# Patient Record
Sex: FEMALE | Race: WHITE | NOT HISPANIC OR LATINO | Employment: UNEMPLOYED | URBAN - METROPOLITAN AREA
[De-identification: names, ages, dates, MRNs, and addresses within clinical notes are randomized per-mention and may not be internally consistent; named-entity substitution may affect disease eponyms.]

---

## 2017-01-16 ENCOUNTER — CARE COORDINATION (OUTPATIENT)
Dept: CARE COORDINATION | Facility: CLINIC | Age: 57
End: 2017-01-16

## 2017-01-16 NOTE — PROGRESS NOTES
Clinic Care Coordination Contact - Social Work - Follow-Up - 1-23-17  OUTREACH    Referral Information:  Referral Source: Specialist (Dr. Deal)  Reason for Contact:  Follow up regarding   Care Conference: No     Universal Utilization:   ED Visits in last year: 0  Hospital visits in last year: 0  Last PCP appointment: 08/08/16  Missed Appointments: 0  Concerns:  (no)  Multiple Providers or Specialists: FP, Ortho, PT    Clinical Concerns:  Current Medical Concerns:      Bursitis, hip   Primary osteoarthritis of both hips   Health Care Home   GERD (gastroesophageal reflux disease)   Hypothyroidism   Obesity   Bipolar 2 disorder (H)   Peripheral edema   KALPESH (obstructive sleep apnea)   Hypertension goal BP (blood pressure) < 140/90   Hyperlipidemia LDL goal <130   Mild depressed bipolar I disorder (H)   Degenerative joint disease (DJD) of hip             Current Behavioral Concerns:  bipolar    Education Provided to patient: Yes, regarding mental health resources   Clinical Pathway Name: None      Medication Management:  Pt does own     Functional Status:  Mobility Status: Independent w/Device  Equipment Currently Used at Home: walker, rolling  Transportation:  Pt has car, son and boyfriend drive           Psychosocial:  Current living arrangement:  I live in a private home with family (son), Other (boyfriend resides with pt also)  Financial/Insurance:  Finances are tight, pt and boyfriend both receive disability and son receives food support/[employer], pt has used food shelves in the past though declines now due to the type of food they provide.     Resources and Interventions:    SW contacted pt via phone today and talked at length.  Pt's situation remains much the same as in previous contacts.  Pt reports that she has been unable to exercise or lose weight in preparation for hip surgery.  Pt reports that she has pain which makes it difficult for her to exercise or get around.  Pt's son, who has asperger's,  assists pt with some things around the home.  Pt's son is involved with services at [place].  Pt's father remains ill with cancer and pt's cat  recently.  Pt acknowledges that she has bipolar disorder and struggles more with it in the winter months.  Pt continues to see a psychiatrist regulary and takes mental health medications as listed on her medication list.  SW discussed with pt the benefits of counseling though pt declines.  Pt reports she saw a counselor for one year in [phone] though does not want to pursue it now.  SW discussed at length with pt her desire to get a second opinion regarding potential hip surgery.  Pt has not pursued a second opinion thugh communicates that she wants to.  SW discussed pt focusing in the present as pt gets overwhelmed with thinking ahead regarding all the decisions she would need to make regarding surgery.  SW discussed pt focusing only on setting up an orthopedic appt.  Pt receptive to doing this.  SW provided active listening, encouragement, and support to pt during contact.      Current Resources:  Pt to talk to her friend regarding orthopedic options in [facility].  PAS Number:  (na)  Senior Linkage Line Referral Placed:  (na)  Advanced Care Plans/Directives on file:: No  Referrals Placed:  (none)     Goals:   Goal 1 Statement:  (I would like a second opinion regarding surgery)  Goal 1 Progression Percent: 10%  Goal 1 Progression Date: 17              Barriers:  Pt having difficulty moving forward with setting up ortho appt as pt becomes overwhelmed with process   Strengths:  Pt communicates desire to get 2nd opinion      Patient/Caregiver understanding:  Pt has lack of insight into her situation  Frequency of Care Coordination:  (prn)  Upcoming appointment:  (none scheduled)     Plan:  SW will follow up with pt in one month regarding getting ortho appt set up.       DOUGLAS Ferguson  Care Coordinator - Social Work  [facility]  Office:  [phone]  1/23/2017 11:00 AM    Clinic Care Coordination Contact - Social Work - Follow-Up - 1-16-17  [facility]/Voicemail    Referral Source: Specialist (Dr. Deal)  Clinical Data: SW/Care Coordinator Outreach to pt regarding hip surgery.    Outreach attempted x1.  Left message on voicemail with call back information and requested return call.  Plan: SW/Care Coordinator will follow up with pt in 3-5 days if pt does not call SW back by then.    DOUGLAS Ferguson  Care Coordinator - Social Work  [facility]  Office:  [phone]  1/16/2017 9:46 AM

## 2017-02-08 ENCOUNTER — TELEPHONE (OUTPATIENT)
Dept: FAMILY MEDICINE | Facility: CLINIC | Age: 57
End: 2017-02-08

## 2017-02-08 DIAGNOSIS — K21.9 GASTROESOPHAGEAL REFLUX DISEASE WITHOUT ESOPHAGITIS: Primary | ICD-10-CM

## 2017-02-08 RX ORDER — ESOMEPRAZOLE MAGNESIUM 40 MG/1
40 CAPSULE, DELAYED RELEASE ORAL 2 TIMES DAILY
Qty: 180 CAPSULE | Refills: 1 | Status: SHIPPED | OUTPATIENT
Start: 2017-02-08 | End: 2017-06-21

## 2017-02-08 NOTE — TELEPHONE ENCOUNTER
Patient notified, she will call back with any questions or concerns.    SANJAY Youssef, Clinical RN [facility].

## 2017-02-08 NOTE — TELEPHONE ENCOUNTER
Please notify patient that I have sent the Nexium to her pharmacy for the BID dosing and she should f/u with Dr. Mccloud for further refills.  She should try and see if her symptoms are relieved with just once per day dosing of this medication.     Will forward to SBF as an FYI to f/u on  I see that it has been BID dosing for quite some time, however at last visit PPI risk was discussed (no mention of a dose change but the sig was changed at that appointment, the quantity was not changed).   7. Gastroesophageal reflux disease without esophagitis  Controlled but PPI risk discussed. Discussed hiatal hernia repair as better long term treatment she will think about his.  - esomeprazole (NEXIUM) 40 MG capsule; Take 1 capsule (40 mg) by mouth every morning (before breakfast) Take 30-60 minutes before eating.  Dispense: 180 capsule; Refill: 1    Raissa Newell PA-C

## 2017-02-08 NOTE — TELEPHONE ENCOUNTER
..Reason for Call:  Prescription for the esomeprazole and change dosing/needs new script    Detailed comments: script should say: take 2 per day, qty of 180; it is written :  take one for breakfast so filled only for # 90 for 90 day supply; has been getting it for a long time for 2 per day, now is close to being out  [facility] [phone]  Phone Number Patient can be reached at: Home number on file [phone] (home)    Best Time: anytime    Can we leave a detailed message on this number? YES    Call taken on 2/8/2017 at 12:19 PM by Marie Johnson

## 2017-02-23 ENCOUNTER — CARE COORDINATION (OUTPATIENT)
Dept: CARE COORDINATION | Facility: CLINIC | Age: 57
End: 2017-02-23

## 2017-02-23 NOTE — PROGRESS NOTES
Clinic Care Coordination Contact - Social Work - Follow-Up 3-2-17  Care Team Conversations    SW contacted pt via phone today to follow-up regarding getting a second opinion regarding hip surgery.  Pt reports that she plans to make appt with MD in [place] within this month, near where her friend lives, to get 2nd opinion.  SW provided encouragement and support during contact.  SW will follow up with pt in 4 weeks.     DOUGLAS Ferguson  Care Coordinator - [facility] Work  [facility]  Office:  [phone]  3/2/2017 3:18 PM    Clinic Care Coordination Contact - Social Work - Follow-Up - 2-23-17  [facility]/Voicemail    Referral Source: Specialist (Dr. Deal)  Clinical Data: SW/Care Coordinator Outreach to follow-up with pt regarding second opinion for hip surgery  Outreach attempted x1.  Left message on voicemail with call back information and requested return call.  Plan: SW/Care Coordinator will follow-up with pt in 3-5 days if pt does not call SW back by then     DOUGLAS Ferguson  Care Coordinator - Social Work  [facility]  Office:  [phone]  2/23/2017 11:21 AM

## 2017-03-27 ENCOUNTER — OFFICE VISIT (OUTPATIENT)
Dept: FAMILY MEDICINE | Facility: CLINIC | Age: 57
End: 2017-03-27
Payer: COMMERCIAL

## 2017-03-27 VITALS
TEMPERATURE: 98.4 F | BODY MASS INDEX: 43.38 KG/M2 | HEIGHT: 65 IN | WEIGHT: 260.38 LBS | SYSTOLIC BLOOD PRESSURE: 132 MMHG | OXYGEN SATURATION: 96 % | HEART RATE: 66 BPM | DIASTOLIC BLOOD PRESSURE: 80 MMHG

## 2017-03-27 DIAGNOSIS — M16.4 POST-TRAUMATIC OSTEOARTHRITIS OF BOTH HIPS: Primary | ICD-10-CM

## 2017-03-27 DIAGNOSIS — E66.01 MORBID OBESITY DUE TO EXCESS CALORIES (H): ICD-10-CM

## 2017-03-27 DIAGNOSIS — N32.89 BLADDER SPASM: ICD-10-CM

## 2017-03-27 DIAGNOSIS — M16.0 PRIMARY OSTEOARTHRITIS OF BOTH HIPS: ICD-10-CM

## 2017-03-27 DIAGNOSIS — K21.9 GASTROESOPHAGEAL REFLUX DISEASE, ESOPHAGITIS PRESENCE NOT SPECIFIED: ICD-10-CM

## 2017-03-27 DIAGNOSIS — F31.81 BIPOLAR 2 DISORDER (H): ICD-10-CM

## 2017-03-27 LAB
ALBUMIN UR-MCNC: ABNORMAL MG/DL
APPEARANCE UR: CLEAR
BACTERIA #/AREA URNS HPF: ABNORMAL /HPF
BILIRUB UR QL STRIP: NEGATIVE
COLOR UR AUTO: YELLOW
GLUCOSE UR STRIP-MCNC: NEGATIVE MG/DL
HGB UR QL STRIP: NEGATIVE
HYALINE CASTS #/AREA URNS LPF: ABNORMAL /LPF (ref 0–2)
KETONES UR STRIP-MCNC: ABNORMAL MG/DL
LEUKOCYTE ESTERASE UR QL STRIP: NEGATIVE
MUCOUS THREADS #/AREA URNS LPF: PRESENT /LPF
NITRATE UR QL: NEGATIVE
NON-SQ EPI CELLS #/AREA URNS LPF: ABNORMAL /LPF
PH UR STRIP: 6 PH (ref 5–7)
RBC #/AREA URNS AUTO: ABNORMAL /HPF (ref 0–2)
SP GR UR STRIP: 1.02 (ref 1–1.03)
URN SPEC COLLECT METH UR: ABNORMAL
UROBILINOGEN UR STRIP-ACNC: 1 EU/DL (ref 0.2–1)
WBC #/AREA URNS AUTO: ABNORMAL /HPF (ref 0–2)

## 2017-03-27 PROCEDURE — 81001 URINALYSIS AUTO W/SCOPE: CPT | Performed by: FAMILY MEDICINE

## 2017-03-27 PROCEDURE — 99214 OFFICE O/P EST MOD 30 MIN: CPT | Performed by: FAMILY MEDICINE

## 2017-03-27 NOTE — NURSING NOTE
"Chief Complaint   Patient presents with     Medication Problem       Initial /80  Pulse 66  Temp 98.4  F (36.9  C) (Oral)  Ht 5' 5\" (1.651 m)  Wt 260 lb 6 oz (118.1 kg)  SpO2 96%  Breastfeeding? No  BMI 43.33 kg/m2 Estimated body mass index is 43.33 kg/(m^2) as calculated from the following:    Height as of this encounter: 5' 5\" (1.651 m).    Weight as of this encounter: 260 lb 6 oz (118.1 kg).  Medication Reconciliation: complete     Radha Lane CMA      "

## 2017-03-27 NOTE — MR AVS SNAPSHOT
After Visit Summary   3/27/2017    Geovanna Aguilar    MRN: [id number]           Patient Information     Date Of Birth          1960        Visit Information        Provider Department      3/27/2017 1:20 PM Leah Mae MD [facility]        Today's Diagnoses     Post-traumatic osteoarthritis of both hips    -  1    Primary osteoarthritis of both hips        Gastroesophageal reflux disease, esophagitis presence not specified        Bladder spasm        Bipolar 2 disorder (H)        Morbid obesity due to excess calories (H)           Follow-ups after your visit        Additional Services     GASTROENTEROLOGY ADULT REF PROCEDURE ONLY       Last Lab Result: Creatinine (mg/dL)       Date                     Value                 08/08/2016               0.83             ----------  Body mass index is 43.33 kg/(m^2).     Needed:  No  Language:  English    Patient will be contacted to schedule procedure.     Please be aware that coverage of these services is subject to the terms and limitations of your health insurance plan.  Call member services at your health plan with any benefit or coverage questions.  Any procedures must be performed at a [place] facility OR coordinated by your clinic's referral office.    Please bring the following with you to your appointment:    (1) Any X-Rays, CTs or MRIs which have been performed.  Contact the facility where they were done to arrange for  prior to your scheduled appointment.    (2) List of current medications   (3) This referral request   (4) Any documents/labs given to you for this referral            ORTHO  REFERRAL       [facility] is referring you to the Orthopedic  Services at [facility].       The  Representative will assist you in the coordination of your Orthopedic and Musculoskeletal Care as prescribed by your physician.    The  [facility] Representative will call you within 1 business day to help schedule your appointment, or you may contact the  Representative at:    All areas ~ [phone]     Type of Referral : Surgical / Specialist Requesting [place]      Timeframe requested: Routine    Coverage of these services is subject to the terms and limitations of your health insurance plan.  Please call member services at your health plan with any benefit or coverage questions.      If X-rays, CT or MRI's have been performed, please contact the facility where they were done to arrange for , prior to your scheduled appointment.  Please bring this referral request to your appointment and present it to your specialist.                  Your next 10 appointments already scheduled     Mar 27, 2017  1:20 PM CDT   Office Visit with Leah Rhoades MD   [facility]    [address] [place] [id number] [phone]           Bring a current list of meds and any records pertaining to this visit.  For Physicals, please bring immunization records and any forms needing to be filled out.  Please arrive 10 minutes early to complete paperwork.              Who to contact     If you have questions or need follow up information about today's clinic visit or your schedule please contact [facility] directly at [phone].  Normal or non-critical lab and imaging results will be communicated to you by MyChart, letter or phone within 4 business days after the clinic has received the results. If you do not hear from us within 7 days, please contact the clinic through MyChart or phone. If you have a critical or abnormal lab result, we will notify you by phone as soon as possible.  Submit refill requests through [employer] or call your pharmacy and they will forward the refill request to us. Please allow 3 business days for your refill to be  "completed.          Additional Information About Your Visit        Tech[employer]er[employer]StyleChat by [employer] Mobile Information     Precogn[employer] lets you send messages to your doctor, view your test results, renew your prescriptions, schedule appointments and more. To sign up, go to [email or web address] . Click on \"Log in\" on the left side of the screen, which will take you to the Welcome page. Then click on \"Sign up Now\" on the right side of the page.     You will be asked to enter the access code listed below, as well as some personal information. Please follow the directions to create your username and password.     Your access code is: [email or web address]  Expires: 2017  1:18 PM     Your access code will  in 90 days. If you need help or a new code, please call your [place] clinic or [phone].        Care EveryWhere ID     This is your [facility] EveryWhere ID. This could be used by other organizations to access your [place] medical records  [id number]        Your Vitals Were     Pulse Temperature Height Pulse Oximetry Breastfeeding? BMI (Body Mass Index)    66 98.4  F (36.9  C) (Oral) 5' 5\" (1.651 m) 96% No 43.33 kg/m2       Blood Pressure from Last 3 Encounters:   17 132/80   16 130/69   16 122/64    Weight from Last 3 Encounters:   17 260 lb 6 oz (118.1 kg)   16 275 lb 8 oz (125 kg)   16 275 lb (124.7 kg)              We Performed the Following     *UA reflex to Microscopic and Culture ([place] and [facility] (except [place] and [place])     GASTROENTEROLOGY ADULT REF PROCEDURE ONLY     ORTHO  REFERRAL        Primary Care Provider Office Phone # Fax #    Leah Rhoades [phone]       [facility] [address]  [facility] [address]        Thank you!     Thank you for choosing [facility]  for your care. Our goal is always to provide you with excellent care. Hearing back from our patients is one way we can continue to " improve our services. Please take a few minutes to complete the written survey that you may receive in the mail after your visit with us. Thank you!             Your Updated Medication List - Protect others around you: Learn how to safely use, store and throw away your medicines at www.disposemymeds.org.          This list is accurate as of: 3/27/17  1:18 PM.  Always use your most recent med list.                   Brand Name Dispense Instructions for use    AMBIEN 10 MG tablet   Generic drug:  zolpidem      1 TABLET AT BEDTIME       buPROPion 300 MG 24 hr tablet    WELLBUTRIN XL     1 TABLET DAILY       cyclobenzaprine 10 MG tablet    FLEXERIL    30 tablet    Take 1 tablet (10 mg) by mouth 3 times daily as needed for muscle spasms       diclofenac 75 MG EC tablet    VOLTAREN    60 tablet    Take 1 tablet (75 mg) by mouth 2 times daily as needed for moderate pain       divalproex 500 MG 24 hr tablet    DEPAKOTE ER     Reported on 3/27/2017       esomeprazole 40 MG CR capsule    nexIUM    180 capsule    Take 1 capsule (40 mg) by mouth 2 times daily Take 30-60 minutes before eating. Needs to be seen by provider for further refills       levothyroxine 150 MCG tablet    SYNTHROID/LEVOTHROID    90 tablet    Take 1 tablet (150 mcg) by mouth daily       LORazepam 0.5 MG tablet    ATIVAN    2 tablet    Take 1 prior to procedure, can take 2 if needed       metoprolol 25 MG tablet    LOPRESSOR    180 tablet    Take 1 tablet (25 mg) by mouth 2 times daily       polyethylene glycol powder    MIRALAX    510 g    Take 17 g by mouth daily       simvastatin 40 MG tablet    ZOCOR    90 tablet    Take 1 tablet (40 mg) by mouth At Bedtime       traMADol 50 MG tablet    ULTRAM    90 tablet    Take 1-2 tablets ( mg) by mouth every 6 hours as needed for severe pain       zyPREXA 5 MG tablet   Generic drug:  OLANZapine      Reported on 3/27/2017

## 2017-03-27 NOTE — PROGRESS NOTES
MsRamona Rehmanavnipetar,    Your urine does not show any infection.    Please contact the clinic if you have additional questions.  Thank you.    Sincerely,    Leah Rhoades

## 2017-03-27 NOTE — PROGRESS NOTES
SUBJECTIVE:                                                    Geovanna Aguilar is a 56 year old female who presents to clinic today for the following health issues:      Medication Followup of  Nexium     Taking Medication as prescribed: yes    Side Effects:  Dizziness, muscle weakness/ aches, hand tremors     Medication Helping Symptoms:  Yes    8 months ago  Has been having problems when needing to go to the bathroom suddenly comes on    Pinch nerve in low back  Radiates down leg      Bilateral hip pain for months.  Right > left pain but left is better after cortisone.  Right hip have progressively worsened though.    GERD - on nexium bid for months and now a little more interested in doing something else.    Urine issues - present for last 8 months.  Sudden urge to go and has to go right away.  Has some leakage with cough and sneezing occasionally.    Obesity - has been working on weight loss as per patient ortho provider said she needs to lose 50 lbs for surgery.    Bipolar 2 - seeing psychiatry and doing well      Problem list and histories reviewed & adjusted, as indicated.  Additional history: as documented    Patient Active Problem List   Diagnosis     GERD (gastroesophageal reflux disease)     Hypothyroidism     Obesity     Bipolar 2 disorder (H)     Peripheral edema     KALPESH (obstructive sleep apnea)     Hypertension goal BP (blood pressure) < 140/90     Hyperlipidemia LDL goal <130     Mild depressed bipolar I disorder (H)     Degenerative joint disease (DJD) of hip     Bursitis, hip     Primary osteoarthritis of both hips     Health Care Home     Bladder spasm     Past Surgical History:   Procedure Laterality Date     C ABLATION SUPRAVENT ARRHYTHMOGENIC FOCUS  2001     CRYOTHERAPY, CERVICAL       HC TOOTH EXTRACTION W/FORCEP       HYSTERECTOMY SUPRACERVICAL  5/06    due to endometrial hyperplasia     SALPINGO OOPHORECTOMY,R/L/KEVIN  5/06    Salpingo Oophorectomy, RT     SURGICAL HISTORY OF -   1974     "Repair soft tissue of right arm     TUBAL LIGATION       [facility] End[facility]  12       Social History   Substance Use Topics     Smoking status: Never Smoker     Smokeless tobacco: Never Used      Comment: boyfriend smokes     Alcohol use Yes      Comment: rarely     Family History   Problem Relation Age of Onset     C.A.D. Father      Alzheimer Disease Paternal Grandmother      CANCER Mother 69     lung cancer now      DIABETES Son      Type 1           Reviewed and updated as needed this visit by clinical staff       Reviewed and updated as needed this visit by Provider         ROS:  Constitutional, HEENT, cardiovascular, pulmonary, gi and gu systems are negative, except as otherwise noted.    OBJECTIVE:                                                    /80  Pulse 66  Temp 98.4  F (36.9  C) (Oral)  Ht 5' 5\" (1.651 m)  Wt 260 lb 6 oz (118.1 kg)  SpO2 96%  Breastfeeding? No  BMI 43.33 kg/m2  Body mass index is 43.33 kg/(m^2).  GENERAL: healthy, alert and no distress, obese  NECK: no adenopathy, no asymmetry, masses, or scars and thyroid normal to palpation  RESP: lungs clear to auscultation - no rales, rhonchi or wheezes  CV: regular rate and rhythm, normal S1 S2, no S3 or S4, no murmur, click or rub, no peripheral edema and peripheral pulses strong  ABDOMEN: soft, nontender, no hepatosplenomegaly, no masses and bowel sounds normal  MS: ambulating with walker  PSYCH: mentation appears normal, affect normal/bright    Diagnostic Test Results:  none      ASSESSMENT/PLAN:                                                    1. Post-traumatic osteoarthritis of both hips  Referral for second opinion given severe OA  - ORTHO  REFERRAL    2. Primary osteoarthritis of both hips  As above  - ORTHO  REFERRAL    3. Gastroesophageal reflux disease, esophagitis presence not specified  Discussed apple cider vinegar drink, change nexium to zanatac and EGD for status assessment  - GASTROENTEROLOGY " ADULT REF PROCEDURE ONLY    4. Bladder spasm  Check for infection, Kegel exercises and weight loss.  - *UA reflex to Microscopic and Culture ([place] [facility] (except [place] and [place])    5. Bipolar 2 disorder (H)  Continue as per ortho    6. Morbid obesity due to excess calories (H)  Low carb eating recommended.    The uses and side effects, including black box warnings as appropriate, were discussed in detail.  All patient questions were answered.  The patient was instructed to call immediately if any side effects developed.       FUTURE APPOINTMENTS:       - Follow-up visit in August at latest or as needed.    Leah Rhoades MD  [facility]

## 2017-03-30 ENCOUNTER — CARE COORDINATION (OUTPATIENT)
Dept: CARE COORDINATION | Facility: CLINIC | Age: 57
End: 2017-03-30

## 2017-03-30 NOTE — PROGRESS NOTES
Clinic Care Coordination Contact - Social Work - Follow-Up - 4-6-17  [facility]/Voicemail    Referral Source: Specialist (Dr. Deal)  Clinical Data: SW/Care Coordinator Outreach to pt regarding getting second opinion regarding whether pt can have hip surgery.  Pt saw Dr. Blank on 4-4-17 and he did not feel comfortable doing hip surgery due to pt's obesity.  Dr. Blank referred pt back to Dr. Deal or Dr Saxena.  Pt has appt set up with Dr. Saxena on 4-24-17  Outreach attempted x2.  Left message on voicemail with call back information and requested return call.  Plan: SW/Care Coordinator will follow-up with pt in 3-5 days, if pt has not called SW back by then    DOUGLAS Ferguson  Care Coordinator - [facility] Work  [facility]  Office:  [phone]  4/6/2017 10:48 AM    Clinic Care Coordination Contact - Social Work - Follow-Up - 3-30-17  [facility]/Voicemail    Referral Source: Specialist (Dr. Deal)  Clinical Data: SW/Care Coordinator Outreach to pt regarding getting second opinion regarding orthpedic surgery  Outreach attempted X1.  Left message on voicemail with call back information and requested return call.  Plan: SW/Care Coordinator  Will follow up with pt in 3-5 days.    DOUGLAS Ferguson  Care Coordinator - [facility] Work  [facility]  Office:  [phone]  3/30/2017 9:47 AM

## 2017-04-04 ENCOUNTER — RADIANT APPOINTMENT (OUTPATIENT)
Dept: GENERAL RADIOLOGY | Facility: CLINIC | Age: 57
End: 2017-04-04
Attending: ORTHOPAEDIC SURGERY
Payer: COMMERCIAL

## 2017-04-04 ENCOUNTER — OFFICE VISIT (OUTPATIENT)
Dept: ORTHOPEDICS | Facility: CLINIC | Age: 57
End: 2017-04-04
Payer: COMMERCIAL

## 2017-04-04 VITALS — RESPIRATION RATE: 16 BRPM | BODY MASS INDEX: 42.65 KG/M2 | HEIGHT: 65 IN | WEIGHT: 256 LBS

## 2017-04-04 DIAGNOSIS — M16.0 OSTEOARTHRITIS OF BOTH HIPS, UNSPECIFIED OSTEOARTHRITIS TYPE: Primary | ICD-10-CM

## 2017-04-04 PROCEDURE — 73522 X-RAY EXAM HIPS BI 3-4 VIEWS: CPT

## 2017-04-04 PROCEDURE — 99243 OFF/OP CNSLTJ NEW/EST LOW 30: CPT | Performed by: ORTHOPAEDIC SURGERY

## 2017-04-04 NOTE — LETTER
2017       RE: Geovanna Aguilar  [address] [facility] [address]           Dear Colleague,    Thank you for referring your patient, Geovanna Aguilar, to the [facility]. Please see a copy of my visit note below.    Geovanna Aguilar is a 56 year old female who is seen in consultation at the request of Dr. Leah Mccloud  for right hip osteoarthritis.    Past Medical History:   Diagnosis Date     Bipolar 2 disorder (H)      GERD (gastroesophageal reflux disease)      Hiatal hernia      Hyperlipidemia LDL goal < 100      Hypertension      Hypothyroidism      Obesity      Obstructive sleep apnea      Peripheral edema      Schizophrenia (H)        Past Surgical History:   Procedure Laterality Date     C ABLATION SUPRAVENT ARRHYTHMOGENIC FOCUS       CRYOTHERAPY, CERVICAL       HC TOOTH EXTRACTION W/FORCEP       HYSTERECTOMY SUPRACERVICAL      due to endometrial hyperplasia     SALPINGO OOPHORECTOMY,R/L/KEVIN      Salpingo Oophorectomy, RT     SURGICAL HISTORY OF -       Repair soft tissue of right arm     TUBAL LIGATION       [facility]  12       Family History   Problem Relation Age of Onset     C.A.D. Father      Alzheimer Disease Paternal Grandmother      CANCER Mother 69     lung cancer now      DIABETES Son      Type 1       Social History     Social History     Marital status:      Spouse name: N/A     Number of children: N/A     Years of education: N/A     Occupational History     Not on file.     Social History Main Topics     Smoking status: Never Smoker     Smokeless tobacco: Never Used      Comment: boyfriend smokes     Alcohol use Yes      Comment: rarely     Drug use: No     Sexual activity: Yes     Partners: Male     Other Topics Concern     Parent/Sibling W/ Cabg, Mi Or Angioplasty Before 65f 55m? No     Social History Narrative       Current Outpatient Prescriptions   Medication Sig Dispense Refill      esomeprazole (NEXIUM) 40 MG CR capsule Take 1 capsule (40 mg) by mouth 2 times daily Take 30-60 minutes before eating. Needs to be seen by provider for further refills 180 capsule 1     traMADol (ULTRAM) 50 MG tablet Take 1-2 tablets ( mg) by mouth every 6 hours as needed for severe pain 90 tablet 1     divalproex (DEPAKOTE ER) 500 MG 24 hr tablet Reported on 3/27/2017       simvastatin (ZOCOR) 40 MG tablet Take 1 tablet (40 mg) by mouth At Bedtime 90 tablet 4     metoprolol (LOPRESSOR) 25 MG tablet Take 1 tablet (25 mg) by mouth 2 times daily 180 tablet 4     levothyroxine (SYNTHROID, LEVOTHROID) 150 MCG tablet Take 1 tablet (150 mcg) by mouth daily 90 tablet 3     LORazepam (ATIVAN) 0.5 MG tablet Take 1 prior to procedure, can take 2 if needed 2 tablet 0     polyethylene glycol (MIRALAX) powder Take 17 g by mouth daily (Patient not taking: Reported on 3/27/2017) 510 g 1     cyclobenzaprine (FLEXERIL) 10 MG tablet Take 1 tablet (10 mg) by mouth 3 times daily as needed for muscle spasms (Patient not taking: Reported on 3/27/2017) 30 tablet 1     diclofenac (VOLTAREN) 75 MG EC tablet Take 1 tablet (75 mg) by mouth 2 times daily as needed for moderate pain (Patient not taking: Reported on 3/27/2017) 60 tablet 2     BUPROPION HCL# 300 MG OR TB24 1 TABLET DAILY       AMBIEN 10 MG OR TABS 1 TABLET AT BEDTIME       ZYPREXA 5 MG OR TABS Reported on 3/27/2017         Allergies   Allergen Reactions     Abilify [Aripiprazole]      Carafate [Sucralfate]      Lisinopril Cough     cough       REVIEW OF SYSTEMS:  CONSTITUTIONAL:  NEGATIVE for fever, chills, change in weight, not feeling tired  SKIN:  NEGATIVE for worrisome rashes, no skin lumps, no skin ulcers and no non-healing wounds  EYES:  NEGATIVE for vision changes or irritation.  ENT/MOUTH:  NEGATIVE.  No hearing loss, no hoarseness, no difficulty swallowing.  RESP:  NEGATIVE. No cough or shortness of breath.  CV:  NEGATIVE for chest pain, palpitations or peripheral  "edema  GI:  NEGATIVE for nausea, abdominal pain, heartburn, or change in bowel habits  :  Negative. No dysuria, no hematuria  MUSCULOSKELETAL:  See HPI above  NEURO:  NEGATIVE . No headaches, no dizziness,  no numbness  ENDOCRINE:  NEGATIVE for temperature intolerance, skin/hair changes  HEME/ALLERGY/IMMUNE:  NEGATIVE for bleeding problems  PSYCHIATRIC:  NEGATIVE. no anxiety, no depression.      Exam:  Vitals: Resp 16  Ht 1.651 m (5' 5\")  Wt 116.1 kg (256 lb)  BMI 42.6 kg/m2  BMI= Body mass index is 42.6 kg/(m^2).  Constitutional:  healthy, alert and no distress  Neuro: Alert and Oriented x 3, Gait with right abduction lurch. Sensation grossly WNL.  HEENT:  Atraumatic, EOMI  Neck:  Neck supple with no tenderness.  Psych: Affect normal   Respiratory: Breathing not labored.  Cardiovascular: normal peripheral pulses  Lymph: no adenopathy  Skin: No rashes,worrisome lesions or skin problems        Again, thank you for allowing me to participate in the care of your patient.        Sincerely,              Power Blank MD    "

## 2017-04-04 NOTE — PROGRESS NOTES
DATE OF VISIT:  04/04/2017      Ms. Geovanna Aguilar is a 56-year-old female referred from Dr. Leah Mccloud for a second opinion regarding right hip osteoarthritis and pain.  She has had pain in the hip for several years.  It is getting much worse in the last 2 years.  She is a disabled  because of the hip.  She has a sharp aching pain rated between 8 and 10/10.  She has severe osteoarthritis of the hip and has been seen by Dr. Santiago Deal in 08/2016.  At that time she had a BMI of 45 and he advised weight loss prior to hip replacement, but realized that the hip was severe enough that we may not be able to get her down low enough before needing to do a hip replacement.  He referred to a nutritionist which she saw in September.  They placed her on a [id number] calorie diet.  She has been sticking fairly close to this and has dropped her weight from 275 pounds in September to 256 pounds today.  She is seen now for a second opinion, wondering if I would proceed with hip replacement without waiting for weight loss.  She was instructed to see Dr. Deal back in late fall, but she has not gone back to see him yet.  She was also instructed to see the nutritionist in followup and has not gone back to see her after the September visit.        X-ray of the pelvis and hip shows severe osteoarthritis of the right hip.  There is lateral subluxation of the hip, superior subluxation.  The only thing keeping the hip in position is the spurs that are still covering superiorly.  She has an enlarged femoral head on the right, very small on the left.  There is considerable shortening of the right hip with the subluxation.  She notes significant stresses to her life as well with family illnesses.      Examination shows a patient with significantly antalgic gait and an abductor lurch on the right.  She has pain with motion of both hips.  There is very little mobility and rotation on the right hip.  The left hip  has better mobility with 30-40 degrees external rotation and 20 degrees internal rotation.  Sensation and circulation are intact.      IMPRESSION:  Severe right hip osteoarthritis with continued obesity.  She has successfully lost 20 pounds and this is definitely moving in the right direction.  Because of the severity of her hip anatomy, I do not feel I would feel comfortable taking care of this yet and have advised either visiting back with Dr. Santiago Deal or seeing Dr. Neal Saxena for evaluation.  We will see back deb VILLEGAS MD             D: 2017 12:29   T: 2017 13:52   MT: SK      Name:     TYRESE MCBRIDE   MRN:      [id number]        Account:      [id number]   :      1960           Visit Date:   2017      Document: [id number]

## 2017-04-04 NOTE — PROGRESS NOTES
Geovanna Aguilar is a 56 year old female who is seen in consultation at the request of Dr. Leah Mccloud  for right hip osteoarthritis.    Past Medical History:   Diagnosis Date     Bipolar 2 disorder (H)      GERD (gastroesophageal reflux disease)      Hiatal hernia      Hyperlipidemia LDL goal < 100      Hypertension      Hypothyroidism      Obesity      Obstructive sleep apnea      Peripheral edema      Schizophrenia (H)        Past Surgical History:   Procedure Laterality Date     C ABLATION SUPRAVENT ARRHYTHMOGENIC FOCUS       CRYOTHERAPY, CERVICAL       HC TOOTH EXTRACTION W/FORCEP       HYSTERECTOMY SUPRACERVICAL      due to endometrial hyperplasia     SALPINGO OOPHORECTOMY,R/L/KEVIN      Salpingo Oophorectomy, RT     SURGICAL HISTORY OF -       Repair soft tissue of right arm     TUBAL LIGATION       [facility]  12       Family History   Problem Relation Age of Onset     C.A.D. Father      Alzheimer Disease Paternal Grandmother      CANCER Mother 69     lung cancer now      DIABETES Son      Type 1       Social History     Social History     Marital status:      Spouse name: N/A     Number of children: N/A     Years of education: N/A     Occupational History     Not on file.     Social History Main Topics     Smoking status: Never Smoker     Smokeless tobacco: Never Used      Comment: boyfriend smokes     Alcohol use Yes      Comment: rarely     Drug use: No     Sexual activity: Yes     Partners: Male     Other Topics Concern     Parent/Sibling W/ Cabg, Mi Or Angioplasty Before 65f 55m? No     Social History Narrative       Current Outpatient Prescriptions   Medication Sig Dispense Refill     esomeprazole (NEXIUM) 40 MG CR capsule Take 1 capsule (40 mg) by mouth 2 times daily Take 30-60 minutes before eating. Needs to be seen by provider for further refills 180 capsule 1     traMADol (ULTRAM) 50 MG tablet Take 1-2 tablets ( mg) by mouth every 6 hours as  needed for severe pain 90 tablet 1     divalproex (DEPAKOTE ER) 500 MG 24 hr tablet Reported on 3/27/2017       simvastatin (ZOCOR) 40 MG tablet Take 1 tablet (40 mg) by mouth At Bedtime 90 tablet 4     metoprolol (LOPRESSOR) 25 MG tablet Take 1 tablet (25 mg) by mouth 2 times daily 180 tablet 4     levothyroxine (SYNTHROID, LEVOTHROID) 150 MCG tablet Take 1 tablet (150 mcg) by mouth daily 90 tablet 3     LORazepam (ATIVAN) 0.5 MG tablet Take 1 prior to procedure, can take 2 if needed 2 tablet 0     polyethylene glycol (MIRALAX) powder Take 17 g by mouth daily (Patient not taking: Reported on 3/27/2017) 510 g 1     cyclobenzaprine (FLEXERIL) 10 MG tablet Take 1 tablet (10 mg) by mouth 3 times daily as needed for muscle spasms (Patient not taking: Reported on 3/27/2017) 30 tablet 1     diclofenac (VOLTAREN) 75 MG EC tablet Take 1 tablet (75 mg) by mouth 2 times daily as needed for moderate pain (Patient not taking: Reported on 3/27/2017) 60 tablet 2     BUPROPION HCL# 300 MG OR TB24 1 TABLET DAILY       AMBIEN 10 MG OR TABS 1 TABLET AT BEDTIME       ZYPREXA 5 MG OR TABS Reported on 3/27/2017         Allergies   Allergen Reactions     Abilify [Aripiprazole]      Carafate [Sucralfate]      Lisinopril Cough     cough       REVIEW OF SYSTEMS:  CONSTITUTIONAL:  NEGATIVE for fever, chills, change in weight, not feeling tired  SKIN:  NEGATIVE for worrisome rashes, no skin lumps, no skin ulcers and no non-healing wounds  EYES:  NEGATIVE for vision changes or irritation.  ENT/MOUTH:  NEGATIVE.  No hearing loss, no hoarseness, no difficulty swallowing.  RESP:  NEGATIVE. No cough or shortness of breath.  CV:  NEGATIVE for chest pain, palpitations or peripheral edema  GI:  NEGATIVE for nausea, abdominal pain, heartburn, or change in bowel habits  :  Negative. No dysuria, no hematuria  MUSCULOSKELETAL:  See HPI above  NEURO:  NEGATIVE . No headaches, no dizziness,  no numbness  ENDOCRINE:  NEGATIVE for temperature intolerance,  "skin/hair changes  HEME/ALLERGY/IMMUNE:  NEGATIVE for bleeding problems  PSYCHIATRIC:  NEGATIVE. no anxiety, no depression.      Exam:  Vitals: Resp 16  Ht 1.651 m (5' 5\")  Wt 116.1 kg (256 lb)  BMI 42.6 kg/m2  BMI= Body mass index is 42.6 kg/(m^2).  Constitutional:  healthy, alert and no distress  Neuro: Alert and Oriented x 3, Gait with right abduction lurch. Sensation grossly WNL.  HEENT:  Atraumatic, EOMI  Neck:  Neck supple with no tenderness.  Psych: Affect normal   Respiratory: Breathing not labored.  Cardiovascular: normal peripheral pulses  Lymph: no adenopathy  Skin: No rashes,worrisome lesions or skin problems      "

## 2017-04-04 NOTE — PATIENT INSTRUCTIONS
Visit Dr. Neal Saxena or Dr Santiago Deal for evaluation of right hip.  Continue weight loss.  Right hip is severe enough that the [facility] expertise will be necessary for a total hip arthroplasty.

## 2017-04-04 NOTE — MR AVS SNAPSHOT
After Visit Summary   4/4/2017    Geovanna Aguilar    MRN: [id number]           Patient Information     Date Of Birth          1960        Visit Information        Provider Department      4/4/2017 9:45 AM Power Blank MD [facility]        Today's Diagnoses     Osteoarthritis of both hips, unspecified osteoarthritis type    -  1       Follow-ups after your visit        Additional Services     ORTHO  REFERRAL       [facility] is referring you to the Orthopedic  Services at [facility].       The  Representative will assist you in the coordination of your Orthopedic and Musculoskeletal Care as prescribed by your physician.    The  Representative will call you within 1 business day to help schedule your appointment, or you may contact the  Representative at:    All areas ~ [phone]     Type of Referral : Surgical / Specialist Jreemy Saxena MD      Timeframe requested: 3 - 5 days    Coverage of these services is subject to the terms and limitations of your health insurance plan.  Please call member services at your health plan with any benefit or coverage questions.      If X-rays, CT or MRI's have been performed, please contact the facility where they were done to arrange for , prior to your scheduled appointment.  Please bring this referral request to your appointment and present it to your specialist.                  Who to contact     If you have questions or need follow up information about today's clinic visit or your schedule please contact [facility] directly at [phone].  Normal or non-critical lab and imaging results will be communicated to you by MyChart, letter or phone within 4 business days after the clinic has received the results. If you do not hear from us within 7 days, please contact the clinic through MyChart or phone. If you have a  "critical or abnormal lab result, we will notify you by phone as soon as possible.  Submit refill requests through [employer] or call your pharmacy and they will forward the refill request to us. Please allow 3 business days for your refill to be completed.          Additional Information About Your Visit        [employer] Information     [employer] lets you send messages to your doctor, view your test results, renew your prescriptions, schedule appointments and more. To sign up, go to [email or web address] . Click on \"Log in\" on the left side of the screen, which will take you to the Welcome page. Then click on \"Sign up Now\" on the right side of the page.     You will be asked to enter the access code listed below, as well as some personal information. Please follow the directions to create your username and password.     Your access code is: [email or web address]  Expires: 2017  1:18 PM     Your access code will  in 90 days. If you need help or a new code, please call your [facility] clinic or [phone].        Care EveryWhere ID     This is your Care EveryWhere ID. This could be used by other organizations to access your [facility] medical records  [id number]        Your Vitals Were     Respirations Height BMI (Body Mass Index)             16 1.651 m (5' 5\") 42.6 kg/m2          Blood Pressure from Last 3 Encounters:   17 132/80   16 130/69   16 122/64    Weight from Last 3 Encounters:   17 116.1 kg (256 lb)   17 118.1 kg (260 lb 6 oz)   16 125 kg (275 lb 8 oz)              We Performed the Following     ORTHO  REFERRAL     XR Pelvis and Hip Bilateral 1 View        Primary Care Provider Office Phone # Fax #    Leah Nevin Rhoades [phone]       [facility] [address]  [facility] [address]        Thank you!     Thank you for choosing [facility]  for your care. Our goal is always to provide you with " excellent care. Hearing back from our patients is one way we can continue to improve our services. Please take a few minutes to complete the written survey that you may receive in the mail after your visit with us. Thank you!             Your Updated Medication List - Protect others around you: Learn how to safely use, store and throw away your medicines at www.disposemymeds.org.          This list is accurate as of: 4/4/17 10:05 AM.  Always use your most recent med list.                   Brand Name Dispense Instructions for use    AMBIEN 10 MG tablet   Generic drug:  zolpidem      1 TABLET AT BEDTIME       buPROPion 300 MG 24 hr tablet    WELLBUTRIN XL     1 TABLET DAILY       cyclobenzaprine 10 MG tablet    FLEXERIL    30 tablet    Take 1 tablet (10 mg) by mouth 3 times daily as needed for muscle spasms       diclofenac 75 MG EC tablet    VOLTAREN    60 tablet    Take 1 tablet (75 mg) by mouth 2 times daily as needed for moderate pain       divalproex 500 MG 24 hr tablet    DEPAKOTE ER     Reported on 3/27/2017       esomeprazole 40 MG CR capsule    nexIUM    180 capsule    Take 1 capsule (40 mg) by mouth 2 times daily Take 30-60 minutes before eating. Needs to be seen by provider for further refills       levothyroxine 150 MCG tablet    SYNTHROID/LEVOTHROID    90 tablet    Take 1 tablet (150 mcg) by mouth daily       LORazepam 0.5 MG tablet    ATIVAN    2 tablet    Take 1 prior to procedure, can take 2 if needed       metoprolol 25 MG tablet    LOPRESSOR    180 tablet    Take 1 tablet (25 mg) by mouth 2 times daily       polyethylene glycol powder    MIRALAX    510 g    Take 17 g by mouth daily       simvastatin 40 MG tablet    ZOCOR    90 tablet    Take 1 tablet (40 mg) by mouth At Bedtime       traMADol 50 MG tablet    ULTRAM    90 tablet    Take 1-2 tablets ( mg) by mouth every 6 hours as needed for severe pain       zyPREXA 5 MG tablet   Generic drug:  OLANZapine      Reported on 3/27/2017

## 2017-04-04 NOTE — NURSING NOTE
"Chief Complaint   Patient presents with     Consult     Referred by Dr. Samantha Rhoades for bilateral hip pain.        Initial Resp 16  Ht 1.651 m (5' 5\")  Wt 116.1 kg (256 lb)  BMI 42.6 kg/m2 Estimated body mass index is 42.6 kg/(m^2) as calculated from the following:    Height as of this encounter: 1.651 m (5' 5\").    Weight as of this encounter: 116.1 kg (256 lb).  Medication Reconciliation: complete     FIORDALIZA VillegasC  Supervising physician: Power Blank MD  Dept. of Orthopedics  [facility]          "

## 2017-04-05 ENCOUNTER — PRE VISIT (OUTPATIENT)
Dept: ORTHOPEDICS | Facility: CLINIC | Age: 57
End: 2017-04-05

## 2017-04-05 NOTE — TELEPHONE ENCOUNTER
1.  Date/reason for appt: 4/24/17 -- osteoarthritis of both hips  2.  Referring provider: Power Blank  3.  Call to patient (Yes / No - short description): no, referred  4.  Previous care at / records requested from: [facility] -- records and imaging in epic/pacs  5.  Other: [facility]- pt saw Dr. Celeste Deal 8/16/16 -- records [facility].

## 2017-04-17 ENCOUNTER — CARE COORDINATION (OUTPATIENT)
Dept: CARE COORDINATION | Facility: CLINIC | Age: 57
End: 2017-04-17

## 2017-04-17 NOTE — LETTER
[facility] Care Coordination  [facility]  [address] [place]  [address]      April 17, 2017      Geovanna Aguilar  [address] [facility] [address]    Dear Geovanna,  I am the Clinic Care Coordinator that works with your orthopedic clinic. I recently tried to call and was unable to reach you. Below is a description of what Clinic Care Coordination is and how I can further assist you.     The Clinic Care Coordinator role is a Registered Nurse and/or  who understands the health care system. The goal of Clinic Care Coordination is to help you manage your health and improve access to the [facility] system in the most efficient manner.  The Registered Nurse can assist you in meeting your health care goals by providing education, coordinating services, and strengthening the communication among your providers. The  can assist you with financial, behavioral, psychosocial, and chemical dependency and counseling/psychiatric resources.    Please feel free to keep this letter and contact information to contact me at [phone] with any further questions or concerns that may arise. We at [facility] are focused on providing you with the highest-quality healthcare experience possible and that all starts with you.       Sincerely,         DOUGLAS Ferguson  Care Coordinator - Social Work  [facility]  Office:  [phone]

## 2017-04-17 NOTE — PROGRESS NOTES
Clinic Care Coordination Contact - Social Work - Follow-Up - 4-17-17  [facility]/Voicemail    Referral Source: Specialist (Dr. Deal)  Clinical Data: SW/Care Coordinator Outreach to pt regarding second opinion regarding hip surgery  Outreach attempted x3.  Left message on voicemail with call back information and requested return call.  SW also sent out Unable To Contact letter to pt today  Plan: SW/Care Coordinator will follow up with pt on 4-25-17, following pt's appt regarding hip surgery with Dr. Saxena on 4-24-17    DOUGLAS Ferguson  Care Coordinator - Social Work  [facility]  Office:  [phone]  4/17/2017 11:38 AM

## 2017-04-21 ENCOUNTER — CARE COORDINATION (OUTPATIENT)
Dept: CARE COORDINATION | Facility: CLINIC | Age: 57
End: 2017-04-21

## 2017-04-21 NOTE — PROGRESS NOTES
Clinic Care Coordination Contact - Social Work - Follow-Up - 4-21-17  Care Team Conversations    SW received call back from pt who indicated that she saw Dr. Blank for a second opinion regarding potential hip surgery and Dr. Blank referred pt to Dr Saxena because Dr. Blank felt pt's surgery would be more complicated, and Dr Saxena would be better option for a surgeon for pt.  Pt has an appt set up with Dr. Saxena on 4-24-17 to get another opinion.  SW provided affirmations to pt for moving ahead with getting a another opinion for her potential hip surgery.  Pt will call SW back next week following her appt with Dr. Saxena.  SW will follow-up with pt if pt does not call SW back in 5-7 days.    DOUGLAS Ferguson  Care Coordinator - Social Work  [facility]  Office:  [phone]  4/21/2017 8:33 AM

## 2017-04-24 ENCOUNTER — OFFICE VISIT (OUTPATIENT)
Dept: ORTHOPEDICS | Facility: CLINIC | Age: 57
End: 2017-04-24

## 2017-04-24 VITALS — HEIGHT: 66 IN | WEIGHT: 267.3 LBS | BODY MASS INDEX: 42.96 KG/M2

## 2017-04-24 DIAGNOSIS — M17.10 PRIMARY OSTEOARTHRITIS OF KNEE, UNSPECIFIED LATERALITY: Primary | ICD-10-CM

## 2017-04-24 ASSESSMENT — ENCOUNTER SYMPTOMS
MUSCLE CRAMPS: 1
DYSURIA: 0
ARTHRALGIAS: 1
DIARRHEA: 0
CONSTIPATION: 1
INSOMNIA: 0
NECK PAIN: 0
SPEECH CHANGE: 0
BACK PAIN: 1
HEMATURIA: 0
HEARTBURN: 1
LOSS OF CONSCIOUSNESS: 0
JAUNDICE: 0
STIFFNESS: 1
PARALYSIS: 0
JOINT SWELLING: 1
BLOOD IN STOOL: 0
PANIC: 0
DISTURBANCES IN COORDINATION: 1
SEIZURES: 0
WEAKNESS: 1
MEMORY LOSS: 0
DIZZINESS: 1
BLOATING: 0
TINGLING: 0
DECREASED CONCENTRATION: 0
NUMBNESS: 0
TREMORS: 1
RECTAL PAIN: 0
MYALGIAS: 1
NAUSEA: 0
HEADACHES: 1
MUSCLE WEAKNESS: 1
ABDOMINAL PAIN: 0
RECTAL BLEEDING: 0
DEPRESSION: 1
BOWEL INCONTINENCE: 1
DIFFICULTY URINATING: 0
FLANK PAIN: 0
VOMITING: 0
NERVOUS/ANXIOUS: 1

## 2017-04-24 NOTE — LETTER
4/24/2017      RE: Geovanna Aguilar  [address] [facility] [address]           [facility], Orthopaedic Surgery Consultation  by Jeremy Saxena M.D.    Geovanna Aguilar MRN# [id number]   Age: 56 year old YOB: 1960     Requesting physician: Leah Rooney           Assessment and Plan:   Assessment:  Bilateral hip end stage osteoarthritis (R >L) with leg length discrepancy and morbid obesity (BMI 44)     Plan:  Discussed at length with patient regarding indications and complications associated with specific risk factors and JENNIFER, including obesity and nicotine exposure.  Due to her morbid obesity, we strongly recommend continued attempts at weight loss (goal BMI <35, or ~55 lbs) prior to undergoing JENNIFER.  We discussed avenues to accomplish goal, including dietary restrictions, aquatic therapy, and referral to bariatric surgeon for potential surgical interventions. Plan for patient to return to clinic once goal BMI is achieved and may follow earlier to review progress towards goal.             History of Present Illness:   56 year old female  chief complaint  Bilateral hip OA, right greater than left    Referred for right greater than left bilateral hip pain.  Reports pain 7/10, constant, sharp, shooting pain aggravated by sitting and walking activities. Right hip pain for many years, more acute in last couple of years and now using walker for last 9 months.  Previous treatments include PT, minimal help.  Previous CSI in right hip in December, helped left side but not right side.  Denies any previous injuries or surgical interventions.  Denies any new n/t/weakness.  Amb couple steps with assistive device.  Difficulty with performing ADLs, including don/doffing shoes/socks, in/out of low chairs and vehicles.              Background history:  DX:  1. Bilateral hip osteoarthritis, right greater than left    TREATMENTS:  1. Right hip CSI (uknown date)     Current  "symptoms:  Problem: right and left hip pain  Onset and duration: many years  Awakens from sleep due to sx's:  Yes  Precipitating Injury:  No    Other joints or sites painful:  No  Fever: No  Appetite change or weight loss: No    Social: disability due to bipolar, [place], house, 3 levels, lives with son and SO, support with family friends who are nurses   Smoking: passive smoke inhalation  Etoh: occasional  No bleeding or blood clots  No problems with anesthesia  On tramadol, provided PCP           Physical Exam:     EXAMINATION pertinent findings:   VITAL SIGNS: Height 1.666 m (5' 5.59\"), weight 121.2 kg (267 lb 4.8 oz), not currently breastfeeding.  Body mass index is 43.68 kg/(m^2).  RESP: non labored breathing   ABD: benign   SKIN: grossly normal   LYMPHATIC: grossly normal   NEURO: grossly normal   VASCULAR: satisfactory perfusion of all extremities   MUSCULOSKELETAL:   Gait: antalgic gait with decreased stance time on right side, amb with 4ww, leaning heavily to left side       Hips:  L hip: ROM  FF40, Extension 0 , IRF 5 , ERF 5 , ABD 5 , ADD 5   R hip: ROM  FF70, Extension 0 , IRF 5 , ERF 15 , ABD 10 , ADD 10   ROM is limited significantly by pain    Patient demonstrates pain and difficulty with getting onto exam table, needs assistance from son  Integument around hip intact  Abductor complex ttp full muscle bulk with no atrophy present  LLD, Right 3-4 cm shorter than left  Ttp to greater troch/IT band  Edson test unable to perform due to limited hip ROM  Stincfield test unable to perform due to pain  FADIR unable perform due to decreased ROM, gentle IR illicits pain  -CMS intact    -motor intact to ehl/fhl/ta/gsc, 5/5 to psoas    -SILT to sp/dp/sural/saph/tibial    -foot wwp, cap refill <2 sec, DP 2+ palpable         Data:   All laboratory data reviewed  All imaging studies reviewed by me  4/4/2017: bilateral XR pelvis, hip  -significant bilateral hip arthrosis with loss of joint space and large " osteophyte formation.  Right hip with shortening and lateralization of femoral head.         Arash Rae MD seen and evaluated with Dr Hemanth Saxena MD  [facility] Family Professor  Oncology and Adult Reconstructive Surgery  Dept Orthopaedic Surgery, [facility]  [phone] office, [phone] pager  [email or web address]      DATA for DOCUMENTATION:         Past Medical History:     Patient Active Problem List   Diagnosis     GERD (gastroesophageal reflux disease)     Hypothyroidism     Peripheral edema     KALPESH (obstructive sleep apnea)     Hypertension goal BP (blood pressure) < 140/90     Hyperlipidemia LDL goal <130     Mild depressed bipolar I disorder (H)     Degenerative joint disease (DJD) of hip     Bursitis, hip     Health Care Home     Bladder spasm     Anemia     Hyperglycemia     Chest pain     Schizophrenia (H)     Past Medical History:   Diagnosis Date     Arthritis      Bipolar 2 disorder (H)      GERD (gastroesophageal reflux disease)      Hiatal hernia      Hyperlipidemia LDL goal < 100      Hypertension      Hypothyroidism      Obesity      Obstructive sleep apnea      Peripheral edema      Schizophrenia (H)        Also see scanned health assessment forms.       Past Surgical History:     Past Surgical History:   Procedure Laterality Date     C ABLATION SUPRAVENT ARRHYTHMOGENIC FOCUS  2001     C CARDIAC SURG PROCEDURE UNLIST       C STOMACH SURGERY PROCEDURE UNLISTED       CRYOTHERAPY, CERVICAL       HC TOOTH EXTRACTION W/FORCEP       HYSTERECTOMY SUPRACERVICAL  5/06    due to endometrial hyperplasia     SALPINGO OOPHORECTOMY,R/L/KEVIN  5/06    Salpingo Oophorectomy, RT     SURGICAL HISTORY OF -   1974    Repair soft tissue of right arm     TUBAL LIGATION  1994     [facility] Endo  1-4-11            Social History:     Social History     Social History     Marital status:      Spouse name: N/A     Number of children: N/A     Years of education: N/A     Occupational History     Not  on file.     Social History Main Topics     Smoking status: Passive Smoke Exposure - Never Smoker     Types: Cigarettes     Smokeless tobacco: Never Used      Comment: boyfriend smokes     Alcohol use Yes      Comment: rarely     Drug use: No     Sexual activity: Yes     Partners: Male     Other Topics Concern     Parent/Sibling W/ Cabg, Mi Or Angioplasty Before 65f 55m? No     Social History Narrative            Family History:       Family History   Problem Relation Age of Onset     C.A.D. Father      Alzheimer Disease Paternal Grandmother      Thyroid Disease Paternal Grandmother      Migraines Paternal Grandmother      CANCER Mother 69     lung cancer now      Depression Mother      Other Cancer Mother      Migraines Mother      DIABETES Son      Type 1     Substance Abuse Sister      Depression Sister      Migraines Sister      Alcoholism Sister      Substance Abuse Sister      Depression Sister      Alcoholism Sister      DIABETES Son      DIABETES Sister             Medications:     Current Outpatient Prescriptions   Medication Sig     esomeprazole (NEXIUM) 40 MG CR capsule Take 1 capsule (40 mg) by mouth 2 times daily Take 30-60 minutes before eating. Needs to be seen by provider for further refills     traMADol (ULTRAM) 50 MG tablet Take 1-2 tablets ( mg) by mouth every 6 hours as needed for severe pain     divalproex (DEPAKOTE ER) 500 MG 24 hr tablet Reported on 3/27/2017     simvastatin (ZOCOR) 40 MG tablet Take 1 tablet (40 mg) by mouth At Bedtime     metoprolol (LOPRESSOR) 25 MG tablet Take 1 tablet (25 mg) by mouth 2 times daily     levothyroxine (SYNTHROID, LEVOTHROID) 150 MCG tablet Take 1 tablet (150 mcg) by mouth daily     BUPROPION HCL# 300 MG OR TB24 1 TABLET DAILY     AMBIEN 10 MG OR TABS 1 TABLET AT BEDTIME     ZYPREXA 5 MG OR TABS Reported on 3/27/2017     No current facility-administered medications for this visit.             Review of Systems:   A comprehensive 10 point review  of systems (constitutional, ENT, cardiac, peripheral vascular, lymphatic, respiratory, GI, , Musculoskeletal, skin, Neurological) was performed and found to be negative except as described in this note.       Jeremy Saxena MD

## 2017-04-24 NOTE — MR AVS SNAPSHOT
After Visit Summary   4/24/2017    Geovanna Aguilar    MRN: [id number]           Patient Information     Date Of Birth          1960        Visit Information        Provider Department      4/24/2017 1:30 PM Jeremy Saxena MD [facility]        Today's Diagnoses     Primary osteoarthritis of knee, unspecified laterality    -  1       Follow-ups after your visit        Additional Services     BARIATRIC ADULT REFERRAL       Your provider has referred you to: [facility]: [facility] [place] [phone]   [email or web address]    Please be aware that coverage of these services is subject to the terms and limitations of your health insurance plan.  Call member services at your health plan with any benefit or coverage questions.      Please bring the following with you to your appointment:      (1) List of current medications   (2) This referral request   (3) Any documents/labs given to you for this referral                  Your next 10 appointments already scheduled     May 31, 2017  1:00 PM CDT   (Arrive by 12:45 PM)   New Bariatric Surgery Consult with Rosy Padilla PA-C   [facility] ([facility])    [address] [place] [id number] [phone]            May 31, 2017  1:30 PM CDT   NUTRITION VISIT with Valerie Bills RD   [facility] ([facility])    [address] [place] [address] [phone]              Who to contact     Please call your clinic at [phone] to:    Ask questions about your health    Make or cancel appointments    Discuss your medicines    Learn about your test results    Speak to your doctor   If you have compliments or concerns about an experience at your clinic, or if you wish to file a complaint, please contact  "[facility] Patient Relations at [phone] or email us at [email or web address]         Additional Information About Your Visit        [employer] Information     [employer] is an electronic gateway that provides easy, online access to your medical records. With [employer], you can request a clinic appointment, read your test results, renew a prescription or communicate with your care team.     To sign up for [employer] visit the website at [email or web address]   You will be asked to enter the access code listed below, as well as some personal information. Please follow the directions to create your username and password.     Your access code is: [email or web address]  Expires: 2017  1:18 PM     Your access code will  in 90 days. If you need help or a new code, please contact your [facility] or call [phone] for assistance.        Care EveryWhere ID     This is your Care EveryWhere ID. This could be used by other organizations to access your [facility] medical records  [id number]        Your Vitals Were     Height BMI (Body Mass Index)                1.666 m (5' 5.59\") 43.68 kg/m2           Blood Pressure from Last 3 Encounters:   17 132/80   16 130/69   16 122/64    Weight from Last 3 Encounters:   17 121.2 kg (267 lb 4.8 oz)   17 116.1 kg (256 lb)   17 118.1 kg (260 lb 6 oz)              We Performed the Following     BARIATRIC ADULT REFERRAL          Today's Medication Changes          These changes are accurate as of: 17 11:59 PM.  If you have any questions, ask your nurse or doctor.               Stop taking these medicines if you haven't already. Please contact your care team if you have questions.     cyclobenzaprine 10 MG tablet   Commonly known as:  FLEXERIL   Stopped by:  Jeremy Saxena MD           diclofenac 75 MG EC tablet   Commonly known as:  VOLTAREN   Stopped by:  Jeremy Saxena MD     "       LORazepam 0.5 MG tablet   Commonly known as:  ATIVAN   Stopped by:  Jeremy Saxena MD           polyethylene glycol powder   Commonly known as:  MIRALAX   Stopped by:  Jeremy Saxena MD                    Primary Care Provider Office Phone # Fax #    Leah Rooney Samantha Rhoades [phone]       [facility] [address]  [facility] [address]        Thank you!     Thank you for choosing [facility]  for your care. Our goal is always to provide you with excellent care. Hearing back from our patients is one way we can continue to improve our services. Please take a few minutes to complete the written survey that you may receive in the mail after your visit with us. Thank you!             Your Updated Medication List - Protect others around you: Learn how to safely use, store and throw away your medicines at www.disposemymeds.org.          This list is accurate as of: 4/24/17 11:59 PM.  Always use your most recent med list.                   Brand Name Dispense Instructions for use    AMBIEN 10 MG tablet   Generic drug:  zolpidem      1 TABLET AT BEDTIME       buPROPion 300 MG 24 hr tablet    WELLBUTRIN XL     1 TABLET DAILY       divalproex 500 MG 24 hr tablet    DEPAKOTE ER     Reported on 3/27/2017       esomeprazole 40 MG CR capsule    nexIUM    180 capsule    Take 1 capsule (40 mg) by mouth 2 times daily Take 30-60 minutes before eating. Needs to be seen by provider for further refills       levothyroxine 150 MCG tablet    SYNTHROID/LEVOTHROID    90 tablet    Take 1 tablet (150 mcg) by mouth daily       metoprolol 25 MG tablet    LOPRESSOR    180 tablet    Take 1 tablet (25 mg) by mouth 2 times daily       simvastatin 40 MG tablet    ZOCOR    90 tablet    Take 1 tablet (40 mg) by mouth At Bedtime       traMADol 50 MG tablet    ULTRAM    90 tablet    Take 1-2 tablets ( mg) by mouth every 6 hours as needed for severe pain       zyPREXA 5 MG  tablet   Generic drug:  OLANZapine      Reported on 3/27/2017

## 2017-04-24 NOTE — NURSING NOTE
"Reason For Visit:   Chief Complaint   Patient presents with     Consult     Osteoarthritis of both hips. States right hip is worse than the other. Referring: Dr. Power Blank       Primary MD: Leah Mae      Ht 1.666 m (5' 5.59\")  Wt 121.2 kg (267 lb 4.8 oz)  BMI 43.68 kg/m2    Pain Assessment  Patient Currently in Pain: Yes  0-10 Pain Scale: 7  Primary Pain Location: Hip  Pain Orientation: Right, Left  Pain Descriptors: Constant, Sharp, Shooting  Aggravating Factors: Sitting, Walking         Current Outpatient Prescriptions   Medication     esomeprazole (NEXIUM) 40 MG CR capsule     traMADol (ULTRAM) 50 MG tablet     divalproex (DEPAKOTE ER) 500 MG 24 hr tablet     simvastatin (ZOCOR) 40 MG tablet     metoprolol (LOPRESSOR) 25 MG tablet     levothyroxine (SYNTHROID, LEVOTHROID) 150 MCG tablet     BUPROPION HCL# 300 MG OR TB24     AMBIEN 10 MG OR TABS     ZYPREXA 5 MG OR TABS     No current facility-administered medications for this visit.           Allergies   Allergen Reactions     Abilify [Aripiprazole]      Carafate [Sucralfate]      Lisinopril Cough     cough     "

## 2017-04-24 NOTE — LETTER
4/24/2017       RE: Geovanna Aguilar  [address] [address]     Dear Colleague,    Thank you for referring your patient, Geovanna Aguilar, to the [facility] at [facility]. Please see a copy of my visit note below.      [facility], Orthopaedic Surgery Consultation  by Jeremy Saxena M.D.    Geovanna Aguilar MRN# [id number]   Age: 56 year old YOB: 1960     Requesting physician: Leah Rooney             Assessment and Plan:   Assessment:  Bilateral hip end stage osteoarthritis (R >L) with leg length discrepancy and morbid obesity (BMI 44)     Plan:  Discussed at length with patient regarding indications and complications associated with specific risk factors and JENNIFER, including obesity and nicotine exposure.  Due to her morbid obesity, we strongly recommend continued attempts at weight loss (goal BMI <35, or ~55 lbs) prior to undergoing JENNIFER.  We discussed avenues to accomplish goal, including dietary restrictions, aquatic therapy, and referral to bariatric surgeon for potential surgical interventions. Plan for patient to return to clinic once goal BMI is achieved and may follow earlier to review progress towards goal.             History of Present Illness:   56 year old female  chief complaint  Bilateral hip OA, right greater than left    Referred for right greater than left bilateral hip pain.  Reports pain 7/10, constant, sharp, shooting pain aggravated by sitting and walking activities. Right hip pain for many years, more acute in last couple of years and now using walker for last 9 months.  Previous treatments include PT, minimal help.  Previous CSI in right hip in December, helped left side but not right side.  Denies any previous injuries or surgical interventions.  Denies any new n/t/weakness.  Amb couple steps with assistive device.  Difficulty with performing ADLs, including don/doffing  "shoes/socks, in/out of low chairs and vehicles.              Background history:  DX:  1. Bilateral hip osteoarthritis, right greater than left    TREATMENTS:  1. Right hip CSI (uknown date)     Current symptoms:  Problem: right and left hip pain  Onset and duration: many years  Awakens from sleep due to sx's:  Yes  Precipitating Injury:  No    Other joints or sites painful:  No  Fever: No  Appetite change or weight loss: No    Social: disability due to bipolar, [place], house, 3 levels, lives with son and SO, support with family friends who are nurses   Smoking: passive smoke inhalation  Etoh: occasional  No bleeding or blood clots  No problems with anesthesia  On tramadol, provided PCP             Physical Exam:     EXAMINATION pertinent findings:   VITAL SIGNS: Height 1.666 m (5' 5.59\"), weight 121.2 kg (267 lb 4.8 oz), not currently breastfeeding.  Body mass index is 43.68 kg/(m^2).  RESP: non labored breathing   ABD: benign   SKIN: grossly normal   LYMPHATIC: grossly normal   NEURO: grossly normal   VASCULAR: satisfactory perfusion of all extremities   MUSCULOSKELETAL:   Gait: antalgic gait with decreased stance time on right side, amb with 4ww, leaning heavily to left side       Hips:  L hip: ROM  FF40, Extension 0 , IRF 5 , ERF 5 , ABD 5 , ADD 5   R hip: ROM  FF70, Extension 0 , IRF 5 , ERF 15 , ABD 10 , ADD 10   ROM is limited significantly by pain    Patient demonstrates pain and difficulty with getting onto exam table, needs assistance from son  Integument around hip intact  Abductor complex ttp full muscle bulk with no atrophy present  LLD, Right 3-4 cm shorter than left  Ttp to greater troch/IT band  Edson test unable to perform due to limited hip ROM  StiFirstHealthfield test unable to perform due to pain  FADIR unable perform due to decreased ROM, gentle IR illicits pain  -CMS intact    -motor intact to ehl/fhl/ta/gsc, 5/5 to psoas    -SILT to sp/dp/sural/saph/tibial    -foot wwp, cap refill <2 sec, " DP 2+ palpable         Data:   All laboratory data reviewed  All imaging studies reviewed by me  4/4/2017: bilateral XR pelvis, hip  -significant bilateral hip arthrosis with loss of joint space and large osteophyte formation.  Right hip with shortening and lateralization of femoral head.         Arash Rae MD seen and evaluated with MD Addis Wu Family Professor  Oncology and Adult Reconstructive Surgery  Dept Orthopaedic Surgery, [facility]  [phone] office, [phone] pager  [email or web address]      DATA for DOCUMENTATION:         Past Medical History:     Patient Active Problem List   Diagnosis     GERD (gastroesophageal reflux disease)     Hypothyroidism     Peripheral edema     KALPESH (obstructive sleep apnea)     Hypertension goal BP (blood pressure) < 140/90     Hyperlipidemia LDL goal <130     Mild depressed bipolar I disorder (H)     Degenerative joint disease (DJD) of hip     Bursitis, hip     Health Care Home     Bladder spasm     Anemia     Hyperglycemia     Chest pain     Schizophrenia (H)     Past Medical History:   Diagnosis Date     Arthritis      Bipolar 2 disorder (H)      GERD (gastroesophageal reflux disease)      Hiatal hernia      Hyperlipidemia LDL goal < 100      Hypertension      Hypothyroidism      Obesity      Obstructive sleep apnea      Peripheral edema      Schizophrenia (H)        Also see scanned health assessment forms.       Past Surgical History:     Past Surgical History:   Procedure Laterality Date     C ABLATION SUPRAVENT ARRHYTHMOGENIC FOCUS  2001     C CARDIAC SURG PROCEDURE UNLIST       C STOMACH SURGERY PROCEDURE UNLISTED       CRYOTHERAPY, CERVICAL       HC TOOTH EXTRACTION W/FORCEP       HYSTERECTOMY SUPRACERVICAL  5/06    due to endometrial hyperplasia     SALPINGO OOPHORECTOMY,R/L/KEVIN  5/06    Salpingo Oophorectomy, RT     SURGICAL HISTORY OF -   1974    Repair soft tissue of right arm     TUBAL LIGATION  1994     [facility] Endo  1-4-11             Social History:     Social History     Social History     Marital status:      Spouse name: N/A     Number of children: N/A     Years of education: N/A     Occupational History     Not on file.     Social History Main Topics     Smoking status: Passive Smoke Exposure - Never Smoker     Types: Cigarettes     Smokeless tobacco: Never Used      Comment: boyfriend smokes     Alcohol use Yes      Comment: rarely     Drug use: No     Sexual activity: Yes     Partners: Male     Other Topics Concern     Parent/Sibling W/ Cabg, Mi Or Angioplasty Before 65f 55m? No     Social History Narrative            Family History:       Family History   Problem Relation Age of Onset     C.A.D. Father      Alzheimer Disease Paternal Grandmother      Thyroid Disease Paternal Grandmother      Migraines Paternal Grandmother      CANCER Mother 69     lung cancer now      Depression Mother      Other Cancer Mother      Migraines Mother      DIABETES Son      Type 1     Substance Abuse Sister      Depression Sister      Migraines Sister      Alcoholism Sister      Substance Abuse Sister      Depression Sister      Alcoholism Sister      DIABETES Son      DIABETES Sister             Medications:     Current Outpatient Prescriptions   Medication Sig     esomeprazole (NEXIUM) 40 MG CR capsule Take 1 capsule (40 mg) by mouth 2 times daily Take 30-60 minutes before eating. Needs to be seen by provider for further refills     traMADol (ULTRAM) 50 MG tablet Take 1-2 tablets ( mg) by mouth every 6 hours as needed for severe pain     divalproex (DEPAKOTE ER) 500 MG 24 hr tablet Reported on 3/27/2017     simvastatin (ZOCOR) 40 MG tablet Take 1 tablet (40 mg) by mouth At Bedtime     metoprolol (LOPRESSOR) 25 MG tablet Take 1 tablet (25 mg) by mouth 2 times daily     levothyroxine (SYNTHROID, LEVOTHROID) 150 MCG tablet Take 1 tablet (150 mcg) by mouth daily     BUPROPION HCL# 300 MG OR TB24 1 TABLET DAILY     AMBIEN 10 MG  OR TABS 1 TABLET AT BEDTIME     ZYPREXA 5 MG OR TABS Reported on 3/27/2017     No current facility-administered medications for this visit.               Review of Systems:   A comprehensive 10 point review of systems (constitutional, ENT, cardiac, peripheral vascular, lymphatic, respiratory, GI, , Musculoskeletal, skin, Neurological) was performed and found to be negative except as described in this note.     See intake form completed by patient        Again, thank you for allowing me to participate in the care of your patient.      Sincerely,    Jeremy Saxena MD

## 2017-04-24 NOTE — PROGRESS NOTES
[facility], Orthopaedic Surgery Consultation  by Jeremy Saxena M.D.    Geovanna Aguilar MRN# [id number]   Age: 56 year old YOB: 1960     Requesting physician: Leah Rooney             Assessment and Plan:   Assessment:  Bilateral hip end stage osteoarthritis (R >L) with leg length discrepancy and morbid obesity (BMI 44)     Plan:  Discussed at length with patient regarding indications and complications associated with specific risk factors and JENNIFER, including obesity and nicotine exposure.  Due to her morbid obesity, we strongly recommend continued attempts at weight loss (goal BMI <35, or ~55 lbs) prior to undergoing JENNIFER.  We discussed avenues to accomplish goal, including dietary restrictions, aquatic therapy, and referral to bariatric surgeon for potential surgical interventions. Plan for patient to return to clinic once goal BMI is achieved and may follow earlier to review progress towards goal.             History of Present Illness:   56 year old female  chief complaint  Bilateral hip OA, right greater than left    Referred for right greater than left bilateral hip pain.  Reports pain 7/10, constant, sharp, shooting pain aggravated by sitting and walking activities. Right hip pain for many years, more acute in last couple of years and now using walker for last 9 months.  Previous treatments include PT, minimal help.  Previous CSI in right hip in December, helped left side but not right side.  Denies any previous injuries or surgical interventions.  Denies any new n/t/weakness.  Amb couple steps with assistive device.  Difficulty with performing ADLs, including don/doffing shoes/socks, in/out of low chairs and vehicles.              Background history:  DX:  1. Bilateral hip osteoarthritis, right greater than left    TREATMENTS:  1. Right hip CSI (uknown date)     Current symptoms:  Problem: right and left hip pain  Onset and duration: many years  Awakens from sleep due to sx's:   "Yes  Precipitating Injury:  No    Other joints or sites painful:  No  Fever: No  Appetite change or weight loss: No    Social: disability due to bipolar, [place], house, 3 levels, lives with son and SO, support with family friends who are nurses   Smoking: passive smoke inhalation  Etoh: occasional  No bleeding or blood clots  No problems with anesthesia  On tramadol, provided PCP             Physical Exam:     EXAMINATION pertinent findings:   VITAL SIGNS: Height 1.666 m (5' 5.59\"), weight 121.2 kg (267 lb 4.8 oz), not currently breastfeeding.  Body mass index is 43.68 kg/(m^2).  RESP: non labored breathing   ABD: benign   SKIN: grossly normal   LYMPHATIC: grossly normal   NEURO: grossly normal   VASCULAR: satisfactory perfusion of all extremities   MUSCULOSKELETAL:   Gait: antalgic gait with decreased stance time on right side, amb with 4ww, leaning heavily to left side       Hips:  L hip: ROM  FF40, Extension 0 , IRF 5 , ERF 5 , ABD 5 , ADD 5   R hip: ROM  FF70, Extension 0 , IRF 5 , ERF 15 , ABD 10 , ADD 10   ROM is limited significantly by pain    Patient demonstrates pain and difficulty with getting onto exam table, needs assistance from son  Integument around hip intact  Abductor complex ttp full muscle bulk with no atrophy present  LLD, Right 3-4 cm shorter than left  Ttp to greater troch/IT band  Edson test unable to perform due to limited hip ROM  Stinchfield test unable to perform due to pain  FADIR unable perform due to decreased ROM, gentle IR illicits pain  -CMS intact    -motor intact to ehl/fhl/ta/gsc, 5/5 to psoas    -SILT to sp/dp/sural/saph/tibial    -foot wwp, cap refill <2 sec, DP 2+ palpable         Data:   All laboratory data reviewed  All imaging studies reviewed by me  4/4/2017: bilateral XR pelvis, hip  -significant bilateral hip arthrosis with loss of joint space and large osteophyte formation.  Right hip with shortening and lateralization of femoral head.         Arash Rae, " MD seen and evaluated with MD Addis Wu Family Professor  Oncology and Adult Reconstructive Surgery  Dept Orthopaedic Surgery, [facility]  [phone] office, [phone] pager  [email or web address]      DATA for DOCUMENTATION:         Past Medical History:     Patient Active Problem List   Diagnosis     GERD (gastroesophageal reflux disease)     Hypothyroidism     Peripheral edema     KALPESH (obstructive sleep apnea)     Hypertension goal BP (blood pressure) < 140/90     Hyperlipidemia LDL goal <130     Mild depressed bipolar I disorder (H)     Degenerative joint disease (DJD) of hip     Bursitis, hip     Health Care Home     Bladder spasm     Anemia     Hyperglycemia     Chest pain     Schizophrenia (H)     Past Medical History:   Diagnosis Date     Arthritis      Bipolar 2 disorder (H)      GERD (gastroesophageal reflux disease)      Hiatal hernia      Hyperlipidemia LDL goal < 100      Hypertension      Hypothyroidism      Obesity      Obstructive sleep apnea      Peripheral edema      Schizophrenia (H)        Also see scanned health assessment forms.       Past Surgical History:     Past Surgical History:   Procedure Laterality Date     C ABLATION SUPRAVENT ARRHYTHMOGENIC FOCUS  2001     C CARDIAC SURG PROCEDURE UNLIST       C STOMACH SURGERY PROCEDURE UNLISTED       CRYOTHERAPY, CERVICAL       HC TOOTH EXTRACTION W/FORCEP       HYSTERECTOMY SUPRACERVICAL  5/06    due to endometrial hyperplasia     SALPINGO OOPHORECTOMY,R/L/KEVIN  5/06    Salpingo Oophorectomy, RT     SURGICAL HISTORY OF -   1974    Repair soft tissue of right arm     TUBAL LIGATION  1994     [facility]  1-4-11            Social History:     Social History     Social History     Marital status:      Spouse name: N/A     Number of children: N/A     Years of education: N/A     Occupational History     Not on file.     Social History Main Topics     Smoking status: Passive Smoke Exposure - Never Smoker     Types:  Cigarettes     Smokeless tobacco: Never Used      Comment: boyfriend smokes     Alcohol use Yes      Comment: rarely     Drug use: No     Sexual activity: Yes     Partners: Male     Other Topics Concern     Parent/Sibling W/ Cabg, Mi Or Angioplasty Before 65f 55m? No     Social History Narrative            Family History:       Family History   Problem Relation Age of Onset     C.A.D. Father      Alzheimer Disease Paternal Grandmother      Thyroid Disease Paternal Grandmother      Migraines Paternal Grandmother      CANCER Mother 69     lung cancer now      Depression Mother      Other Cancer Mother      Migraines Mother      DIABETES Son      Type 1     Substance Abuse Sister      Depression Sister      Migraines Sister      Alcoholism Sister      Substance Abuse Sister      Depression Sister      Alcoholism Sister      DIABETES Son      DIABETES Sister             Medications:     Current Outpatient Prescriptions   Medication Sig     esomeprazole (NEXIUM) 40 MG CR capsule Take 1 capsule (40 mg) by mouth 2 times daily Take 30-60 minutes before eating. Needs to be seen by provider for further refills     traMADol (ULTRAM) 50 MG tablet Take 1-2 tablets ( mg) by mouth every 6 hours as needed for severe pain     divalproex (DEPAKOTE ER) 500 MG 24 hr tablet Reported on 3/27/2017     simvastatin (ZOCOR) 40 MG tablet Take 1 tablet (40 mg) by mouth At Bedtime     metoprolol (LOPRESSOR) 25 MG tablet Take 1 tablet (25 mg) by mouth 2 times daily     levothyroxine (SYNTHROID, LEVOTHROID) 150 MCG tablet Take 1 tablet (150 mcg) by mouth daily     BUPROPION HCL# 300 MG OR TB24 1 TABLET DAILY     AMBIEN 10 MG OR TABS 1 TABLET AT BEDTIME     ZYPREXA 5 MG OR TABS Reported on 3/27/2017     No current facility-administered medications for this visit.               Review of Systems:   A comprehensive 10 point review of systems (constitutional, ENT, cardiac, peripheral vascular, lymphatic, respiratory, GI, ,  Musculoskeletal, skin, Neurological) was performed and found to be negative except as described in this note.     See intake form completed by patient        Answers for HPI/ROS submitted by the patient on 4/24/2017   General Symptoms: No  Skin Symptoms: No  HENT Symptoms: No  EYE SYMPTOMS: No  HEART SYMPTOMS: No  LUNG SYMPTOMS: No  INTESTINAL SYMPTOMS: Yes  URINARY SYMPTOMS: Yes  GYNECOLOGIC SYMPTOMS: No  BREAST SYMPTOMS: No  SKELETAL SYMPTOMS: Yes  BLOOD SYMPTOMS: No  NERVOUS SYSTEM SYMPTOMS: Yes  MENTAL HEALTH SYMPTOMS: Yes  Heart burn or indigestion: Yes  Nausea: No  Vomiting: No  Abdominal pain: No  Bloating: No  Constipation: Yes  Diarrhea: No  Blood in stool: No  Black stools: No  Rectal or Anal pain: No  Fecal incontinence: Yes  Rectal bleeding: No  Yellowing of skin or eyes: No  Vomit with blood: No  Change in stools: No  Hemorrhoids: No  Trouble holding urine or incontinence: Yes  Pain or burning: No  Trouble starting or stopping: No  Increased frequency of urination: No  Blood in urine: No  Decreased frequency of urination: Yes  Frequent nighttime urination: No  Flank pain: No  Difficulty emptying bladder: No  Back pain: Yes  Muscle aches: Yes  Neck pain: No  Swollen joints: Yes  Joint pain: Yes  Bone pain: Yes  Muscle cramps: Yes  Muscle weakness: Yes  Joint stiffness: Yes  Bone fracture: No  Trouble with coordination: Yes  Dizziness or trouble with balance: Yes  Fainting or black-out spells: No  Memory loss: No  Headache: Yes  Seizures: No  Speech problems: No  Tingling: No  Tremor: Yes  Weakness: Yes  Difficulty walking: Yes  Paralysis: No  Numbness: No  Nervous or Anxious: Yes  Depression: Yes  Trouble sleeping: No  Trouble thinking or concentrating: No  Mood changes: Yes  Panic attacks: No

## 2017-05-02 ENCOUNTER — CARE COORDINATION (OUTPATIENT)
Dept: CARE COORDINATION | Facility: CLINIC | Age: 57
End: 2017-05-02

## 2017-05-02 NOTE — PROGRESS NOTES
Clinic Care Coordination Contact - Social Work - Follow-Up - 5-2-17    Care Team Conversations    SW contacted pt via phone today to follow-up regarding pt's appt with Dr. Saxena regarding having hip replacement surgery.  Dr. Saxena informed pt that she would need to lose about 50 pounds before he would consider doing hip replacement surgery for pt.  Dr Saxena referred pt to surgical weight management at the [facility] and pt has an appt there on 5-31-17.  Pt has already received multiple differing opinions from non-professionals regarding whether she should have weight management surgery.  KIANNA encouraged pt to keep an open mind until she has her appointment and can get information from MD there.  Pt indicates that exercise is difficult for her due to her pain in both hips.  Pt does not feel that she could get in and out of a pool.  KIANNA encouraged pt to discuss exercise options at her weight management surgery appt.  KIANNA provided active listening, encouragement and support to pt during contact.  KIANNA will follow up with pt following her 5-31-17 surgery weight management appt.        DOUGLAS Ferguson  Care Coordinator - Social Work  [facility]  Office:  [phone]  5/2/2017 12:53 PM

## 2017-05-19 ENCOUNTER — PRE VISIT (OUTPATIENT)
Dept: SURGERY | Facility: CLINIC | Age: 57
End: 2017-05-19

## 2017-05-19 NOTE — TELEPHONE ENCOUNTER
1.  Date/reason for appt: 5/31/17 - NBS Consult  2.  Referring provider: Dr. Saxena  3.  Call to patient (Yes / No - short description): no, pt is referred  4.  Previous care at / records requested from:   - [facility]- pt saw Dr. Celeste Deal 8/16/16 -- records in [facility].   - [facility] -- Records in [facility] with Dr. Saxena   - Labs in [facility]

## 2017-05-24 ENCOUNTER — CARE COORDINATION (OUTPATIENT)
Dept: SURGERY | Facility: CLINIC | Age: 57
End: 2017-05-24

## 2017-05-24 NOTE — PROGRESS NOTES
Called patient and left a message in regards to needing to ask questions before her visit and her needing to complete questionnaire before visit.

## 2017-05-31 ENCOUNTER — ALLIED HEALTH/NURSE VISIT (OUTPATIENT)
Dept: SURGERY | Facility: CLINIC | Age: 57
End: 2017-05-31

## 2017-05-31 ENCOUNTER — OFFICE VISIT (OUTPATIENT)
Dept: SURGERY | Facility: CLINIC | Age: 57
End: 2017-05-31

## 2017-05-31 VITALS
WEIGHT: 264.8 LBS | TEMPERATURE: 98.8 F | HEART RATE: 55 BPM | SYSTOLIC BLOOD PRESSURE: 145 MMHG | OXYGEN SATURATION: 98 % | DIASTOLIC BLOOD PRESSURE: 73 MMHG | HEIGHT: 65 IN | BODY MASS INDEX: 44.12 KG/M2

## 2017-05-31 DIAGNOSIS — E66.01 MORBID OBESITY, UNSPECIFIED OBESITY TYPE (H): Primary | ICD-10-CM

## 2017-05-31 NOTE — PATIENT INSTRUCTIONS
GOALS:  Relating To Eating:  Eat slowly (20-30 minutes per meal), chewing foods well (25 chews per bite/applesauce consistency)  Focus on lean protein and non-starchy vegetables/whole fruit at each meal with no more than 1 cup of carbs or 2 slices of bread    Relating to beverages:  Avoid calorie-containing beverages    Relating to cravings:  Practice alternatives to emotion eating (reading, upper body resistance bands, listening to music).  Rid your environment of trigger foods (chocolate covered peanuts, higher calorie popcorn)

## 2017-05-31 NOTE — MR AVS SNAPSHOT
MRN:[id number]                      After Visit Summary   5/31/2017    Geovanna Aguilar    MRN: [id number]           Visit Information        Provider Department      5/31/2017 1:30 PM Valerie Bills RD [facility]        Care Instructions    GOALS:  Relating To Eating:  Eat slowly (20-30 minutes per meal), chewing foods well (25 chews per bite/applesauce consistency)  Focus on lean protein and non-starchy vegetables/whole fruit at each meal with no more than 1 cup of carbs or 2 slices of bread    Relating to beverages:  Avoid calorie-containing beverages    Relating to cravings:  Practice alternatives to emotion eating (reading, upper body resistance bands, listening to music).  Rid your environment of trigger foods (chocolate covered peanuts, higher calorie popcorn)         [employer] Information     [employer] gives you secure access to your electronic health record. If you see a primary care provider, you can also send messages to your care team and make appointments. If you have questions, please call your primary care clinic.  If you do not have a primary care provider, please call [phone] and they will assist you.      [employer] is an electronic gateway that provides easy, online access to your medical records. With [employer], you can request a clinic appointment, read your test results, renew a prescription or communicate with your care team.     To access your existing account, please contact your [facility] or call [phone] for assistance.        Care EveryWhere ID     This is your Care EveryWhere ID. This could be used by other organizations to access your [place] medical records  [id number]

## 2017-05-31 NOTE — PROGRESS NOTES
"New Bariatric Nutrition Consultation Note    Reason For Visit: Nutrition Assessment    Geovanna Aguilar is a 57 year-old presenting today for new bariatric nutrition consult.  Pt is interested in medical weight management (does not qualify for bariatric surgery due to psych health history).  Patient is accompanied by son with [employer].      Support System Reviewed With Patient 5/26/2017   Who do you have in your support network that can be available to help you for the first 2 weeks after surgery? Fay Hammond   Who can you count on for support throughout your weight loss surgery journey? Kandi Jackson       ANTHROPOMETRICS:    Height as of an earlier encounter on 5/31/17: 1.645 m (5' 4.76\").    Weight as of an earlier encounter on 5/31/17: 120.1 kg (264 lb 12.8 oz) with BMI of 44.39.    Required weight loss goal prior to hip replacement surgery: -56 lbs from initial consult weight to for hip surgery (goal weight 208.8 lbs or less before surgery)       5/26/2017   I have tried the following methods to lose weight Watching portions or calories, Weight Watchers, Prescription Medications       Weight Loss Questions Reviewed With Patient 5/26/2017   How long have you been overweight? Since late 20's to early 40's       SUPPLEMENT INFORMATION:  Occasionally will take an over 50 MVI    NUTRITION HISTORY:  Recall Diet Questions Reviewed With Patient 5/26/2017   Describe what you typically consume for breakfast (typical or most recent): Nothing    Describe what you typically consume for lunch (typical or most recent): Hot Dog (she has cut back from 2 hot dogs on buns to 1 hot dog with a piece of bread)   Describe what you typically consume for supper (typical or most recent): Hot Dog   Describe what you typically consume as snacks (typical or most recent): cheese   How many oz of water, or other low calorie drinks, do you drink daily (8oz=1 glass)? 16 oz   How many oz of caffeine (coffee, tea, pop) do you drink daily " (8oz=1 glass)? 16 oz   How many oz of carbonated (pop, beer, sparkling water) drinks do you drinky daily (8oz=1 glass)? 16 oz   How many oz of juice, pop, sweet tea, sports drinks, protein drinks, other sweetened drinks, do you drink daily (8oz=1 glass)? 16 oz   Please indicate the type of milk: 1%   How often do you drink alcohol? Monthly or less   If you do drink alcohol, how many drinks might you have in a day? (one drink = 5 oz. wine, 1 can/bottle of beer, 1 shot liquor) 1 or 2       Eating Habits 5/26/2017   Do you have any dietary restrictions? No   Do you currently binge eat (eat a large amount of food in a short time)? Yes   Are you an emotional eater? Yes   Do you get up to eat after falling asleep? No   What foods do you crave? chocolate       Dining Out History Reviewed With Patient 5/26/2017   How often do you dine out? Around once a week.   Where do you dine out? (select all that apply) fast food chains, take out   What types of food do you order when you dine out? Chicken, Burgers       Physical Activity Reviewed With Patient 5/26/2017   How often do you exercise? Never   What keeps you from being more active?  Pain, My ability to walk or move around is limited       NUTRITION DIAGNOSIS:  Obesity r/t long history of self-monitoring deficit and excessive energy intake aeb BMI >30.    INTERVENTION:  Intervention Provided/Education Provided on Volumetric eating, portion/calorie-control, and mindful eating.  Provided pt with list of goals RD contact information.      Questions Reviewed With Patient 5/26/2017   How ready are you to make changes regarding your weight? Number 1 = Not ready at all to make changes up to 10 = very ready. 5   How confident are you that you can change? 1 = Not confident that you will be successful making changes up to 10 = very confident. 4       Patient Understanding: good  Expected Compliance: good    GOALS:  Relating To Eating:  Eat slowly (20-30 minutes per meal), chewing  foods well (25 chews per bite/applesauce consistency)  Focus on lean protein and non-starchy vegetables/whole fruit at each meal with no more than 1 cup of carbs or 2 slices of bread    Relating to beverages:  Avoid calorie-containing beverages    Relating to cravings:  Practice alternatives to emotion eating.  Rid your environment of trigger foods (chocolate covered peanuts, higher calorie popcorn).    Follow-Up: 1 month or prn    Time spent with patient: 30 minutes.  Valerie Bills MS, RDN, LDN, CLT  Pager: [phone]

## 2017-05-31 NOTE — MR AVS SNAPSHOT
After Visit Summary   5/31/2017    Geovanna Aguilar    MRN: [id number]           Patient Information     Date Of Birth          1960        Visit Information        Provider Department      5/31/2017 1:00 PM Rosy Padilla PA-C [facility]        Today's Diagnoses     Morbid obesity, unspecified obesity type (H)    -  1      Care Instructions    Discuss Topiramate with psychiatrist before starting  See dietitian today and monthly      MEDICATION DISCUSSED AT THIS APPOINTMENT  We are considering starting topiramate at bedtime.  Start one tab, 25 mg, for a week. Go up to 50 mg (2 tabs) for the next week. At the third week, take   3 tabs (75 mg).  Stay at 3 tabs until you are seen again. Call the nurse at [phone] if you have any questions or concerns. (Do not stop taking it if you don't think it's working. For some people it works even though they do not feel much different.)    Topiramate (Topamax) is a medication that is used most often to treat migraine headaches or for seizures. It has also been found to help with weight loss. Although it's not currently FDA approved for weight loss, it has been used safely for a number of years to help people who are carrying extra weight.     Just how topiramate helps with weight loss has not been exactly determined. However it seems to work on areas of the brain to quiet down signals related to eating.      Topiramate may make you:    >feel less interest in eating in between meals   >think less about food and eating   >find it easier to push the plate away   >find giving up pop easier    >have an easier time eating less    For some of our patients, the pills work right away. They feel and think quite differently about food. Other patients don't feel much of a change but find in fact they have lost weight! Like all weight loss medications, topiramate works best when you help it work.  This means:    1) Have less tempting  high calorie (fattening) food around the house or office    2) Have lower calorie food (fruits, vegetables,low fat meats and dairy) for snacks    3) Eat out only one time or less each week.   4) Eat your meals at a table with the TV or computer off.    Side-effects. Topiramate is generally well tolerated. The main side-effects we see are:   Tingling in hands,feet, or face (usually not very troublesome)   Mental confusion and word finding trouble (about 10% of patients have this.)     Feeling sleepy or a bit dopey- this goes away very soon after starting.    One of the dangers of topiramate is the possibility of birth defects--if you get pregnant when you are on it, there is the risk that your baby will be born with a cleft lip or palate.  If you are on topiramate and of child bearing age, you need to be on a reliable form of birth control or refrain from sexual intercourse.     Please refer to the pharmacy insert for more information on side-effects. Since many pharmacists are not familiar with the use of topiramate in weight loss, calling the clinic will get you the most accurate information on the use of this medication for weight loss.     In order to get refills of this or any medication we prescribe you must be seen in the medical weight mgmt clinic every 2-3 months. Please have your pharmacy fax a refill request to [phone].            Follow-ups after your visit        Who to contact     Please call your clinic at [phone] to:    Ask questions about your health    Make or cancel appointments    Discuss your medicines    Learn about your test results    Speak to your doctor   If you have compliments or concerns about an experience at your clinic, or if you wish to file a complaint, please contact [facility] Patient Relations at [phone] or email us at [email or web address]         Additional Information About Your Visit        MyChart Information     [employer]  "gives you secure access to your electronic health record. If you see a primary care provider, you can also send messages to your care team and make appointments. If you have questions, please call your primary care clinic.  If you do not have a primary care provider, please call [phone] and they will assist you.      Dial2Do is an electronic gateway that provides easy, online access to your medical records. With Dial2Do, you can request a clinic appointment, read your test results, renew a prescription or communicate with your care team.     To access your existing account, please contact your [facility] or call [phone] for assistance.        Care EveryWhere ID     This is your Care EveryWhere ID. This could be used by other organizations to access your [place] medical records  [id number]        Your Vitals Were     Pulse Temperature Height Pulse Oximetry BMI (Body Mass Index)       55 98.8  F (37.1  C) (Oral) 5' 4.76\" 98% 44.39 kg/m2        Blood Pressure from Last 3 Encounters:   05/31/17 145/73   03/27/17 132/80   08/16/16 130/69    Weight from Last 3 Encounters:   05/31/17 264 lb 12.8 oz   04/24/17 267 lb 4.8 oz   04/04/17 256 lb              Today, you had the following     No orders found for display       Primary Care Provider Office Phone # Fax #    Leah Nevin Rhoades [phone]       [facility] [address] [place]  [facility] [address]        Thank you!     Thank you for choosing [facility]  for your care. Our goal is always to provide you with excellent care. Hearing back from our patients is one way we can continue to improve our services. Please take a few minutes to complete the written survey that you may receive in the mail after your visit with us. Thank you!             Your Updated Medication List - Protect others around you: Learn how to safely use, store and throw away your medicines " at www.disposemymeds.org.          This list is accurate as of: 5/31/17  2:08 PM.  Always use your most recent med list.                   Brand Name Dispense Instructions for use    AMBIEN 10 MG tablet   Generic drug:  zolpidem      1 TABLET AT BEDTIME       buPROPion 300 MG 24 hr tablet    WELLBUTRIN XL     1 TABLET DAILY       divalproex 500 MG 24 hr tablet    DEPAKOTE ER     Reported on 3/27/2017       esomeprazole 40 MG CR capsule    nexIUM    180 capsule    Take 1 capsule (40 mg) by mouth 2 times daily Take 30-60 minutes before eating. Needs to be seen by provider for further refills       levothyroxine 150 MCG tablet    SYNTHROID/LEVOTHROID    90 tablet    Take 1 tablet (150 mcg) by mouth daily       metoprolol 25 MG tablet    LOPRESSOR    180 tablet    Take 1 tablet (25 mg) by mouth 2 times daily       simvastatin 40 MG tablet    ZOCOR    90 tablet    Take 1 tablet (40 mg) by mouth At Bedtime       traMADol 50 MG tablet    ULTRAM    90 tablet    Take 1-2 tablets ( mg) by mouth every 6 hours as needed for severe pain       zyPREXA 5 MG tablet   Generic drug:  OLANZapine      Reported on 3/27/2017

## 2017-05-31 NOTE — LETTER
"2017       RE: Geovanna Aguilar  [address] [facility] [address]     Dear Colleague,    Thank you for referring your patient, Geovanna Aguilar, to the [facility] at [facility]. Please see a copy of my visit note below.    New Bariatric Surgery Consultation Note    RE: Geovanna Aguilar  MR#: [id number]  : 1960    Referring provider:       2017   Who referred you? Hemanth Luna     Chief Complaint/Reason for visit: evaluation for possible weight loss surgery    Dear Leah Mae MD (General),    I had the pleasure of seeing your patient, Geovanna Aguilar, to evaluate her obesity and consider her for possible weight loss surgery. As you know, Geovanna Aguilar is 57 year old.  She has a height of 5' 4.764\", a weight of 264 lbs 12.8 oz, and calculated Body mass index is 44.39 kg/(m^2).    HISTORY OF PRESENT ILLNESS:  Weight Loss History Reviewed with Patient 2017   How long have you been overweight? Since late 20's to early 40's   What is the most that you have ever weighed? 318   What is the most weight you have lost? 81   I have tried the following methods to lose weight Watching portions or calories, Weight Watchers, Prescription Medications   I have tried the following weight loss medications? (Check all that apply) Phentermine/Adipex-p/Suprenza   Have you ever had weight loss surgery? No   Lost 81 lbs with Weight watchers    CO-MORBIDITIES OF OBESITY INCLUDE:     2017   I have the following co-morbidities associated with obesity: High Blood Pressure, Sleep Apnea, Weight Bearing Joint Pain, Stress Incontinence   Do you use a CPAP? Yes       PAST MEDICAL HISTORY:  Past Medical History:   Diagnosis Date     Arthritis      Bipolar 2 disorder (H)      Bone and joint disord back, pelvis, leg complicat preg, childb, puerp      Chronic constipation      Depressive disorder      Esophageal " reflux      GERD (gastroesophageal reflux disease)      Heart rate problem     maybe     Hiatal hernia      Hyperlipidemia LDL goal < 100      Hypertension      Hypothyroidism      Migraines      Obesity      Obstructive sleep apnea      Osteoporosis     not sure     Other mental problems      Peripheral edema      Schizophrenia (H)      Urinary incontinence     pulsating urge with spasms to pee       PAST SURGICAL HISTORY:  Past Surgical History:   Procedure Laterality Date     C ABLATION SUPRAVENT ARRHYTHMOGENIC FOCUS       C CARDIAC SURG PROCEDURE UNLIST       C STOMACH SURGERY PROCEDURE UNLISTED       CRYOTHERAPY, CERVICAL       GENITOURINARY SURGERY       HC TOOTH EXTRACTION W/FORCEP       HYSTERECTOMY SUPRACERVICAL      due to endometrial hyperplasia     SALPINGO OOPHORECTOMY,R/L/KEVIN      Salpingo Oophorectomy, RT     SURGICAL HISTORY OF -       Repair soft tissue of right arm     TUBAL LIGATION       [facility]  12     VASCULAR SURGERY         FAMILY HISTORY:   Family History   Problem Relation Age of Onset     C.A.D. Father      Alzheimer Disease Paternal Grandmother      Thyroid Disease Paternal Grandmother      Migraines Paternal Grandmother      CANCER Mother 69     lung cancer now      Depression Mother      Other Cancer Mother           Migraines Mother      DIABETES Son      Type 1     Substance Abuse Sister      Depression Sister      Migraines Sister      Alcoholism Sister      Substance Abuse Sister      Depression Sister      Alcoholism Sister      DIABETES Son      DIABETES Sister        SOCIAL HISTORY:   Social History Questions Reviewed With Patient 2017   Which best describes your employment status (select all that apply) I am disabled   Which best describes your marital status:    Do you have children? Yes   Who do you have in your support network that can be available to help you for the first 2 weeks after surgery? Fay Hammond   Who can you  count on for support throughout your weight loss surgery journey? Kandi Jackson   Can you afford 3 meals a day?  Yes   Can you afford 50-60 dollars a month for vitamins? No       HABITS:     5/26/2017   How often do you drink alcohol? Monthly or less   If you do drink alcohol, how many drinks might you have in a day? (one drink = 5 oz. wine, 1 can/bottle of beer, 1 shot liquor) 1 or 2   Do you currently use any of the following Nicotine products? No   Have you ever used any of the following nicotine products? No   Have you or are you currently using street drugs or prescription strength medication for which you do not have a prescription for? No   Do you have a history of chemical dependency (alcohol or drug abuse)? No       PSYCHOLOGICAL HISTORY:   Psychological History Reviewed With Patient 5/26/2017   Have you ever attempted suicide? Never.   Have you had thoughts of suicide in the past year? Yes   Have you ever been hospitalized for mental illness or a suicide attempt? More than 10 years ago.   Do you have a history of chronic pain? Yes   Have you ever been diagnosed with fibromyalgia? No   Are you currently being treated for any of the following? (select all that apply) Depression, Bipolar, Schizophrenia, I take medication or see a therapist for another mental illness   Are you currently seeing a therapist or counselor?  No   Are you currently seeing a psychiatrist? Yes     ROS:     5/26/2017   Skin:  Leg swelling   HEENT: Headaches, Dizziness/lightheadedness   Musculoskeletal: Joint Pain, Back pain, Limited mobility, Swelling of legs, Arthritis   Cardiovascular: Chest pain, Shortness of breath with activity   Pulmonary: Shortness of breath with activity, Snoring, People have told me I stop breathing while asleep, Excessively sleep during the day, Expereince morning headaches   Gastrointestinal: Heartburn, Reflux, Constipation   Genitourinary: Stress incontinence (losing urine when coughing, sneezing, etc.)  "  Hematological: None of the above   Neurological: Migraine headahces   Female ONLY: None of the above     EATING BEHAVIORS:     5/26/2017   Have you or anyone else thought that you had an eating disorder? No   Do you currently binge eat (eat a large amount of food in a short time)? Yes   Are you an emotional eater? Yes   Do you get up to eat after falling asleep? No     EXERCISE:     5/26/2017   How often do you exercise? Never   What keeps you from being more active?  Pain, My ability to walk or move around is limited     MEDICATIONS:  Current Outpatient Prescriptions   Medication     esomeprazole (NEXIUM) 40 MG CR capsule     traMADol (ULTRAM) 50 MG tablet     divalproex (DEPAKOTE ER) 500 MG 24 hr tablet     simvastatin (ZOCOR) 40 MG tablet     metoprolol (LOPRESSOR) 25 MG tablet     levothyroxine (SYNTHROID, LEVOTHROID) 150 MCG tablet     BUPROPION HCL# 300 MG OR TB24     AMBIEN 10 MG OR TABS     ZYPREXA 5 MG OR TABS     No current facility-administered medications for this visit.      ALLERGIES:  Allergies   Allergen Reactions     Abilify [Aripiprazole]      Carafate [Sucralfate]      Lisinopril Cough     cough       LABS/IMAGING/MEDICAL RECORDS REVIEW: None    PHYSICAL EXAM:  /73  Pulse 55  Temp 98.8  F (37.1  C) (Oral)  Ht 1.645 m (5' 4.76\")  Wt 120.1 kg (264 lb 12.8 oz)  SpO2 98%  BMI 44.39 kg/m2  General: NAD  Neurologic: A & O x 3, gait normal  Head: normocephalic, atraumatic  HEENT: PERRL, EOMI.   Respiratory: respirations unlabored  Abdomen: Obese, Soft NT ND   Extremities: No LE swelling   Skin: warm and dry.  No rashes on exposed skin  Psychiatric: Mentation and Affect appear normal    In summary, Geovanna Aguilar has Class III obesity with a body mass index of Body mass index is 44.39 kg/(m^2). kg/m2 and the comorbidities stated above.   Due to her psychiatric history she is not a candidate for weight loss surgery.   We discussed topiramate and side effects.  She will think about this " medication and discuss with psychiatrist.    Needs to lose 56 lbs before hip surgery per ortho (BMI 35)  See RD today and monthly  See Rosy Padilla for return MWM in 1 month      MEDICATION DISCUSSED AT THIS APPOINTMENT  We are considering starting topiramate at bedtime.  Start one tab, 25 mg, for a week. Go up to 50 mg (2 tabs) for the next week. At the third week, take   3 tabs (75 mg).  Stay at 3 tabs until you are seen again. Call the nurse at [phone] if you have any questions or concerns. (Do not stop taking it if you don't think it's working. For some people it works even though they do not feel much different.)    Topiramate (Topamax) is a medication that is used most often to treat migraine headaches or for seizures. It has also been found to help with weight loss. Although it's not currently FDA approved for weight loss, it has been used safely for a number of years to help people who are carrying extra weight.     Just how topiramate helps with weight loss has not been exactly determined. However it seems to work on areas of the brain to quiet down signals related to eating.      Topiramate may make you:    >feel less interest in eating in between meals   >think less about food and eating   >find it easier to push the plate away   >find giving up pop easier    >have an easier time eating less    For some of our patients, the pills work right away. They feel and think quite differently about food. Other patients don't feel much of a change but find in fact they have lost weight! Like all weight loss medications, topiramate works best when you help it work.  This means:    1) Have less tempting high calorie (fattening) food around the house or office    2) Have lower calorie food (fruits, vegetables,low fat meats and dairy) for snacks    3) Eat out only one time or less each week.   4) Eat your meals at a table with the TV or computer off.    Side-effects. Topiramate is generally well tolerated. The  main side-effects we see are:   Tingling in hands,feet, or face (usually not very troublesome)   Mental confusion and word finding trouble (about 10% of patients have this.)     Feeling sleepy or a bit dopey- this goes away very soon after starting.    One of the dangers of topiramate is the possibility of birth defects--if you get pregnant when you are on it, there is the risk that your baby will be born with a cleft lip or palate.  If you are on topiramate and of child bearing age, you need to be on a reliable form of birth control or refrain from sexual intercourse.     Please refer to the pharmacy insert for more information on side-effects. Since many pharmacists are not familiar with the use of topiramate in weight loss, calling the clinic will get you the most accurate information on the use of this medication for weight loss.     In order to get refills of this or any medication we prescribe you must be seen in the medical weight mgmt clinic every 2-3 months. Please have your pharmacy fax a refill request to [phone].        Sincerely,    Rosy Padilla PA-C    I spent a total of 30 minutes face to face with Geovanna during today's office visit. Over 50% of this time was spent counseling the patient and/or coordinating care.

## 2017-05-31 NOTE — MR AVS SNAPSHOT
MRN:[id number]                      After Visit Summary   5/31/2017    Geovanna Aguilar    MRN: [id number]           Visit Information        Provider Department      5/31/2017 1:00 PM Rosy Padilla PA-C [facility]        Your next 10 appointments already scheduled     May 31, 2017  1:30 PM CDT   NUTRITION VISIT with ELSIE Schaeffer [facility] ([facility])    [address] [facility] [address]  [address] [place] [address] [phone]              Care Instructions    Discuss Topiramate with psychiatrist before starting  See dietitian today and monthly      MEDICATION DISCUSSED AT THIS APPOINTMENT  We are considering starting topiramate at bedtime.  Start one tab, 25 mg, for a week. Go up to 50 mg (2 tabs) for the next week. At the third week, take   3 tabs (75 mg).  Stay at 3 tabs until you are seen again. Call the nurse at [phone] if you have any questions or concerns. (Do not stop taking it if you don't think it's working. For some people it works even though they do not feel much different.)    Topiramate (Topamax) is a medication that is used most often to treat migraine headaches or for seizures. It has also been found to help with weight loss. Although it's not currently FDA approved for weight loss, it has been used safely for a number of years to help people who are carrying extra weight.     Just how topiramate helps with weight loss has not been exactly determined. However it seems to work on areas of the brain to quiet down signals related to eating.      Topiramate may make you:    >feel less interest in eating in between meals   >think less about food and eating   >find it easier to push the plate away   >find giving up pop easier    >have an easier time eating less    For some of our patients, the pills work right away. They feel and think quite differently about food. Other patients  don't feel much of a change but find in fact they have lost weight! Like all weight loss medications, topiramate works best when you help it work.  This means:    1) Have less tempting high calorie (fattening) food around the house or office    2) Have lower calorie food (fruits, vegetables,low fat meats and dairy) for snacks    3) Eat out only one time or less each week.   4) Eat your meals at a table with the TV or computer off.    Side-effects. Topiramate is generally well tolerated. The main side-effects we see are:   Tingling in hands,feet, or face (usually not very troublesome)   Mental confusion and word finding trouble (about 10% of patients have this.)     Feeling sleepy or a bit dopey- this goes away very soon after starting.    One of the dangers of topiramate is the possibility of birth defects--if you get pregnant when you are on it, there is the risk that your baby will be born with a cleft lip or palate.  If you are on topiramate and of child bearing age, you need to be on a reliable form of birth control or refrain from sexual intercourse.     Please refer to the pharmacy insert for more information on side-effects. Since many pharmacists are not familiar with the use of topiramate in weight loss, calling the clinic will get you the most accurate information on the use of this medication for weight loss.     In order to get refills of this or any medication we prescribe you must be seen in the medical weight mgmt clinic every 2-3 months. Please have your pharmacy fax a refill request to [phone].           Values of n Information     Values [employer] n gives you secure access to your electronic health record. If you see a primary care provider, you can also send messages to your care team and make appointments. If you have questions, please call your primary care clinic.  If you do not have a primary care provider, please call [phone] and they will assist you.      Values of n is an electronic gateway that  provides easy, online access to your medical records. With [employer], you can request a clinic appointment, read your test results, renew a prescription or communicate with your care team.     To access your existing account, please contact your [facility] or call [phone] for assistance.        Care EveryWhere ID     This is your Care EveryWhere ID. This could be used by other organizations to access your [place] medical records  [id number]

## 2017-05-31 NOTE — PATIENT INSTRUCTIONS
Discuss Topiramate with psychiatrist before starting  See dietitian today and monthly      MEDICATION DISCUSSED AT THIS APPOINTMENT  We are considering starting topiramate at bedtime.  Start one tab, 25 mg, for a week. Go up to 50 mg (2 tabs) for the next week. At the third week, take   3 tabs (75 mg).  Stay at 3 tabs until you are seen again. Call the nurse at [phone] if you have any questions or concerns. (Do not stop taking it if you don't think it's working. For some people it works even though they do not feel much different.)    Topiramate (Topamax) is a medication that is used most often to treat migraine headaches or for seizures. It has also been found to help with weight loss. Although it's not currently FDA approved for weight loss, it has been used safely for a number of years to help people who are carrying extra weight.     Just how topiramate helps with weight loss has not been exactly determined. However it seems to work on areas of the brain to quiet down signals related to eating.      Topiramate may make you:    >feel less interest in eating in between meals   >think less about food and eating   >find it easier to push the plate away   >find giving up pop easier    >have an easier time eating less    For some of our patients, the pills work right away. They feel and think quite differently about food. Other patients don't feel much of a change but find in fact they have lost weight! Like all weight loss medications, topiramate works best when you help it work.  This means:    1) Have less tempting high calorie (fattening) food around the house or office    2) Have lower calorie food (fruits, vegetables,low fat meats and dairy) for snacks    3) Eat out only one time or less each week.   4) Eat your meals at a table with the TV or computer off.    Side-effects. Topiramate is generally well tolerated. The main side-effects we see are:   Tingling in hands,feet, or face (usually not very  troublesome)   Mental confusion and word finding trouble (about 10% of patients have this.)     Feeling sleepy or a bit dopey- this goes away very soon after starting.    One of the dangers of topiramate is the possibility of birth defects--if you get pregnant when you are on it, there is the risk that your baby will be born with a cleft lip or palate.  If you are on topiramate and of child bearing age, you need to be on a reliable form of birth control or refrain from sexual intercourse.     Please refer to the pharmacy insert for more information on side-effects. Since many pharmacists are not familiar with the use of topiramate in weight loss, calling the clinic will get you the most accurate information on the use of this medication for weight loss.     In order to get refills of this or any medication we prescribe you must be seen in the medical weight mgmt clinic every 2-3 months. Please have your pharmacy fax a refill request to [phone].

## 2017-05-31 NOTE — PROGRESS NOTES
"New Bariatric Surgery Consultation Note    RE: Geovanna Aguialr  MR#: [id number]  : 1960    Referring provider:       2017   Who referred you? Hemanth Luna     Chief Complaint/Reason for visit: evaluation for possible weight loss surgery    Dear Leah Mae MD (General),    I had the pleasure of seeing your patient, Geovanna Aguilar, to evaluate her obesity and consider her for possible weight loss surgery. As you know, Geovanna Aguilar is 57 year old.  She has a height of 5' 4.764\", a weight of 264 lbs 12.8 oz, and calculated Body mass index is 44.39 kg/(m^2).    HISTORY OF PRESENT ILLNESS:  Weight Loss History Reviewed with Patient 2017   How long have you been overweight? Since late 20's to early 40's   What is the most that you have ever weighed? 318   What is the most weight you have lost? 81   I have tried the following methods to lose weight Watching portions or calories, Weight Watchers, Prescription Medications   I have tried the following weight loss medications? (Check all that apply) Phentermine/Adipex-p/Suprenza   Have you ever had weight loss surgery? No   Lost 81 lbs with Weight watchers    CO-MORBIDITIES OF OBESITY INCLUDE:     2017   I have the following co-morbidities associated with obesity: High Blood Pressure, Sleep Apnea, Weight Bearing Joint Pain, Stress Incontinence   Do you use a CPAP? Yes       PAST MEDICAL HISTORY:  Past Medical History:   Diagnosis Date     Arthritis      Bipolar 2 disorder (H)      Bone and joint disord back, pelvis, leg complicat preg, childb, puerp      Chronic constipation      Depressive disorder      Esophageal reflux      GERD (gastroesophageal reflux disease)      Heart rate problem     maybe     Hiatal hernia      Hyperlipidemia LDL goal < 100      Hypertension      Hypothyroidism      Migraines      Obesity      Obstructive sleep apnea      Osteoporosis     not sure     Other mental problems      Peripheral edema  "     Schizophrenia (H)      Urinary incontinence     pulsating urge with spasms to pee       PAST SURGICAL HISTORY:  Past Surgical History:   Procedure Laterality Date     C ABLATION SUPRAVENT ARRHYTHMOGENIC FOCUS       C CARDIAC SURG PROCEDURE UNLIST       C STOMACH SURGERY PROCEDURE UNLISTED       CRYOTHERAPY, CERVICAL       GENITOURINARY SURGERY       HC TOOTH EXTRACTION W/FORCEP       HYSTERECTOMY SUPRACERVICAL      due to endometrial hyperplasia     SALPINGO OOPHORECTOMY,R/L/KEVIN      Salpingo Oophorectomy, RT     SURGICAL HISTORY OF -       Repair soft tissue of right arm     TUBAL LIGATION       [facility] End[facility]  12     VASCULAR SURGERY         FAMILY HISTORY:   Family History   Problem Relation Age of Onset     C.A.D. Father      Alzheimer Disease Paternal Grandmother      Thyroid Disease Paternal Grandmother      Migraines Paternal Grandmother      CANCER Mother 69     lung cancer now      Depression Mother      Other Cancer Mother           Migraines Mother      DIABETES Son      Type 1     Substance Abuse Sister      Depression Sister      Migraines Sister      Alcoholism Sister      Substance Abuse Sister      Depression Sister      Alcoholism Sister      DIABETES Son      DIABETES Sister        SOCIAL HISTORY:   Social History Questions Reviewed With Patient 2017   Which best describes your employment status (select all that apply) I am disabled   Which best describes your marital status:    Do you have children? Yes   Who do you have in your support network that can be available to help you for the first 2 weeks after surgery? Fay Hammond   Who can you count on for support throughout your weight loss surgery journey? Kandi Jackson   Can you afford 3 meals a day?  Yes   Can you afford 50-60 dollars a month for vitamins? No       HABITS:     2017   How often do you drink alcohol? Monthly or less   If you do drink alcohol, how many drinks might you have  in a day? (one drink = 5 oz. wine, 1 can/bottle of beer, 1 shot liquor) 1 or 2   Do you currently use any of the following Nicotine products? No   Have you ever used any of the following nicotine products? No   Have you or are you currently using street drugs or prescription strength medication for which you do not have a prescription for? No   Do you have a history of chemical dependency (alcohol or drug abuse)? No       PSYCHOLOGICAL HISTORY:   Psychological History Reviewed With Patient 5/26/2017   Have you ever attempted suicide? Never.   Have you had thoughts of suicide in the past year? Yes   Have you ever been hospitalized for mental illness or a suicide attempt? More than 10 years ago.   Do you have a history of chronic pain? Yes   Have you ever been diagnosed with fibromyalgia? No   Are you currently being treated for any of the following? (select all that apply) Depression, Bipolar, Schizophrenia, I take medication or see a therapist for another mental illness   Are you currently seeing a therapist or counselor?  No   Are you currently seeing a psychiatrist? Yes     ROS:     5/26/2017   Skin:  Leg swelling   HEENT: Headaches, Dizziness/lightheadedness   Musculoskeletal: Joint Pain, Back pain, Limited mobility, Swelling of legs, Arthritis   Cardiovascular: Chest pain, Shortness of breath with activity   Pulmonary: Shortness of breath with activity, Snoring, People have told me I stop breathing while asleep, Excessively sleep during the day, Expereince morning headaches   Gastrointestinal: Heartburn, Reflux, Constipation   Genitourinary: Stress incontinence (losing urine when coughing, sneezing, etc.)   Hematological: None of the above   Neurological: Migraine headahces   Female ONLY: None of the above     EATING BEHAVIORS:     5/26/2017   Have you or anyone else thought that you had an eating disorder? No   Do you currently binge eat (eat a large amount of food in a short time)? Yes   Are you an emotional  "eater? Yes   Do you get up to eat after falling asleep? No     EXERCISE:     5/26/2017   How often do you exercise? Never   What keeps you from being more active?  Pain, My ability to walk or move around is limited     MEDICATIONS:  Current Outpatient Prescriptions   Medication     esomeprazole (NEXIUM) 40 MG CR capsule     traMADol (ULTRAM) 50 MG tablet     divalproex (DEPAKOTE ER) 500 MG 24 hr tablet     simvastatin (ZOCOR) 40 MG tablet     metoprolol (LOPRESSOR) 25 MG tablet     levothyroxine (SYNTHROID, LEVOTHROID) 150 MCG tablet     BUPROPION HCL# 300 MG OR TB24     AMBIEN 10 MG OR TABS     ZYPREXA 5 MG OR TABS     No current facility-administered medications for this visit.      ALLERGIES:  Allergies   Allergen Reactions     Abilify [Aripiprazole]      Carafate [Sucralfate]      Lisinopril Cough     cough       LABS/IMAGING/MEDICAL RECORDS REVIEW: None    PHYSICAL EXAM:  /73  Pulse 55  Temp 98.8  F (37.1  C) (Oral)  Ht 1.645 m (5' 4.76\")  Wt 120.1 kg (264 lb 12.8 oz)  SpO2 98%  BMI 44.39 kg/m2  General: NAD  Neurologic: A & O x 3, gait normal  Head: normocephalic, atraumatic  HEENT: PERRL, EOMI.   Respiratory: respirations unlabored  Abdomen: Obese, Soft NT ND   Extremities: No LE swelling   Skin: warm and dry.  No rashes on exposed skin  Psychiatric: Mentation and Affect appear normal    In summary, Geovanna Aguilar has Class III obesity with a body mass index of Body mass index is 44.39 kg/(m^2). kg/m2 and the comorbidities stated above.   Due to her psychiatric history she is not a candidate for weight loss surgery.   We discussed topiramate and side effects.  She will think about this medication and discuss with psychiatrist.    Needs to lose 56 lbs before hip surgery per ortho (BMI 35)  See RD today and monthly  See Rosy Padilla for return MWM in 1 month      MEDICATION DISCUSSED AT THIS APPOINTMENT  We are considering starting topiramate at bedtime.  Start one tab, 25 mg, for a week. Go up " to 50 mg (2 tabs) for the next week. At the third week, take   3 tabs (75 mg).  Stay at 3 tabs until you are seen again. Call the nurse at [phone] if you have any questions or concerns. (Do not stop taking it if you don't think it's working. For some people it works even though they do not feel much different.)    Topiramate (Topamax) is a medication that is used most often to treat migraine headaches or for seizures. It has also been found to help with weight loss. Although it's not currently FDA approved for weight loss, it has been used safely for a number of years to help people who are carrying extra weight.     Just how topiramate helps with weight loss has not been exactly determined. However it seems to work on areas of the brain to quiet down signals related to eating.      Topiramate may make you:    >feel less interest in eating in between meals   >think less about food and eating   >find it easier to push the plate away   >find giving up pop easier    >have an easier time eating less    For some of our patients, the pills work right away. They feel and think quite differently about food. Other patients don't feel much of a change but find in fact they have lost weight! Like all weight loss medications, topiramate works best when you help it work.  This means:    1) Have less tempting high calorie (fattening) food around the house or office    2) Have lower calorie food (fruits, vegetables,low fat meats and dairy) for snacks    3) Eat out only one time or less each week.   4) Eat your meals at a table with the TV or computer off.    Side-effects. Topiramate is generally well tolerated. The main side-effects we see are:   Tingling in hands,feet, or face (usually not very troublesome)   Mental confusion and word finding trouble (about 10% of patients have this.)     Feeling sleepy or a bit dopey- this goes away very soon after starting.    One of the dangers of topiramate is the possibility of birth  defects--if you get pregnant when you are on it, there is the risk that your baby will be born with a cleft lip or palate.  If you are on topiramate and of child bearing age, you need to be on a reliable form of birth control or refrain from sexual intercourse.     Please refer to the pharmacy insert for more information on side-effects. Since many pharmacists are not familiar with the use of topiramate in weight loss, calling the clinic will get you the most accurate information on the use of this medication for weight loss.     In order to get refills of this or any medication we prescribe you must be seen in the medical weight mgmt clinic every 2-3 months. Please have your pharmacy fax a refill request to [phone].        Sincerely,    Rosy Padilla PA-C    I spent a total of 30 minutes face to face with Geovanna during today's office visit. Over 50% of this time was spent counseling the patient and/or coordinating care.

## 2017-05-31 NOTE — NURSING NOTE
"(   Chief Complaint   Patient presents with     Consult     NBS/BMI 44    )    ( Weight: 264 lb 12.8 oz )  ( Height: 5' 4.76\" )  ( BMI (Calculated): 44.48 )  ( Initial Weight: 264 lb 12.8 oz )  ( Cumulative weight loss (lbs): 0 )  (   )  (   )  ( Waist Circumference (cm): 126.5 cm )  (   )    ( BP: 145/73 )  (   )  ( Temp: 98.8  F (37.1  C) )  ( Temp src: Oral )  ( Pulse: 55 )  (   )  ( SpO2: 98 % )    (   Patient Active Problem List   Diagnosis     GERD (gastroesophageal reflux disease)     Hypothyroidism     Peripheral edema     KALPESH (obstructive sleep apnea)     Hypertension goal BP (blood pressure) < 140/90     Hyperlipidemia LDL goal <130     Mild depressed bipolar I disorder (H)     Degenerative joint disease (DJD) of hip     Bursitis, hip     Bladder spasm     Anemia     Hyperglycemia     Chest pain     Schizophrenia (H)    )  (   Current Outpatient Prescriptions   Medication Sig Dispense Refill     esomeprazole (NEXIUM) 40 MG CR capsule Take 1 capsule (40 mg) by mouth 2 times daily Take 30-60 minutes before eating. Needs to be seen by provider for further refills 180 capsule 1     traMADol (ULTRAM) 50 MG tablet Take 1-2 tablets ( mg) by mouth every 6 hours as needed for severe pain 90 tablet 1     divalproex (DEPAKOTE ER) 500 MG 24 hr tablet Reported on 3/27/2017       simvastatin (ZOCOR) 40 MG tablet Take 1 tablet (40 mg) by mouth At Bedtime 90 tablet 4     metoprolol (LOPRESSOR) 25 MG tablet Take 1 tablet (25 mg) by mouth 2 times daily 180 tablet 4     levothyroxine (SYNTHROID, LEVOTHROID) 150 MCG tablet Take 1 tablet (150 mcg) by mouth daily 90 tablet 3     BUPROPION HCL# 300 MG OR TB24 1 TABLET DAILY       AMBIEN 10 MG OR TABS 1 TABLET AT BEDTIME       ZYPREXA 5 MG OR TABS Reported on 3/27/2017      )  ( Diabetes Eval:    )    ( Pain Eval:  Data Unavailable )    ( Wound Eval:       )    (   History   Smoking Status     Passive Smoke Exposure - Never Smoker     Packs/day: 0.00     Years: 0.00     " Types: Cigarettes, Other   Smokeless Tobacco     Never Used     Comment: boyfriend smokes    )    ( Signed By:  Eduard Mcginnis; May 31, 2017; 12:43 PM )

## 2017-06-02 ENCOUNTER — TELEPHONE (OUTPATIENT)
Dept: ENDOCRINOLOGY | Facility: CLINIC | Age: 57
End: 2017-06-02

## 2017-06-02 ENCOUNTER — TELEPHONE (OUTPATIENT)
Dept: FAMILY MEDICINE | Facility: CLINIC | Age: 57
End: 2017-06-02

## 2017-06-02 ENCOUNTER — CARE COORDINATION (OUTPATIENT)
Dept: CARE COORDINATION | Facility: CLINIC | Age: 57
End: 2017-06-02

## 2017-06-02 DIAGNOSIS — K21.9 GASTROESOPHAGEAL REFLUX DISEASE WITHOUT ESOPHAGITIS: ICD-10-CM

## 2017-06-02 DIAGNOSIS — E66.01 MORBID OBESITY, UNSPECIFIED OBESITY TYPE (H): Primary | ICD-10-CM

## 2017-06-02 RX ORDER — ESOMEPRAZOLE MAGNESIUM 40 MG/1
40 CAPSULE, DELAYED RELEASE ORAL 2 TIMES DAILY
Qty: 180 CAPSULE | Refills: 1 | Status: CANCELLED | OUTPATIENT
Start: 2017-06-02

## 2017-06-02 RX ORDER — TOPIRAMATE 25 MG/1
TABLET, FILM COATED ORAL
Qty: 270 TABLET | Refills: 0 | Status: SHIPPED | OUTPATIENT
Start: 2017-06-02 | End: 2017-09-26

## 2017-06-02 NOTE — TELEPHONE ENCOUNTER
Reason for Call:  Medication or medication refill:    Do you use a [facility]?  Name of the pharmacy and phone number for the current request:  [facility] [place], [place] - [address] AT Portal to Registered [facility] Sites    Name of the medication requested: Nexium 40 mg - wants 90 day supply pr says will need prior auth    Other request: also calling about a symptom -  Immediate need urinate after drinking - please call and advise    Can we leave a detailed message on this number?  any    Phone number patient can be reached at: Home number on file [phone] (home)    Best Time: any    Call taken on 6/2/2017 at 8:37 AM by Judi Osei

## 2017-06-02 NOTE — PROGRESS NOTES
Clinic Care Coordination Contact - Social Work - Follow-Up - 6-2-17  Care Team Conversations    SW followed up wit pt via phone today regarding her appt with Surgical Weight [facility].  Pt is not a candidate for weight loss surgery due to her mental health diagnoses of bipolar disorder and schizophrenia.  However, pt is going to begin taking a medication, topiramate, that curbs appetite.  Pt is relieved that weight loss surgery was not recommended and sounds motivated to try the medication to assist her in losing weight so she can have her hip surgery eventually.  Pt continues to see her psychiatrist on a regular basis.  SW provided active listening, affirmations, and support to pt during contact.  Pt requested that SW follow up with her again following her next weight manangement appt on 7-14-17, which KIANNA will do.     DOUGLAS Ferguson  Care Coordinator - Social Work  [facility]  Office:  [phone]  6/2/2017 11:36 AM

## 2017-06-02 NOTE — TELEPHONE ENCOUNTER
Spoke with the pt and verbalize the PCP note below about the Urine Symptoms.  Pt verbalize that she does not has pain/burning sensation and decline phone visit.  Pt verbalize that she saw PCP couple months ago, had some labs done, at the time the problem was there; and never has resolved.  Pt wonder if provider can prescribe something to make it stop (URGENCY).  Staff verbalize to send msg back to the PCP.  Pt do not uses My-Chart, staff mentioned that as soon   Route to the PCP for review.      PA STARTED : COVER MY MEDS.   Key: [id number]

## 2017-06-02 NOTE — TELEPHONE ENCOUNTER
Start PA for medication    As to the urine symptoms, she doesn't have diabetes as far as we know but could be developing a urinary tract infection.  If she develops any burning or pain, come in for testing.  Otherwise try to stay hydrated with the warm weather and let me know how she is feeling Monday.

## 2017-06-02 NOTE — TELEPHONE ENCOUNTER
Pt calling regarding Rx for Topiramate. Stated that she has checked with her psychologist regarding this prescription and they have okayed her to start it. She would like the prescription sent to [facility] (ph # [phone]) States that she wants to ensure full 90 day supply is sent because she is on disability and gets all her prescriptions sent this way as she is charged the jaycob amount for a 30 day and 90 day supply. States that her understanding is to take 25mg for one week, 50mg the next week, and 75 mg for the remainder of the prescription (249 tabs). She would like a call when this prescription has been sent. Yulissa CARLOS LPN

## 2017-06-05 NOTE — TELEPHONE ENCOUNTER
As this would be a new medication a visit would be necessary but telephone visit could be fine.  We need to discuss the medication and potential problems with her other medications.

## 2017-06-05 NOTE — TELEPHONE ENCOUNTER
This writer attempted to contact Geovanna on 06/05/17      Reason for call schedule follow up for medication  and left detailed message.      When patient calls back, please schedule Office visit or Phone visit appointment within 1 week with PCP, document that pt called and close encounter .          Nadege Moran, CMA

## 2017-06-09 NOTE — TELEPHONE ENCOUNTER
Called left message with person answering to have patient schedule  An office visit with her provider.  Charlee Kline MA/  For Teams Spirit and Analy

## 2017-06-14 NOTE — TELEPHONE ENCOUNTER
Spoke to patient scheduled a phone visit for 6/21/17 4:00 pm to discuss bladder issues.  Vladimir ERICKSON

## 2017-06-21 ENCOUNTER — TELEPHONE (OUTPATIENT)
Dept: FAMILY MEDICINE | Facility: CLINIC | Age: 57
End: 2017-06-21

## 2017-06-21 ENCOUNTER — VIRTUAL VISIT (OUTPATIENT)
Dept: FAMILY MEDICINE | Facility: CLINIC | Age: 57
End: 2017-06-21
Payer: COMMERCIAL

## 2017-06-21 DIAGNOSIS — N39.41 URGE INCONTINENCE OF URINE: Primary | ICD-10-CM

## 2017-06-21 DIAGNOSIS — E66.01 MORBID OBESITY DUE TO EXCESS CALORIES (H): ICD-10-CM

## 2017-06-21 DIAGNOSIS — K21.9 GASTROESOPHAGEAL REFLUX DISEASE WITHOUT ESOPHAGITIS: ICD-10-CM

## 2017-06-21 DIAGNOSIS — N39.41 URGE INCONTINENCE OF URINE: ICD-10-CM

## 2017-06-21 PROCEDURE — 99442 ZZC PHYSICIAN TELEPHONE EVALUATION 11-20 MIN: CPT | Performed by: FAMILY MEDICINE

## 2017-06-21 RX ORDER — ESOMEPRAZOLE MAGNESIUM 40 MG/1
40 CAPSULE, DELAYED RELEASE ORAL 2 TIMES DAILY
Qty: 180 CAPSULE | Refills: 1 | Status: SHIPPED | OUTPATIENT
Start: 2017-06-21 | End: 2017-11-28

## 2017-06-21 RX ORDER — OXYBUTYNIN CHLORIDE 5 MG/1
5 TABLET, EXTENDED RELEASE ORAL DAILY
Qty: 30 TABLET | Refills: 1 | Status: SHIPPED | OUTPATIENT
Start: 2017-06-21 | End: 2017-06-22

## 2017-06-21 NOTE — PROGRESS NOTES
"  SUBJECTIVE:                                                    Geovanna Aguilar is a 57 year old female who is being evaluated via a telephone visit.      The patient has been notified of following:     \"This telephone visit will be conducted via a call between you and your physician/provider. We have found that certain health care needs can be provided without the need for a physical exam.  This service lets us provide the care you need with a short phone conversation.  If a prescription is necessary we can send it directly to your pharmacy.  If lab work is needed we can place an order for that and you can then stop by our lab to have the test done at a later time.    We will bill your insurance company for this service.  Please check with your medical insurance if this type of visit is covered. You may be responsible for the cost of this type of visit if insurance coverage is denied.  The typical cost is $30 (10min), $59 (11-20min) and $85 (21-30min).  Most often these visits are shorter than 10 minutes.    If during the course of the call the physician/provider feels a telephone visit is not appropriate, you will not be charged for this service.\"       Consent has been obtained for this service by 1 care team members: yes. See the scanned image in the medical record.    Geovanna Aguilar complains of  UTI (Phone visit)      I have reviewed and updated the patient's Past Medical History, Social History, Family History and Medication List.    ALLERGIES  Abilify [aripiprazole]; Carafate [sucralfate]; and Lisinopril    Gisela (MA signature)    Additional provider notes:     GERD - has not been able to get nexium and needs refill.  Has not tried apple cider vinegar.  Obesity - seen by bariatric specialist and refused to due surgery and given Topamax.  She has not started this medication and has not been eating low carb.  Sudden urge to urinate over the last few months. Seen in office for similar symptoms and no " UTI    Assessment/Plan:  The primary encounter diagnosis was Urge incontinence of urine. Diagnoses of Gastroesophageal reflux disease without esophagitis and Morbid obesity due to excess calories (H) were also pertinent to this visit.     Refilled nexium as patient is stable  Ketogenic/low carb diet info sent and patient will discuss topamax with psychiatrist.  Rx for ditropan xr sent.    The uses and side effects, including black box warnings as appropriate, were discussed in detail.  All patient questions were answered.  The patient was instructed to call immediately if any side effects developed.     Follow up in 2 weeks if not improved or new symptoms.    Total time of call between patient and provider was 13 minutes       Leah Mccloud M.D.

## 2017-06-21 NOTE — PATIENT INSTRUCTIONS
Ketogenic eating plan    Take a look at the Paleo diet as it is a milder version of the ketogenic diet, also.    Watch this video to see why just eating less calories does not work: https://www.youChromoTekube.com/watch?v=mNYlIcXynwE.    This eating plan is recommended to you to lower you bad cholesterol, diabetes risk, blood sugars and potential liver damage.    This plan resolves around low carbohydrate/starchy food intake with moderate protein intake and high fat intake.  I recommend natural, minimally processed foods.  You can have eggs, butter, wood, full fat cheese full fat cream cheese and heavy cream.  Most nuts and seeds are benecial as well.    If you use a calories track ashley, like my fitness pal or similar, I recommend 70% of calories come from fat, 25% of calories come from protein and 5% of your calories come from non starchy veggies.  Non-starchy veggies would include the vegetables that grow above ground.  If you plan to take in fruit, stick to berries and treat as a dessert. Avoid sugar, high fructose corn syrup, and corn syrup sweeteners.  It should be okay to use Splenda and stevia sweeteners. Avoid corn (this is more of a grain than a veggie), regular soda, potatoes, rice, wheat and pasta.    Please look at www.ViFlux.ITS KOOL.au, www.Everything Club, KetogenicPurplledietPurplleresource.ITS KOOL, Google Ketogenic diets and check out smash the fat on YouTube.    Http://www.ncbi.nlm.nih.gov/pmc/articles/XZT3615875/ is a study which shows long term ketogenic diets are safe and work for weight loss and cholesterol improvement.    These recommendations are general and we should discuss your response to this on a regular basis so we can adjust these recommendations for you.    Note of caution: it will take about 2 weeks for the ketogenic diet to shift you into fat burning as a primary fuel source.  You may feel fatigued and have slight trouble thinking over the first 2 weeks as you shift from carbohydrate as a primary  fuel source to fat as a primary source.  Taking 250 mg of Magnesium and increasing your salt intake every day will help.  (Yes one of the few times you provider will tell you to eat more salt!)

## 2017-06-21 NOTE — TELEPHONE ENCOUNTER
Reason for Call:  Other prescription    Detailed comments: [facility] is requiring prior Auth for esomeprazole (NEXIUM) 40 MG CR capsule.  [facility]  [phone]. Also Geovanna would like to know if the Rx you just prescribed her for Spasm's could be 90 day supply instead of 30 day and if so please resend this to [facility] [address] [place] [address], [place] [facility] [address] [facility]    Phone Number Patient can be reached at: Home number on file [phone] (home)    Best Time: Any     Can we leave a detailed message on this number? YES    Call taken on 6/21/2017 at 6:07 PM by Katie rOozco

## 2017-06-21 NOTE — MR AVS SNAPSHOT
After Visit Summary   6/21/2017    Geovanna Aguilar    MRN: [id number]           Patient Information     Date Of Birth          1960        Visit Information        Provider Department      6/21/2017 4:00 PM Leah Mae MD [facility]        Today's Diagnoses     Urge incontinence of urine    -  1    Gastroesophageal reflux disease without esophagitis        Morbid obesity due to excess calories (H)          Care Instructions    Ketogenic eating plan    Take a look at the Paleo diet as it is a milder version of the ketogenic diet, also.    Watch this video to see why just eating less calories does not work: https://www.RIDERSube.com/watch?v=mNYlIcXynwE.    This eating plan is recommended to you to lower you bad cholesterol, diabetes risk, blood sugars and potential liver damage.    This plan resolves around low carbohydrate/starchy food intake with moderate protein intake and high fat intake.  I recommend natural, minimally processed foods.  You can have eggs, butter, wood, full fat cheese full fat cream cheese and heavy cream.  Most nuts and seeds are benecial as well.    If you use a calories track ashley, like my fitness pal or similar, I recommend 70% of calories come from fat, 25% of calories come from protein and 5% of your calories come from non starchy veggies.  Non-starchy veggies would include the vegetables that grow above ground.  If you plan to take in fruit, stick to berries and treat as a dessert. Avoid sugar, high fructose corn syrup, and corn syrup sweeteners.  It should be okay to use Splenda and stevia sweeteners. Avoid corn (this is more of a grain than a veggie), regular soda, potatoes, rice, wheat and pasta.    Please look at www.Phoenix Enterprise Computing Serviceser.Pepperweed Consulting.au, www.Deep-Secure, KetogenicWindar PhotonicsdietWindar Photonicsresource.Pepperweed Consulting, Google Ketogenic diets and check out smash the fat on YouTube.    Http://www.ncbi.nlm.nih.gov/pmc/articles/AJY1887418/ is a study which  shows long term ketogenic diets are safe and work for weight loss and cholesterol improvement.    These recommendations are general and we should discuss your response to this on a regular basis so we can adjust these recommendations for you.    Note of caution: it will take about 2 weeks for the ketogenic diet to shift you into fat burning as a primary fuel source.  You may feel fatigued and have slight trouble thinking over the first 2 weeks as you shift from carbohydrate as a primary fuel source to fat as a primary source.  Taking 250 mg of Magnesium and increasing your salt intake every day will help.  (Yes one of the few times you provider will tell you to eat more salt!)            Follow-ups after your visit        Your next 10 appointments already scheduled     Jun 21, 2017  4:00 PM CDT   Telephone Visit with Leah Rhoades MD   [facility] ([facility])    [address] [place] [id number] [phone]           Note: this is not an onsite visit; there is no need to come to the facility.            Jul 14, 2017 11:00 AM CDT   NUTRITION VISIT with Valerie Bills RD   [facility] ([facility])    [address] [place] [id number] [phone]            Jul 14, 2017 11:30 AM CDT   (Arrive by 11:15 AM)   Return Weight Management Visit with Rosy Padilla PA-C   [facility] ([facility])    [address] [place] [id number] [phone]              Who to contact     If you have questions or need follow up information about today's clinic visit or your schedule please contact [facility] directly at [phone].  Normal or non-critical lab and imaging results will be communicated to you by MyChart, letter or phone within 4 business days after the clinic  has received the results. If you do not hear from us within 7 days, please contact the clinic through Synthego or phone. If you have a critical or abnormal lab result, we will notify you by phone as soon as possible.  Submit refill requests through Synthego or call your pharmacy and they will forward the refill request to us. Please allow 3 business days for your refill to be completed.          Additional Information About Your Visit        [facility] Information     Synthego gives you secure access to your electronic health record. If you see a primary care provider, you can also send messages to your care team and make appointments. If you have questions, please call your primary care clinic.  If you do not have a primary care provider, please call [phone] and they will assist you.        Care EveryWhere ID     This is your Care EveryWhere ID. This could be used by other organizations to access your [place] medical records  [id number]         Blood Pressure from Last 3 Encounters:   05/31/17 145/73   03/27/17 132/80   08/16/16 130/69    Weight from Last 3 Encounters:   05/31/17 264 lb 12.8 oz (120.1 kg)   04/24/17 267 lb 4.8 oz (121.2 kg)   04/04/17 256 lb (116.1 kg)              Today, you had the following     No orders found for display         Today's Medication Changes          These changes are accurate as of: 6/21/17  3:25 PM.  If you have any questions, ask your nurse or doctor.               Start taking these medicines.        Dose/Directions    oxybutynin 5 MG 24 hr tablet   Commonly known as:  DITROPAN XL   Used for:  Urge incontinence of urine   Started by:  Leah Mae MD        Dose:  5 mg   Take 1 tablet (5 mg) by mouth daily   Quantity:  30 tablet   Refills:  1         These medicines have changed or have updated prescriptions.        Dose/Directions    esomeprazole 40 MG CR capsule   Commonly known as:  nexIUM   This may have changed:  additional instructions   Used for:   Gastroesophageal reflux disease without esophagitis   Changed by:  Leah Mae MD        Dose:  40 mg   Take 1 capsule (40 mg) by mouth 2 times daily Take 30-60 minutes before eating.   Quantity:  180 capsule   Refills:  1            Where to get your medicines      These medications were sent to [facility] [place], [place] - [address] AT [address] [place] [address] [facility] [address]     Phone:  [phone]     esomeprazole 40 MG CR capsule         These medications were sent to [facility] [place] [facility] [address] [facility]  [address] [facility] [address]     Phone:  [phone]     oxybutynin 5 MG 24 hr tablet                Primary Care Provider Office Phone # Fax #    Leah Rhoades [phone]       [facility] [address]  [facility] [address]        Equal Access to Services     [facility]: Hadii aad ku hadasho Soomaali, waaxda luqadaha, qaybta kaalmada adeegyada, waxay idiin hayaan pablo kharamaribell rivas . So [facility] [phone].    ATENCIÓN: Si habla español, tiene a clark disposición servicios gratuitos de asistencia lingüística. Llame al [phone].    We comply with applicable federal civil rights laws and [place] laws. We do not discriminate on the basis of race, color, national origin, age, disability sex, sexual orientation or gender identity.            Thank you!     Thank you for choosing [facility]  for your care. Our goal is always to provide you with excellent care. Hearing back from our patients is one way we can continue to improve our services. Please take a few minutes to complete the written survey that you may receive in the mail after your visit with us. Thank you!             Your Updated Medication List - Protect others around you: Learn how to safely use, store and throw away your  medicines at www.disposemymeds.org.          This list is accurate as of: 6/21/17  3:25 PM.  Always use your most recent med list.                   Brand Name Dispense Instructions for use Diagnosis    AMBIEN 10 MG tablet   Generic drug:  zolpidem      1 TABLET AT BEDTIME        buPROPion 300 MG 24 hr tablet    WELLBUTRIN XL     1 TABLET DAILY        divalproex 500 MG 24 hr tablet    DEPAKOTE ER     Reported on 3/27/2017        esomeprazole 40 MG CR capsule    nexIUM    180 capsule    Take 1 capsule (40 mg) by mouth 2 times daily Take 30-60 minutes before eating.    Gastroesophageal reflux disease without esophagitis       levothyroxine 150 MCG tablet    SYNTHROID/LEVOTHROID    90 tablet    Take 1 tablet (150 mcg) by mouth daily    Acquired hypothyroidism       metoprolol 25 MG tablet    LOPRESSOR    180 tablet    Take 1 tablet (25 mg) by mouth 2 times daily    Essential hypertension with goal blood pressure less than 140/90       oxybutynin 5 MG 24 hr tablet    DITROPAN XL    30 tablet    Take 1 tablet (5 mg) by mouth daily    Urge incontinence of urine       simvastatin 40 MG tablet    ZOCOR    90 tablet    Take 1 tablet (40 mg) by mouth At Bedtime    Hyperlipidemia LDL goal <130       topiramate 25 MG tablet    TOPAMAX    270 tablet    25mg at bedtime for week 1, 50mg at bedtime for 1 week, and 75mg at bedtime thereafter    Morbid obesity, unspecified obesity type (H)       traMADol 50 MG tablet    ULTRAM    90 tablet    Take 1-2 tablets ( mg) by mouth every 6 hours as needed for severe pain    Primary osteoarthritis of both hips       zyPREXA 5 MG tablet   Generic drug:  OLANZapine      Reported on 3/27/2017

## 2017-06-22 RX ORDER — OXYBUTYNIN CHLORIDE 5 MG/1
5 TABLET, EXTENDED RELEASE ORAL DAILY
Qty: 90 TABLET | Refills: 0 | Status: SHIPPED | OUTPATIENT
Start: 2017-06-22 | End: 2017-08-28

## 2017-06-23 NOTE — TELEPHONE ENCOUNTER
Prior Auth. Form completed by provider and faxed to [employer] [phone].      Will wait for reply.     Marian Boucher CMA

## 2017-06-27 NOTE — TELEPHONE ENCOUNTER
This writer attempted to contact Geovanna on 06/27/17      Reason for call Prior authorization approved from 4/25/17 to 6/24/18 and left message to return call.      When patient calls back, please contact 2nd floor [facility] Team (MA/TC). If no one available, document that pt called and route to care team. routine priority .          Nettie Quiñonez MA

## 2017-07-17 ENCOUNTER — TELEPHONE (OUTPATIENT)
Dept: NURSING | Facility: CLINIC | Age: 57
End: 2017-07-17

## 2017-07-17 NOTE — TELEPHONE ENCOUNTER
Social Work Contact - Follow-Up - 7-17-17    SW attempted to contact pt via phone today to follow regarding support/resources related to pt's need to lose weight so she can have orthopedic surgery.  SW was unable to connect with pt and  for pt to call SW back.  SW will await reuturn call from pt.    DOUGLAS Ferguson  Care Coordinator - Social Work  [facility]  Office:  [phone]  7/17/2017 9:24 AM

## 2017-07-21 NOTE — TELEPHONE ENCOUNTER
Social Work Contact - Follow Up - 7-21-17 - Addendum    SW received call back from pt.  Pt reports that she has not started taking the medication, topiramate, that was recommended to her by [facility] as pt has been experiencing much stress in her life.  Pt also concerned regarding the interaction this medication would have with the depakote that pt takes for bipolar disorder.  Pt has an appt coming up with her psychiatrist and plans to discuss the medication with him before starting it.  Pt also reported that her step father has been ill, was hospitalized 3 times, and is now in hospice care.  Also, pt indicates that her boyfriend, Alan, experienced a serious stroke and has been hospitalized in ICU at [facility] since early July.  Per pt, SO has trach, is not responsive, and will be going soon to [facility] [place], for continued care and rehabilitation.  Pt is very distressed by these situations.  Pt is very much missing the presence of her SO at home and is very concerned about whether he will recover enough to return home at some point.  SO's siblings are making medical decisions for SO and thus far, pt has agreed with the decisions pt's siblings have made.  SW discussed with pt the availability of counseling services to pt though pt declines the need currently.  Pt continues to see her psychiatrist on a regular basis for medication management.  SW provided active listening, affirmations, and support to pt during contact.  SW will continue to follow pt for support/resources.     DOUGLAS Ferguson     Social Work  [facility]  Office:  [phone]  E-Mail:  [email or web address]  7/21/2017 9:43 AM

## 2017-08-03 ENCOUNTER — TELEPHONE (OUTPATIENT)
Dept: ORTHOPEDICS | Facility: CLINIC | Age: 57
End: 2017-08-03

## 2017-08-04 ENCOUNTER — RADIANT APPOINTMENT (OUTPATIENT)
Dept: MAMMOGRAPHY | Facility: CLINIC | Age: 57
End: 2017-08-04
Attending: FAMILY MEDICINE
Payer: COMMERCIAL

## 2017-08-04 ENCOUNTER — TELEPHONE (OUTPATIENT)
Dept: ORTHOPEDICS | Facility: CLINIC | Age: 57
End: 2017-08-04

## 2017-08-04 DIAGNOSIS — Z12.31 VISIT FOR SCREENING MAMMOGRAM: ICD-10-CM

## 2017-08-04 DIAGNOSIS — M16.11 PRIMARY OSTEOARTHRITIS OF RIGHT HIP: Primary | ICD-10-CM

## 2017-08-04 PROCEDURE — G0202 SCR MAMMO BI INCL CAD: HCPCS | Mod: TC

## 2017-08-04 NOTE — TELEPHONE ENCOUNTER
Reason for Call:  Other prescription    Detailed comments: walker broke needs new RX for new one, must say: 4  wheel with breaks,seat, and have DX code     Phone Number Patient can be reached at: Home number on file [phone] (home) - fax to [employer] [facility] fax [phone]    Best Time: any    Can we leave a detailed message on this number? NO    Call taken on 8/4/2017 at 4:19 PM by Judi Osei

## 2017-08-07 NOTE — TELEPHONE ENCOUNTER
Spoke with Geovanna regarding her request. I let her know that this is fine and that I am sending the fax to the number she requested. She appreciated this and had no further questions at this time. Fax confirmed successful.    Dmitriy Venegas PA-C  Supervising physician: Power Blank MD  Dept. of Orthopedics  [facility]

## 2017-08-10 ENCOUNTER — TELEPHONE (OUTPATIENT)
Dept: ORTHOPEDICS | Facility: CLINIC | Age: 57
End: 2017-08-10

## 2017-08-10 NOTE — TELEPHONE ENCOUNTER
Social Work Contact - Follow-Up - 8-10-17    KIANNA contacted pt via phone to follow up for support/resources.  Pt's SO remains in the ICU at [facility].  Per pt, SO is unresponsive though has moved some parts of his body.  Per pt, SO has a fever and pneumonia currently.  Pt has been able to visit there twice.  Pt is still hopeful for some recovery for her SO.  Pt's stepfather remains in home hospice and is being cared for by pt's sisters.  SW again discussed with pt the availability of counseling to assist pt in managing the current stressors in pt's life, though pt continues to decline.  Pt does see a psychiatrist on a regular basis and psychiatrist informed pt that he felt it is safe for pt to take topiramate, the medication that pt's weight loss physician recommended pt take..  Pt has started taking topiramate and next week will be taking the 4 pills per day that is pt's full dose.  SW provided active listening, affirmations and support to pt.  SW will continue to follow pt for support/resources.  SW will follow up with pt in 4 weeks.      DOUGLAS Ferguson     Social Work  [facility]  Office:  [phone]  E-Mail:  [email or web address]  8/10/2017 4:30 PM

## 2017-08-28 ENCOUNTER — OFFICE VISIT (OUTPATIENT)
Dept: FAMILY MEDICINE | Facility: CLINIC | Age: 57
End: 2017-08-28
Payer: COMMERCIAL

## 2017-08-28 VITALS
HEART RATE: 63 BPM | OXYGEN SATURATION: 94 % | BODY MASS INDEX: 43.99 KG/M2 | HEIGHT: 65 IN | DIASTOLIC BLOOD PRESSURE: 64 MMHG | TEMPERATURE: 98.6 F | WEIGHT: 264 LBS | SYSTOLIC BLOOD PRESSURE: 125 MMHG

## 2017-08-28 DIAGNOSIS — E78.5 HYPERLIPIDEMIA LDL GOAL <130: ICD-10-CM

## 2017-08-28 DIAGNOSIS — N39.41 URGE INCONTINENCE OF URINE: ICD-10-CM

## 2017-08-28 DIAGNOSIS — F20.9 SCHIZOPHRENIA, UNSPECIFIED TYPE (H): ICD-10-CM

## 2017-08-28 DIAGNOSIS — E66.01 MORBID OBESITY DUE TO EXCESS CALORIES (H): ICD-10-CM

## 2017-08-28 DIAGNOSIS — F31.2 SEVERE MANIC BIPOLAR I DISORDER WITH PSYCHOTIC FEATURES (H): ICD-10-CM

## 2017-08-28 DIAGNOSIS — I10 HYPERTENSION GOAL BP (BLOOD PRESSURE) < 140/90: ICD-10-CM

## 2017-08-28 DIAGNOSIS — E03.9 ACQUIRED HYPOTHYROIDISM: ICD-10-CM

## 2017-08-28 DIAGNOSIS — F31.31 MILD DEPRESSED BIPOLAR I DISORDER (H): ICD-10-CM

## 2017-08-28 DIAGNOSIS — Z00.00 ENCOUNTER FOR ROUTINE ADULT HEALTH EXAMINATION WITHOUT ABNORMAL FINDINGS: Primary | ICD-10-CM

## 2017-08-28 LAB
ALBUMIN SERPL-MCNC: 3.5 G/DL (ref 3.4–5)
ALBUMIN SERPL-MCNC: 3.6 G/DL (ref 3.4–5)
ALP SERPL-CCNC: 57 U/L (ref 40–150)
ALP SERPL-CCNC: 59 U/L (ref 40–150)
ALT SERPL W P-5'-P-CCNC: 17 U/L (ref 0–50)
ALT SERPL W P-5'-P-CCNC: 18 U/L (ref 0–50)
ANION GAP SERPL CALCULATED.3IONS-SCNC: 10 MMOL/L (ref 3–14)
AST SERPL W P-5'-P-CCNC: 8 U/L (ref 0–45)
AST SERPL W P-5'-P-CCNC: 9 U/L (ref 0–45)
BILIRUB DIRECT SERPL-MCNC: 0.1 MG/DL (ref 0–0.2)
BILIRUB SERPL-MCNC: 0.4 MG/DL (ref 0.2–1.3)
BILIRUB SERPL-MCNC: 0.4 MG/DL (ref 0.2–1.3)
BUN SERPL-MCNC: 12 MG/DL (ref 7–30)
CALCIUM SERPL-MCNC: 9.3 MG/DL (ref 8.5–10.1)
CHLORIDE SERPL-SCNC: 110 MMOL/L (ref 94–109)
CHOLEST SERPL-MCNC: 191 MG/DL
CO2 SERPL-SCNC: 22 MMOL/L (ref 20–32)
CREAT SERPL-MCNC: 0.78 MG/DL (ref 0.52–1.04)
ERYTHROCYTE [DISTWIDTH] IN BLOOD BY AUTOMATED COUNT: 14.4 % (ref 10–15)
GFR SERPL CREATININE-BSD FRML MDRD: 76 ML/MIN/1.7M2
GLUCOSE SERPL-MCNC: 87 MG/DL (ref 70–99)
HBA1C MFR BLD: 4.6 % (ref 4.3–6)
HCT VFR BLD AUTO: 38.4 % (ref 35–47)
HDLC SERPL-MCNC: 46 MG/DL
HGB BLD-MCNC: 12.6 G/DL (ref 11.7–15.7)
LDLC SERPL CALC-MCNC: 104 MG/DL
MCH RBC QN AUTO: 30.2 PG (ref 26.5–33)
MCHC RBC AUTO-ENTMCNC: 32.8 G/DL (ref 31.5–36.5)
MCV RBC AUTO: 92 FL (ref 78–100)
NONHDLC SERPL-MCNC: 145 MG/DL
PLATELET # BLD AUTO: 228 10E9/L (ref 150–450)
POTASSIUM SERPL-SCNC: 3.9 MMOL/L (ref 3.4–5.3)
PROT SERPL-MCNC: 7.3 G/DL (ref 6.8–8.8)
PROT SERPL-MCNC: 7.5 G/DL (ref 6.8–8.8)
RBC # BLD AUTO: 4.17 10E12/L (ref 3.8–5.2)
SODIUM SERPL-SCNC: 142 MMOL/L (ref 133–144)
TRIGL SERPL-MCNC: 207 MG/DL
TSH SERPL DL<=0.005 MIU/L-ACNC: 3.21 MU/L (ref 0.4–4)
VALPROATE SERPL-MCNC: 67 MG/L (ref 50–100)
WBC # BLD AUTO: 8.1 10E9/L (ref 4–11)

## 2017-08-28 PROCEDURE — 82248 BILIRUBIN DIRECT: CPT | Performed by: PSYCHIATRY & NEUROLOGY

## 2017-08-28 PROCEDURE — 84443 ASSAY THYROID STIM HORMONE: CPT | Performed by: FAMILY MEDICINE

## 2017-08-28 PROCEDURE — 80164 ASSAY DIPROPYLACETIC ACD TOT: CPT | Performed by: PSYCHIATRY & NEUROLOGY

## 2017-08-28 PROCEDURE — 85027 COMPLETE CBC AUTOMATED: CPT | Performed by: PSYCHIATRY & NEUROLOGY

## 2017-08-28 PROCEDURE — 36415 COLL VENOUS BLD VENIPUNCTURE: CPT | Performed by: FAMILY MEDICINE

## 2017-08-28 PROCEDURE — 83036 HEMOGLOBIN GLYCOSYLATED A1C: CPT | Performed by: PSYCHIATRY & NEUROLOGY

## 2017-08-28 PROCEDURE — 82043 UR ALBUMIN QUANTITATIVE: CPT | Performed by: FAMILY MEDICINE

## 2017-08-28 PROCEDURE — 80061 LIPID PANEL: CPT | Performed by: FAMILY MEDICINE

## 2017-08-28 PROCEDURE — 99396 PREV VISIT EST AGE 40-64: CPT | Performed by: FAMILY MEDICINE

## 2017-08-28 PROCEDURE — 2894A VOIDCORRECT: CPT | Performed by: PSYCHIATRY & NEUROLOGY

## 2017-08-28 PROCEDURE — 80053 COMPREHEN METABOLIC PANEL: CPT | Performed by: FAMILY MEDICINE

## 2017-08-28 RX ORDER — METOPROLOL TARTRATE 25 MG/1
25 TABLET, FILM COATED ORAL 2 TIMES DAILY
Qty: 180 TABLET | Refills: 4 | Status: SHIPPED | OUTPATIENT
Start: 2017-08-28 | End: 2018-08-30

## 2017-08-28 RX ORDER — LEVOTHYROXINE SODIUM 150 UG/1
150 TABLET ORAL DAILY
Qty: 90 TABLET | Refills: 3 | Status: SHIPPED | OUTPATIENT
Start: 2017-08-28 | End: 2017-12-29

## 2017-08-28 RX ORDER — SIMVASTATIN 40 MG
40 TABLET ORAL AT BEDTIME
Qty: 90 TABLET | Refills: 4 | Status: SHIPPED | OUTPATIENT
Start: 2017-08-28 | End: 2017-09-25

## 2017-08-28 RX ORDER — OXYBUTYNIN CHLORIDE 5 MG/1
5 TABLET, EXTENDED RELEASE ORAL DAILY
Qty: 90 TABLET | Refills: 0 | Status: SHIPPED | OUTPATIENT
Start: 2017-08-28 | End: 2017-09-25

## 2017-08-28 NOTE — LETTER
[facility]  [address] [facility] [id number] [phone]                                                                                                           Geovanna Aguilar  [address] [place] [address]    August 29, 2017    Ms. Aguilar,     All of your labs were normal for you.     Please contact the clinic if you have additional questions.  Thank you.     Sincerely,     Leah Rhoades/loc    Results for orders placed or performed in visit on 08/28/17   Lipid panel reflex to direct LDL   Result Value Ref Range    Cholesterol 191 <200 mg/dL    Triglycerides 207 (H) <150 mg/dL    HDL Cholesterol 46 (L) >49 mg/dL    LDL Cholesterol Calculated 104 (H) <100 mg/dL    Non HDL Cholesterol 145 (H) <130 mg/dL   Albumin Random Urine Quantitative with Creat Ratio   Result Value Ref Range    Creatinine Urine 334 mg/dL    Albumin Urine mg/L 95 mg/L    Albumin Urine mg/g Cr 28.32 (H) 0 - 25 mg/g Cr   TSH WITH FREE T4 REFLEX   Result Value Ref Range    TSH 3.21 0.40 - 4.00 mU/L   Comprehensive metabolic panel   Result Value Ref Range    Sodium 142 133 - 144 mmol/L    Potassium 3.9 3.4 - 5.3 mmol/L    Chloride 110 (H) 94 - 109 mmol/L    Carbon Dioxide 22 20 - 32 mmol/L    Anion Gap 10 3 - 14 mmol/L    Glucose 87 70 - 99 mg/dL    Urea Nitrogen 12 7 - 30 mg/dL    Creatinine 0.78 0.52 - 1.04 mg/dL    GFR Estimate 76 >60 mL/min/1.7m2    GFR Estimate If Black >90 >60 mL/min/1.7m2    Calcium 9.3 8.5 - 10.1 mg/dL    Bilirubin Total 0.4 0.2 - 1.3 mg/dL    Albumin 3.5 3.4 - 5.0 g/dL    Protein Total 7.5 6.8 - 8.8 g/dL    Alkaline Phosphatase 59 40 - 150 U/L    ALT 18 0 - 50 U/L    AST 9 0 - 45 U/L   Valproic acid   Result Value Ref Range    Valproic Acid Level 67 50 - 100 mg/L   Hepatic panel (Albumin, ALT, AST, Bili, Alk Phos, TP)   Result Value Ref Range    Bilirubin Direct 0.1 0.0 - 0.2 mg/dL    Bilirubin Total 0.4 0.2 - 1.3 mg/dL    Albumin 3.6 3.4  - 5.0 g/dL    Protein Total 7.3 6.8 - 8.8 g/dL    Alkaline Phosphatase 57 40 - 150 U/L    ALT 17 0 - 50 U/L    AST 8 0 - 45 U/L   CBC with platelets   Result Value Ref Range    WBC 8.1 4.0 - 11.0 10e9/L    RBC Count 4.17 3.8 - 5.2 10e12/L    Hemoglobin 12.6 11.7 - 15.7 g/dL    Hematocrit 38.4 35.0 - 47.0 %    MCV 92 78 - 100 fl    MCH 30.2 26.5 - 33.0 pg    MCHC 32.8 31.5 - 36.5 g/dL    RDW 14.4 10.0 - 15.0 %    Platelet Count 228 150 - 450 10e9/L   Hemoglobin A1c   Result Value Ref Range    Hemoglobin A1C 4.6 4.3 - 6.0 %

## 2017-08-28 NOTE — MR AVS SNAPSHOT
After Visit Summary   8/28/2017    Geovanna Aguilar    MRN: [id number]           Patient Information     Date Of Birth          1960        Visit Information        Provider Department      8/28/2017 3:00 PM Leah Mae MD [facility]        Today's Diagnoses     Encounter for routine adult health examination without abnormal findings    -  1    Urge incontinence of urine        Acquired hypothyroidism        Hypertension goal BP (blood pressure) < 140/90        Hyperlipidemia LDL goal <130        Mild depressed bipolar I disorder (H)        Schizophrenia, unspecified type (H)        Morbid obesity due to excess calories (H)        Essential hypertension with goal blood pressure less than 140/90          Care Instructions    This summary includes the important diagnoses, test, medications and other important parts of your medical history.  Below are a few good we sites you can use to learn more about these.     Www.[facility] : Up to date and easily searchable information on multiple topics.  Www.[facility]/Pharmacy/[email or web address] : [facility] $4.99 medications  Www.medlineplus.gov : medication info, interactive tutorials, watch real surgeries online  Www.familydoctor.org : good info from the Academy of Family Physicians  Www.mayoclinic.com : good info from the [facility]  Www.cdc.gov : public health info, travel advisories, epidemics (H1N1)  Www.aap.org : children's health info, normal development, vaccinations  Www.health.state.[place].us : [place] dept of heatlh, public health issues in [place], N1N1    Based on your medical history and these are the current health maintenance or preventive care services that you are due for (some may have been done at this visit:)  Health Maintenance Due   Topic Date Due     ADVANCE DIRECTIVE PLANNING Q5 YRS  05/01/2016     TSH Q1 YEAR  08/08/2017     ALT Q1 YR  08/08/2017     BMP Q1 YR  08/08/2017     LIPID MONITORING Q1  YEAR  08/08/2017     MICROALBUMIN Q1 YEAR  08/08/2017     =================================================================================  Normal Values   Blood pressure  <140/90 for most adults    <130/80 for some chronic diseases (ask your care team about yours)    BMI (body mass index)  18.5-25 kg/m2 (based on height and weight)     Thank you for visiting [facility]    Normal or non-critical lab and imaging results will be communicated to you by MyChart, letter or phone within 7 days.  If you do not hear from us within 10 days, please call the clinic. If you have a critical or abnormal lab result, we will notify you by phone as soon as possible.     If you have any questions regarding your visit please contact:     Team Comfort:   Clinic Hours Telephone Number   Dr. Stephen Rust   7am-5pm  Monday - Friday [phone]     Pharmacy 8:30am-9pm Monday-Friday    9am-5pm Saturday-Sunday [phone]   Urgent Care 11am-9pm Monday-Friday        9am-5pm Saturday-Sunday [phone]     After hours, weekend or if you need to make an appointment with your primary provider please call [phone].   After Hours nurse advise: call [facility]: [phone]    Medication Refills:  Call your pharmacy and they will forward the refill to us. Please allow 3 business days for your refills to be completed.    Use [employer] (secure email communication and access to your chart) to send your primary care provider a message or make an appointment. Ask someone on your Team how to sign up for [employer]. To log on to [employer] or for more information in [employer] please visit the website at [email or web address].  As of October 8, 2013, all password changes, disabled accounts, or ID changes in [employer]/MyHealth will be done by our Access Services Department.   If you need help with your account or password, call: [phone]. Clinic  staff no longer has the ability to change passwords.       Preventive Health Recommendations  Female Ages 50 - 64    Yearly exam: See your health care provider every year in order to  o Review health changes.   o Discuss preventive care.    o Review your medicines if your doctor has prescribed any.      Get a Pap test every three years (unless you have an abnormal result and your provider advises testing more often).    If you get Pap tests with HPV test, you only need to test every 5 years, unless you have an abnormal result.     You do not need a Pap test if your uterus was removed (hysterectomy) and you have not had cancer.    You should be tested each year for STDs (sexually transmitted diseases) if you're at risk.     Have a mammogram every 1 to 2 years.    Have a colonoscopy at age 50, or have a yearly FIT test (stool test). These exams screen for colon cancer.      Have a cholesterol test every 5 years, or more often if advised.    Have a diabetes test (fasting glucose) every three years. If you are at risk for diabetes, you should have this test more often.     If you are at risk for osteoporosis (brittle bone disease), think about having a bone density scan (DEXA).    Shots: Get a flu shot each year. Get a tetanus shot every 10 years.    Nutrition:     Eat at least 5 servings of fruits and vegetables each day.    Eat whole-grain bread, whole-wheat pasta and brown rice instead of white grains and rice.    Talk to your provider about Calcium and Vitamin D.     Lifestyle    Exercise at least 150 minutes a week (30 minutes a day, 5 days a week). This will help you control your weight and prevent disease.    Limit alcohol to one drink per day.    No smoking.     Wear sunscreen to prevent skin cancer.     See your dentist every six months for an exam and cleaning.    See your eye doctor every 1 to 2 years.            Follow-ups after your visit        Who to contact     If you have questions or need follow up  "information about today's clinic visit or your schedule please contact [facility] YESICA RUDD directly at [phone].  Normal or non-critical lab and imaging results will be communicated to you by MyChart, letter or phone within 4 business days after the clinic has received the results. If you do not hear from us within 7 days, please contact the clinic through iPierianhart or phone. If you have a critical or abnormal lab result, we will notify you by phone as soon as possible.  Submit refill requests through [facility] or call your pharmacy and they will forward the refill request to us. Please allow 3 business days for your refill to be completed.          Additional Information About Your Visit        iPierianhar[employer]ymeworks Information     [facility] gives you secure access to your electronic health record. If you see a primary care provider, you can also send messages to your care team and make appointments. If you have questions, please call your primary care clinic.  If you do not have a primary care provider, please call [phone] and they will assist you.        Care EveryWhere ID     This is your Care EveryWhere ID. This could be used by other organizations to access your [facility] medical records  [id number]        Your Vitals Were     Pulse Temperature Height Pulse Oximetry Breastfeeding? BMI (Body Mass Index)    63 98.6  F (37  C) (Oral) 5' 4.7\" (1.643 m) 94% No 44.34 kg/m2       Blood Pressure from Last 3 Encounters:   08/28/17 125/64   05/31/17 145/73   03/27/17 132/80    Weight from Last 3 Encounters:   08/28/17 264 lb (119.7 kg)   05/31/17 264 lb 12.8 oz (120.1 kg)   04/24/17 267 lb 4.8 oz (121.2 kg)              We Performed the Following     Albumin Random Urine Quantitative with Creat Ratio     Comprehensive metabolic panel     Lipid panel reflex to direct LDL     TSH WITH FREE T4 REFLEX          Where to get your medicines      These medications were sent to [facility] - [place], [place] " - [address] AT [facility]  [address], [facility] [address]     Phone:  [phone]     levothyroxine 150 MCG tablet    metoprolol 25 MG tablet    oxybutynin 5 MG 24 hr tablet    simvastatin 40 MG tablet          Primary Care Provider Office Phone # Fax #    Leah Rooney Samantha Rhoades [phone] [address]  [facility] [address]        Equal Access to Services     [facility]: Hadii aad ku hadasho Soomaali, waaxda luqadaha, qaybta kaalmada adeegyada, waxay idiin hayaan adeeg kharash labrit . So [facility] [phone].    ATENCIÓN: Si habla español, tiene a clark disposición servicios gratuitos de asistencia lingüística. [facility] [phone].    We comply with applicable federal civil rights laws and [place] laws. We do not discriminate on the basis of race, color, national origin, age, disability sex, sexual orientation or gender identity.            Thank you!     Thank you for choosing [facility]  for your care. Our goal is always to provide you with excellent care. Hearing back from our patients is one way we can continue to improve our services. Please take a few minutes to complete the written survey that you may receive in the mail after your visit with us. Thank you!             Your Updated Medication List - Protect others around you: Learn how to safely use, store and throw away your medicines at www.disposemymeds.org.          This list is accurate as of: 8/28/17  3:32 PM.  Always use your most recent med list.                   Brand Name Dispense Instructions for use Diagnosis    AMBIEN 10 MG tablet   Generic drug:  zolpidem      1 TABLET AT BEDTIME        buPROPion 300 MG 24 hr tablet    WELLBUTRIN XL     1 TABLET DAILY        divalproex 500 MG 24 hr tablet    DEPAKOTE ER     Reported on 3/27/2017        esomeprazole 40 MG CR capsule    nexIUM    180 capsule    Take 1 capsule (40 mg) by mouth 2 times daily  Take 30-60 minutes before eating.    Gastroesophageal reflux disease without esophagitis       levothyroxine 150 MCG tablet    SYNTHROID/LEVOTHROID    90 tablet    Take 1 tablet (150 mcg) by mouth daily    Acquired hypothyroidism       metoprolol 25 MG tablet    LOPRESSOR    180 tablet    Take 1 tablet (25 mg) by mouth 2 times daily    Essential hypertension with goal blood pressure less than 140/90       order for DME     1 each    Walker with 4 wheels, brakes, seat    Primary osteoarthritis of right hip       oxybutynin 5 MG 24 hr tablet    DITROPAN XL    90 tablet    Take 1 tablet (5 mg) by mouth daily    Urge incontinence of urine       simvastatin 40 MG tablet    ZOCOR    90 tablet    Take 1 tablet (40 mg) by mouth At Bedtime    Hyperlipidemia LDL goal <130       topiramate 25 MG tablet    TOPAMAX    270 tablet    25mg at bedtime for week 1, 50mg at bedtime for 1 week, and 75mg at bedtime thereafter    Morbid obesity, unspecified obesity type (H)       traMADol 50 MG tablet    ULTRAM    90 tablet    Take 1-2 tablets ( mg) by mouth every 6 hours as needed for severe pain    Primary osteoarthritis of both hips       zyPREXA 5 MG tablet   Generic drug:  OLANZapine      Reported on 3/27/2017

## 2017-08-28 NOTE — PATIENT INSTRUCTIONS
This summary includes the important diagnoses, test, medications and other important parts of your medical history.  Below are a few good we sites you can use to learn more about these.     Www.[place].org : Up to date and easily searchable information on multiple topics.  Www.[employer].[email or web address]/[email or web address]/[email or web address] : [facility] $4.99 medications  Www.medlineplus.gov : medication info, interactive tutorials, watch real surgeries online  Www.familydoctor.org : good info from the Academy of Family Physicians  Www.[employer].com : good info from the [facility]  Www.cdc.gov : public health info, travel advisories, epidemics (H1N1)  Www.aap.org : children's health info, normal development, vaccinations  Www.health.[facility].[place].us : [place] dept of heat, public health issues in [place], N1N1    Based on your medical history and these are the current health maintenance or preventive care services that you are due for (some may have been done at this visit:)  Health Maintenance Due   Topic Date Due     ADVANCE DIRECTIVE PLANNING Q5 YRS  05/01/2016     TSH Q1 YEAR  08/08/2017     ALT Q1 YR  08/08/2017     BMP Q1 YR  08/08/2017     LIPID MONITORING Q1 YEAR  08/08/2017     MICROALBUMIN Q1 YEAR  08/08/2017     =================================================================================  Normal Values   Blood pressure  <140/90 for most adults    <130/80 for some chronic diseases (ask your care team about yours)    BMI (body mass index)  18.5-25 kg/m2 (based on height and weight)     Thank you for visiting [facility]    Normal or non-critical lab and imaging results will be communicated to you by MyChart, letter or phone within 7 days.  If you do not hear from us within 10 days, please call the clinic. If you have a critical or abnormal lab result, we will notify you by phone as soon as possible.     If you have any questions regarding your visit please contact:     Team Comfort:   Clinic Hours Telephone  Charles Rust   7am-5pm  Monday - Friday [phone]     Pharmacy 8:30am-9pm Monday-Friday    9am-5pm Saturday-Sunday [phone]   Urgent Care 11am-9pm Monday-Friday        9am-5pm Saturday-Sunday [phone]     After hours, weekend or if you need to make an appointment with your primary provider please call [phone].   After Hours nurse advise: call [facility]: [phone]    Medication Refills:  Call your pharmacy and they will forward the refill to us. Please allow 3 business days for your refills to be completed.    Use easy2map (secure email communication and access to your chart) to send your primary care provider a message or make an appointment. Ask someone on your Team how to sign up for easy2map. To log on to [employer] or for more information in [employer] please visit the website at [email or web address].  As of October 8, 2013, all password changes, disabled accounts, or ID changes in [email or web address]/[email or web address] will be done by our Access Services Department.   If you need help with your account or password, call: [phone]. Clinic staff no longer has the ability to change passwords.       Preventive Health Recommendations  Female Ages 50 - 64    Yearly exam: See your health care provider every year in order to  o Review health changes.   o Discuss preventive care.    o Review your medicines if your doctor has prescribed any.      Get a Pap test every three years (unless you have an abnormal result and your provider advises testing more often).    If you get Pap tests with HPV test, you only need to test every 5 years, unless you have an abnormal result.     You do not need a Pap test if your uterus was removed (hysterectomy) and you have not had cancer.    You should be tested each year for STDs (sexually transmitted diseases) if you're at risk.     Have a mammogram every 1 to 2 years.    Have a colonoscopy  at age 50, or have a yearly FIT test (stool test). These exams screen for colon cancer.      Have a cholesterol test every 5 years, or more often if advised.    Have a diabetes test (fasting glucose) every three years. If you are at risk for diabetes, you should have this test more often.     If you are at risk for osteoporosis (brittle bone disease), think about having a bone density scan (DEXA).    Shots: Get a flu shot each year. Get a tetanus shot every 10 years.    Nutrition:     Eat at least 5 servings of fruits and vegetables each day.    Eat whole-grain bread, whole-wheat pasta and brown rice instead of white grains and rice.    Talk to your provider about Calcium and Vitamin D.     Lifestyle    Exercise at least 150 minutes a week (30 minutes a day, 5 days a week). This will help you control your weight and prevent disease.    Limit alcohol to one drink per day.    No smoking.     Wear sunscreen to prevent skin cancer.     See your dentist every six months for an exam and cleaning.    See your eye doctor every 1 to 2 years.

## 2017-08-28 NOTE — PROGRESS NOTES
SUBJECTIVE:   CC: Geovanna Aguilar is an 57 year old woman who presents for preventive health visit.     Healthy Habits:    Do you get at least three servings of calcium containing foods daily (dairy, green leafy vegetables, etc.)? No    Amount of exercise or daily activities, outside of work: No    Problems taking medications regularly No    Medication side effects: No    Have you had an eye exam in the past two years? yes    Do you see a dentist twice per year? yes    Do you have sleep apnea, excessive snoring or daytime drowsiness? Yes, Sleep apnea    Depressed bipolar 1/schizophrenia - stepdad has lung cancer and brain tumor.  Boyfriend had major stroke on July 6th and can't talk. Has less income now due to this as well.    Obesity - having trouble getting active due to hip issues. Has trouble dress and undressing due to hip pain so unable to get to pool therapy.  Has been on topamax but hasn't seen any weight loss.  Saw bariatric specialist and was told that couldn't have surgery due to her being bipolar.    Chronic hip pain - unable to have surgery until loses weight but frustrated that unable to be active to help with weight loss.      Hyperlipidemia Follow-Up      Rate your low fat/cholesterol diet?: good    Taking statin?  Yes, no muscle aches from statin    Other lipid medications/supplements?:  none    Hypertension Follow-up      Outpatient blood pressures are not being checked.    Low Salt Diet: no added salt    Urine Incontinence - patient thinks this is more related to her hip pain but medication maybe helping some.        Today's PHQ-2 Score:   PHQ-2 ( 1999 [employer]) 8/28/2017 8/8/2016   Q1: Little interest or pleasure in doing things 3 1   Q2: Feeling down, depressed or hopeless 3 1   PHQ-2 Score 6 2         Abuse: Current or Past(Physical, Sexual or Emotional)- No  Do you feel safe in your environment - Yes  Social History   Substance Use Topics     Smoking status: Passive Smoke Exposure - Never  Smoker     Packs/day: 0.00     Years: 0.00     Types: Cigarettes, Other     Smokeless tobacco: Never Used      Comment: boyfriend smokes     Alcohol use Yes      Comment: rarely     The patient does not drink >3 drinks per day nor >7 drinks per week.    Reviewed orders with patient.  Reviewed health maintenance and updated orders accordingly - Yes  Patient Active Problem List   Diagnosis     GERD (gastroesophageal reflux disease)     Hypothyroidism     Peripheral edema     KALPESH (obstructive sleep apnea)     Hypertension goal BP (blood pressure) < 140/90     Hyperlipidemia LDL goal <130     Mild depressed bipolar I disorder (H)     Degenerative joint disease (DJD) of hip     Bursitis, hip     Bladder spasm     Anemia     Hyperglycemia     Chest pain     Schizophrenia (H)     Morbid obesity due to excess calories (H)     Past Surgical History:   Procedure Laterality Date     C ABLATION SUPRAVENT ARRHYTHMOGENIC FOCUS       C CARDIAC SURG PROCEDURE UNLIST       C STOMACH SURGERY PROCEDURE UNLISTED       CRYOTHERAPY, CERVICAL       GENITOURINARY SURGERY       HC TOOTH EXTRACTION W/FORCEP       HYSTERECTOMY SUPRACERVICAL      due to endometrial hyperplasia     SALPINGO OOPHORECTOMY,R/L/KEVIN      Salpingo Oophorectomy, RT     SURGICAL HISTORY OF -       Repair soft tissue of right arm     TUBAL LIGATION       [facility]  12     VASCULAR SURGERY         Social History   Substance Use Topics     Smoking status: Passive Smoke Exposure - Never Smoker     Packs/day: 0.00     Years: 0.00     Types: Cigarettes, Other     Smokeless tobacco: Never Used      Comment: boyfriend smokes     Alcohol use Yes      Comment: rarely     Family History   Problem Relation Age of Onset     C.A.D. Father      Alzheimer Disease Paternal Grandmother      Thyroid Disease Paternal Grandmother      Migraines Paternal Grandmother      CANCER Mother 69     lung cancer now      Depression Mother      Other Cancer Mother       "     Migraines Mother      DIABETES Son      Type 1     Substance Abuse Sister      Depression Sister      Migraines Sister      Alcoholism Sister      Substance Abuse Sister      Depression Sister      Alcoholism Sister      DIABETES Son      DIABETES Sister              Patient over age 50, mutual decision to screen reflected in health maintenance.      Pertinent mammograms are reviewed under the imaging tab.  History of abnormal Pap smear: Status post benign hysterectomy. Health Maintenance and Surgical History updated.    Reviewed and updated as needed this visit by clinical staff  Tobacco  Allergies  Meds  Problems         Reviewed and updated as needed this visit by Provider  Allergies  Meds  Problems              ROS:  C: NEGATIVE for fever, chills, change in weight  I: NEGATIVE for worrisome rashes, moles or lesions  E: NEGATIVE for vision changes or irritation  ENT: NEGATIVE for ear, mouth and throat problems  R: NEGATIVE for significant cough or SOB  B: NEGATIVE for masses, tenderness or discharge  CV: NEGATIVE for chest pain, palpitations or peripheral edema  GI: NEGATIVE for nausea, abdominal pain, heartburn, or change in bowel habits  : NEGATIVE for unusual urinary or vaginal symptoms. No vaginal bleeding.  M: NEGATIVE for significant arthralgias or myalgia  N: NEGATIVE for weakness, dizziness or paresthesias  P: NEGATIVE for changes in mood or affect     OBJECTIVE:   /64 (BP Location: Left arm, Patient Position: Chair, Cuff Size: Adult Large)  Pulse 63  Temp 98.6  F (37  C) (Oral)  Ht 5' 4.7\" (1.643 m)  Wt 264 lb (119.7 kg)  SpO2 94%  Breastfeeding? No  BMI 44.34 kg/m2  EXAM:  GENERAL: healthy, alert, no distress and obese  EYES: Eyes grossly normal to inspection, PERRL and conjunctivae and sclerae normal  HENT: ear canals and TM's normal, nose and mouth without ulcers or lesions  NECK: no adenopathy, no asymmetry, masses, or scars and thyroid normal to palpation  RESP: " lungs clear to auscultation - no rales, rhonchi or wheezes  CV: regular rate and rhythm, normal S1 S2, no S3 or S4, no murmur, click or rub, no peripheral edema and peripheral pulses strong  ABDOMEN: soft, nontender, no hepatosplenomegaly, no masses and bowel sounds normal  MS: ambulating with walker and limping gait  SKIN: dry red skin on face  NEURO: Normal strength and tone, mentation intact and speech normal  PSYCH: mentation appears normal and anxious    ASSESSMENT/PLAN:   1. Encounter for routine adult health examination without abnormal findings  Routine preventive discussed    2. Morbid obesity due to excess calories (H)  Low carb eating discussed    3. Mild depressed bipolar I disorder (H)  Check labs and continue as per psychiatry. Current life stressor and hoping weight management discussion with distract her from unchangeable issues.  - Comprehensive metabolic panel    4. Schizophrenia, unspecified type (H)  Check labs and continue as per psychiatry.  Current life stressor and hoping weight management discussion with distract her from unchangeable issues.  - Comprehensive metabolic panel    5. Hyperlipidemia LDL goal <130  Previously controlled - refill and check labs  - Lipid panel reflex to direct LDL  - Comprehensive metabolic panel  - simvastatin (ZOCOR) 40 MG tablet; Take 1 tablet (40 mg) by mouth At Bedtime  Dispense: 90 tablet; Refill: 4    6. Hypertension goal BP (blood pressure) < 140/90  Controlled - refill and check labs  - Albumin Random Urine Quantitative with Creat Ratio  - Comprehensive metabolic panel  - metoprolol (LOPRESSOR) 25 MG tablet; Take 1 tablet (25 mg) by mouth 2 times daily  Dispense: 180 tablet; Refill: 4    7. Acquired hypothyroidism  Recheck and refill   - TSH WITH FREE T4 REFLEX  - Comprehensive metabolic panel  - levothyroxine (SYNTHROID/LEVOTHROID) 150 MCG tablet; Take 1 tablet (150 mcg) by mouth daily  Dispense: 90 tablet; Refill: 3    8. Urge incontinence of  "urine  Refilled  - oxybutynin (DITROPAN XL) 5 MG 24 hr tablet; Take 1 tablet (5 mg) by mouth daily  Dispense: 90 tablet; Refill: 0    COUNSELING:   Reviewed preventive health counseling, as reflected in patient instructions     reports that she is a non-smoker but has been exposed to tobacco smoke. She has been exposed to 0.00 packs per day for the past 0.00 years. She has never used smokeless tobacco.    Estimated body mass index is 44.34 kg/(m^2) as calculated from the following:    Height as of this encounter: 5' 4.7\" (1.643 m).    Weight as of this encounter: 264 lb (119.7 kg).   Weight management plan: Discussed healthy diet and exercise guidelines and patient will follow up in 6 months in clinic to re-evaluate.    Counseling Resources:  ATP IV Guidelines  Pooled Cohorts Equation Calculator  Breast Cancer Risk Calculator  FRAX Risk Assessment  ICSI Preventive Guidelines  Dietary Guidelines for Americans, 2010  USDA's MyPlate  ASA Prophylaxis  Lung CA Screening    Leah Rhoades MD  [facility]  "

## 2017-08-28 NOTE — NURSING NOTE
"Chief Complaint   Patient presents with     Physical       Initial /64 (BP Location: Left arm, Patient Position: Chair, Cuff Size: Adult Large)  Pulse 63  Temp 98.6  F (37  C) (Oral)  Ht 5' 4.7\" (1.643 m)  Wt 264 lb (119.7 kg)  SpO2 94%  Breastfeeding? No  BMI 44.34 kg/m2 Estimated body mass index is 44.34 kg/(m^2) as calculated from the following:    Height as of this encounter: 5' 4.7\" (1.643 m).    Weight as of this encounter: 264 lb (119.7 kg).  Medication Reconciliation: complete   Allen Cardoza MA        "

## 2017-08-29 LAB
CREAT UR-MCNC: 334 MG/DL
MICROALBUMIN UR-MCNC: 95 MG/L
MICROALBUMIN/CREAT UR: 28.32 MG/G CR (ref 0–25)

## 2017-08-29 NOTE — PROGRESS NOTES
Ms. Aguilar,    All of your labs were normal for you.    Please contact the clinic if you have additional questions.  Thank you.    Sincerely,    Leah Rhoades

## 2017-09-11 ENCOUNTER — TELEPHONE (OUTPATIENT)
Dept: CARE COORDINATION | Facility: CLINIC | Age: 57
End: 2017-09-11

## 2017-09-11 NOTE — TELEPHONE ENCOUNTER
Social Work Contact - Follow-Up - 9-11-17    SW attempted to contact pt via phone today to follow up regarding support/resources.  SW unable to connect with pt and LM for pt to call SW back.  SW will continue to follow and await return call back from pt.    DOUGLAS Ferguson     Social Work  [facility]  Office:  [phone]  E-Mail:  [email or web address]  9/11/2017 2:34 PM

## 2017-09-19 ENCOUNTER — TRANSFERRED RECORDS (OUTPATIENT)
Dept: HEALTH INFORMATION MANAGEMENT | Facility: CLINIC | Age: 57
End: 2017-09-19

## 2017-09-22 ENCOUNTER — TELEPHONE (OUTPATIENT)
Dept: CARE COORDINATION | Facility: CLINIC | Age: 57
End: 2017-09-22

## 2017-09-22 NOTE — TELEPHONE ENCOUNTER
Social Work Contact - Follow-Up - 9-22-17    SW contacted pt via phone today to follow up regarding support/resources.  Pt reports that she is continuing to take topiramate though does not feel it is decreasing her appetite.  Pt's goal is to lose weight so she can have hip surgery.  Pt reports that it is difficult for her to get around due to hip pain and her son assists her with ADL's if she needs it and also helps with household chores.  Pt has been experiencing much stress in her life due to SO's illness.  Pt reported that he SO, Alan, had to be re-hospitalized again at [facility] due to a fever and sepsis.  Instead of returning to [facility], pt was discharged from [facility] to [facility].  Pt has not been able to visit pt there yet though pt has a call into the director there with some questions she has regarding the facility.  Pt struggles with not being the decision maker for her SO as they were not  and pt did not have Health Care Directive designating pt as decision maker.  Pt has a fairly cordial relationship with SO's family, which makes it a bit easier for pt.  Pt continues to see her psychiatrist on a regular basis though continues to decline counseling.  Pt indicates that it is helpful to talk with SW regarding her situation.  SW provided active listening, affirmations, and support to pt during contact.  SW will follow and reach out to pt again in  4-6 weeks.        DOUGLAS Ferguson     Social Work  [facility]  Office:  [phone]  E-Mail:  [email or web address]  9/22/2017 1:07 PM

## 2017-09-25 ENCOUNTER — TELEPHONE (OUTPATIENT)
Dept: FAMILY MEDICINE | Facility: CLINIC | Age: 57
End: 2017-09-25

## 2017-09-25 DIAGNOSIS — K21.9 GASTROESOPHAGEAL REFLUX DISEASE WITHOUT ESOPHAGITIS: ICD-10-CM

## 2017-09-25 DIAGNOSIS — M16.0 PRIMARY OSTEOARTHRITIS OF BOTH HIPS: ICD-10-CM

## 2017-09-25 DIAGNOSIS — E66.01 MORBID OBESITY, UNSPECIFIED OBESITY TYPE (H): ICD-10-CM

## 2017-09-25 DIAGNOSIS — E78.5 HYPERLIPIDEMIA LDL GOAL <130: ICD-10-CM

## 2017-09-25 DIAGNOSIS — N39.41 URGE INCONTINENCE OF URINE: ICD-10-CM

## 2017-09-25 DIAGNOSIS — E03.9 ACQUIRED HYPOTHYROIDISM: ICD-10-CM

## 2017-09-25 RX ORDER — TOPIRAMATE 25 MG/1
TABLET, FILM COATED ORAL
Qty: 270 TABLET | Refills: 0 | Status: CANCELLED | OUTPATIENT
Start: 2017-09-25

## 2017-09-25 RX ORDER — LEVOTHYROXINE SODIUM 150 UG/1
150 TABLET ORAL DAILY
Qty: 90 TABLET | Refills: 3 | Status: CANCELLED | OUTPATIENT
Start: 2017-09-25

## 2017-09-25 RX ORDER — TRAMADOL HYDROCHLORIDE 50 MG/1
50-100 TABLET ORAL EVERY 6 HOURS PRN
Qty: 90 TABLET | Refills: 1 | Status: CANCELLED | OUTPATIENT
Start: 2017-09-25

## 2017-09-25 RX ORDER — ESOMEPRAZOLE MAGNESIUM 40 MG/1
40 CAPSULE, DELAYED RELEASE ORAL 2 TIMES DAILY
Qty: 180 CAPSULE | Refills: 1 | Status: CANCELLED | OUTPATIENT
Start: 2017-09-25

## 2017-09-25 NOTE — TELEPHONE ENCOUNTER
Reason for Call:  Medication or medication refill:    Do you use a [facility]?  Name of the pharmacy and phone number for the current request:  [facility]    Name of the medication requested: Pending Prescriptions:                       Disp   Refills    topiramate (TOPAMAX) 25 MG tablet         270 ta*0            Simg at bedtime for week 1, 50mg at bedtime for 1           week, and 75mg at bedtime thereafter    esomeprazole (NEXIUM) 40 MG CR capsule    180 ca*1            Sig: Take 1 capsule (40 mg) by mouth 2 times daily           Take 30-60 minutes before eating.    traMADol (ULTRAM) 50 MG tablet            90 tab*1            Sig: Take 1-2 tablets ( mg) by mouth every 6           hours as needed for severe pain    levothyroxine (SYNTHROID/LEVOTHROID) 150 *90 tab*3            Sig: Take 1 tablet (150 mcg) by mouth daily          Other request: Almost out of Tomiramate. Patient was seen at the end of August for physical and thought medications were already placed for refill.    Can we leave a detailed message on this number? YES    Phone number patient can be reached at: Home number on file [phone] (home)    Best Time: Anytime     Call taken on 2017 at 9:46 AM by Joselyn Roland

## 2017-09-25 NOTE — TELEPHONE ENCOUNTER
Different pharmacy       simvastatin (ZOCOR) 40 MG tablet     Last Written Prescription Date: 08/28/17-sent to [facility] mail order  Last Fill Quantity: 90, # refills: 4  Last Office Visit with [facility], [facility] or [facility] prescribing provider: 08/28/17       Lab Results   Component Value Date    CHOL 191 08/28/2017     Lab Results   Component Value Date    HDL 46 08/28/2017     Lab Results   Component Value Date     08/28/2017     Lab Results   Component Value Date    TRIG 207 08/28/2017     Lab Results   Component Value Date    CHOLHDLRATIO 4.6 06/29/2015           oxybutynin (DITROPAN XL) 5 MG 24 hr tablet      Last Written Prescription Date: 08/28/17-sent to [facility] mail order  Last Fill Quantity: 90,  # refills: 0   Last Office Visit with [facility], [facility] or  [facility] prescribing provider: 08/28/17                                                   traMADol (ULTRAM) 50 MG tablet      Last Written Prescription Date:  12/14/16  Last Fill Quantity: 90,   # refills: 1  Last Office Visit with [facility], [facility] or  [facility] prescribing provider: 08/28/17  Future Office visit:       Routing refill request to provider for review/approval because:  Drug not on the [facility], [facility] or  [facility] refill protocol or controlled substance

## 2017-09-25 NOTE — TELEPHONE ENCOUNTER
Tramadol also requested on another encounter.    topiramate (TOPAMAX) 25 MG tablet       Last Written Prescription Date: 06/02/17  Last Fill Quantity: 270, # refills: 0    Last Office Visit with [facility] [place] or [facility] prescribing provider:  08/28/17   Future Office Visit:      Creatinine   Date Value Ref Range Status   08/28/2017 0.78 0.52 - 1.04 mg/dL Final         esomeprazole (NEXIUM) 40 MG CR capsule      Last Written Prescription Date: 06/21/17  Last Fill Quantity: 180,  # refills: 1   Last Office Visit with [facility] [place], [facility] or [facility] prescribing provider: 08/28/17                                                 levothyroxine (SYNTHROID/LEVOTHROID) 150 MCG tablet     Last Written Prescription Date: 08/28/17  Last Quantity: 90, # refills: 3  Last Office Visit with [facility] [place], [facility] or [facility] prescribing provider: 08/28/17        TSH   Date Value Ref Range Status   08/28/2017 3.21 0.40 - 4.00 mU/L Final

## 2017-09-25 NOTE — TELEPHONE ENCOUNTER
Reason for Call:  Medication or medication refill:    Do you use a [facility]?  Name of the pharmacy and phone number for the current request:  [facility]    Name of the medication requested: Pending Prescriptions:                       Disp   Refills    simvastatin (ZOCOR) 40 MG tablet          90 tab*4            Sig: Take 1 tablet (40 mg) by mouth At Bedtime    traMADol (ULTRAM) 50 MG tablet            90 tab*1            Sig: Take 1-2 tablets ( mg) by mouth every 6           hours as needed for severe pain    oxybutynin (DITROPAN XL) 5 MG 24 hr [id number] tab*0            Sig: Take 1 tablet (5 mg) by mouth daily      Can we leave a detailed message on this number? YES    Phone number patient can be reached at: Home number on file [phone] (home)    Best Time: Anytime    Call taken on 9/25/2017 at 9:42 AM by Joselyn Roland

## 2017-09-26 ENCOUNTER — TELEPHONE (OUTPATIENT)
Dept: ENDOCRINOLOGY | Facility: CLINIC | Age: 57
End: 2017-09-26

## 2017-09-26 DIAGNOSIS — E66.01 MORBID OBESITY, UNSPECIFIED OBESITY TYPE (H): ICD-10-CM

## 2017-09-26 RX ORDER — SIMVASTATIN 40 MG
40 TABLET ORAL AT BEDTIME
Qty: 90 TABLET | Refills: 4 | Status: SHIPPED | OUTPATIENT
Start: 2017-09-26 | End: 2018-09-02

## 2017-09-26 RX ORDER — TOPIRAMATE 25 MG/1
75 TABLET, FILM COATED ORAL DAILY
Qty: 90 TABLET | Refills: 0 | Status: SHIPPED | OUTPATIENT
Start: 2017-09-26 | End: 2019-03-18

## 2017-09-26 RX ORDER — OXYBUTYNIN CHLORIDE 5 MG/1
5 TABLET, EXTENDED RELEASE ORAL DAILY
Qty: 90 TABLET | Refills: 0 | Status: SHIPPED | OUTPATIENT
Start: 2017-09-26 | End: 2017-11-28

## 2017-09-26 NOTE — TELEPHONE ENCOUNTER
topamax  Last Written Prescription Date:  6/2/17  Last Fill Quantity: 270,   # refills: 0  Last Office Visit : 5/31/17  Future Office visit:  No future appt    Routing refill request to clinic RN for review/approval because:  Does not have pending appt

## 2017-09-26 NOTE — TELEPHONE ENCOUNTER
Simvastatin and [facility]butynin resent to [facility] per patient's request.  Some meds patient wants to go to [facility] and some to [facility].  She is advised to specify when requesting a refill.    Pt said that [facility] does not have a prescription for Tramadol.  Pt states that she never got it a hard copy prescription.  Please advise.

## 2017-09-28 NOTE — TELEPHONE ENCOUNTER
Reason for Call:  Other prescription    Detailed comments: please call patients about meds   requesting Topamax and Tramadol - wants all prescriptions fillled for 90 days [facility] in [place]    Phone Number Patient can be reached at:  Home number on file [phone] (home)    Best Time: any    Can we leave a detailed message on this number? YES    Call taken on 9/28/2017 at 12:47 PM by Judi Osei

## 2017-09-28 NOTE — TELEPHONE ENCOUNTER
Patient reports she wanted all her Rxs to go to [facility]. However, there are some already processing through [facility] now.     Patient requests she takes tramadol at least once a day, however she would like to be able to take it more since her hips hurt her. Patient requesting larger qty of tramadol.     Please send Rx to [facility] in [facility]. She also wants Topamax qty 90 sent there as well. (not prescribed by our clinic provider) Patient requests 4 tabs Topamax, not 3 tabs.     Routing to provider to review and advise.   Belkys Breen RN

## 2017-09-29 RX ORDER — TRAMADOL HYDROCHLORIDE 50 MG/1
50-100 TABLET ORAL EVERY 6 HOURS PRN
Qty: 90 TABLET | Refills: 1 | Status: SHIPPED | OUTPATIENT
Start: 2017-09-29 | End: 2018-04-18

## 2017-09-29 NOTE — TELEPHONE ENCOUNTER
Faxed signed Rx for the Ultram to [facility], [place], right fax confirmed at 2:19 pm today.  Charele Kline MA/  For [employer] and [employer]

## 2017-10-12 ENCOUNTER — TELEPHONE (OUTPATIENT)
Dept: FAMILY MEDICINE | Facility: CLINIC | Age: 57
End: 2017-10-12

## 2017-10-12 NOTE — TELEPHONE ENCOUNTER
.Reason for Call:  Medication or medication refill:    Do you use a [facility]?  Name of the pharmacy and phone number for the current request: [facility] [address] [facility] [place] [facility] [address] [Patient Preferred]  [phone]    Name of the medication requested: traMADol (ULTRAM) 50 MG tablet and traMADol (ULTRAM) 50 MG tablet    Other request: Patient would like a 90 days supply of each of the medication and she running out of it    Can we leave a detailed message on this number? YES    Phone number patient can be reached at: Home number on file [phone] (home) or Cell number on file:    No relevant phone numbers on file.       Best Time: any    Call taken on 10/12/2017 at 4:56 PM by Nghia Lassiter

## 2017-10-12 NOTE — TELEPHONE ENCOUNTER
To clarify patient's request from 9/25/17.  1) Provider, please clarify Sig of Topamax - Patient requesting 4 tablets daily, current Rx shows 3 tabs daily.   2) Provide qty as appropriate (patient is requesting 3 month supply) Qty (3 tabs 270) vs Qty (4 tabs 360)  3) Ultram Rx from 9/29/17 not filled per patient and I did [facility]. Patient requesting 90 day supply (Ideally patient would appreciate 4 tabs of Ultram per day (1 tab every 6 hours for 3 month supply = Qty 360). If that is allowed. Would need new Rx to [facility].     Routing to provider to review and advise request.   Medication Topamax and Ultram 90 day supply to go to [facility] pharmacy.     Belkys Breen RN

## 2017-10-13 NOTE — TELEPHONE ENCOUNTER
Looks like topamax rx is from endocrine and she is due for a follow up with them for more of this.  Since tramadol is a controlled substance, I am not willing to do a 90 days supply for now.  She will need to continue the 30 days prescriptions.

## 2017-10-13 NOTE — TELEPHONE ENCOUNTER
This writer attempted to contact Geovanna on 10/13/17      Reason for call refill and left detailed message.      If patient calls back:  Patient contacted by 2nd floor [facility] Team (MA/TC). Inform patient that someone from the team will contact them, document that pt called and route to care team. .        Nettie Quiñonez MA

## 2017-10-13 NOTE — TELEPHONE ENCOUNTER
Spoke to patient she states should be on 6 Tramadol a day and 4 of the Topamax a day did mention due to be seen but says she's out and she wanted this message routed to endocrine and [facility] with new information.  Please advise.  Vladimir ERICKSON

## 2017-10-13 NOTE — TELEPHONE ENCOUNTER
Team: please advise patient of provider notes below. To clarify the tramadol, it appears provider denied request to change tramadol to 90 day supply or up to 4 tabs daily since # 90 given for 30 day supply.

## 2017-10-13 NOTE — TELEPHONE ENCOUNTER
Patient returned call    Best number to reach them: Home number on file [phone] (home)    Is it ok to leave a detailed message: YES

## 2017-10-13 NOTE — TELEPHONE ENCOUNTER
This writer attempted to contact Geovanna on 10/13/17      Reason for call medication denial and left message to return call.      If patient calls back:   Patient contacted by 2nd floor [facility] Team (MA/TC). Inform patient that someone from the team will contact them, document that pt called and route to care team. .        Freida Martin CMA

## 2017-10-13 NOTE — TELEPHONE ENCOUNTER
Patient returned call    Best number to reach them: Home number on file [phone] (home)    Is it ok to leave a detailed message: YES

## 2017-10-13 NOTE — TELEPHONE ENCOUNTER
Per our records, patient has not previously been using 4 tramadol per day.  She has been getting 90 at a time for months and those 90 usually last longer than 1 month.  If she has had worsening of her pain requiring 4 tramadol per day, then a new visit is needed.

## 2017-10-16 NOTE — TELEPHONE ENCOUNTER
Patient returned call    Best number to reach them: Home number on file [phone] (home)    Is it ok to leave a detailed message: YES

## 2017-10-16 NOTE — TELEPHONE ENCOUNTER
This writer attempted to contact Geovanna on 10/16/17      Reason for call medication refill and left message to return call.      If patient calls back:   Patient contacted by 2nd floor [facility] Team (MA/TC). Inform patient that someone from the team will contact them, document that pt called and route to care team. .        Freida Martin CMA

## 2017-10-16 NOTE — TELEPHONE ENCOUNTER
Patient returned call    Best number to reach them: Home number on file [phone] (home)    Is it ok to leave a detailed message: YES

## 2017-10-17 NOTE — TELEPHONE ENCOUNTER
This writer attempted to contact Geovanna on 10/17/17      Reason for call refill and left detailed message with information below.        Nettie Quiñonez MA

## 2017-11-24 ENCOUNTER — TELEPHONE (OUTPATIENT)
Dept: CARE COORDINATION | Facility: CLINIC | Age: 57
End: 2017-11-24

## 2017-11-24 NOTE — TELEPHONE ENCOUNTER
Social Work Telephone Message Note  [facility]    Patient Name:  Geovanna Aguilar  /Age:  1960 (57 year old)    Referral Source:  Original referral from Dr Deal, Orthopedics  Reason for Referral:  Support/resources related to pt's SO being very ill     contacted Patient via telephone on 17 and LM for pt to call SW back.  SW will continue to follow and await return call back from pt.    DOUGLAS Ferguson     Social Work  [facility]  Office:  [phone]  E-Mail:  [email or web address]  2017 1:38 PM

## 2017-11-28 DIAGNOSIS — N39.41 URGE INCONTINENCE OF URINE: ICD-10-CM

## 2017-11-28 DIAGNOSIS — K21.9 GASTROESOPHAGEAL REFLUX DISEASE WITHOUT ESOPHAGITIS: ICD-10-CM

## 2017-11-28 NOTE — TELEPHONE ENCOUNTER
..Reason for Call:  Other     Detailed comments: Patient called said she want to have her prescriptions for OXYBUTTNIN 90 day supply and NEXIUM 90 day supply sent to [facility] and the NEXIUM need a prior auth. The number for the prio-auth is [phone]    Phone Number Patient can be reached at: Home number on file [phone] (home)    Best Time: anytime    Can we leave a detailed message on this number? YES    Call taken on 11/28/2017 at 11:56 AM by Tariq Wilkinson

## 2017-11-28 NOTE — TELEPHONE ENCOUNTER
Requested Prescriptions   Pending Prescriptions Disp Refills     oxybutynin (DITROPAN XL) 5 MG 24 hr tablet  Last Written Prescription Date:  09/24/17  Last Fill Quantity: 90,  # refills: 0   Last Office Visit with [facility], [facility] or [facility] prescribing provider:  08/28/17   Future Office Visit:      90 tablet 0     Sig: Take 1 tablet (5 mg) by mouth daily    Muscarinic Antagonists (Urinary Incontinence Agents) Passed    11/28/2017 12:05 PM       Passed - Recent or future visit with authorizing provider's specialty    Patient had office visit in the last year or has a visit in the next 30 days with authorizing provider.  See chart review.              Passed - Medication is Oxybutynin and patient is 5 years of age or older       Passed - Patient does not have a diagnosis of glaucoma on the problem list    If glaucoma diagnosis is new, refer refill to physician.         Passed - Patient is 18 years of age or older        esomeprazole (NEXIUM) 40 MG CR capsule  Last Written Prescription Date:  06/21/17  Last Fill Quantity: 180,  # refills: 1   Last Office Visit with [facility], [facility] or [facility] prescribing provider:  08/28/17   Future Office Visit:    180 capsule 1     Sig: Take 1 capsule (40 mg) by mouth 2 times daily Take 30-60 minutes before eating.    PPI Protocol Passed    11/28/2017 12:05 PM       Passed - Not on Clopidogrel (unless Pantoprazole ordered)       Passed - No diagnosis of osteoporosis on record       Passed - Recent or future visit with authorizing provider's specialty    Patient had office visit in the last year or has a visit in the next 30 days with authorizing provider.  See chart review.              Passed - Patient is age 18 or older       Passed - No active pregnacy on record       Passed - No positive pregnancy test in past 12 months

## 2017-11-30 ENCOUNTER — TELEPHONE (OUTPATIENT)
Dept: CARE COORDINATION | Facility: CLINIC | Age: 57
End: 2017-11-30

## 2017-11-30 RX ORDER — OXYBUTYNIN CHLORIDE 5 MG/1
5 TABLET, EXTENDED RELEASE ORAL DAILY
Qty: 90 TABLET | Refills: 2 | Status: SHIPPED | OUTPATIENT
Start: 2017-11-30 | End: 2019-09-16

## 2017-11-30 RX ORDER — ESOMEPRAZOLE MAGNESIUM 40 MG/1
40 CAPSULE, DELAYED RELEASE ORAL 2 TIMES DAILY
Qty: 180 CAPSULE | Refills: 2 | Status: SHIPPED | OUTPATIENT
Start: 2017-11-30 | End: 2018-09-12

## 2017-11-30 NOTE — TELEPHONE ENCOUNTER
Social Work Telephone Message Note  [facility]    Patient Name:  Geovanna Aguilar  /Age:  1960 (57 year old)    Referral Source:  Dr Deal, Orthopedics  Reason for Referral:  Support/resources     SW attempted to contact pt via phone today, was unable to connect with pt, and LM for pt to call SW back.  This is the 2nd outreach to pt with no response.  SW will follow and await return call from pt.    DOUGLAS Ferguson     Social Work  [facility]  Office:  [phone]  E-Mail:  [email or web address]  2017 2:48 PM

## 2017-12-26 ENCOUNTER — TELEPHONE (OUTPATIENT)
Dept: CARE COORDINATION | Facility: CLINIC | Age: 57
End: 2017-12-26

## 2017-12-26 NOTE — TELEPHONE ENCOUNTER
Social Work Follow-Up  [facility]    Data/Intervention:  Patient Name:  Geovanna Aguilar  /Age:  1960 (57 year old)    Reason for Follow-Up:    Weight loss, need for hip surgery after weight loss, grief/loss      Collaborated With:    Pt    Intervention/Education/Resources Provided:  SW contacted pt via phone today.  Pt indicated that since SW and pt last spoke in , pt's sister  unexpectedly on 17, pt's SO of 11 plus years  on 10-9-17 (he had been ill), and pt's stepfather  on 10-22-17 (he also had been ill).  Pt has been seeing a grief counselor through [facility] and has had 4 appts thus far.  Pt feels that this has been helping her in coping with the above losses.  SW is registered and plans to begin a grief support group at [facility] that begins 18, that meets weekly.  Pt does receive support from another sister of hers.  Son continues to provide transportation for pt to appts.  SW discussed pt's weight loss/pending surgery with pt and pt feels like once she has dealt with some of her grief, she will be able to better focus on herself and her health.       Assessment/Plan:  Pt grieving the loss of three people she was very close to.  Pt proactive in dealing with her grief by seeing a counselor and starting a grief group shortly.  SW provided active listening, affirmations, and support to pt in our contact.  SW will follow and reach out to pt again in about 3-4 weeks.  Previously provided patient/family with writer's contact information and availability.     DOUGLAS Ferguson     Social Work  [facility]  Office:  [phone]  E-Mail:  [email or web address]  2017 3:05 PM

## 2017-12-29 ENCOUNTER — TELEPHONE (OUTPATIENT)
Dept: FAMILY MEDICINE | Facility: CLINIC | Age: 57
End: 2017-12-29

## 2017-12-29 DIAGNOSIS — E03.9 ACQUIRED HYPOTHYROIDISM: ICD-10-CM

## 2017-12-29 RX ORDER — LEVOTHYROXINE SODIUM 150 UG/1
150 TABLET ORAL DAILY
Qty: 14 TABLET | Refills: 0 | Status: SHIPPED | OUTPATIENT
Start: 2017-12-29 | End: 2018-09-12

## 2017-12-29 NOTE — TELEPHONE ENCOUNTER
Reason for Call:  Medication or medication refill:    Do you use a [facility]?  Name of the pharmacy and phone number for the current request:  [facility] [address] [place] [address], [place] - [address]    Name of the medication requested: Synthroid 150 mcg    Other request: Needs 14 days of prescription until mail order supply comes. Running out needs asap has only 3 day supply left.    Can we leave a detailed message on this number? YES    Phone number patient can be reached at: Home number on file [phone] (home)    Best Time: any    Call taken on 12/29/2017 at 2:51 PM by Judi Osei

## 2017-12-29 NOTE — TELEPHONE ENCOUNTER
Reason for Call:  Other prescription    Detailed comments: Maria Del Rosario calling to verify if Dr. Samantha Ervin had meant to order a two week supply of Synthroid.    Phone Number Patient can be reached at: Other phone number:  [phone]    Best Time: anytime    Can we leave a detailed message on this number? YES    Call taken on 12/29/2017 at 3:32 PM by Bill An

## 2017-12-29 NOTE — TELEPHONE ENCOUNTER
.Reason for Call:   calling to clarify on the synthroid    Detailed comments: Patient spoke with the local [facility] @[phone]; if it is written for the generic synthroid is less costly for this 14 day period, until  Patient gets this through mail order;     Phone Number Patient can be reached at: Home number on file [phone] (home)    Best Time: anytime    Can we leave a detailed message on this number? YES    Call taken on 12/29/2017 at 3:02 PM by Marie Johnson

## 2017-12-29 NOTE — TELEPHONE ENCOUNTER
This writer attempted to contact pharmacy on 12/29/17      Reason for call refill and left detailed message yes until mail order arrives.        Nettie Quiñonez MA

## 2018-03-16 ENCOUNTER — TELEPHONE (OUTPATIENT)
Dept: CARE COORDINATION | Facility: CLINIC | Age: 58
End: 2018-03-16

## 2018-03-16 NOTE — TELEPHONE ENCOUNTER
Social Work Follow-Up  [facility]    Data/Intervention:  Patient Name:  Geovanna Aguilar  /Age:  1960 (57 year old)    Reason for Follow-Up:    Loss of SO, step father, and sister, in September and 2017.    Collaborated With:    Pt    Intervention/Education/Resources Provided:  SW contacted pt via phone to follow up regarding how pt is coping with her recent losses.  Pt indicated that she completed a 2 month grief support group that she found to be very helpful.  The group ended a couple of weeks ago some of the members have decided to get together monthly on their own for a social outing.  Pt is planning to attend those outings, the first one being next week.  Son provides transport for pt.  Pt and son reside together, though pt is isolated at home at times regarding hip pain getting in and out of a car.  Pt also reports that she is receiving one-to-one counseling in her home through the [facility] and pt finds that to be helpful.  Pt teary at times during social work contact.  SW provided active listening, affirmations and support to pt during contact.            Assessment/Plan:  Pt appears to be experiencing normal grief due to 3 losses in a short period of time.  Pt has new grief supports in place.  Pt has participated in grief support group, is planning to continue to meet with group, and is receiving home counseling to assist her in coping with her losses.  Pt does not have any new SW related needs.  SW will no longer reach out to pt on a regular basis though SW remains available for potential future needs of pt.    DOUGLAS Ferguson     Social Work  [facility]  Office:  [phone]  E-Mail:  [email or web address]  3/16/2018 4:06 PM

## 2018-04-06 ENCOUNTER — OFFICE VISIT (OUTPATIENT)
Dept: ENDOCRINOLOGY | Facility: CLINIC | Age: 58
End: 2018-04-06
Payer: COMMERCIAL

## 2018-04-06 VITALS
BODY MASS INDEX: 46.82 KG/M2 | OXYGEN SATURATION: 98 % | WEIGHT: 281 LBS | SYSTOLIC BLOOD PRESSURE: 127 MMHG | HEIGHT: 65 IN | DIASTOLIC BLOOD PRESSURE: 69 MMHG | HEART RATE: 62 BPM

## 2018-04-06 DIAGNOSIS — E66.01 MORBID OBESITY, UNSPECIFIED OBESITY TYPE (H): ICD-10-CM

## 2018-04-06 RX ORDER — TOPIRAMATE 25 MG/1
TABLET, FILM COATED ORAL
Qty: 270 TABLET | Refills: 1 | Status: SHIPPED | OUTPATIENT
Start: 2018-04-06 | End: 2020-12-07

## 2018-04-06 NOTE — PATIENT INSTRUCTIONS
MEDICATION STARTED AT THIS APPOINTMENT  We are starting topiramate at bedtime.  Start one tab, 25 mg, for a week. Go up to 50 mg (2 tabs) for the next week. At the third week, take   3 tabs (75 mg).  Stay at 3 tabs until you are seen again. Call the nurse at [phone] if you have any questions or concerns. (Do not stop taking it if you don't think it's working. For some people it works even though they do not feel much different.)    Topiramate (Topamax) is a medication that is used most often to treat migraine headaches or for seizures. It has also been found to help with weight loss. Although it's not currently FDA approved for weight loss, it has been used safely for a number of years to help people who are carrying extra weight.     Just how topiramate helps with weight loss has not been exactly determined. However it seems to work on areas of the brain to quiet down signals related to eating.      Topiramate may make you:    >feel less interest in eating in between meals   >think less about food and eating   >find it easier to push the plate away   >find giving up pop easier    >have an easier time eating less    For some of our patients, the pills work right away. They feel and think quite differently about food. Other patients don't feel much of a change but find in fact they have lost weight! Like all weight loss medications, topiramate works best when you help it work.  This means:    1) Have less tempting high calorie (fattening) food around the house or office    2) Have lower calorie food (fruits, vegetables,low fat meats and dairy) for snacks    3) Eat out only one time or less each week.   4) Eat your meals at a table with the TV or computer off.    Side-effects. Topiramate is generally well tolerated. The main side-effects we see are:   Tingling in hands,feet, or face (usually not very troublesome)   Mental confusion and word finding trouble (about 10% of patients have this.)     Feeling sleepy  or a bit dopey- this goes away very soon after starting.    One of the dangers of topiramate is the possibility of birth defects--if you get pregnant when you are on it, there is the risk that your baby will be born with a cleft lip or palate.  If you are on topiramate and of child bearing age, you need to be on a reliable form of birth control or refrain from sexual intercourse.     Please refer to the pharmacy insert for more information on side-effects. Since many pharmacists are not familiar with the use of topiramate in weight loss, calling the clinic will get you the most accurate information on the use of this medication for weight loss.     In order to get refills of this or any medication we prescribe you must be seen in the medical weight mgmt clinic every 2-3 months. Please have your pharmacy fax a refill request to [phone].

## 2018-04-06 NOTE — NURSING NOTE
"  Chief Complaint   Patient presents with     Weight Problem     NMWM     Vitals:    04/06/18 1017   BP: 127/69   Pulse: 62   SpO2: 98%   Weight: 281 lb   Height: 5' 4.7\"     Body mass index is 47.2 kg/(m^2).  Eduard Mcginnis CMA    "

## 2018-04-06 NOTE — LETTER
"2018       RE: Geovanna Aguilar  [address] [address]     Dear Colleague,    Thank you for referring your patient, Geovanna Aguilar, to the [facility]NAG[facility] at [facility]. Please see a copy of my visit note below.    New Medical Weight Management Consult    PATIENT:  Geovanna Aguilar  MRN:         [id number]  :         1960  KENRO[place]:         2018    Dear Leah Mae,    I had the pleasure of seeing your patient, Geovanna Aguilar.  Full intake/assessment done to determine barriers to weight loss success and develop a treatment plan.  Geovanna Aguilar is a 57 year old female interested in treatment of medical problems associated with weight.  Her weight today is 281 lbs 0 oz, Body mass index is 47.2 kg/(m^2)., and she has the following co-morbidities:       2018   I have the following co-morbidities associated with obesity: High Blood Pressure, High Cholesterol, Sleep Apnea, GERD (Reflux), Weight Bearing Joint Pain, Stress Incontinence   Do you use a CPAP? -     Lost her boyfriend in October.  Also lost her sister and stepfather the same month.     I saw her for NBS consult last year and plan was:    \"In summary, Geovanna Aguilar has Class III obesity with a body mass index of Body mass index is 44.39 kg/(m^2). kg/m2 and the comorbidities stated above.   Due to her psychiatric history she is not a candidate for weight loss surgery.   We discussed topiramate and side effects.  She will think about this medication and discuss with psychiatrist.    Needs to lose 56 lbs before hip surgery per ortho (BMI 35)  See RD today and monthly  See Rosy Padilla for return MWM in 1 month\"      She took the topiramate for 3 months but she was not focusing on herself or weight loss due to her boyfriend being ill and dying.    She still needs to lost to BMI <35 for hip surgery.     Patient Goals Reviewed With " "Patient 4/6/2018   I am interested in attaining a healthier weight to diminish current health problems related to co-morbid conditions: Yes   I am interested in attaining a healthier weight in order to prevent future health problems: Yes       Referring Provider 4/6/2018   Please name the provider who referred you to Medical Weight Management.  If you do not know, please answer: \"I Don't Know\". [place]       Wt Readings from Last 4 Encounters:   04/06/18 281 lb   08/28/17 264 lb   05/31/17 264 lb 12.8 oz   04/24/17 267 lb 4.8 oz       Weight History Reviewed With Patient 4/6/2018   How concerned are you about your weight? Very Concerned   Would you describe your weight gain as gradual? Yes   I became overweight: As an Adult   The following factors have contributed to my weight gain:  Starting on Medication for Mental Health, A Health Crisis/Stress, Eating Wrong Types of Food, Eating Too Much, Lack of Exercise, Genetic (Runs in the Family)   I have tried the following methods to lose weight: Weight Watchers, Medications   I have the following family history of obesity/being overweight:  I am the only one in my immediate family who is overweight   Has anyone in your family had weight loss surgery? No       Diet Recall Reviewed With Patient 4/6/2018   How many glasses of juice do you drink in a typical day? 0   How many of glasses of milk do you drink in a typical day? 0   How many 8oz glasses of sugar containing drinks such as Kiran-Aid/sweet tea do you drink in a day? 0   How many cans/bottles of sugar pop/soda/tea/sports drinks do you drink in a day? 2   How many cans/bottles of diet pop/soda/tea or sports drink do you drink in a day? 0   How often do you have a drink of alcohol? Never   If you do drink, how many drinks might you have in a day? 1 or 2       Eating Habits Reviewed With Patient 4/6/2018   Generally, my meals include foods like these: bread, pasta, rice, potatoes, corn, crackers, sweet dessert, pop, or " juice. Half of the Week   Generally, my meals include foods like these: fried meats, brats, burgers, french fries, pizza, cheese, chips, or ice cream. A Few Times a Week   Eat fast food (like [employer], [employer], [employer]). A Few Times a Week   Eat at a buffet or sit-down restaurant. Never   Eat most of my meals in front of the TV or computer. Almost Everyday   Often skip meals, eat at random times, have no regular eating times. Almost Everyday   Rarely sit down for a meal but snack or graze throughout.  Never   Eat extra snacks between meals. A Few Times a Week   Eat most of my food at the end of the day. A Few Times a Week   Eat in the middle of the night or wake up at night to eat. A Few TImes a Week   Eat extra snacks to prevent or correct low blood sugar. Never   Eat to prevent acid reflux or stomach pain. Never   Worry about not having enough food to eat. Never   Have you been to the food shelf at least a few times this year? No   I eat when I am depressed, stressed, anxious, or bored. A Few Times a Week   I eat when I am happy or as a reward. A Few Times a Week   I feel hungry all the time even if I just have eaten. Never   Feeling full is important to me. Half of the Week   Once I start eating, it is hard to stop. Never   I finish all the food on my plate even if I am already full. Half of the Week   I can't resist eating delicious food or walk past the good food/smell. Never   I eat/snack without noticing that I am eating. A Few Times a Week   I eat when I am preparing the meal. Never   I eat more than usual when I see others eating. A Few Times a Week   I have trouble not eating sweets, ice cream, cookies, or chips if they are around the house. Half of the Week   I think about food all day. Never   What foods, if any, do you crave? Sweets/Candy/Chocolate   Please list any other foods you crave? ice cream   I feel out of control when eating. Monthly   I eat a large amount of food, like a loaf of bread,  a box of cookies, a pint/quart of ice cream, all at once. Never   I eat a large amount of food even when I am not hungry. Never   I eat rapidly. Weekly   I eat alone because I feel embarrassed and do not want others to see how much I have eaten. Never   I eat until I am uncomfortably full. Monthly   I feel bad, disgusted, or guilty after I overeat. Monthly   I make myself vomit what I have eaten or use laxatives to get rid of food. Never       Activity/Exercise History Reviewed With Patient 4/6/2018   How much of a typical 12 hour day do you spend sitting? Most of the Day   How much of a typical 12 hour day do you spend lying down? Half the Day   How much of a typical day do you spend walking/standing? Less Than Half the Day   How many hours (not including work) do you spend on the TV/Video Games/Computer/Tablet/Phone? 6 Hours or More   How many times a week are you active for the purpose of exercise? Never   How many total minutes do you spend doing some activity for the purpose of exercising when you exercise? None   What keeps you from being more active? Pain, Shortness of Breath, Too tired       ROS    PAST MEDICAL HISTORY:  Past Medical History:   Diagnosis Date     Arthritis      Bipolar 2 disorder (H)      Bone and joint disord back, pelvis, leg complicat preg, childb, puerp      Chronic constipation      Depressive disorder      Esophageal reflux      GERD (gastroesophageal reflux disease)      Heart rate problem     maybe     Hiatal hernia      Hyperlipidemia LDL goal < 100      Hypertension      Hypothyroidism      Migraines      Obesity      Obstructive sleep apnea      Osteoporosis     not sure     Other mental problems      Peripheral edema      Schizophrenia (H)      Urinary incontinence     pulsating urge with spasms to pee       Work/Social History Reviewed With Patient 4/6/2018   My employment status is: Retired, Disabled, Stay at Home Parent   My job is:    How much of your job is  spent on the computer or phone? Less Than 50%   What is your marital status? Single   If in a relationship, is your significant other overweight? No   Do you have children? Yes   If you have children, are they overweight? Yes       Mental Health History Reviewed With Patient 4/6/2018   Have you ever been physically or sexually abused? No   How often in the past 2 weeks have you felt little interest or pleasure in doing things? Nearly Everyday   Over the past 2 weeks how often have you felt down, depressed, or hopeless? Nearly Everyday       Sleep History Reviewed With Patient 4/6/2018   How many hours do you sleep at night? 7   Do you think that you snore loudly or has anybody ever heard you snore loudly (louder than talking or so loud it can be heard behind a shut door)? Yes   Has anyone seen or heard you stop breathing during your sleep? Yes   Do you often feel tired, fatigued, or sleepy during the day? Yes       MEDICATIONS:   Current Outpatient Prescriptions   Medication Sig Dispense Refill     levothyroxine (SYNTHROID/LEVOTHROID) 150 MCG tablet Take 1 tablet (150 mcg) by mouth daily 14 tablet 0     oxybutynin (DITROPAN XL) 5 MG 24 hr tablet Take 1 tablet (5 mg) by mouth daily 90 tablet 2     esomeprazole (NEXIUM) 40 MG CR capsule Take 1 capsule (40 mg) by mouth 2 times daily Take 30-60 minutes before eating. 180 capsule 2     traMADol (ULTRAM) 50 MG tablet Take 1-2 tablets ( mg) by mouth every 6 hours as needed for severe pain 90 tablet 1     simvastatin (ZOCOR) 40 MG tablet Take 1 tablet (40 mg) by mouth At Bedtime 90 tablet 4     topiramate (TOPAMAX) 25 MG tablet Take 3 tablets (75 mg) by mouth daily NEEDS APPOINTMENT 90 tablet 0     metoprolol (LOPRESSOR) 25 MG tablet Take 1 tablet (25 mg) by mouth 2 times daily 180 tablet 4     order for DME Walker with 4 wheels, brakes, seat 1 each 0     divalproex (DEPAKOTE ER) 500 MG 24 hr tablet Reported on 3/27/2017       BUPROPION HCL# 300 MG OR TB24 1 TABLET  "DAILY       AMBIEN 10 MG OR TABS 1 TABLET AT BEDTIME       ZYPREXA 5 MG OR TABS Reported on 3/27/2017         ALLERGIES:   Allergies   Allergen Reactions     Abilify [Aripiprazole]      Carafate [Sucralfate]      Lisinopril Cough     cough       PHYSICAL EXAM:  /69  Pulse 62  Ht 5' 4.7\"  Wt 281 lb  SpO2 98%  BMI 47.2 kg/m2   A & O x 3  HEENT: NCAT, mucous membranes moist  Respirations unlabored  Location of obesity: Mixed Obesity    ASSESSMENT:  Geovanna is a patient with mature onset obesity with significant element of familial/genetic influence and with current health consequences. She does need aggressive weight loss plan due to BMI 47 and need to get to <35 BMI for hip surgery.  Geovanna Aguilar eats a high carb diet, eats a high fat diet, uses food as a reward, uses food as mood management and has a disorganized meal pattern.    Her problem is complicated by a hunger disorder, strong craving/reward pathways, gender and short stature and poor lifestyle choices      PLAN:      Restart topiramate ramp 75 mg. She confirmed with psychiatrist and she tolerated in last year.  Call [phone] to call in 1-2 months if wanting to increase dose to 50mg twice daily  See Rosy in 3-4 months       RTC:    12 weeks.    TIME: 15 min spent on evaluation, management, counseling, education, & motivational interviewing with greater than 50 % of the total time was spent on counseling and coordinating care    Sincerely,    Rosy Padilla PA-C      "

## 2018-04-06 NOTE — MR AVS SNAPSHOT
After Visit Summary   4/6/2018    Geovanna Aguilar    MRN: [id number]           Patient Information     Date Of Birth          1960        Visit Information        Provider Department      4/6/2018 10:30 AM Rosy Padilla PA-C [facility]        Today's Diagnoses     Morbid obesity, unspecified obesity type (H)          Care Instructions      MEDICATION STARTED AT THIS APPOINTMENT  We are starting topiramate at bedtime.  Start one tab, 25 mg, for a week. Go up to 50 mg (2 tabs) for the next week. At the third week, take   3 tabs (75 mg).  Stay at 3 tabs until you are seen again. Call the nurse at [phone] if you have any questions or concerns. (Do not stop taking it if you don't think it's working. For some people it works even though they do not feel much different.)    Topiramate (Topamax) is a medication that is used most often to treat migraine headaches or for seizures. It has also been found to help with weight loss. Although it's not currently FDA approved for weight loss, it has been used safely for a number of years to help people who are carrying extra weight.     Just how topiramate helps with weight loss has not been exactly determined. However it seems to work on areas of the brain to quiet down signals related to eating.      Topiramate may make you:    >feel less interest in eating in between meals   >think less about food and eating   >find it easier to push the plate away   >find giving up pop easier    >have an easier time eating less    For some of our patients, the pills work right away. They feel and think quite differently about food. Other patients don't feel much of a change but find in fact they have lost weight! Like all weight loss medications, topiramate works best when you help it work.  This means:    1) Have less tempting high calorie (fattening) food around the house or office    2) Have lower calorie food (fruits, vegetables,low  fat meats and dairy) for snacks    3) Eat out only one time or less each week.   4) Eat your meals at a table with the TV or computer off.    Side-effects. Topiramate is generally well tolerated. The main side-effects we see are:   Tingling in hands,feet, or face (usually not very troublesome)   Mental confusion and word finding trouble (about 10% of patients have this.)     Feeling sleepy or a bit dopey- this goes away very soon after starting.    One of the dangers of topiramate is the possibility of birth defects--if you get pregnant when you are on it, there is the risk that your baby will be born with a cleft lip or palate.  If you are on topiramate and of child bearing age, you need to be on a reliable form of birth control or refrain from sexual intercourse.     Please refer to the pharmacy insert for more information on side-effects. Since many pharmacists are not familiar with the use of topiramate in weight loss, calling the clinic will get you the most accurate information on the use of this medication for weight loss.     In order to get refills of this or any medication we prescribe you must be seen in the medical weight mgmt clinic every 2-3 months. Please have your pharmacy fax a refill request to [phone].                  Follow-ups after your visit        Follow-up notes from your care team     Return in 3 months (on 7/6/2018).      Who to contact     Please call your clinic at [phone] to:    Ask questions about your health    Make or cancel appointments    Discuss your medicines    Learn about your test results    Speak to your doctor            Additional Information About Your Visit        [employer] Information     [employer] gives you secure access to your electronic health record. If you see a primary care provider, you can also send messages to your care team and make appointments. If you have questions, please call your primary care clinic.  If you do not have a primary care provider,  "please call [phone] and they will assist you.      [employer] is an electronic gateway that provides easy, online access to your medical records. With [employer], you can request a clinic appointment, read your test results, renew a prescription or communicate with your care team.     To access your existing account, please contact your [facility] or call [phone] for assistance.        Care EveryWhere ID     This is your Care EveryWhere ID. This could be used by other organizations to access your [place] medical records  [id number]        Your Vitals Were     Pulse Height Pulse Oximetry BMI (Body Mass Index)          62 5' 4.7\" 98% 47.2 kg/m2         Blood Pressure from Last 3 Encounters:   04/06/18 127/69   08/28/17 125/64   05/31/17 145/73    Weight from Last 3 Encounters:   04/06/18 281 lb   08/28/17 264 lb   05/31/17 264 lb 12.8 oz              Today, you had the following     No orders found for display         Today's Medication Changes          These changes are accurate as of 4/6/18 10:52 AM.  If you have any questions, ask your nurse or doctor.               These medicines have changed or have updated prescriptions.        Dose/Directions    * topiramate 25 MG tablet   Commonly known as:  TOPAMAX   This may have changed:  Another medication with the same name was added. Make sure you understand how and when to take each.   Used for:  Morbid obesity, unspecified obesity type (H)   Changed by:  Rosy Padilla PA-C        Dose:  75 mg   Take 3 tablets (75 mg) by mouth daily NEEDS APPOINTMENT   Quantity:  90 tablet   Refills:  0       * topiramate 25 MG tablet   Commonly known as:  TOPAMAX   This may have changed:  You were already taking a medication with the same name, and this prescription was added. Make sure you understand how and when to take each.   Used for:  Morbid obesity, unspecified obesity type (H)   Changed by:  Rosy Padilla PA-C        " 25mg at bedtime for week 1, 50mg at bedtime for 1 week, and 75mg at bedtime thereafter   Quantity:  270 tablet   Refills:  1       * Notice:  This list has 2 medication(s) that are the same as other medications prescribed for you. Read the directions carefully, and ask your doctor or other care provider to review them with you.         Where to get your medicines      These medications were sent to [facility] - [place], [place] - [address] AT [address] [place] [id number], [facility] [address]     Phone:  [phone]     topiramate 25 MG tablet                Primary Care Provider Office Phone # Fax #    Leah Nevin Rhoades [phone] [address]  [place] [facility] [address]        Equal Access to Services     [facility]: Hadii brooklyn baez hadasho Soomaali, waaxda luqadaha, qaybta kaalmada adeegyada, bruce newman hayjanet rivas . So [facility] [phone].    ATENCIÓN: Si habla español, tiene a clark disposición servicios gratuitos de asistencia lingüística. Llame al [phone].    We comply with applicable federal civil rights laws and [place] laws. We do not discriminate on the basis of race, color, national origin, age, disability, sex, sexual orientation, or gender identity.            Thank you!     Thank you for choosing [facility]  for your care. Our goal is always to provide you with excellent care. Hearing back from our patients is one way we can continue to improve our services. Please take a few minutes to complete the written survey that you may receive in the mail after your visit with us. Thank you!             Your Updated Medication List - Protect others around you: Learn how to safely use, store and throw away your medicines at www.disposemymeds.org.          This list is accurate as of 4/6/18 10:52 AM.  Always use your most recent med list.                   Brand Name Dispense  Instructions for use Diagnosis    AMBIEN 10 MG tablet   Generic drug:  zolpidem      1 TABLET AT BEDTIME        buPROPion 300 MG 24 hr tablet    WELLBUTRIN XL     1 TABLET DAILY        divalproex sodium extended-release 500 MG 24 hr tablet    DEPAKOTE ER     Reported on 3/27/2017        esomeprazole 40 MG CR capsule    nexIUM    180 capsule    Take 1 capsule (40 mg) by mouth 2 times daily Take 30-60 minutes before eating.    Gastroesophageal reflux disease without esophagitis       levothyroxine 150 MCG tablet    SYNTHROID/LEVOTHROID    14 tablet    Take 1 tablet (150 mcg) by mouth daily    Acquired hypothyroidism       metoprolol tartrate 25 MG tablet    LOPRESSOR    180 tablet    Take 1 tablet (25 mg) by mouth 2 times daily    Hypertension goal BP (blood pressure) < 140/90       order for DME     1 each    Walker with 4 wheels, brakes, seat    Primary osteoarthritis of right hip       oxybutynin 5 MG 24 hr tablet    DITROPAN XL    90 tablet    Take 1 tablet (5 mg) by mouth daily    Urge incontinence of urine       simvastatin 40 MG tablet    ZOCOR    90 tablet    Take 1 tablet (40 mg) by mouth At Bedtime    Hyperlipidemia LDL goal <130       * topiramate 25 MG tablet    TOPAMAX    90 tablet    Take 3 tablets (75 mg) by mouth daily NEEDS APPOINTMENT    Morbid obesity, unspecified obesity type (H)       * topiramate 25 MG tablet    TOPAMAX    270 tablet    25mg at bedtime for week 1, 50mg at bedtime for 1 week, and 75mg at bedtime thereafter    Morbid obesity, unspecified obesity type (H)       traMADol 50 MG tablet    ULTRAM    90 tablet    Take 1-2 tablets ( mg) by mouth every 6 hours as needed for severe pain    Primary osteoarthritis of both hips       zyPREXA 5 MG tablet   Generic drug:  OLANZapine      Reported on 3/27/2017        * Notice:  This list has 2 medication(s) that are the same as other medications prescribed for you. Read the directions carefully, and ask your doctor or other care provider to  review them with you.

## 2018-04-06 NOTE — PROGRESS NOTES
"New Medical Weight Management Consult    PATIENT:  Geovanna Aguilar  MRN:         [id number]  :         1960  KENROY:         2018    Dear Samantha Rhoades, Leah Rooney,    I had the pleasure of seeing your patient, Geovanna Aguilar.  Full intake/assessment done to determine barriers to weight loss success and develop a treatment plan.  Geovanna Aguilar is a 57 year old female interested in treatment of medical problems associated with weight.  Her weight today is 281 lbs 0 oz, Body mass index is 47.2 kg/(m^2)., and she has the following co-morbidities:       2018   I have the following co-morbidities associated with obesity: High Blood Pressure, High Cholesterol, Sleep Apnea, GERD (Reflux), Weight Bearing Joint Pain, Stress Incontinence   Do you use a CPAP? -     Lost her boyfriend in October.  Also lost her sister and stepfather the same month.     I saw her for NBS consult last year and plan was:    \"In summary, Geovanna Aguilar has Class III obesity with a body mass index of Body mass index is 44.39 kg/(m^2). kg/m2 and the comorbidities stated above.   Due to her psychiatric history she is not a candidate for weight loss surgery.   We discussed topiramate and side effects.  She will think about this medication and discuss with psychiatrist.    Needs to lose 56 lbs before hip surgery per ortho (BMI 35)  See RD today and monthly  See Rosy Padilla for return MWM in 1 month\"      She took the topiramate for 3 months but she was not focusing on herself or weight loss due to her boyfriend being ill and dying.    She still needs to lost to BMI <35 for hip surgery.     Patient Goals Reviewed With Patient 2018   I am interested in attaining a healthier weight to diminish current health problems related to co-morbid conditions: Yes   I am interested in attaining a healthier weight in order to prevent future health problems: Yes       Referring Provider 2018   Please name the provider who referred " "you to Medical Weight Management.  If you do not know, please answer: \"I Don't Know\". Clarita       Wt Readings from Last 4 Encounters:   04/06/18 281 lb   08/28/17 264 lb   05/31/17 264 lb 12.8 oz   04/24/17 267 lb 4.8 oz       Weight History Reviewed With Patient 4/6/2018   How concerned are you about your weight? Very Concerned   Would you describe your weight gain as gradual? Yes   I became overweight: As an Adult   The following factors have contributed to my weight gain:  Starting on Medication for Mental Health, A Health Crisis/Stress, Eating Wrong Types of Food, Eating Too Much, Lack of Exercise, Genetic (Runs in the Family)   I have tried the following methods to lose weight: Weight Watchers, Medications   I have the following family history of obesity/being overweight:  I am the only one in my immediate family who is overweight   Has anyone in your family had weight loss surgery? No       Diet Recall Reviewed With Patient 4/6/2018   How many glasses of juice do you drink in a typical day? 0   How many of glasses of milk do you drink in a typical day? 0   How many 8oz glasses of sugar containing drinks such as Kiran-Aid/sweet tea do you drink in a day? 0   How many cans/bottles of sugar pop/soda/tea/sports drinks do you drink in a day? 2   How many cans/bottles of diet pop/soda/tea or sports drink do you drink in a day? 0   How often do you have a drink of alcohol? Never   If you do drink, how many drinks might you have in a day? 1 or 2       Eating Habits Reviewed With Patient 4/6/2018   Generally, my meals include foods like these: bread, pasta, rice, potatoes, corn, crackers, sweet dessert, pop, or juice. Half of the Week   Generally, my meals include foods like these: fried meats, brats, burgers, french fries, pizza, cheese, chips, or ice cream. A Few Times a Week   Eat fast food (like McDonalds, [employer], Taco Bell). A Few Times a Week   Eat at a buffet or sit-down restaurant. Never   Eat most of my " meals in front of the TV or computer. Almost Everyday   Often skip meals, eat at random times, have no regular eating times. Almost Everyday   Rarely sit down for a meal but snack or graze throughout.  Never   Eat extra snacks between meals. A Few Times a Week   Eat most of my food at the end of the day. A Few Times a Week   Eat in the middle of the night or wake up at night to eat. A Few TImes a Week   Eat extra snacks to prevent or correct low blood sugar. Never   Eat to prevent acid reflux or stomach pain. Never   Worry about not having enough food to eat. Never   Have you been to the food shelf at least a few times this year? No   I eat when I am depressed, stressed, anxious, or bored. A Few Times a Week   I eat when I am happy or as a reward. A Few Times a Week   I feel hungry all the time even if I just have eaten. Never   Feeling full is important to me. Half of the Week   Once I start eating, it is hard to stop. Never   I finish all the food on my plate even if I am already full. Half of the Week   I can't resist eating delicious food or walk past the good food/smell. Never   I eat/snack without noticing that I am eating. A Few Times a Week   I eat when I am preparing the meal. Never   I eat more than usual when I see others eating. A Few Times a Week   I have trouble not eating sweets, ice cream, cookies, or chips if they are around the house. Half of the Week   I think about food all day. Never   What foods, if any, do you crave? Sweets/Candy/Chocolate   Please list any other foods you crave? ice cream   I feel out of control when eating. Monthly   I eat a large amount of food, like a loaf of bread, a box of cookies, a pint/quart of ice cream, all at once. Never   I eat a large amount of food even when I am not hungry. Never   I eat rapidly. Weekly   I eat alone because I feel embarrassed and do not want others to see how much I have eaten. Never   I eat until I am uncomfortably full. Monthly   I feel bad,  disgusted, or guilty after I overeat. Monthly   I make myself vomit what I have eaten or use laxatives to get rid of food. Never       Activity/Exercise History Reviewed With Patient 4/6/2018   How much of a typical 12 hour day do you spend sitting? Most of the Day   How much of a typical 12 hour day do you spend lying down? Half the Day   How much of a typical day do you spend walking/standing? Less Than Half the Day   How many hours (not including work) do you spend on the TV/Video Games/Computer/Tablet/Phone? 6 Hours or More   How many times a week are you active for the purpose of exercise? Never   How many total minutes do you spend doing some activity for the purpose of exercising when you exercise? None   What keeps you from being more active? Pain, Shortness of Breath, Too tired       ROS    PAST MEDICAL HISTORY:  Past Medical History:   Diagnosis Date     Arthritis      Bipolar 2 disorder (H)      Bone and joint disord back, pelvis, leg complicat preg, childb, puerp      Chronic constipation      Depressive disorder      Esophageal reflux      GERD (gastroesophageal reflux disease)      Heart rate problem     maybe     Hiatal hernia      Hyperlipidemia LDL goal < 100      Hypertension      Hypothyroidism      Migraines      Obesity      Obstructive sleep apnea      Osteoporosis     not sure     Other mental problems      Peripheral edema      Schizophrenia (H)      Urinary incontinence     pulsating urge with spasms to pee       Work/Social History Reviewed With Patient 4/6/2018   My employment status is: Retired, Disabled, Stay at Home Parent   My job is:    How much of your job is spent on the computer or phone? Less Than 50%   What is your marital status? Single   If in a relationship, is your significant other overweight? No   Do you have children? Yes   If you have children, are they overweight? Yes       Mental Health History Reviewed With Patient 4/6/2018   Have you ever been physically  "or sexually abused? No   How often in the past 2 weeks have you felt little interest or pleasure in doing things? Nearly Everyday   Over the past 2 weeks how often have you felt down, depressed, or hopeless? Nearly Everyday       Sleep History Reviewed With Patient 4/6/2018   How many hours do you sleep at night? 7   Do you think that you snore loudly or has anybody ever heard you snore loudly (louder than talking or so loud it can be heard behind a shut door)? Yes   Has anyone seen or heard you stop breathing during your sleep? Yes   Do you often feel tired, fatigued, or sleepy during the day? Yes       MEDICATIONS:   Current Outpatient Prescriptions   Medication Sig Dispense Refill     levothyroxine (SYNTHROID/LEVOTHROID) 150 MCG tablet Take 1 tablet (150 mcg) by mouth daily 14 tablet 0     oxybutynin (DITROPAN XL) 5 MG 24 hr tablet Take 1 tablet (5 mg) by mouth daily 90 tablet 2     esomeprazole (NEXIUM) 40 MG CR capsule Take 1 capsule (40 mg) by mouth 2 times daily Take 30-60 minutes before eating. 180 capsule 2     traMADol (ULTRAM) 50 MG tablet Take 1-2 tablets ( mg) by mouth every 6 hours as needed for severe pain 90 tablet 1     simvastatin (ZOCOR) 40 MG tablet Take 1 tablet (40 mg) by mouth At Bedtime 90 tablet 4     topiramate (TOPAMAX) 25 MG tablet Take 3 tablets (75 mg) by mouth daily NEEDS APPOINTMENT 90 tablet 0     metoprolol (LOPRESSOR) 25 MG tablet Take 1 tablet (25 mg) by mouth 2 times daily 180 tablet 4     [place] [facility] [place] 4 wheels, brakes, seat 1 each 0     divalproex (DEPAKOTE ER) 500 MG 24 hr tablet Reported on 3/27/2017       BUPROPION HCL# 300 MG OR TB24 1 TABLET DAILY       AMBIEN 10 MG OR TABS 1 TABLET AT BEDTIME       ZYPREXA 5 MG OR TABS Reported on 3/27/2017         ALLERGIES:   Allergies   Allergen Reactions     Abilify [Aripiprazole]      Carafate [Sucralfate]      Lisinopril Cough     cough       PHYSICAL EXAM:  /69  Pulse 62  Ht 5' 4.7\"  Wt 281 lb  SpO2 98% "  BMI 47.2 kg/m2   A & O x 3  HEENT: NCAT, mucous membranes moist  Respirations unlabored  Location of obesity: Mixed Obesity    ASSESSMENT:  Geovanna is a patient with mature onset obesity with significant element of familial/genetic influence and with current health consequences. She does need aggressive weight loss plan due to BMI 47 and need to get to <35 BMI for hip surgery.  Geovanna Aguilar eats a high carb diet, eats a high fat diet, uses food as a reward, uses food as mood management and has a disorganized meal pattern.    Her problem is complicated by a hunger disorder, strong craving/reward pathways, gender and short stature and poor lifestyle choices      PLAN:      Restart topiramate ramp 75 mg. She confirmed with psychiatrist and she tolerated in last year.  Call [phone] to call in 1-2 months if wanting to increase dose to 50mg twice daily  See Rosy in 3-4 months       RTC:    12 weeks.    TIME: 15 min spent on evaluation, management, counseling, education, & motivational interviewing with greater than 50 % of the total time was spent on counseling and coordinating care    Sincerely,    Rosy Padilla PA-C

## 2018-04-18 DIAGNOSIS — M16.0 PRIMARY OSTEOARTHRITIS OF BOTH HIPS: ICD-10-CM

## 2018-04-18 NOTE — TELEPHONE ENCOUNTER
Routing refill request to provider for review/approval because:  Drug not on the [facility] refill protocol   Marilyn Harris RN

## 2018-04-20 RX ORDER — TRAMADOL HYDROCHLORIDE 50 MG/1
50-100 TABLET ORAL EVERY 6 HOURS PRN
Qty: 90 TABLET | Refills: 0 | Status: SHIPPED | OUTPATIENT
Start: 2018-04-20 | End: 2019-09-16

## 2018-04-20 NOTE — TELEPHONE ENCOUNTER
Faxed Rx for the Ultram to [facility], [place],  [phone], right fax confirmed at 2:35 pm today. Called  And left a voicemail message regarding Rx and next appt due.  Charlee Kline MA/  [employer]

## 2018-08-30 DIAGNOSIS — I10 HYPERTENSION GOAL BP (BLOOD PRESSURE) < 140/90: ICD-10-CM

## 2018-08-30 NOTE — TELEPHONE ENCOUNTER
Pending Prescriptions:                       Disp   Refills    metoprolol tartrate (LOPRESSOR) 25 MG tab*180 ta*4            Sig: Take 1 tablet (25 mg) by mouth 2 times daily      Can this be called to [facility]  She gets a better price there    Call if questions [phone]

## 2018-08-30 NOTE — TELEPHONE ENCOUNTER
"Requested Prescriptions   Pending Prescriptions Disp Refills     metoprolol tartrate (LOPRESSOR) 25 MG tablet  Last Written Prescription Date:  08/28/17  Last Fill Quantity: 180,  # refills: 4   Last Office Visit with G, P or [facility] prescribing provider:  08/28/17-Samantha Rhoades   Future Office Visit:    Next 5 appointments (look out 90 days)     Sep 12, 2018  4:40 PM CDT   [facility]YSICAL with Leah [facility]oades [facility]   [facility]    [address] [place] [id number]   [phone]                180 tablet 4     Sig: Take 1 tablet (25 mg) by mouth 2 times daily    Beta-Blockers Protocol Passed    8/30/2018 11:00 AM       Passed - Blood pressure under 140/90 in past 12 months    BP Readings from Last 3 Encounters:   04/06/18 127/69   08/28/17 125/64   05/31/17 145/73                Passed - Patient is age 6 or older       Passed - Recent (12 mo) or future (30 days) visit within the authorizing provider's specialty    Patient had office visit in the last 12 months or has a visit in the next 30 days with authorizing provider or within the authorizing provider's specialty.  See \"Patient Info\" tab in inbasket, or \"Choose Columns\" in Meds & Orders section of the refill encounter.              "

## 2018-08-31 RX ORDER — METOPROLOL TARTRATE 25 MG/1
25 TABLET, FILM COATED ORAL 2 TIMES DAILY
Qty: 60 TABLET | Refills: 0 | Status: SHIPPED | OUTPATIENT
Start: 2018-08-31 | End: 2018-09-12

## 2018-08-31 NOTE — TELEPHONE ENCOUNTER
This writer attempted to contact patient on 08/31/18      Reason for call medication request and left detailed message.      If patient calls back:  Patient contacted by 2nd floor [facility] Team (MA/TC). Inform patient that someone from the team will contact them, document that pt called and route to care team.         Adam Zapien MA

## 2018-08-31 NOTE — TELEPHONE ENCOUNTER
Medication is being filled for 1 time refill only due to:  Patient needs to be seen because it has been more than one year since last visit.     Elyssa Baldwin RN, [facility]

## 2018-09-02 DIAGNOSIS — E78.5 HYPERLIPIDEMIA LDL GOAL <130: ICD-10-CM

## 2018-09-02 NOTE — TELEPHONE ENCOUNTER
"Requested Prescriptions   Pending Prescriptions Disp Refills     simvastatin (ZOCOR) 40 MG tablet [Pharmacy Med Name: SIMVASTATIN 40MG    TAB]  Last Written Prescription Date:  9/26/17  Last Fill Quantity: 90,  # refills: 4   Last office visit: 8/28/2017 with prescribing provider:  Samantha Rhoades   Future Office Visit:   Next 5 appointments (look out 90 days)     Sep 12, 2018  4:40 PM CDT   PHYSICAL with Leah Rhoades MD   [facility] ([facility]    [address] [place] [id number]   [phone]                  90 tablet 3     Sig: TAKE ONE TABLET BY MOUTH AT BEDTIME    Statins Protocol Failed    9/2/2018  1:27 PM       Failed - LDL on file in past 12 months    Recent Labs   Lab Test  08/28/17   1548   LDL  104*            Passed - No abnormal creatine kinase in past 12 months    Recent Labs   Lab Test  03/10/11   1238   CKT  74               Passed - Recent (12 mo) or future (30 days) visit within the authorizing provider's specialty    Patient had office visit in the last 12 months or has a visit in the next 30 days with authorizing provider or within the authorizing provider's specialty.  See \"Patient Info\" tab in inbasket, or \"Choose Columns\" in Meds & Orders section of the refill encounter.           Passed - Patient is age 18 or older       Passed - No active pregnancy on record       Passed - No positive pregnancy test in past 12 months          "

## 2018-09-06 RX ORDER — SIMVASTATIN 40 MG
TABLET ORAL
Qty: 90 TABLET | Refills: 3 | Status: SHIPPED | OUTPATIENT
Start: 2018-09-06 | End: 2019-09-10

## 2018-09-06 NOTE — TELEPHONE ENCOUNTER
Routing refill request to provider for review/approval because:  Labs not current:  LDL  Patient needs to be seen because it has been more than 1 year since last office visit.    Next 5 appointments (look out 90 days)     Sep 12, 2018  4:40 PM CDT   PHYSICAL with Leah Rhoades MD   [facility]    [address] [place] [id number] [phone]                  Enriqueta Choi RN, BSN

## 2018-09-12 ENCOUNTER — OFFICE VISIT (OUTPATIENT)
Dept: FAMILY MEDICINE | Facility: CLINIC | Age: 58
End: 2018-09-12
Payer: COMMERCIAL

## 2018-09-12 VITALS
BODY MASS INDEX: 45.82 KG/M2 | RESPIRATION RATE: 16 BRPM | DIASTOLIC BLOOD PRESSURE: 56 MMHG | WEIGHT: 275 LBS | HEIGHT: 65 IN | SYSTOLIC BLOOD PRESSURE: 124 MMHG | HEART RATE: 84 BPM | TEMPERATURE: 97.4 F | OXYGEN SATURATION: 96 %

## 2018-09-12 DIAGNOSIS — E66.01 MORBID OBESITY DUE TO EXCESS CALORIES (H): ICD-10-CM

## 2018-09-12 DIAGNOSIS — F31.31 MILD DEPRESSED BIPOLAR I DISORDER (H): ICD-10-CM

## 2018-09-12 DIAGNOSIS — Z00.00 ENCOUNTER FOR ROUTINE ADULT HEALTH EXAMINATION WITHOUT ABNORMAL FINDINGS: Primary | ICD-10-CM

## 2018-09-12 DIAGNOSIS — E03.9 ACQUIRED HYPOTHYROIDISM: ICD-10-CM

## 2018-09-12 DIAGNOSIS — I10 HYPERTENSION GOAL BP (BLOOD PRESSURE) < 140/90: ICD-10-CM

## 2018-09-12 DIAGNOSIS — K21.9 GASTROESOPHAGEAL REFLUX DISEASE WITHOUT ESOPHAGITIS: ICD-10-CM

## 2018-09-12 DIAGNOSIS — K59.01 SLOW TRANSIT CONSTIPATION: ICD-10-CM

## 2018-09-12 DIAGNOSIS — Z11.4 SCREENING FOR HIV (HUMAN IMMUNODEFICIENCY VIRUS): ICD-10-CM

## 2018-09-12 DIAGNOSIS — E78.5 HYPERLIPIDEMIA LDL GOAL <130: ICD-10-CM

## 2018-09-12 DIAGNOSIS — Z23 NEED FOR PROPHYLACTIC VACCINATION AND INOCULATION AGAINST INFLUENZA: ICD-10-CM

## 2018-09-12 DIAGNOSIS — R73.9 HYPERGLYCEMIA: ICD-10-CM

## 2018-09-12 DIAGNOSIS — F20.9 SCHIZOPHRENIA, UNSPECIFIED TYPE (H): ICD-10-CM

## 2018-09-12 DIAGNOSIS — G47.33 OSA (OBSTRUCTIVE SLEEP APNEA): ICD-10-CM

## 2018-09-12 LAB
ERYTHROCYTE [DISTWIDTH] IN BLOOD BY AUTOMATED COUNT: 14.9 % (ref 10–15)
HBA1C MFR BLD: 5.1 % (ref 0–5.6)
HCT VFR BLD AUTO: 40.2 % (ref 35–47)
HGB BLD-MCNC: 12.8 G/DL (ref 11.7–15.7)
MCH RBC QN AUTO: 29.5 PG (ref 26.5–33)
MCHC RBC AUTO-ENTMCNC: 31.8 G/DL (ref 31.5–36.5)
MCV RBC AUTO: 93 FL (ref 78–100)
PLATELET # BLD AUTO: 246 10E9/L (ref 150–450)
RBC # BLD AUTO: 4.34 10E12/L (ref 3.8–5.2)
WBC # BLD AUTO: 8.9 10E9/L (ref 4–11)

## 2018-09-12 PROCEDURE — 83036 HEMOGLOBIN GLYCOSYLATED A1C: CPT | Performed by: FAMILY MEDICINE

## 2018-09-12 PROCEDURE — 80053 COMPREHEN METABOLIC PANEL: CPT | Performed by: FAMILY MEDICINE

## 2018-09-12 PROCEDURE — 84443 ASSAY THYROID STIM HORMONE: CPT | Performed by: FAMILY MEDICINE

## 2018-09-12 PROCEDURE — 90686 IIV4 VACC NO PRSV 0.5 ML IM: CPT | Performed by: FAMILY MEDICINE

## 2018-09-12 PROCEDURE — 99213 OFFICE O/P EST LOW 20 MIN: CPT | Mod: 25 | Performed by: FAMILY MEDICINE

## 2018-09-12 PROCEDURE — 80061 LIPID PANEL: CPT | Performed by: FAMILY MEDICINE

## 2018-09-12 PROCEDURE — 80164 ASSAY DIPROPYLACETIC ACD TOT: CPT | Performed by: FAMILY MEDICINE

## 2018-09-12 PROCEDURE — 87389 HIV-1 AG W/HIV-1&-2 AB AG IA: CPT | Performed by: FAMILY MEDICINE

## 2018-09-12 PROCEDURE — 84439 ASSAY OF FREE THYROXINE: CPT | Performed by: FAMILY MEDICINE

## 2018-09-12 PROCEDURE — 85027 COMPLETE CBC AUTOMATED: CPT | Performed by: FAMILY MEDICINE

## 2018-09-12 PROCEDURE — 90471 IMMUNIZATION ADMIN: CPT | Performed by: FAMILY MEDICINE

## 2018-09-12 PROCEDURE — 82043 UR ALBUMIN QUANTITATIVE: CPT | Performed by: FAMILY MEDICINE

## 2018-09-12 PROCEDURE — 36415 COLL VENOUS BLD VENIPUNCTURE: CPT | Performed by: FAMILY MEDICINE

## 2018-09-12 PROCEDURE — 99396 PREV VISIT EST AGE 40-64: CPT | Mod: 25 | Performed by: FAMILY MEDICINE

## 2018-09-12 RX ORDER — ESOMEPRAZOLE MAGNESIUM 40 MG/1
40 CAPSULE, DELAYED RELEASE ORAL 2 TIMES DAILY
Qty: 180 CAPSULE | Refills: 3 | Status: SHIPPED | OUTPATIENT
Start: 2018-09-12 | End: 2019-09-16

## 2018-09-12 RX ORDER — LACTULOSE 10 G/15ML
20 SOLUTION ORAL
Qty: 500 ML | Refills: 3 | Status: SHIPPED | OUTPATIENT
Start: 2018-09-12 | End: 2019-09-16

## 2018-09-12 RX ORDER — METOPROLOL TARTRATE 25 MG/1
25 TABLET, FILM COATED ORAL 2 TIMES DAILY
Qty: 180 TABLET | Refills: 3 | Status: SHIPPED | OUTPATIENT
Start: 2018-09-12 | End: 2019-08-29

## 2018-09-12 RX ORDER — LEVOTHYROXINE SODIUM 150 UG/1
150 TABLET ORAL DAILY
Qty: 90 TABLET | Refills: 3 | Status: SHIPPED | OUTPATIENT
Start: 2018-09-12 | End: 2019-09-16

## 2018-09-12 ASSESSMENT — ANXIETY QUESTIONNAIRES
2. NOT BEING ABLE TO STOP OR CONTROL WORRYING: NEARLY EVERY DAY
6. BECOMING EASILY ANNOYED OR IRRITABLE: SEVERAL DAYS
3. WORRYING TOO MUCH ABOUT DIFFERENT THINGS: MORE THAN HALF THE DAYS
GAD7 TOTAL SCORE: 14
IF YOU CHECKED OFF ANY PROBLEMS ON THIS QUESTIONNAIRE, HOW DIFFICULT HAVE THESE PROBLEMS MADE IT FOR YOU TO DO YOUR WORK, TAKE CARE OF THINGS AT HOME, OR GET ALONG WITH OTHER PEOPLE: EXTREMELY DIFFICULT
7. FEELING AFRAID AS IF SOMETHING AWFUL MIGHT HAPPEN: SEVERAL DAYS
5. BEING SO RESTLESS THAT IT IS HARD TO SIT STILL: SEVERAL DAYS
1. FEELING NERVOUS, ANXIOUS, OR ON EDGE: NEARLY EVERY DAY

## 2018-09-12 ASSESSMENT — PATIENT HEALTH QUESTIONNAIRE - PHQ9: 5. POOR APPETITE OR OVEREATING: NEARLY EVERY DAY

## 2018-09-12 NOTE — PATIENT INSTRUCTIONS
Preventive Health Recommendations  Female Ages 50 - 64    Yearly exam: See your health care provider every year in order to  o Review health changes.   o Discuss preventive care.    o Review your medicines if your doctor has prescribed any.      Get a Pap test every three years (unless you have an abnormal result and your provider advises testing more often).    If you get Pap tests with HPV test, you only need to test every 5 years, unless you have an abnormal result.     You do not need a Pap test if your uterus was removed (hysterectomy) and you have not had cancer.    You should be tested each year for STDs (sexually transmitted diseases) if you're at risk.     Have a mammogram every 1 to 2 years.    Have a colonoscopy at age 50, or have a yearly FIT test (stool test). These exams screen for colon cancer.      Have a cholesterol test every 5 years, or more often if advised.    Have a diabetes test (fasting glucose) every three years. If you are at risk for diabetes, you should have this test more often.     If you are at risk for osteoporosis (brittle bone disease), think about having a bone density scan (DEXA).    Shots: Get a flu shot each year. Get a tetanus shot every 10 years.    Nutrition:     Eat at least 5 servings of fruits and vegetables each day.    Eat whole-grain bread, whole-wheat pasta and brown rice instead of white grains and rice.    Get adequate Calcium and Vitamin D.     Lifestyle    Exercise at least 150 minutes a week (30 minutes a day, 5 days a week). This will help you control your weight and prevent disease.    Limit alcohol to one drink per day.    No smoking.     Wear sunscreen to prevent skin cancer.     See your dentist every six months for an exam and cleaning.    See your eye doctor every 1 to 2 years.  At [facility], we strive to deliver an exceptional experience to you, every time we see you.  If you receive a survey in the mail, please send us back your  thoughts. We really do value your feedback.    Based on your medical history, these are the current health maintenance/preventive care services that you are due for (some may have been done at this visit.)  Health Maintenance Due   Topic Date Due     HIV SCREEN (SYSTEM ASSIGNED)  05/01/1979     ADVANCE DIRECTIVE PLANNING Q5 YRS  05/01/2016     TSH Q1 YEAR  08/28/2018     ALT Q1 YR  08/28/2018     BMP Q1 YR  08/28/2018     LIPID MONITORING Q1 YEAR  08/28/2018     MICROALBUMIN Q1 YEAR  08/28/2018     PHQ-2 Q1 YR  08/28/2018     INFLUENZA VACCINE (1) 09/01/2018       Suggested websites for health information:  Www.Equipois.org : Up to date and easily searchable information on multiple topics.  Www.medlineplus.gov : medication info, interactive tutorials, watch real surgeries online  Www.familydoctor.org : good info from the Academy of Family Physicians  Www.cdc.gov : public health info, travel advisories, epidemics (H1N1)  Www.aap.org : children's health info, normal development, vaccinations  Www.health[facility].us : [place] dept of health, public health issues in [place], N1N1    Your care team:                            Family Medicine Internal Medicine   MD Jesus Mendez MD Shantel Branch-Fleming, MD Katya Georgiev PA-C Megan Hill, APRN CAITLIN Brown MD Pediatrics   Raad Suero, PARAFAEL Tyson, MD Shanda Pearson APRN CNP   MD Chantal Lamas MD Deborah Mielke, MD Kim Thein, APRN [facility]      Clinic hours: Monday - Thursday 7 am-7 pm; Fridays 7 am-5 pm.   Urgent care: Monday - Friday 11 am-9 pm; Saturday and Sunday 9 am-5 pm.  Pharmacy : Monday -Thursday 8 am-8 pm; Friday 8 am-6 pm; Saturday and Sunday 9 am-5 pm.     Clinic: [phone]   Pharmacy: [phone]    Ketogenic eating plan    Take a look at the Paleo diet as it is a milder version of the ketogenic diet, also.    Watch this video to see why just eating less calories does not work:  https://www.youtube.com/watch?v=mNYlIcXynwE.    This eating plan is recommended to you to lower you bad cholesterol, diabetes risk, blood sugars and potential liver damage.    This plan resolves around low carbohydrate/starchy food intake with moderate protein intake and high fat intake.  I recommend natural, minimally processed foods.  You can have eggs, butter, wood, full fat cheese full fat cream cheese and heavy cream.  Most nuts and seeds are benecial as well.    If you use a calories track ashley, like my fitness pal or similar, I recommend 70% of calories come from fat, 25% of calories come from protein and 5% of your calories come from non starchy veggies.  Non-starchy veggies would include the vegetables that grow above ground.  If you plan to take in fruit, stick to berries and treat as a dessert. Avoid sugar, high fructose corn syrup, and corn syrup sweeteners.  It should be okay to use Splenda and stevia sweeteners. Avoid corn (this is more of a grain than a veggie), regular soda, potatoes, rice, wheat and pasta.    Please look at www.ketoconnect.net, www.Broccol-e-games.MarketMeSuite.au, www.Blizuu, KetogenicPatternsdietPatternsresource.MarketMeSuite, Google Ketogenic diets and check out Ketoconnect on YouTube.    Http://www.ncbi.nlm.nih.gov/pmc/articles/KSL7155773/ is a study which shows long term ketogenic diets are safe and work for weight loss and cholesterol improvement.    These recommendations are general and we should discuss your response to this on a regular basis so we can adjust these recommendations for you.    Note of caution: it will take about 2 weeks for the ketogenic diet to shift you into fat burning as a primary fuel source.  You may feel fatigued and have slight trouble thinking over the first 2 weeks as you shift from carbohydrate as a primary fuel source to fat as a primary source.  Taking 250 mg of Magnesium and increasing your salt intake every day will help.  (Yes one of the few times you provider will  tell you to eat more salt!)

## 2018-09-12 NOTE — PROGRESS NOTES
SUBJECTIVE:   CC: Geovanna Aguilar is an 58 year old woman who presents for preventive health visit.     Healthy Habits:    Do you get at least three servings of calcium containing foods daily (dairy, green leafy vegetables, etc.)? no, taking calcium and/or vitamin D supplement: no    Amount of exercise or daily activities, outside of work: none    Problems taking medications regularly No    Medication side effects: No    Have you had an eye exam in the past two years? yes    Do you see a dentist twice per year? no    Do you have sleep apnea, excessive snoring or daytime drowsiness?yes      Schizophrenia/Bipolar 1 - seeing psychiatrist and stable but disability filing recommended.    KALPESH - seeing sleep and using CPAP.    Obesity - spending most of time sitting in recliner and little exercise due to hip pain.    Hypothyroidism/HTN/Lipid/GERD - taking medication as directed.    Constipation - present for year.  Stools every 4 - 5 days.  Using senna with stomach upset noted. Not taking anything daily.    Today's PHQ-2 Score:   PHQ-2 ( 1999 Pfizer) 9/12/2018 8/28/2017   Q1: Little interest or pleasure in doing things 3 3   Q2: Feeling down, depressed or hopeless 2 3   PHQ-2 Score 5 6       Abuse: Current or Past(Physical, Sexual or Emotional)- No  Do you feel safe in your environment - Yes    Social History   Substance Use Topics     Smoking status: Passive Smoke Exposure - Never Smoker     Packs/day: 0.00     Years: 0.00     Types: Cigarettes, Other     Smokeless tobacco: Never Used      Comment: boyfriend smokes     Alcohol use Yes      Comment: rarely     If you drink alcohol do you typically have >3 drinks per day or >7 drinks per week? No                     Reviewed orders with patient.  Reviewed health maintenance and updated orders accordingly - Yes  Patient Active Problem List   Diagnosis     GERD (gastroesophageal reflux disease)     Hypothyroidism     Peripheral edema     KALPESH (obstructive sleep apnea)      Hypertension goal BP (blood pressure) < 140/90     Hyperlipidemia LDL goal <130     Mild depressed bipolar I disorder (H)     Degenerative joint disease (DJD) of hip     Bursitis, hip     Bladder spasm     Anemia     Hyperglycemia     Chest pain     Schizophrenia (H)     Morbid obesity due to excess calories (H)     Slow transit constipation     Past Surgical History:   Procedure Laterality Date     C ABLATION SUPRAVENT ARRHYTHMOGENIC FOCUS       C CARDIAC SURG PROCEDURE UNLIST       C STOMACH SURGERY PROCEDURE UNLISTED       CRYOTHERAPY, CERVICAL       GENITOURINARY SURGERY       HC TOOTH EXTRACTION W/FORCEP       HYSTERECTOMY SUPRACERVICAL      due to endometrial hyperplasia     SALPINGO OOPHORECTOMY,R/L/KEVIN      Salpingo Oophorectomy, RT     SURGICAL HISTORY OF -       Repair soft tissue of right arm     TUBAL LIGATION       [facility]  12     VASCULAR SURGERY         Social History   Substance Use Topics     Smoking status: Passive Smoke Exposure - Never Smoker     Packs/day: 0.00     Years: 0.00     Types: Cigarettes, Other     Smokeless tobacco: Never Used      Comment: boyfriend smokes     Alcohol use Yes      Comment: rarely     Family History   Problem Relation Age of Onset     C.A.D. Father      Alzheimer Disease Paternal Grandmother      Thyroid Disease Paternal Grandmother      Migraines Paternal Grandmother      Cancer Mother 69     lung cancer now      Depression Mother      Other Cancer Mother           Migraines Mother      Diabetes Son      Type 1     Substance Abuse Sister      Depression Sister      Migraines Sister      Alcoholism Sister      Substance Abuse Sister      Depression Sister      Alcoholism Sister      Diabetes Son      Diabetes Sister            Patient over age 50, mutual decision to screen reflected in health maintenance.    Pertinent mammograms are reviewed under the imaging tab.  History of abnormal Pap smear: Status post benign  "hysterectomy. Health Maintenance and Surgical History updated.  PAP / HPV 6/18/2013   PAP NIL     Reviewed and updated as needed this visit by clinical staff  Tobacco  Allergies  Meds  Problems         Reviewed and updated as needed this visit by Provider  Allergies  Meds  Problems            ROS:  CONSTITUTIONAL: NEGATIVE for fever, chills, change in weight  INTEGUMENTARY/SKIN: NEGATIVE for worrisome rashes, moles or lesions  EYES: NEGATIVE for vision changes or irritation  ENT: NEGATIVE for ear, mouth and throat problems  RESP: NEGATIVE for significant cough or SOB  BREAST: NEGATIVE for masses, tenderness or discharge  CV: NEGATIVE for chest pain, palpitations or peripheral edema  : NEGATIVE for unusual urinary or vaginal symptoms. No vaginal bleeding.  MUSCULOSKELETAL: NEGATIVE for significant arthralgias or myalgia  NEURO: NEGATIVE for weakness, dizziness or paresthesias     OBJECTIVE:   /56 (BP Location: Left arm, Patient Position: Chair, Cuff Size: Adult Large)  Pulse 84  Temp 97.4  F (36.3  C) (Oral)  Resp 16  Ht 5' 4.7\" (1.643 m)  Wt 275 lb (124.7 kg)  SpO2 96%  Breastfeeding? No  BMI 46.19 kg/m2  EXAM:  GENERAL: healthy, alert, no distress and obese  EYES: Eyes grossly normal to inspection, PERRL and conjunctivae and sclerae normal  HENT: ear canals and TM's normal, nose and mouth without ulcers or lesions  NECK: no adenopathy, no asymmetry, masses, or scars and thyroid normal to palpation  RESP: lungs clear to auscultation - no rales, rhonchi or wheezes  CV: regular rate and rhythm, normal S1 S2, no S3 or S4, no murmur, click or rub, no peripheral edema and peripheral pulses strong  ABDOMEN: soft, nontender, no hepatosplenomegaly, no masses and bowel sounds normal  MS: skin edema noted  SKIN: no suspicious lesions or rashes  NEURO: Normal strength and tone, mentation intact and speech normal  PSYCH: mentation appears normal, affect normal/bright    Diagnostic Test Results:  none "     ASSESSMENT/PLAN:   1. Encounter for routine adult health examination without abnormal findings  Routine preventive discussed    2. Screening for HIV (human immunodeficiency virus)  screening  - HIV Screening    3. Schizophrenia, unspecified type (H)  Continue as per psychiatry and labs today  - CBC with platelets  - Valproic acid    4. KALPESH (obstructive sleep apnea)  Continue as per sleep    5. Morbid obesity due to excess calories (H)  Low carb eating    6. Mild depressed bipolar I disorder (H)  Continue as per psychiatry    7. Acquired hypothyroidism  Check lab and refill medication with adjustments as indicated  - TSH WITH FREE T4 REFLEX  - levothyroxine (SYNTHROID/LEVOTHROID) 150 MCG tablet; Take 1 tablet (150 mcg) by mouth daily  Dispense: 90 tablet; Refill: 3    8. Hypertension goal BP (blood pressure) < 140/90  Controlled - continue current treatment and check labs  - Albumin Random Urine Quantitative with Creat Ratio  - Comprehensive metabolic panel  - metoprolol tartrate (LOPRESSOR) 25 MG tablet; Take 1 tablet (25 mg) by mouth 2 times daily  Dispense: 180 tablet; Refill: 3    9. Hyperlipidemia LDL goal <130  Check labs  - Lipid panel reflex to direct LDL Non-fasting  - Comprehensive metabolic panel    10. Hyperglycemia  Screening for diabetes  - Comprehensive metabolic panel  - Hemoglobin A1c    11. Gastroesophageal reflux disease without esophagitis  Continue current treatment and consider another EGD  - esomeprazole (NEXIUM) 40 MG CR capsule; Take 1 capsule (40 mg) by mouth 2 times daily Take 30-60 minutes before eating.  Dispense: 180 capsule; Refill: 3    12. Slow transit constipation  New diagnosis/treatment - trial of lactulose.  - lactulose (CHRONULAC) 10 GM/15ML solution; Take 30 mLs (20 g) by mouth nightly as needed for constipation Okay to mix with water if needed.  Dispense: 500 mL; Refill: 3    13. Need for prophylactic vaccination and inoculation against influenza    - FLU VACCINE, SPLIT  "VIRUS, IM (QUADRIVALENT) [97723]- >3 YRS  - Vaccine Administration, Initial [80017]    The uses and side effects, including black box warnings as appropriate, were discussed in detail.  All patient questions were answered.  The patient was instructed to call immediately if any side effects developed.    Follow up in 2 weeks if not improved or 12 months.    COUNSELING:   Reviewed preventive health counseling, as reflected in patient instructions    BP Readings from Last 1 Encounters:   09/12/18 124/56     Estimated body mass index is 46.19 kg/(m^2) as calculated from the following:    Height as of this encounter: 5' 4.7\" (1.643 m).    Weight as of this encounter: 275 lb (124.7 kg).      Weight management plan: Discussed healthy diet and exercise guidelines and patient will follow up in 12 months in clinic to re-evaluate.     reports that she is a non-smoker but has been exposed to tobacco smoke. She has been exposed to 0.00 packs per day for the past 0.00 years. She has never used smokeless tobacco.      Counseling Resources:  ATP IV Guidelines  Pooled Cohorts Equation Calculator  Breast Cancer Risk Calculator  FRAX Risk Assessment  ICSI Preventive Guidelines  Dietary Guidelines for Americans, 2010  USDA's MyPlate  ASA Prophylaxis  Lung CA Screening    Leah Rhoades MD  [facility]    Injectable Influenza Immunization Documentation    1.  Is the person to be vaccinated sick today?   No    2. Does the person to be vaccinated have an allergy to a component   of the vaccine?   No  Egg Allergy Algorithm Link    3. Has the person to be vaccinated ever had a serious reaction   to influenza vaccine in the past?   No    4. Has the person to be vaccinated ever had Guillain-Barré syndrome?   No    Form completed by Marylin Lew MA           "

## 2018-09-12 NOTE — MR AVS SNAPSHOT
After Visit Summary   9/12/2018    Geovanna Aguilar    MRN: [id number]           Patient Information     Date Of Birth          1960        Visit Information        Provider Department      9/12/2018 4:40 PM Leah Mae MD [facility]        Today's Diagnoses     Encounter for routine adult health examination without abnormal findings    -  1    Screening for HIV (human immunodeficiency virus)        Schizophrenia, unspecified type (H)        KALPESH (obstructive sleep apnea)        Morbid obesity due to excess calories (H)        Mild depressed bipolar I disorder (H)        Acquired hypothyroidism        Hypertension goal BP (blood pressure) < 140/90        Hyperlipidemia LDL goal <130        Hyperglycemia        Gastroesophageal reflux disease without esophagitis        Slow transit constipation        Need for prophylactic vaccination and inoculation against influenza          Care Instructions      Preventive Health Recommendations  Female Ages 50 - 64    Yearly exam: See your health care provider every year in order to  o Review health changes.   o Discuss preventive care.    o Review your medicines if your doctor has prescribed any.      Get a Pap test every three years (unless you have an abnormal result and your provider advises testing more often).    If you get Pap tests with HPV test, you only need to test every 5 years, unless you have an abnormal result.     You do not need a Pap test if your uterus was removed (hysterectomy) and you have not had cancer.    You should be tested each year for STDs (sexually transmitted diseases) if you're at risk.     Have a mammogram every 1 to 2 years.    Have a colonoscopy at age 50, or have a yearly FIT test (stool test). These exams screen for colon cancer.      Have a cholesterol test every 5 years, or more often if advised.    Have a diabetes test (fasting glucose) every three years. If you are at risk for  diabetes, you should have this test more often.     If you are at risk for osteoporosis (brittle bone disease), think about having a bone density scan (DEXA).    Shots: Get a flu shot each year. Get a tetanus shot every 10 years.    Nutrition:     Eat at least 5 servings of fruits and vegetables each day.    Eat whole-grain bread, whole-wheat pasta and brown rice instead of white grains and rice.    Get adequate Calcium and Vitamin D.     Lifestyle    Exercise at least 150 minutes a week (30 minutes a day, 5 days a week). This will help you control your weight and prevent disease.    Limit alcohol to one drink per day.    No smoking.     Wear sunscreen to prevent skin cancer.     See your dentist every six months for an exam and cleaning.    See your eye doctor every 1 to 2 years.  At [facility], we strive to deliver an exceptional experience to you, every time we see you.  If you receive a survey in the mail, please send us back your thoughts. We really do value your feedback.    Based on your medical history, these are the current health maintenance/preventive care services that you are due for (some may have been done at this visit.)  Health Maintenance Due   Topic Date Due     HIV SCREEN (SYSTEM ASSIGNED)  05/01/1979     ADVANCE DIRECTIVE PLANNING Q5 YRS  05/01/2016     TSH Q1 YEAR  08/28/2018     ALT Q1 YR  08/28/2018     BMP Q1 YR  08/28/2018     LIPID MONITORING Q1 YEAR  08/28/2018     MICROALBUMIN Q1 YEAR  08/28/2018     PHQ-2 Q1 YR  08/28/2018     INFLUENZA VACCINE (1) 09/01/2018       Suggested websites for health information:  Www.[employer].org : Up to date and easily searchable information on multiple topics.  Www.medlineplus.gov : medication info, interactive tutorials, watch real surgeries online  Www.familydoctor.org : good info from the Academy of Family Physicians  Www.cdc.gov : public health info, travel advisories, epidemics (H1N1)  Www.aap.org : children's health info, normal  development, vaccinations  Www.health.[facility].[place].us : [place] dept of health, public health issues in [place], N1N1    Your care team:                            Family Medicine Internal Medicine   MD Jesus Mendez MD Shantel Branch-Fleming, MD Katya Georgiev PA-C Megan Hill, APRN [facility]    Mauricio Brown MD Pediatrics   Raad Suero, RHIANNON Tyson, MD Shanda Pearson APRN CNP   MD Chantal Lamas MD Deborah Mielke, MD Kim Thein, APR[facility]      Clinic hours: Monday - Thursday 7 am-7 pm; Fridays 7 am-5 pm.   Urgent care: Monday - Friday 11 am-9 pm; Saturday and Sunday 9 am-5 pm.  Pharmacy : Monday -Thursday 8 am-8 pm; Friday 8 am-6 pm; Saturday and Sunday 9 am-5 pm.     Clinic: [phone]   Pharmacy: [phone]    Ketogenic eating plan    Take a look at the Paleo diet as it is a milder version of the ketogenic diet, also.    Watch this video to see why just eating less calories does not work: https://www.youtube.com/watch?v=mNYlIcXynwE.    This eating plan is recommended to you to lower you bad cholesterol, diabetes risk, blood sugars and potential liver damage.    This plan resolves around low carbohydrate/starchy food intake with moderate protein intake and high fat intake.  I recommend natural, minimally processed foods.  You can have eggs, butter, wood, full fat cheese full fat cream cheese and heavy cream.  Most nuts and seeds are benecial as well.    If you use a calories track ashley, like my fitness pal or similar, I recommend 70% of calories come from fat, 25% of calories come from protein and 5% of your calories come from non starchy veggies.  Non-starchy veggies would include the vegetables that grow above ground.  If you plan to take in fruit, stick to berries and treat as a dessert. Avoid sugar, high fructose corn syrup, and corn syrup sweeteners.  It should be okay to use Splenda and stevia sweeteners. Avoid corn (this is more of a grain than a veggie),  regular soda, potatoes, rice, wheat and pasta.    Please look at www.ketoconnect.net, www.[facility].[employer].au, www.[employer], KetogenicAppLovindietAppLovinresource.[employer], Google Ketogenic diets and check out Ketoconnect on YouTube.    Http://www.ncbi.nlm.nih.gov/pmc/articles/JFL5835425/ is a study which shows long term ketogenic diets are safe and work for weight loss and cholesterol improvement.    These recommendations are general and we should discuss your response to this on a regular basis so we can adjust these recommendations for you.    Note of caution: it will take about 2 weeks for the ketogenic diet to shift you into fat burning as a primary fuel source.  You may feel fatigued and have slight trouble thinking over the first 2 weeks as you shift from carbohydrate as a primary fuel source to fat as a primary source.  Taking 250 mg of Magnesium and increasing your salt intake every day will help.  (Yes one of the few times you provider will tell you to eat more salt!)           Follow-ups after your visit        Follow-up notes from your care team     Return in about 1 year (around 9/12/2019) for Annual Exam.      Who to contact     If you have questions or need follow up information about today's clinic visit or your schedule please contact [facility] directly at [phone].  Normal or non-critical lab and imaging results will be communicated to you by [employer].tvhart, letter or phone within 4 business days after the clinic has received the results. If you do not hear from us within 7 days, please contact the clinic through [employer]tvhart or phone. If you have a critical or abnormal lab result, we will notify you by phone as soon as possible.  Submit refill requests through [employer] or call your pharmacy and they will forward the refill request to us. Please allow 3 business days for your refill to be completed.          Additional Information About Your Visit        [email or web address].[email or web address] Information     [employer] gives  "you secure access to your electronic health record. If you see a primary care provider, you can also send messages to your care team and make appointments. If you have questions, please call your primary care clinic.  If you do not have a primary care provider, please call [phone] and they will assist you.        Care EveryWhere ID     This is your Care EveryWhere ID. This could be used by other organizations to access your [facility] medical records  [id number]        Your Vitals Were     Pulse Temperature Respirations Height Pulse Oximetry Breastfeeding?    84 97.4  F (36.3  C) (Oral) 16 5' 4.7\" (1.643 m) 96% No    BMI (Body Mass Index)                   46.19 kg/m2            Blood Pressure from Last 3 Encounters:   09/12/18 124/56   04/06/18 127/69   08/28/17 125/64    Weight from Last 3 Encounters:   09/12/18 275 lb (124.7 kg)   04/06/18 281 lb (127.5 kg)   08/28/17 264 lb (119.7 kg)              We Performed the Following     Albumin Random Urine Quantitative with Creat Ratio     CBC with platelets     Comprehensive metabolic panel     FLU VACCINE, SPLIT VIRUS, IM (QUADRIVALENT) [96202]- >3 YRS     Hemoglobin A1c     HIV Screening     Lipid panel reflex to direct LDL Non-fasting     TSH WITH FREE T4 REFLEX     Vaccine Administration, Initial [12876]     Valproic acid          Today's Medication Changes          These changes are accurate as of 9/12/18  5:30 PM.  If you have any questions, ask your nurse or doctor.               Start taking these medicines.        Dose/Directions    lactulose 10 GM/15ML solution   Commonly known as:  CHRONULAC   Used for:  Slow transit constipation   Started by:  Leah Mae MD        Dose:  20 g   Take 30 mLs (20 g) by mouth nightly as needed for constipation Okay to mix with water if needed.   Quantity:  500 mL   Refills:  3       levothyroxine 150 MCG tablet   Commonly known as:  SYNTHROID/LEVOTHROID   Used for:  Acquired hypothyroidism   Started " by:  Leah Mae MD        Dose:  150 mcg   Take 1 tablet (150 mcg) by mouth daily   Quantity:  90 tablet   Refills:  3            Where to get your medicines      These medications were sent to [facility] - [place], [place] - [address] [facility]  [address], [facility] [address]     Phone:  [phone]     esomeprazole 40 MG CR capsule    lactulose 10 GM/15ML solution    levothyroxine 150 MCG tablet         These medications were sent to [id number] - [facility], [place] [address] [facility]  [address] [address]     Phone:  [phone]     metoprolol tartrate 25 MG tablet                Primary Care Provider Office Phone # Fax #    Leah Rhoades [phone] [address]  [facility] [address]        Equal Access to Services     [facility] AH: Hadii aad ku hadasho Soomaali, waaxda luqadaha, qaybta kaalmada adeegyada, waxay idiin hayaan adeeg kharash labrit . So [facility] [phone].    ATENCIÓN: Si habla español, tiene a clark disposición servicios gratuitos de asistencia lingüística. [facility] [phone].    We comply with applicable federal civil rights laws and [place] laws. We do not discriminate on the basis of race, color, national origin, age, disability, sex, sexual orientation, or gender identity.            Thank you!     Thank you for choosing [facility]  for your care. Our goal is always to provide you with excellent care. Hearing back from our patients is one way we can continue to improve our services. Please take a few minutes to complete the written survey that you may receive in the mail after your visit with us. Thank you!             Your Updated Medication List - Protect others around you: Learn how to safely use, store and throw away your medicines at www.disposemymeds.org.          This list  is accurate as of 9/12/18  5:30 PM.  Always use your most recent med list.                   Brand Name Dispense Instructions for use Diagnosis    AMBIEN 10 MG tablet   Generic drug:  zolpidem      1 TABLET AT BEDTIME        buPROPion 300 MG 24 hr tablet    WELLBUTRIN XL     1 TABLET DAILY        divalproex sodium extended-release 500 MG 24 hr tablet    DEPAKOTE ER     Reported on 3/27/2017        esomeprazole 40 MG CR capsule    nexIUM    180 capsule    Take 1 capsule (40 mg) by mouth 2 times daily Take 30-60 minutes before eating.    Gastroesophageal reflux disease without esophagitis       lactulose 10 GM/15ML solution    CHRONULAC    500 mL    Take 30 mLs (20 g) by mouth nightly as needed for constipation Okay to mix with water if needed.    Slow transit constipation       levothyroxine 150 MCG tablet    SYNTHROID/LEVOTHROID    90 tablet    Take 1 tablet (150 mcg) by mouth daily    Acquired hypothyroidism       metoprolol tartrate 25 MG tablet    LOPRESSOR    180 tablet    Take 1 tablet (25 mg) by mouth 2 times daily    Hypertension goal BP (blood pressure) < 140/90       order for DME     1 each    Walker with 4 wheels, brakes, seat    Primary osteoarthritis of right hip       oxybutynin 5 MG 24 hr tablet    DITROPAN XL    90 tablet    Take 1 tablet (5 mg) by mouth daily    Urge incontinence of urine       simvastatin 40 MG tablet    ZOCOR    90 tablet    TAKE ONE TABLET BY MOUTH AT BEDTIME    Hyperlipidemia LDL goal <130       * topiramate 25 MG tablet    TOPAMAX    90 tablet    Take 3 tablets (75 mg) by mouth daily NEEDS APPOINTMENT    Morbid obesity, unspecified obesity type (H)       * topiramate 25 MG tablet    TOPAMAX    270 tablet    25mg at bedtime for week 1, 50mg at bedtime for 1 week, and 75mg at bedtime thereafter    Morbid obesity, unspecified obesity type (H)       traMADol 50 MG tablet    ULTRAM    90 tablet    Take 1-2 tablets ( mg) by mouth every 6 hours as needed for severe pain Needs to  be seen for more.    Primary osteoarthritis of both hips       zyPREXA 5 MG tablet   Generic drug:  OLANZapine      Reported on 3/27/2017        * Notice:  This list has 2 medication(s) that are the same as other medications prescribed for you. Read the directions carefully, and ask your doctor or other care provider to review them with you.

## 2018-09-13 LAB
ALBUMIN SERPL-MCNC: 3.5 G/DL (ref 3.4–5)
ALP SERPL-CCNC: 61 U/L (ref 40–150)
ALT SERPL W P-5'-P-CCNC: 16 U/L (ref 0–50)
ANION GAP SERPL CALCULATED.3IONS-SCNC: 10 MMOL/L (ref 3–14)
AST SERPL W P-5'-P-CCNC: 12 U/L (ref 0–45)
BILIRUB SERPL-MCNC: 0.4 MG/DL (ref 0.2–1.3)
BUN SERPL-MCNC: 18 MG/DL (ref 7–30)
CALCIUM SERPL-MCNC: 9.4 MG/DL (ref 8.5–10.1)
CHLORIDE SERPL-SCNC: 110 MMOL/L (ref 94–109)
CHOLEST SERPL-MCNC: 204 MG/DL
CO2 SERPL-SCNC: 23 MMOL/L (ref 20–32)
CREAT SERPL-MCNC: 0.89 MG/DL (ref 0.52–1.04)
CREAT UR-MCNC: 229 MG/DL
GFR SERPL CREATININE-BSD FRML MDRD: 65 ML/MIN/1.7M2
GLUCOSE SERPL-MCNC: 83 MG/DL (ref 70–99)
HDLC SERPL-MCNC: 41 MG/DL
HIV 1+2 AB+HIV1 P24 AG SERPL QL IA: NONREACTIVE
LDLC SERPL CALC-MCNC: 125 MG/DL
MICROALBUMIN UR-MCNC: 13 MG/L
MICROALBUMIN/CREAT UR: 5.5 MG/G CR (ref 0–25)
NONHDLC SERPL-MCNC: 163 MG/DL
POTASSIUM SERPL-SCNC: 4.2 MMOL/L (ref 3.4–5.3)
PROT SERPL-MCNC: 7.2 G/DL (ref 6.8–8.8)
SODIUM SERPL-SCNC: 143 MMOL/L (ref 133–144)
T4 FREE SERPL-MCNC: 1.22 NG/DL (ref 0.76–1.46)
TRIGL SERPL-MCNC: 192 MG/DL
TSH SERPL DL<=0.005 MIU/L-ACNC: 4.19 MU/L (ref 0.4–4)
VALPROATE SERPL-MCNC: 84 MG/L (ref 50–100)

## 2018-09-13 ASSESSMENT — PATIENT HEALTH QUESTIONNAIRE - PHQ9: SUM OF ALL RESPONSES TO PHQ QUESTIONS 1-9: 15

## 2018-09-13 ASSESSMENT — ANXIETY QUESTIONNAIRES: GAD7 TOTAL SCORE: 14

## 2018-10-09 ENCOUNTER — TRANSFERRED RECORDS (OUTPATIENT)
Dept: HEALTH INFORMATION MANAGEMENT | Facility: CLINIC | Age: 58
End: 2018-10-09

## 2018-11-15 ENCOUNTER — TELEPHONE (OUTPATIENT)
Dept: FAMILY MEDICINE | Facility: CLINIC | Age: 58
End: 2018-11-15

## 2018-11-15 NOTE — TELEPHONE ENCOUNTER
..Reason for Call:  Other     Detailed comments: Patient said the psychiatrist never received her lab results please fax to Dr. Quang Ken at [phone]    Phone Number Patient can be reached at: Home number on file [phone] (home)    Best Time: anytime    Can we leave a detailed message on this number? YES    Call taken on 11/15/2018 at 2:53 PM by Tariq Wilkinson

## 2018-11-15 NOTE — TELEPHONE ENCOUNTER
In the office notes from 9/12/18 it states:  3. Schizophrenia, unspecified type (H)  Continue as per psychiatry and labs today  - CBC with platelets  - Valproic acid    Printed and faxed CBC and Valproic Acid final results on 9/12/18 to Dr Quang Ken, [phone], right fax confirmed at 3:19 pm.  Charlee Kline MA/  For [employer] and Analy

## 2019-03-04 DIAGNOSIS — I10 HYPERTENSION GOAL BP (BLOOD PRESSURE) < 140/90: ICD-10-CM

## 2019-03-04 DIAGNOSIS — E78.5 HYPERLIPIDEMIA LDL GOAL <130: ICD-10-CM

## 2019-03-04 NOTE — TELEPHONE ENCOUNTER
"Requested Prescriptions   Pending Prescriptions Disp Refills     simvastatin (ZOCOR) 40 MG tablet 90 tablet 3    Last Written Prescription Date:  9/6/18  Last Fill Quantity: 90,  # refills: 3   Last Office Visit with [facility], [facility] or [facility] prescribing provider:  9/12/18   Future Office Visit:      Sig: Take 1 tablet (40 mg) by mouth    Statins Protocol Passed - 3/4/2019  1:25 PM       Passed - LDL on file in past 12 months    Recent Labs   Lab Test 09/12/18  1734   *            Passed - No abnormal creatine kinase in past 12 months    Recent Labs   Lab Test 03/10/11  1238   CKT 74               Passed - Recent (12 mo) or future (30 days) visit within the authorizing provider's specialty    Patient had office visit in the last 12 months or has a visit in the next 30 days with authorizing provider or within the authorizing provider's specialty.  See \"Patient Info\" tab in inbasket, or \"Choose Columns\" in Meds & Orders section of the refill encounter.             Passed - Medication is active on med list       Passed - Patient is age 18 or older       Passed - No active pregnancy on record       Passed - No positive pregnancy test in past 12 months        metoprolol tartrate (LOPRESSOR) 25 MG tablet 180 tablet 3    Last Written Prescription Date:  9/12/18  Last Fill Quantity: 180,  # refills: 3   Last Office Visit with [facility], [facility] or [facility] prescribing provider:  9/12/18   Future Office Visit:      Sig: Take 1 tablet (25 mg) by mouth 2 times daily    Beta-Blockers Protocol Passed - 3/4/2019  1:25 PM       Passed - Blood pressure under 140/90 in past 12 months    BP Readings from Last 3 Encounters:   09/12/18 124/56   04/06/18 127/69   08/28/17 125/64                Passed - Patient is age 6 or older       Passed - Recent (12 mo) or future (30 days) visit within the authorizing provider's specialty    Patient had office visit in the last 12 months or has a visit in the next 30 days with authorizing provider or within " "the authorizing provider's specialty.  See \"Patient Info\" tab in inbasket, or \"Choose Columns\" in Meds & Orders section of the refill encounter.             Passed - Medication is active on med list          "

## 2019-03-04 NOTE — TELEPHONE ENCOUNTER
Reason for Call:  Medication or medication refill:    Do you use a [facility]?  Name of the pharmacy and phone number for the current request:  Mail Order Pharmacy      Name of the medication requested: metoprolol tartrate (LOPRESSOR) 25 MG tablet, simvastatin (ZOCOR) 40 MG tablet    Other request: New Pharmacy because of changes in insurance needs to use mail order pharmacy otherwise too expensive.     Can we leave a detailed message on this number? YES    Phone number patient can be reached at: Home number on file [phone] (home)    Best Time: Any    Call taken on 3/4/2019 at 1:23 PM by Katie Orozco

## 2019-03-06 RX ORDER — METOPROLOL TARTRATE 25 MG/1
25 TABLET, FILM COATED ORAL 2 TIMES DAILY
Qty: 180 TABLET | Refills: 3 | OUTPATIENT
Start: 2019-03-06

## 2019-03-06 RX ORDER — SIMVASTATIN 40 MG
40 TABLET ORAL
Qty: 90 TABLET | Refills: 3 | OUTPATIENT
Start: 2019-03-06

## 2019-03-06 NOTE — TELEPHONE ENCOUNTER
Medication denied patient has requested medication to soon.-Simvastatin and Metoprolol    Marilyn Harris RN

## 2019-03-08 ENCOUNTER — TELEPHONE (OUTPATIENT)
Dept: FAMILY MEDICINE | Facility: CLINIC | Age: 59
End: 2019-03-08

## 2019-03-08 NOTE — TELEPHONE ENCOUNTER
Reason for Call:  Other     Detailed comments: Patient called to give correct number for [facility] [phone].    Phone Number Patient can be reached at: Home number on file [phone] (home)    Best Time: anytime    Can we leave a detailed message on this number? YES    Call taken on 3/8/2019 at 12:32 PM by Tariq Wilkinson

## 2019-03-11 NOTE — TELEPHONE ENCOUNTER
DISREGARD message;     Patient calling back and needs to cancel message, is using [facility] for the two Rx's., less costly than mail order;

## 2019-03-18 ENCOUNTER — OFFICE VISIT (OUTPATIENT)
Dept: OPTOMETRY | Facility: CLINIC | Age: 59
End: 2019-03-18
Payer: COMMERCIAL

## 2019-03-18 DIAGNOSIS — H52.223 REGULAR ASTIGMATISM OF BOTH EYES: ICD-10-CM

## 2019-03-18 DIAGNOSIS — H52.13 MYOPIA OF BOTH EYES: ICD-10-CM

## 2019-03-18 DIAGNOSIS — H25.13 AGE-RELATED NUCLEAR CATARACT OF BOTH EYES: Primary | ICD-10-CM

## 2019-03-18 DIAGNOSIS — H52.4 PRESBYOPIA: ICD-10-CM

## 2019-03-18 PROCEDURE — 92015 DETERMINE REFRACTIVE STATE: CPT | Performed by: OPTOMETRIST

## 2019-03-18 PROCEDURE — 92004 COMPRE OPH EXAM NEW PT 1/>: CPT | Performed by: OPTOMETRIST

## 2019-03-18 ASSESSMENT — SLIT LAMP EXAM - LIDS
COMMENTS: NORMAL
COMMENTS: NORMAL

## 2019-03-18 ASSESSMENT — VISUAL ACUITY
OD_CC+: -2
OS_SC: 20/400-
OS_CC+: -1
METHOD: SNELLEN - LINEAR
OS_CC: 20/40-1
OS_PH_CC: 20/50
OD_SC: 20/400-
CORRECTION_TYPE: GLASSES
OD_CC: 20/30
OS_PH_CC+: -1
OD_CC: 20/40-2
OS_CC: 20/50

## 2019-03-18 ASSESSMENT — REFRACTION_WEARINGRX
OD_ADD: +2.25
OD_SPHERE: -5.75
OD_CYLINDER: +3.00
OS_SPHERE: -4.75
OS_CYLINDER: +1.00
OS_ADD: +2.25
OD_AXIS: 175
SPECS_TYPE: PAL
OS_AXIS: 015

## 2019-03-18 ASSESSMENT — TONOMETRY
OS_IOP_MMHG: 17
OD_IOP_MMHG: 15
IOP_METHOD: TONOPEN

## 2019-03-18 ASSESSMENT — EXTERNAL EXAM - LEFT EYE: OS_EXAM: NORMAL

## 2019-03-18 ASSESSMENT — CUP TO DISC RATIO
OD_RATIO: 0.35
OS_RATIO: 0.35

## 2019-03-18 ASSESSMENT — EXTERNAL EXAM - RIGHT EYE: OD_EXAM: NORMAL

## 2019-03-18 ASSESSMENT — REFRACTION_MANIFEST
OD_CYLINDER: +3.00
OS_SPHERE: -5.00
OD_ADD: +2.50
OS_ADD: +2.50
OD_AXIS: 175
OS_AXIS: 004
OD_SPHERE: -6.00
OS_CYLINDER: +0.25

## 2019-03-18 ASSESSMENT — CONF VISUAL FIELD
OD_NORMAL: 1
OS_NORMAL: 1

## 2019-03-18 NOTE — PATIENT INSTRUCTIONS
You have the start of mild cataracts.  You may notice some blurred vision or glare with night driving.  It is important that you wear good sunglasses to protect your eyes from the ultraviolet light from the sun.     Recommend new glasses.    Return in 1 year for a complete eye exam or sooner if needed.    Jesse Friedman, GABI    The affects of the dilating drops last for 4- 6 hours.  You will be more sensitive to light and vision will be blurry up close.  Mydriatic sunglasses were given if needed.      Optometry Providers       Clinic Locations                                 Telephone Number   Dr. Suzanne [place]   [place]    [facility] [place]   [place] [phone]     [facility] Hours:                [facility] Hours:       [facility] Hours:   [address]   [address] [facility]     [address] [place] [address]   [address] [address] [address]    [place] [address]  Phone: [phone]                    Phone: [phone]     Phone: [phone]                      Monday 8:00-7:00                          Monday 8:00-7:00                          Monday 8:00-7:00              Tuesday 8:00-6:00                          Tuesday 8:00-7:00                          Tuesday 8:00-7:00              Wednesday 8:00-6:00                  Wednesday 8:00-7:00                   Wednesday 8:00-7:00      Thursday 8:00-6:00                        Thursday 8:00-7:00                         Thursday 8:00-7:00            Friday 8:00-5:00                              Friday 8:00-5:00                              Friday 8:00-5:00    [facility] Hours:   [address] [place] [id number] [phone]    Monday 8:00-7:00  Tuesday 8:00-7:00  Wednesday 8:00-7:00  Thursday 8:00-7:00  Friday 8:00-5:00  Please log on to [email or web address] to order your contact lenses.  The link is found on the Eye Care and  Vision Services page.  As always, Thank you for trusting us with your health care needs!

## 2019-03-18 NOTE — LETTER
3/18/2019         RE: Geovanna Aguilar  [address] [place] [address]        Dear Colleague,    Thank you for referring your patient, Geovanna Aguilar, to the [facility]. Please see a copy of my visit note below.    Chief Complaint   Patient presents with     Annual Eye Exam      Accompanied by son  Last Eye Exam: 6 years  Dilated Previously: Yes    What are you currently using to see?  glasses       Distance Vision Acuity: Noticed gradual change in both eyes    Near Vision Acuity: Satisfied with vision while reading  with glasses    Eye Comfort: good  Do you use eye drops? : No  Occupation or Hobbies: disabled    Kirti Evangelina Optometric Assistant, A.B.O.C.          Medical, surgical and family histories reviewed and updated 3/18/2019.       OBJECTIVE: See Ophthalmology exam    ASSESSMENT:    ICD-10-CM    1. Age-related nuclear cataract of both eyes H25.13 EYE EXAM (SIMPLE-NONBILLABLE)   2. Myopia of both eyes H52.13 REFRACTION   3. Presbyopia H52.4 REFRACTION   4. Regular astigmatism of both eyes H52.223 REFRACTION      PLAN:     Patient Instructions   You have the start of mild cataracts.  You may notice some blurred vision or glare with night driving.  It is important that you wear good sunglasses to protect your eyes from the ultraviolet light from the sun.     Recommend new glasses.    Return in 1 year for a complete eye exam or sooner if needed.    Jesse Friedman OD           Again, thank you for allowing me to participate in the care of your patient.        Sincerely,        Jesse Friedman OD

## 2019-03-18 NOTE — PROGRESS NOTES
Chief Complaint   Patient presents with     Annual Eye Exam      Accompanied by son  Last Eye Exam: 6 years  Dilated Previously: Yes    What are you currently using to see?  glasses       Distance Vision Acuity: Noticed gradual change in both eyes    Near Vision Acuity: Satisfied with vision while reading  with glasses    Eye Comfort: good  Do you use eye drops? : No  Occupation or Hobbies: disabled    Kirti Ness Optometric Assistant, A.B.O.C.          Medical, surgical and family histories reviewed and updated 3/18/2019.       OBJECTIVE: See Ophthalmology exam    ASSESSMENT:    ICD-10-CM    1. Age-related nuclear cataract of both eyes H25.13 EYE EXAM (SIMPLE-NONBILLABLE)   2. Myopia of both eyes H52.13 REFRACTION   3. Presbyopia H52.4 REFRACTION   4. Regular astigmatism of both eyes H52.223 REFRACTION      PLAN:     Patient Instructions   You have the start of mild cataracts.  You may notice some blurred vision or glare with night driving.  It is important that you wear good sunglasses to protect your eyes from the ultraviolet light from the sun.     Recommend new glasses.    Return in 1 year for a complete eye exam or sooner if needed.    Jesse Friedman, OD

## 2019-08-29 DIAGNOSIS — I10 HYPERTENSION GOAL BP (BLOOD PRESSURE) < 140/90: ICD-10-CM

## 2019-08-29 NOTE — TELEPHONE ENCOUNTER
"Requested Prescriptions   Pending Prescriptions Disp Refills     metoprolol tartrate (LOPRESSOR) 25 MG tablet [Pharmacy Med Name: METOPROL TAR 25MG   TAB]  Last Written Prescription Date:  09/12/18  Last Fill Quantity: 180,  # refills: 3   Last Office Visit with G, P or [facility] prescribing provider:  09/12/18-[facility]   Future Office Visit:    180 tablet 3     Sig: TAKE 1 TABLET BY MOUTH TWICE DAILY       Beta-Blockers Protocol Passed - 8/29/2019 10:59 AM        Passed - Blood pressure under 140/90 in past 12 months     BP Readings from Last 3 Encounters:   09/12/18 124/56   04/06/18 127/69   08/28/17 125/64                 Passed - Patient is age 6 or older        Passed - Recent (12 mo) or future (30 days) visit within the authorizing provider's specialty     Patient had office visit in the last 12 months or has a visit in the next 30 days with authorizing provider or within the authorizing provider's specialty.  See \"Patient Info\" tab in inbasket, or \"Choose Columns\" in Meds & Orders section of the refill encounter.              Passed - Medication is active on med list          "

## 2019-08-30 RX ORDER — METOPROLOL TARTRATE 25 MG/1
TABLET, FILM COATED ORAL
Qty: 180 TABLET | Refills: 0 | Status: SHIPPED | OUTPATIENT
Start: 2019-08-30 | End: 2019-09-16

## 2019-09-04 NOTE — TELEPHONE ENCOUNTER
Reason for Call:  Medication or medication refill:    Do you use a [facility]?  Name of the pharmacy and phone number for the current request:  [facility] [address] [place] [address], [place] - [address]     Name of the medication requested: ZYPREXA 5 MG OR TABS - Pt requests 90 day supply     Can we leave a detailed message on this number? YES    Phone number patient can be reached at: Home number on file [phone] (home)    Best Time: anytime    Call taken on 9/4/2019 at 11:47 AM by Bill An

## 2019-09-04 NOTE — TELEPHONE ENCOUNTER
Requested Prescriptions   Pending Prescriptions Disp Refills     OLANZapine (ZYPREXA) 5 MG tablet        Last Written Prescription Date:  na  Last Fill Quantity: na,   # refills: na  Last Office Visit: 09/12/18-Samantha Anderson  Future Office visit:    Next 5 appointments (look out 90 days)    Sep 16, 2019 11:00 AM CDT  [facility] [address] [place] [id number] [phone]           Routing refill request to provider for review/approval because:  Drug not active on patient's medication list         Antipsychotic Medications Passed - 9/4/2019 11:50 AM        Passed - Blood pressure under 140/90 in past 12 months     BP Readings from Last 3 Encounters:   09/12/18 124/56   04/06/18 127/69   08/28/17 125/64                 Passed - Patient is 12 years of age or older        Passed - Lipid panel on file within the past 12 months     Recent Labs   Lab Test 09/12/18  1734  06/29/15  1549   CHOL 204*   < > 182   TRIG 192*   < > 156*   HDL 41*   < > 40*   *   < > 111   NHDL 163*   < >  --    VLDL  --   --  31*   CHOLHDLRATIO  --   --  4.6    < > = values in this interval not displayed.               Passed - CBC on file in past 12 months     Recent Labs   Lab Test 09/12/18  1734   WBC 8.9   RBC 4.34   HGB 12.8   HCT 40.2                    Passed - Heart Rate on file within past 12 months     Pulse Readings from Last 3 Encounters:   09/12/18 84   04/06/18 62   08/28/17 63               Passed - A1c or Glucose on file in past 12 months     Recent Labs   Lab Test 09/12/18  1734   GLC 83   A1C 5.1       Please review patients last 3 weights. If a weight gain of >10 lbs exists, you may refill the prescription once after instructing the patient to schedule an appointment within the next 30 days.    Wt Readings from Last 3 Encounters:   09/12/18 124.7 kg (275 lb)   04/06/18 127.5 kg (281 lb)  "  08/28/17 119.7 kg (264 lb)             Passed - Medication is active on med list        Passed - Patient is not pregnant        Passed - No positve pregnancy test on file in past 12 months        Passed - Recent (6 mo) or future (30 days) visit within the authorizing provider's specialty     Patient had office visit in the last 6 months or has a visit in the next 30 days with authorizing provider or within the authorizing provider's specialty.  See \"Patient Info\" tab in inbasket, or \"Choose Columns\" in Meds & Orders section of the refill encounter.              "

## 2019-09-09 NOTE — TELEPHONE ENCOUNTER
Routing refill request to provider for review/approval because:  Drug not active on patient's medication list    Brittney Dillon RN on 9/9/2019 at 1:02 PM

## 2019-09-10 DIAGNOSIS — E78.5 HYPERLIPIDEMIA LDL GOAL <130: ICD-10-CM

## 2019-09-10 RX ORDER — OLANZAPINE 5 MG/1
TABLET ORAL
OUTPATIENT
Start: 2019-09-10

## 2019-09-10 NOTE — TELEPHONE ENCOUNTER
"Requested Prescriptions   Pending Prescriptions Disp Refills     simvastatin (ZOCOR) 40 MG tablet [Pharmacy Med Name: SIMVASTATIN 40MG    TAB] 90 tablet 3     Sig: TAKE 1 TABLET BY MOUTH AT BEDTIME         Last Written Prescription Date:  9/6/18  Last Fill Quantity: 90,  # refills: 3   Last Office Visit with [facility], [facility] or [facility] prescribing provider:  9/12/18   Future Office Visit:    Next 5 appointments (look out 90 days)    Sep 16, 2019 11:00 AM CDT  [facility] [address] [place] [phone]  [phone]             Statins Protocol Passed - 9/10/2019  4:51 PM        Passed - LDL on file in past 12 months     Recent Labs   Lab Test 09/12/18  1734   *             Passed - No abnormal creatine kinase in past 12 months     No lab results found.             Passed - Recent (12 mo) or future (30 days) visit within the authorizing provider's specialty     Patient had office visit in the last 12 months or has a visit in the next 30 days with authorizing provider or within the authorizing provider's specialty.  See \"Patient Info\" tab in inbasket, or \"Choose Columns\" in Meds & Orders section of the refill encounter.              Passed - Medication is active on med list        Passed - Patient is age 18 or older        Passed - No active pregnancy on record        Passed - No positive pregnancy test in past 12 months              Watson Faarax  [facility]  "

## 2019-09-10 NOTE — TELEPHONE ENCOUNTER
Reason for Call:  Other prescription    Detailed comments: patient wants to make sure she gets this medication right away because she has an appointment for the 16th for a physical    Phone Number Patient can be reached at: Home number on file [phone] (home)    Best Time: any    Can we leave a detailed message on this number? YES    Call taken on 9/10/2019 at 4:48 PM by Lashaun Hernandez

## 2019-09-11 NOTE — TELEPHONE ENCOUNTER
Routing refill request to provider for review/approval because:  Labs out of range:  LDL  Macy Stinson RN

## 2019-09-12 RX ORDER — SIMVASTATIN 40 MG
40 TABLET ORAL AT BEDTIME
Qty: 90 TABLET | Refills: 0 | Status: SHIPPED | OUTPATIENT
Start: 2019-09-12 | End: 2019-09-16

## 2019-09-13 ENCOUNTER — ANCILLARY PROCEDURE (OUTPATIENT)
Dept: MAMMOGRAPHY | Facility: CLINIC | Age: 59
End: 2019-09-13
Attending: FAMILY MEDICINE
Payer: COMMERCIAL

## 2019-09-13 DIAGNOSIS — Z12.31 VISIT FOR SCREENING MAMMOGRAM: ICD-10-CM

## 2019-09-13 PROCEDURE — 77067 SCR MAMMO BI INCL CAD: CPT | Mod: TC

## 2019-09-16 ENCOUNTER — OFFICE VISIT (OUTPATIENT)
Dept: FAMILY MEDICINE | Facility: CLINIC | Age: 59
End: 2019-09-16
Payer: COMMERCIAL

## 2019-09-16 VITALS
TEMPERATURE: 97.9 F | HEIGHT: 65 IN | OXYGEN SATURATION: 90 % | RESPIRATION RATE: 20 BRPM | BODY MASS INDEX: 47.32 KG/M2 | HEART RATE: 67 BPM | WEIGHT: 284 LBS | SYSTOLIC BLOOD PRESSURE: 110 MMHG | DIASTOLIC BLOOD PRESSURE: 80 MMHG

## 2019-09-16 DIAGNOSIS — Z12.11 SCREEN FOR COLON CANCER: ICD-10-CM

## 2019-09-16 DIAGNOSIS — F31.73 MANIC BIPOLAR I DISORDER IN PARTIAL REMISSION (H): Primary | ICD-10-CM

## 2019-09-16 DIAGNOSIS — M16.0 PRIMARY OSTEOARTHRITIS OF BOTH HIPS: ICD-10-CM

## 2019-09-16 DIAGNOSIS — Z23 NEED FOR PROPHYLACTIC VACCINATION AND INOCULATION AGAINST INFLUENZA: ICD-10-CM

## 2019-09-16 DIAGNOSIS — Z00.00 ROUTINE GENERAL MEDICAL EXAMINATION AT A HEALTH CARE FACILITY: Primary | ICD-10-CM

## 2019-09-16 DIAGNOSIS — E03.9 ACQUIRED HYPOTHYROIDISM: ICD-10-CM

## 2019-09-16 DIAGNOSIS — F20.9 SCHIZOPHRENIA, UNSPECIFIED TYPE (H): ICD-10-CM

## 2019-09-16 DIAGNOSIS — N39.41 URGE INCONTINENCE OF URINE: ICD-10-CM

## 2019-09-16 DIAGNOSIS — K59.01 SLOW TRANSIT CONSTIPATION: ICD-10-CM

## 2019-09-16 DIAGNOSIS — E78.5 HYPERLIPIDEMIA LDL GOAL <130: ICD-10-CM

## 2019-09-16 DIAGNOSIS — K21.9 GASTROESOPHAGEAL REFLUX DISEASE WITHOUT ESOPHAGITIS: ICD-10-CM

## 2019-09-16 DIAGNOSIS — I10 HYPERTENSION GOAL BP (BLOOD PRESSURE) < 140/90: ICD-10-CM

## 2019-09-16 DIAGNOSIS — E66.01 MORBID OBESITY DUE TO EXCESS CALORIES (H): ICD-10-CM

## 2019-09-16 DIAGNOSIS — Z13.1 SCREENING FOR DIABETES MELLITUS: ICD-10-CM

## 2019-09-16 DIAGNOSIS — I87.2 VENOUS STASIS DERMATITIS OF BOTH LOWER EXTREMITIES: ICD-10-CM

## 2019-09-16 LAB
ALBUMIN SERPL-MCNC: 3.7 G/DL (ref 3.4–5)
ALP SERPL-CCNC: 68 U/L (ref 40–150)
ALT SERPL W P-5'-P-CCNC: 18 U/L (ref 0–50)
ANION GAP SERPL CALCULATED.3IONS-SCNC: 8 MMOL/L (ref 3–14)
AST SERPL W P-5'-P-CCNC: 10 U/L (ref 0–45)
BILIRUB DIRECT SERPL-MCNC: 0.1 MG/DL (ref 0–0.2)
BILIRUB SERPL-MCNC: 0.4 MG/DL (ref 0.2–1.3)
BUN SERPL-MCNC: 13 MG/DL (ref 7–30)
CALCIUM SERPL-MCNC: 8.8 MG/DL (ref 8.5–10.1)
CHLORIDE SERPL-SCNC: 108 MMOL/L (ref 94–109)
CHOLEST SERPL-MCNC: 206 MG/DL
CO2 SERPL-SCNC: 26 MMOL/L (ref 20–32)
CREAT SERPL-MCNC: 0.91 MG/DL (ref 0.52–1.04)
CREAT UR-MCNC: 413 MG/DL
ERYTHROCYTE [DISTWIDTH] IN BLOOD BY AUTOMATED COUNT: 14.8 % (ref 10–15)
GFR SERPL CREATININE-BSD FRML MDRD: 69 ML/MIN/{1.73_M2}
GLUCOSE SERPL-MCNC: 101 MG/DL (ref 70–99)
HBA1C MFR BLD: 4.9 % (ref 0–5.6)
HCT VFR BLD AUTO: 41.3 % (ref 35–47)
HDLC SERPL-MCNC: 45 MG/DL
HGB BLD-MCNC: 13.2 G/DL (ref 11.7–15.7)
LDLC SERPL CALC-MCNC: 137 MG/DL
MCH RBC QN AUTO: 29.6 PG (ref 26.5–33)
MCHC RBC AUTO-ENTMCNC: 32 G/DL (ref 31.5–36.5)
MCV RBC AUTO: 93 FL (ref 78–100)
MICROALBUMIN UR-MCNC: 71 MG/L
MICROALBUMIN/CREAT UR: 17.22 MG/G CR (ref 0–25)
NONHDLC SERPL-MCNC: 161 MG/DL
PLATELET # BLD AUTO: 271 10E9/L (ref 150–450)
POTASSIUM SERPL-SCNC: 4.5 MMOL/L (ref 3.4–5.3)
PROT SERPL-MCNC: 7.4 G/DL (ref 6.8–8.8)
RBC # BLD AUTO: 4.46 10E12/L (ref 3.8–5.2)
SODIUM SERPL-SCNC: 142 MMOL/L (ref 133–144)
TRIGL SERPL-MCNC: 119 MG/DL
TSH SERPL DL<=0.005 MIU/L-ACNC: 1.85 MU/L (ref 0.4–4)
VALPROATE SERPL-MCNC: 101 MG/L (ref 50–100)
WBC # BLD AUTO: 9.8 10E9/L (ref 4–11)

## 2019-09-16 PROCEDURE — 82043 UR ALBUMIN QUANTITATIVE: CPT | Performed by: FAMILY MEDICINE

## 2019-09-16 PROCEDURE — 83036 HEMOGLOBIN GLYCOSYLATED A1C: CPT | Performed by: FAMILY MEDICINE

## 2019-09-16 PROCEDURE — 82248 BILIRUBIN DIRECT: CPT | Performed by: PSYCHIATRY & NEUROLOGY

## 2019-09-16 PROCEDURE — 36415 COLL VENOUS BLD VENIPUNCTURE: CPT | Performed by: FAMILY MEDICINE

## 2019-09-16 PROCEDURE — 99213 OFFICE O/P EST LOW 20 MIN: CPT | Mod: 25 | Performed by: FAMILY MEDICINE

## 2019-09-16 PROCEDURE — 84443 ASSAY THYROID STIM HORMONE: CPT | Performed by: FAMILY MEDICINE

## 2019-09-16 PROCEDURE — 90471 IMMUNIZATION ADMIN: CPT | Performed by: FAMILY MEDICINE

## 2019-09-16 PROCEDURE — 80061 LIPID PANEL: CPT | Performed by: FAMILY MEDICINE

## 2019-09-16 PROCEDURE — 80164 ASSAY DIPROPYLACETIC ACD TOT: CPT | Performed by: PSYCHIATRY & NEUROLOGY

## 2019-09-16 PROCEDURE — 80053 COMPREHEN METABOLIC PANEL: CPT | Performed by: FAMILY MEDICINE

## 2019-09-16 PROCEDURE — 85027 COMPLETE CBC AUTOMATED: CPT | Performed by: PSYCHIATRY & NEUROLOGY

## 2019-09-16 PROCEDURE — 99396 PREV VISIT EST AGE 40-64: CPT | Mod: 25 | Performed by: FAMILY MEDICINE

## 2019-09-16 PROCEDURE — 90686 IIV4 VACC NO PRSV 0.5 ML IM: CPT | Performed by: FAMILY MEDICINE

## 2019-09-16 RX ORDER — OXYBUTYNIN CHLORIDE 5 MG/1
5 TABLET, EXTENDED RELEASE ORAL DAILY
Qty: 90 TABLET | Refills: 2 | Status: SHIPPED | OUTPATIENT
Start: 2019-09-16 | End: 2020-12-07

## 2019-09-16 RX ORDER — ESOMEPRAZOLE MAGNESIUM 40 MG/1
40 CAPSULE, DELAYED RELEASE ORAL 2 TIMES DAILY
Qty: 180 CAPSULE | Refills: 3 | Status: SHIPPED | OUTPATIENT
Start: 2019-09-16 | End: 2020-03-18

## 2019-09-16 RX ORDER — TRAMADOL HYDROCHLORIDE 50 MG/1
50-100 TABLET ORAL EVERY 6 HOURS PRN
Qty: 90 TABLET | Refills: 0 | Status: SHIPPED | OUTPATIENT
Start: 2019-09-16 | End: 2020-12-07

## 2019-09-16 RX ORDER — SIMVASTATIN 40 MG
40 TABLET ORAL AT BEDTIME
Qty: 90 TABLET | Refills: 2 | Status: SHIPPED | OUTPATIENT
Start: 2019-09-16 | End: 2020-12-07

## 2019-09-16 RX ORDER — METOPROLOL TARTRATE 25 MG/1
25 TABLET, FILM COATED ORAL 2 TIMES DAILY
Qty: 180 TABLET | Refills: 2 | Status: SHIPPED | OUTPATIENT
Start: 2019-09-16 | End: 2020-12-07

## 2019-09-16 RX ORDER — LEVOTHYROXINE SODIUM 150 UG/1
150 TABLET ORAL DAILY
Qty: 90 TABLET | Refills: 3 | Status: SHIPPED | OUTPATIENT
Start: 2019-09-16 | End: 2020-02-26

## 2019-09-16 RX ORDER — LACTULOSE 10 G/15ML
20 SOLUTION ORAL
Qty: 500 ML | Refills: 3 | Status: SHIPPED | OUTPATIENT
Start: 2019-09-16 | End: 2020-12-07

## 2019-09-16 ASSESSMENT — PAIN SCALES - GENERAL: PAINLEVEL: NO PAIN (0)

## 2019-09-16 ASSESSMENT — MIFFLIN-ST. JEOR: SCORE: 1869.1

## 2019-09-16 NOTE — PATIENT INSTRUCTIONS
Preventive Health Recommendations  Female Ages 50 - 64    Yearly exam: See your health care provider every year in order to  o Review health changes.   o Discuss preventive care.    o Review your medicines if your doctor has prescribed any.      Get a Pap test every three years (unless you have an abnormal result and your provider advises testing more often).    If you get Pap tests with HPV test, you only need to test every 5 years, unless you have an abnormal result.     You do not need a Pap test if your uterus was removed (hysterectomy) and you have not had cancer.    You should be tested each year for STDs (sexually transmitted diseases) if you're at risk.     Have a mammogram every 1 to 2 years.    Have a colonoscopy at age 50, or have a yearly FIT test (stool test). These exams screen for colon cancer.      Have a cholesterol test every 5 years, or more often if advised.    Have a diabetes test (fasting glucose) every three years. If you are at risk for diabetes, you should have this test more often.     If you are at risk for osteoporosis (brittle bone disease), think about having a bone density scan (DEXA).    Shots: Get a flu shot each year. Get a tetanus shot every 10 years.    Nutrition:     Eat at least 5 servings of fruits and vegetables each day.    Eat whole-grain bread, whole-wheat pasta and brown rice instead of white grains and rice.    Get adequate Calcium and Vitamin D.     Lifestyle    Exercise at least 150 minutes a week (30 minutes a day, 5 days a week). This will help you control your weight and prevent disease.    Limit alcohol to one drink per day.    No smoking.     Wear sunscreen to prevent skin cancer.     See your dentist every six months for an exam and cleaning.    See your eye doctor every 1 to 2 years.    At [facility], we strive to deliver an exceptional experience to you, every time we see you.  If you receive a survey in the mail, please send us back your  thoughts. We really do value your feedback.    Based on your medical history, these are the current health maintenance/preventive care services that you are due for (some may have been done at this visit.)  Health Maintenance Due   Topic Date Due     ADVANCE CARE PLANNING  1960     ZOSTER IMMUNIZATION (1 of 2) 05/01/2011     INFLUENZA VACCINE (1) 09/01/2019     ALT  09/12/2019     BMP  09/12/2019     LIPID  09/12/2019     MICROALBUMIN  09/12/2019     PREVENTIVE CARE VISIT  09/12/2019     TSH W/FREE T4 REFLEX  09/12/2019         Suggested websites for health information:  Www.[employer].org : Up to date and easily searchable information on multiple topics.  Www.INFIMET.gov : medication info, interactive tutorials, watch real surgeries online  Www.familydoctor.org : good info from the Academy of Family Physicians  Www.cdc.gov : public health info, travel advisories, epidemics (H1N1)  Www.aap.org : children's health info, normal development, vaccinations  Www.health.[facility].[place].us : [place] dept of health, public health issues in [place], N1N1    Your care team:                            Family Medicine Internal Medicine   MD Jesus Mendez MD Shantel Branch-Fleming, MD Katya Georgiev PA-C Nam Ho, MD Pediatrics   Raad Suero PARAFAEL Tyson, MD Chantal Augustine CNP, MD Deborah Mielke, MD Kim Thein, APRN [facility]      Clinic hours: Monday - Thursday 7 am-7 pm; Fridays 7 am-5 pm.   Urgent care: Monday - Friday 11 am-9 pm; Saturday and Sunday 9 am-5 pm.  Pharmacy : Monday -Thursday 8 am-8 pm; Friday 8 am-6 pm; Saturday and Sunday 9 am-5 pm.     Clinic: [phone]   Pharmacy: [phone]  Ketogenic eating plan    Take a look at the Paleo diet as it is a milder version of the ketogenic diet, also.    Watch this video to see why just eating less calories does not work: https://www.youtube.com/watch?v=mNYlIcXynwE.    This eating plan is recommended to  you to lower you bad cholesterol, diabetes risk, blood sugars and potential liver damage.    This plan resolves around low carbohydrate/starchy food intake with moderate protein intake and high fat intake.  I recommend natural, minimally processed foods.  You can have eggs, butter, wood, full fat cheese full fat cream cheese and heavy cream.  Most nuts and seeds are benecial as well.    If you use a calories track ashley, like my fitness pal or similar, I recommend 70% of calories come from fat, 25% of calories come from protein and 5% of your calories come from non starchy veggies.  Non-starchy veggies would include the vegetables that grow above ground.  If you plan to take in fruit, stick to berries and treat as a dessert. Avoid sugar, high fructose corn syrup, and corn syrup sweeteners.  It should be okay to use Splenda and stevia sweeteners. Avoid corn (this is more of a grain than a veggie), regular soda, potatoes, rice, wheat and pasta.    Please look at www.ketoconnect.net, www.[employer].au, www.[employer], KetogenicTelnexusdietTelnexusresource.bidu.com.br, Google Ketogenic diets and check out Ketoconnect on YouTube.    Http://www.ncbi.nlm.nih.gov/pmc/articles/XAU9293809/ is a study which shows long term ketogenic diets are safe and work for weight loss and cholesterol improvement.    These recommendations are general and we should discuss your response to this on a regular basis so we can adjust these recommendations for you.    Note of caution: it will take about 2 weeks for the ketogenic diet to shift you into fat burning as a primary fuel source.  You may feel fatigued and have slight trouble thinking over the first 2 weeks as you shift from carbohydrate as a primary fuel source to fat as a primary source.  Taking 250 mg of Magnesium and increasing your salt intake every day will help.  (Yes one of the few times you provider will tell you to eat more salt!)

## 2019-09-16 NOTE — PROGRESS NOTES
SUBJECTIVE:   CC: Geovanna Aguilar is an 59 year old woman who presents for preventive health visit.     Healthy Habits:    Do you get at least three servings of calcium containing foods daily (dairy, green leafy vegetables, etc.)? no    Amount of exercise or daily activities, outside of work: 2-3 day(s) per week    Problems taking medications regularly No    Medication side effects: No    Have you had an eye exam in the past two years? yes    Do you see a dentist twice per year? yes    Do you have sleep apnea, excessive snoring or daytime drowsiness?yes    Patient has request that simvastatin go to [facility] and the Tramadol and Oxybutynin to [facility]   Hyperlipidemia Follow-Up      Are you having any of the following symptoms? (Select all that apply)  No complaints of shortness of breath, chest pain or pressure.  No increased sweating or nausea with activity.  No left-sided neck or arm pain.  No complaints of pain in calves when walking 1-2 blocks.    Are you regularly taking any medication or supplement to lower your cholesterol?   Yes- simvastatin    Are you having muscle aches or other side effects that you think could be caused by your cholesterol lowering medication?  No    Urge incontinence - controlled with medication. Not getting up most nights when uses cpap.  Schizophrenia - seeing psychiatry and stable  Obesity - trying to lose weight for hip replacement but not exercising much and still taking in a lot of simple carbs.  GERD - stable with current treatment.  Constipation - stable with current treatment.  Hypothyroidism - taking medication as directed most days.  Hypertension - taking medication as directed.  Skin color changes on legs for a few months.  Spends most of the day sitting in a recliner and will occasionally sleep there.  Chronic Pain Follow-Up       Type / Location of Pain: Hip  Analgesia/pain control:       Recent changes:  worse      Overall control: Tolerable with  discomfort  Activity level/function:      Daily activities:  Unable to perform most daily activities - chores, hobbies, social activities, driving    Work:  not applicable  Adverse effects:  No  Adherance    Taking medication as directed?  Yes    Participating in other treatments: no   Risk Factors:    Sleep:  Fair    Mood/anxiety:  slightly worsened    Recent family or social stressors:  death in family: sisiter, boyfriend and step dad- 2 years ago    Other aggravating factors: prolonged sitting  PHQ-9 SCORE 12/14/2012 7/28/2015 9/12/2018   PHQ-9 Total Score 2 13 -   PHQ-9 Total Score - - 15     ALEXANDRO-7 SCORE 9/12/2018   Total Score 14     Encounter-Level CSA:    There are no encounter-level csa.     Patient-Level CSA:    There are no patient-level csa.         Today's PHQ-2 Score:   PHQ-2 ( 1999 Pfizer) 9/12/2018 8/28/2017   Q1: Little interest or pleasure in doing things 3 3   Q2: Feeling down, depressed or hopeless 2 3   PHQ-2 Score 5 6       Abuse: Current or Past(Physical, Sexual or Emotional)- No  Do you feel safe in your environment? Yes    Social History     Tobacco Use     Smoking status: Passive Smoke Exposure - Never Smoker     Smokeless tobacco: Never Used     Tobacco comment: boyfriend smokes   Substance Use Topics     Alcohol use: Yes     Comment: rarely     If you drink alcohol do you typically have >3 drinks per day or >7 drinks per week? No                     Reviewed orders with patient.  Reviewed health maintenance and updated orders accordingly - Yes          Pertinent mammograms are reviewed under the imaging tab.  History of abnormal Pap smear: Status post benign hysterectomy. Health Maintenance and Surgical History updated.  PAP / HPV 6/18/2013   PAP NIL     Reviewed and updated as needed this visit by clinical staff  Tobacco  Allergies  Meds  Problems  Med Hx  Surg Hx  Fam Hx  Soc Hx          Reviewed and updated as needed this visit by Provider  Tobacco  Allergies  Meds  Problems   "Med Hx  Surg Hx  Fam Hx            ROS:  10 point ROS of systems including Constitutional, Eyes, Respiratory, Cardiovascular, Gastroenterology, Genitourinary, Integumentary, Muscularskeletal, Psychiatric were all negative except for pertinent positives noted in my HPI.      OBJECTIVE:   /80 (BP Location: Right arm, Patient Position: Sitting, Cuff Size: Adult Large)   Pulse 67   Temp 97.9  F (36.6  C) (Oral)   Resp 20   Ht 1.651 m (5' 5\")   Wt 128.8 kg (284 lb)   LMP  (LMP Unknown)   SpO2 90%   Breastfeeding? No   BMI 47.26 kg/m    EXAM:  GENERAL: healthy, alert, no distress and obese  EYES: Eyes grossly normal to inspection, PERRL and conjunctivae and sclerae normal  HENT: ear canals and TM's normal, nose and mouth without ulcers or lesions  NECK: no adenopathy, no asymmetry, masses, or scars and thyroid normal to palpation  RESP: lungs clear to auscultation - no rales, rhonchi or wheezes  CV: regular rate and rhythm, normal S1 S2, no S3 or S4, no murmur, click or rub, no peripheral edema and peripheral pulses strong  ABDOMEN: soft, nontender, no hepatosplenomegaly, no masses and bowel sounds normal  MS: ambulating with walker  SKIN: gravity dependent erythema of posterior legs, red nodule on skin of left breast appears to be cherry hemangioma  NEURO: Normal strength and tone, mentation intact and speech normal  PSYCH: mentation appears normal and affect flat    Diagnostic Test Results:  Labs reviewed in Epic    ASSESSMENT/PLAN:   1. Routine general medical examination at a health care facility  Routine preventive discussed    2. Urge incontinence of urine  Stable - refilled  - oxybutynin ER (DITROPAN XL) 5 MG 24 hr tablet; Take 1 tablet (5 mg) by mouth daily  Dispense: 90 tablet; Refill: 2    3. Hyperlipidemia LDL goal <130  Check labs and refill  - Lipid panel reflex to direct LDL Fasting  - simvastatin (ZOCOR) 40 MG tablet; Take 1 tablet (40 mg) by mouth At Bedtime  Dispense: 90 tablet; Refill: " 2  - Comprehensive metabolic panel    4. Primary osteoarthritis of both hips  Refilled for very sparing use  - traMADol (ULTRAM) 50 MG tablet; Take 1-2 tablets ( mg) by mouth every 6 hours as needed for severe pain Needs to be seen for more.  Dispense: 90 tablet; Refill: 0    5. Schizophrenia, unspecified type (H)  Check labs  - Comprehensive metabolic panel    6. Morbid obesity due to excess calories (H)  Low carb diet and diabetes screening  - Hemoglobin A1c    7. Gastroesophageal reflux disease without esophagitis  Refilled  - esomeprazole (NEXIUM) 40 MG DR capsule; Take 1 capsule (40 mg) by mouth 2 times daily Take 30-60 minutes before eating.  Dispense: 180 capsule; Refill: 3    8. Slow transit constipation  Refilled  - lactulose (CHRONULAC) 10 GM/15ML solution; Take 30 mLs (20 g) by mouth nightly as needed for constipation Okay to mix with water if needed.  Dispense: 500 mL; Refill: 3    9. Acquired hypothyroidism  Check lab and refill  - TSH WITH FREE T4 REFLEX  - levothyroxine (SYNTHROID/LEVOTHROID) 150 MCG tablet; Take 1 tablet (150 mcg) by mouth daily  Dispense: 90 tablet; Refill: 3    10. Hypertension goal BP (blood pressure) < 140/90  Controlled - refill and check lab  - Albumin Random Urine Quantitative with Creat Ratio  - metoprolol tartrate (LOPRESSOR) 25 MG tablet; Take 1 tablet (25 mg) by mouth 2 times daily  Dispense: 180 tablet; Refill: 2  - Comprehensive metabolic panel    11. Venous stasis dermatitis of both lower extremities  Discussed diagnostics verse activity modification and patient opted to get more active and follow up if not improving    12. Screen for colon cancer  recommended    13. Screening for diabetes mellitus  Screening  - Hemoglobin A1c    14. Need for prophylactic vaccination and inoculation against influenza    - INFLUENZA VACCINE IM > 6 MONTHS VALENT IIV4 [96846]  - Vaccine Administration, Initial [60447]    The uses and side effects, including black box warnings as  "appropriate, were discussed in detail.  All patient questions were answered.  The patient was instructed to call immediately if any side effects developed.     Follow up in 1 year or as needed if above conditions do not improve.    COUNSELING:   Reviewed preventive health counseling, as reflected in patient instructions    Estimated body mass index is 47.26 kg/m  as calculated from the following:    Height as of this encounter: 1.651 m (5' 5\").    Weight as of this encounter: 128.8 kg (284 lb).    Weight management plan: Discussed healthy diet and exercise guidelines     reports that she is a non-smoker but has been exposed to tobacco smoke. She has been exposed to 0.00 packs per day for the past 0.00 years. She has never used smokeless tobacco.      Counseling Resources:  ATP IV Guidelines  Pooled Cohorts Equation Calculator  Breast Cancer Risk Calculator  FRAX Risk Assessment  ICSI Preventive Guidelines  Dietary Guidelines for Americans, 2010  USDA's MyPlate  ASA Prophylaxis  Lung CA Screening    Leah Rhoades MD  [facility]  "

## 2019-09-20 NOTE — RESULT ENCOUNTER NOTE
Ms. Aguilar,    All of your labs were normal for you.  Your cholesterol levels are stable and no signs of diabetes.    Please contact the clinic if you have additional questions.  Thank you.    Sincerely,    Leah Rhoades MD

## 2019-10-02 ENCOUNTER — HEALTH MAINTENANCE LETTER (OUTPATIENT)
Age: 59
End: 2019-10-02

## 2020-02-18 ENCOUNTER — TRANSFERRED RECORDS (OUTPATIENT)
Dept: HEALTH INFORMATION MANAGEMENT | Facility: CLINIC | Age: 60
End: 2020-02-18

## 2020-02-24 DIAGNOSIS — E03.9 ACQUIRED HYPOTHYROIDISM: ICD-10-CM

## 2020-02-24 NOTE — TELEPHONE ENCOUNTER
"Requested Prescriptions   Pending Prescriptions Disp Refills     SYNTHROID 150 MCG tablet [Pharmacy Med Name: SYNTHROID TAB 0.15MG] 90 tablet 3     Sig: TAKE 1 TABLET DAILY       Thyroid Protocol Passed - 2/24/2020  1:52 PM        Passed - Patient is 12 years or older        Passed - Recent (12 mo) or future (30 days) visit within the authorizing provider's specialty     Patient has had an office visit with the authorizing provider or a provider within the authorizing providers department within the previous 12 mos or has a future within next 30 days. See \"Patient Info\" tab in inbasket, or \"Choose Columns\" in Meds & Orders section of the refill encounter.              Passed - Medication is active on med list        Passed - Normal TSH on file in past 12 months     Recent Labs   Lab Test 09/16/19  1143   TSH 1.85              Passed - No active pregnancy on record     If patient is pregnant or has had a positive pregnancy test, please check TSH.          Passed - No positive pregnancy test in past 12 months     If patient is pregnant or has had a positive pregnancy test, please check TSH.          Last Written Prescription Date:  9/16/19  Last Fill Quantity: 90,  # refills: 3   Last office visit: 9/16/2019 with prescribing provider:  Leah Mae     Future Office Visit:      "

## 2020-02-26 RX ORDER — LEVOTHYROXINE SODIUM 150 MCG
TABLET ORAL
Qty: 90 TABLET | Refills: 1 | Status: SHIPPED | OUTPATIENT
Start: 2020-02-26 | End: 2020-12-07

## 2020-02-28 ENCOUNTER — TELEPHONE (OUTPATIENT)
Dept: FAMILY MEDICINE | Facility: CLINIC | Age: 60
End: 2020-02-28

## 2020-02-28 DIAGNOSIS — E03.9 ACQUIRED HYPOTHYROIDISM: ICD-10-CM

## 2020-02-28 DIAGNOSIS — I10 HYPERTENSION GOAL BP (BLOOD PRESSURE) < 140/90: ICD-10-CM

## 2020-02-28 RX ORDER — LEVOTHYROXINE SODIUM 150 UG/1
150 TABLET ORAL DAILY
Qty: 90 TABLET | Refills: 1 | Status: CANCELLED | OUTPATIENT
Start: 2020-02-28

## 2020-02-28 RX ORDER — METOPROLOL TARTRATE 25 MG/1
25 TABLET, FILM COATED ORAL 2 TIMES DAILY
Qty: 180 TABLET | Refills: 2 | Status: CANCELLED | OUTPATIENT
Start: 2020-02-28

## 2020-02-28 NOTE — TELEPHONE ENCOUNTER
Team, please return call to patient and notify refills had already been given.    SYNTHROID 150 MCG tablet was refilled on 2/26/20 #90 tabs with 1 refill and [facility] [place] [facility] [address] AT PORTAL TO REGISTERED [facility] SITES    metoprolol tartrate (LOPRESSOR) 25 MG tablet was refilled on 9/16/19, #180 tabs with 2 refills was sent to [facility] - [address]    Synthroid was sent to mail order pharmacy 2 days ago, should have in their system and be sending out refill soon.  Metoprolol should be on file with [facility], should have pills till mid June 2020 with previous prescription.    Patient to contact her pharmacies and clarify, if has questions.  Thank you.    Clarissa Rubi RN  [facility]/ [facility]

## 2020-02-28 NOTE — TELEPHONE ENCOUNTER
March will be 6 months since we have seen the patient. Routing to provider to advise on office visit needed. Would a Telephone visit be ok?  Charlee Kline [facility]  2nd Floor  Primary Care

## 2020-02-28 NOTE — TELEPHONE ENCOUNTER
.Reason for Call:  Medication or medication refill:    Do you use a [facility]?  Name of the pharmacy and phone number for the current request:  [facility] - [phone]     [facility] [phone](for synthroid)    Name of the medication requested: SYNTHROID 150 MCG tablet    This one goes through the mail order-[facility]  Quantity - 90    Other request:   metoprolol tartrate (LOPRESSOR) 25 MG tablet 25 mg, 2 TIMES DAILY   This is to be called in to the [facility],    quantity of 180;  [phone]    Can we leave a detailed message on this number? YES    Phone number patient can be reached at: Home number on file [phone] (home)    Best Time: anytime    Call taken on 2/28/2020 at 10:58 AM by Marie Johnson

## 2020-02-28 NOTE — TELEPHONE ENCOUNTER
This is not [facility] paperwork so I should be able to complete this Monday.    Leah Mccloud M.D.

## 2020-02-28 NOTE — TELEPHONE ENCOUNTER
".Reason for Call:  handicap sticker    Detailed comments: this will  in /wondering if the paperwork can be handled without an appointment?     Fearing leaving the house due to her \"health risks'  Call to discuss, Thank you;    Phone Number Patient can be reached at: Home number on file [phone] (home)    Best Time: anytime    Can we leave a detailed message on this number? YES    Call taken on 2020 at 11:04 AM by Marie Johnson      "

## 2020-03-02 NOTE — TELEPHONE ENCOUNTER
Received provider signed form. Bringing to the  by 5:00 pm today, 3/2/2020. Copy to TC and abstracting. Called and left a voicemail message regarding form .  Charlee Kline MA  [facility]  2nd Floor  Primary Care

## 2020-03-03 NOTE — TELEPHONE ENCOUNTER
Called and spoke with patient she did  her prescriptions but is still waiting on the mail order.     Poornima Louise

## 2020-03-09 ENCOUNTER — TELEPHONE (OUTPATIENT)
Dept: URGENT CARE | Facility: URGENT CARE | Age: 60
End: 2020-03-09

## 2020-03-10 NOTE — TELEPHONE ENCOUNTER
Patient called urgent care and stated she dropped off a form on 3/2/20 for  to sign and they will leave it at the  for her to  but she was not able to come and  that day. She will have her son Manish Acevedo to come and pick it up [facility] 3/9/2020 before 9:00 p.m.    Ayad Eagle, CARL

## 2020-03-18 DIAGNOSIS — K21.9 GASTROESOPHAGEAL REFLUX DISEASE WITHOUT ESOPHAGITIS: ICD-10-CM

## 2020-03-18 RX ORDER — ESOMEPRAZOLE MAGNESIUM 40 MG/1
40 CAPSULE, DELAYED RELEASE ORAL 2 TIMES DAILY
Qty: 180 CAPSULE | Refills: 3 | Status: SHIPPED | OUTPATIENT
Start: 2020-03-18 | End: 2020-03-19

## 2020-03-18 RX ORDER — ESOMEPRAZOLE MAGNESIUM 40 MG/1
40 CAPSULE, DELAYED RELEASE ORAL 2 TIMES DAILY
Qty: 180 CAPSULE | Refills: 3 | Status: CANCELLED | OUTPATIENT
Start: 2020-03-18

## 2020-03-18 RX ORDER — ESOMEPRAZOLE MAGNESIUM 40 MG/1
CAPSULE, DELAYED RELEASE ORAL
Qty: 180 CAPSULE | Refills: 3 | OUTPATIENT
Start: 2020-03-18

## 2020-03-18 NOTE — TELEPHONE ENCOUNTER
Pending Prescriptions:                       Disp   Refills    esomeprazole (NEXIUM) 40 MG DR capsule    180 ca*3            Sig: Take 1 capsule (40 mg) by mouth 2 times daily           Take 30-60 minutes before eating.    Patient states this needs PA and she need 90 supply    And she is asking for an override or sample    Please call and advise

## 2020-03-18 NOTE — TELEPHONE ENCOUNTER
Last rx went to different pharmacy. I have refilled to her mail order pharmacy and we can complete PA as needed.    Leah Mccloud M.D.

## 2020-03-19 ENCOUNTER — TELEPHONE (OUTPATIENT)
Dept: FAMILY MEDICINE | Facility: CLINIC | Age: 60
End: 2020-03-19

## 2020-03-19 RX ORDER — ESOMEPRAZOLE MAGNESIUM 40 MG/1
40 CAPSULE, DELAYED RELEASE ORAL 2 TIMES DAILY
Qty: 28 CAPSULE | Refills: 0 | Status: SHIPPED | OUTPATIENT
Start: 2020-03-19 | End: 2020-12-07

## 2020-03-19 NOTE — TELEPHONE ENCOUNTER
Reason for Call:  Other prescription    Detailed comments: Pt would like to request a call back with questions on the following medication.     esomeprazole (NEXIUM) 40 MG DR capsule    Phone Number Patient can be reached at: Home number on file [phone] (home)    Best Time: Any    Can we leave a detailed message on this number? YES    Call taken on 3/19/2020 at 5:40 PM by Katarina Henry

## 2020-03-19 NOTE — TELEPHONE ENCOUNTER
Plan does not cover esomeprazole (NEXIUM) 40 MG DR capsule .  Please call [email or web address] to initiate Prior Auth or change med.    Insurance type and ID number: Key:[id number]      Additional Information:     Samaria Garcia

## 2020-03-19 NOTE — TELEPHONE ENCOUNTER
Called and left message with patient stating her medication has been sent to the Pharmacy. Informed her to call back with any further questions or concerns.    Leydi Flannery CMA on 3/19/2020 at 4:26 PM

## 2020-03-19 NOTE — TELEPHONE ENCOUNTER
Reason for Call:  Other prescription    Detailed comments: Patient is out and asking for a 14  day supply to be sent to the a  [facility], [address]  Phone for pharmacy [phone], patient thinks this is the right phone number. Or give samples. Patient takes 2 pills a day. Please call patient when taken care of.  Phone Number Patient can be reached at: Home number on file [phone] (home)    Best Time: any    Can we leave a detailed message on this number? YES    Call taken on 3/19/2020 at 7:49 AM by Lashaun Hernandez

## 2020-03-20 NOTE — TELEPHONE ENCOUNTER
Patient needs PA for Nexium. [facility] pharmacy is charging too much, wants it sent to [facility].    PA# [phone] from patient.     Leydi Flannery CMA on 3/20/2020 at 9:31 AM

## 2020-03-20 NOTE — TELEPHONE ENCOUNTER
PA can take a few days.  We sent the prescription yesterday which it appears they have.  The PA has to do with he insurance and can take up to a week.     She will have to wait until they are done.    Does she want to get 14 tablets of the nexium for the local pharmacy and take it once daily to allow time for the shipment?  She could try pepcid over the counter while she waits as well.    Leah Mccloud M.D.

## 2020-03-20 NOTE — TELEPHONE ENCOUNTER
Prescription was sent to [facility] 3/18/20.  14 days supply sent to local pharmacy because patient state she was out.  She can wait for the mail order prescription if she likes and decline to  the local prescription.    Leah Mccloud M.D.

## 2020-03-20 NOTE — TELEPHONE ENCOUNTER
Writer informed patient of Dr Mccloud's message.  Patient stated that the pharmacy would charge her over $200 for the 14 days.  I informed her that she could use over the counter pepcid until the medication comes from mail order pharmacy.  Patient stated understanding.  She will call with any other concerns/questions.      Stephanie Benson, [facility]

## 2020-03-20 NOTE — TELEPHONE ENCOUNTER
Patient states that [facility] has not yet received this as of 3/19/20.    [facility] # [phone].    Called and spoke with pharmacy they stated that they do not have current PA for this medication.    Leydi Flannery CMA on 3/20/2020 at 9:42 AM

## 2020-03-23 ENCOUNTER — TELEPHONE (OUTPATIENT)
Dept: FAMILY MEDICINE | Facility: CLINIC | Age: 60
End: 2020-03-23

## 2020-03-23 NOTE — TELEPHONE ENCOUNTER
Reason for Call:  Other prescription    Detailed comments: Pt states that her pharmacy told her she needs a prior authorization on the following medication. She said that a small amount was sent to a different pharmacy but the out of pocket cost was too much and she did not  and has been going without this medication. She did also state that she would like to request a phone call letting her know if this was resolved or not and to please leave a message as she will be sleeping. Thank you.    esomeprazole (NEXIUM) 40 MG DR capsule    [facility] F[facility] - [phone]    Phone Number Patient can be reached at: Home number on file [phone] (home)    Best Time: Any    Can we leave a detailed message on this number? YES    Call taken on 3/23/2020 at 7:25 AM by Katarina Henry

## 2020-03-23 NOTE — TELEPHONE ENCOUNTER
Writer called and left message that the PA was filled out and sent in.  I stated we would call her when we hear back.      Stephanie Benson, CMA

## 2020-03-24 NOTE — TELEPHONE ENCOUNTER
Patient wanted you to know that debbie said you can just call them and they can approve   [phone]    Can you call them and verbally ok

## 2020-03-24 NOTE — TELEPHONE ENCOUNTER
Patient contacted and informed that prescription was approved via telephone call by writer at 11:24am on 3/24/20.  Writer contacted [facility] via telephone for prior authorization.      Stephanie Benson, CMA

## 2020-03-30 NOTE — TELEPHONE ENCOUNTER
PA approved from 2/23/2020-3/24/2021- left message on pt's phone informing her PA approved and to contact her pharmacy for refill

## 2020-09-15 ENCOUNTER — ANCILLARY PROCEDURE (OUTPATIENT)
Dept: MAMMOGRAPHY | Facility: CLINIC | Age: 60
End: 2020-09-15
Attending: FAMILY MEDICINE
Payer: COMMERCIAL

## 2020-09-15 DIAGNOSIS — Z12.31 VISIT FOR SCREENING MAMMOGRAM: ICD-10-CM

## 2020-09-15 PROCEDURE — 77067 SCR MAMMO BI INCL CAD: CPT | Mod: TC

## 2020-11-19 ENCOUNTER — DOCUMENTATION ONLY (OUTPATIENT)
Dept: FAMILY MEDICINE | Facility: CLINIC | Age: 60
End: 2020-11-19

## 2020-11-19 DIAGNOSIS — I10 HYPERTENSION GOAL BP (BLOOD PRESSURE) < 140/90: ICD-10-CM

## 2020-11-19 DIAGNOSIS — E78.5 HYPERLIPIDEMIA LDL GOAL <130: ICD-10-CM

## 2020-11-19 DIAGNOSIS — E03.9 ACQUIRED HYPOTHYROIDISM: Primary | ICD-10-CM

## 2020-11-23 DIAGNOSIS — E03.9 ACQUIRED HYPOTHYROIDISM: ICD-10-CM

## 2020-11-23 DIAGNOSIS — E78.5 HYPERLIPIDEMIA LDL GOAL <130: ICD-10-CM

## 2020-11-23 DIAGNOSIS — F31.2 SEVERE MANIC BIPOLAR I DISORDER WITH PSYCHOTIC FEATURES (H): Primary | ICD-10-CM

## 2020-11-23 DIAGNOSIS — I10 HYPERTENSION GOAL BP (BLOOD PRESSURE) < 140/90: ICD-10-CM

## 2020-11-23 LAB
ALBUMIN SERPL-MCNC: 3.4 G/DL (ref 3.4–5)
ALP SERPL-CCNC: 82 U/L (ref 40–150)
ALT SERPL W P-5'-P-CCNC: 28 U/L (ref 0–50)
ALT SERPL W P-5'-P-CCNC: 29 U/L (ref 0–50)
ANION GAP SERPL CALCULATED.3IONS-SCNC: 8 MMOL/L (ref 3–14)
AST SERPL W P-5'-P-CCNC: 9 U/L (ref 0–45)
BASOPHILS # BLD AUTO: 0 10E9/L (ref 0–0.2)
BASOPHILS NFR BLD AUTO: 0.4 %
BILIRUB DIRECT SERPL-MCNC: <0.1 MG/DL (ref 0–0.2)
BILIRUB SERPL-MCNC: 0.4 MG/DL (ref 0.2–1.3)
BUN SERPL-MCNC: 12 MG/DL (ref 7–30)
CALCIUM SERPL-MCNC: 8.8 MG/DL (ref 8.5–10.1)
CHLORIDE SERPL-SCNC: 106 MMOL/L (ref 94–109)
CHOLEST SERPL-MCNC: 197 MG/DL
CO2 SERPL-SCNC: 27 MMOL/L (ref 20–32)
CREAT SERPL-MCNC: 0.73 MG/DL (ref 0.52–1.04)
CREAT UR-MCNC: 318 MG/DL
DIFFERENTIAL METHOD BLD: NORMAL
EOSINOPHIL # BLD AUTO: 0.1 10E9/L (ref 0–0.7)
EOSINOPHIL NFR BLD AUTO: 1.7 %
ERYTHROCYTE [DISTWIDTH] IN BLOOD BY AUTOMATED COUNT: 14.4 % (ref 10–15)
GFR SERPL CREATININE-BSD FRML MDRD: 89 ML/MIN/{1.73_M2}
GLUCOSE SERPL-MCNC: 123 MG/DL (ref 70–99)
HBA1C MFR BLD: 5.3 % (ref 0–5.6)
HCT VFR BLD AUTO: 39.4 % (ref 35–47)
HDLC SERPL-MCNC: 42 MG/DL
HGB BLD-MCNC: 12.6 G/DL (ref 11.7–15.7)
LDLC SERPL CALC-MCNC: 123 MG/DL
LYMPHOCYTES # BLD AUTO: 2.7 10E9/L (ref 0.8–5.3)
LYMPHOCYTES NFR BLD AUTO: 34.5 %
MCH RBC QN AUTO: 28.9 PG (ref 26.5–33)
MCHC RBC AUTO-ENTMCNC: 32 G/DL (ref 31.5–36.5)
MCV RBC AUTO: 90 FL (ref 78–100)
MICROALBUMIN UR-MCNC: 97 MG/L
MICROALBUMIN/CREAT UR: 30.44 MG/G CR (ref 0–25)
MONOCYTES # BLD AUTO: 0.4 10E9/L (ref 0–1.3)
MONOCYTES NFR BLD AUTO: 5.4 %
NEUTROPHILS # BLD AUTO: 4.4 10E9/L (ref 1.6–8.3)
NEUTROPHILS NFR BLD AUTO: 58 %
NONHDLC SERPL-MCNC: 155 MG/DL
PLATELET # BLD AUTO: 235 10E9/L (ref 150–450)
POTASSIUM SERPL-SCNC: 4.2 MMOL/L (ref 3.4–5.3)
PROT SERPL-MCNC: 7.3 G/DL (ref 6.8–8.8)
RBC # BLD AUTO: 4.36 10E12/L (ref 3.8–5.2)
SODIUM SERPL-SCNC: 141 MMOL/L (ref 133–144)
TRIGL SERPL-MCNC: 161 MG/DL
TSH SERPL DL<=0.005 MIU/L-ACNC: 3.28 MU/L (ref 0.4–4)
VALPROATE SERPL-MCNC: 107 MG/L (ref 50–100)
WBC # BLD AUTO: 7.8 10E9/L (ref 4–11)

## 2020-11-23 PROCEDURE — 83036 HEMOGLOBIN GLYCOSYLATED A1C: CPT | Performed by: PSYCHIATRY & NEUROLOGY

## 2020-11-23 PROCEDURE — 80076 HEPATIC FUNCTION PANEL: CPT | Performed by: PSYCHIATRY & NEUROLOGY

## 2020-11-23 PROCEDURE — 84460 ALANINE AMINO (ALT) (SGPT): CPT | Performed by: FAMILY MEDICINE

## 2020-11-23 PROCEDURE — 80164 ASSAY DIPROPYLACETIC ACD TOT: CPT | Performed by: PSYCHIATRY & NEUROLOGY

## 2020-11-23 PROCEDURE — 85025 COMPLETE CBC W/AUTO DIFF WBC: CPT | Performed by: PSYCHIATRY & NEUROLOGY

## 2020-11-23 PROCEDURE — 80048 BASIC METABOLIC PNL TOTAL CA: CPT | Performed by: FAMILY MEDICINE

## 2020-11-23 PROCEDURE — 82043 UR ALBUMIN QUANTITATIVE: CPT | Performed by: FAMILY MEDICINE

## 2020-11-23 PROCEDURE — 36415 COLL VENOUS BLD VENIPUNCTURE: CPT | Performed by: FAMILY MEDICINE

## 2020-11-23 PROCEDURE — 84443 ASSAY THYROID STIM HORMONE: CPT | Performed by: FAMILY MEDICINE

## 2020-11-23 PROCEDURE — 80061 LIPID PANEL: CPT | Performed by: FAMILY MEDICINE

## 2020-12-07 ENCOUNTER — OFFICE VISIT (OUTPATIENT)
Dept: FAMILY MEDICINE | Facility: CLINIC | Age: 60
End: 2020-12-07
Payer: COMMERCIAL

## 2020-12-07 VITALS
BODY MASS INDEX: 48.82 KG/M2 | WEIGHT: 293 LBS | DIASTOLIC BLOOD PRESSURE: 82 MMHG | TEMPERATURE: 97.7 F | HEIGHT: 65 IN | SYSTOLIC BLOOD PRESSURE: 128 MMHG | HEART RATE: 82 BPM | OXYGEN SATURATION: 96 %

## 2020-12-07 DIAGNOSIS — E03.9 ACQUIRED HYPOTHYROIDISM: ICD-10-CM

## 2020-12-07 DIAGNOSIS — Z00.00 ROUTINE GENERAL MEDICAL EXAMINATION AT A HEALTH CARE FACILITY: Primary | ICD-10-CM

## 2020-12-07 DIAGNOSIS — K21.9 GASTROESOPHAGEAL REFLUX DISEASE WITHOUT ESOPHAGITIS: ICD-10-CM

## 2020-12-07 DIAGNOSIS — E66.01 MORBID OBESITY DUE TO EXCESS CALORIES (H): ICD-10-CM

## 2020-12-07 DIAGNOSIS — F20.9 SCHIZOPHRENIA, UNSPECIFIED TYPE (H): ICD-10-CM

## 2020-12-07 DIAGNOSIS — M16.0 PRIMARY OSTEOARTHRITIS OF BOTH HIPS: ICD-10-CM

## 2020-12-07 DIAGNOSIS — E78.5 HYPERLIPIDEMIA LDL GOAL <130: ICD-10-CM

## 2020-12-07 DIAGNOSIS — Z23 NEED FOR SHINGLES VACCINE: ICD-10-CM

## 2020-12-07 DIAGNOSIS — Z23 NEED FOR PROPHYLACTIC VACCINATION AND INOCULATION AGAINST INFLUENZA: ICD-10-CM

## 2020-12-07 DIAGNOSIS — Z23 NEED FOR PROPHYLACTIC VACCINATION WITH TETANUS-DIPHTHERIA (TD): ICD-10-CM

## 2020-12-07 DIAGNOSIS — I10 HYPERTENSION GOAL BP (BLOOD PRESSURE) < 140/90: ICD-10-CM

## 2020-12-07 PROCEDURE — 90714 TD VACC NO PRESV 7 YRS+ IM: CPT | Performed by: FAMILY MEDICINE

## 2020-12-07 PROCEDURE — 90750 HZV VACC RECOMBINANT IM: CPT | Performed by: FAMILY MEDICINE

## 2020-12-07 PROCEDURE — 99213 OFFICE O/P EST LOW 20 MIN: CPT | Mod: 25 | Performed by: FAMILY MEDICINE

## 2020-12-07 PROCEDURE — 90471 IMMUNIZATION ADMIN: CPT | Performed by: FAMILY MEDICINE

## 2020-12-07 PROCEDURE — 90682 RIV4 VACC RECOMBINANT DNA IM: CPT | Performed by: FAMILY MEDICINE

## 2020-12-07 PROCEDURE — 99396 PREV VISIT EST AGE 40-64: CPT | Mod: 25 | Performed by: FAMILY MEDICINE

## 2020-12-07 PROCEDURE — 90472 IMMUNIZATION ADMIN EACH ADD: CPT | Performed by: FAMILY MEDICINE

## 2020-12-07 RX ORDER — SIMVASTATIN 40 MG
40 TABLET ORAL AT BEDTIME
Qty: 90 TABLET | Refills: 3 | Status: SHIPPED | OUTPATIENT
Start: 2020-12-07 | End: 2021-04-13

## 2020-12-07 RX ORDER — METOPROLOL TARTRATE 25 MG/1
25 TABLET, FILM COATED ORAL 2 TIMES DAILY
Qty: 180 TABLET | Refills: 3 | Status: SHIPPED | OUTPATIENT
Start: 2020-12-07 | End: 2021-12-13

## 2020-12-07 RX ORDER — ESOMEPRAZOLE MAGNESIUM 40 MG/1
40 CAPSULE, DELAYED RELEASE ORAL 2 TIMES DAILY
Qty: 180 CAPSULE | Refills: 3 | Status: SHIPPED | OUTPATIENT
Start: 2020-12-07 | End: 2021-12-13

## 2020-12-07 RX ORDER — LEVOTHYROXINE SODIUM 150 UG/1
150 TABLET ORAL DAILY
Qty: 90 TABLET | Refills: 1 | Status: SHIPPED | OUTPATIENT
Start: 2020-12-07 | End: 2021-07-21

## 2020-12-07 RX ORDER — TRAMADOL HYDROCHLORIDE 50 MG/1
50-100 TABLET ORAL EVERY 6 HOURS PRN
Qty: 90 TABLET | Refills: 0 | Status: SHIPPED | OUTPATIENT
Start: 2020-12-07 | End: 2022-12-19

## 2020-12-07 ASSESSMENT — MIFFLIN-ST. JEOR: SCORE: 1999.27

## 2020-12-07 ASSESSMENT — PAIN SCALES - GENERAL: PAINLEVEL: NO PAIN (0)

## 2020-12-07 NOTE — NURSING NOTE
1.  Has the patient received the information for the Zostavax vaccine? YES    2.  Does the patient have any of the following contraindications?     Allergy to Neomycin or Gelatin? No       Current moderate or severe illness? No  Temp = 97.7  If temp > 99.0 degrees orally or patient has above allergies, vaccine is deferred    3.  The vaccine has been administered in the usual fashion and the patient was instructed to wait 15 minutes before leaving the building in the event of an allergic reaction: YES    Vaccination given by Serena Dejesus MA on 12/7/2020 at 3:19 PM.  Recorded by Serena Dejesus MA      Prior to immunization administration, verified patients identity using patient s name and date of birth. Please see Immunization Activity for additional information.     Screening Questionnaire for Adult Immunization    Are you sick today?   No   Do you have allergies to medications, food, a vaccine component or latex?   No   Have you ever had a serious reaction after receiving a vaccination?   No   Do you have a long-term health problem with heart, lung, kidney, or metabolic disease (e.g., diabetes), asthma, a blood disorder, no spleen, complement component deficiency, a cochlear implant, or a spinal fluid leak?  Are you on long-term aspirin therapy?   No   Do you have cancer, leukemia, HIV/AIDS, or any other immune system problem?   No   Do you have a parent, brother, or sister with an immune system problem?   No   In the past 3 months, have you taken medications that affect  your immune system, such as prednisone, other steroids, or anticancer drugs; drugs for the treatment of rheumatoid arthritis, Crohn s disease, or psoriasis; or have you had radiation treatments?   No   Have you had a seizure, or a brain or other nervous system problem?   No   During the past year, have you received a transfusion of blood or blood    products, or been given immune (gamma) globulin or antiviral drug?   No   For women: Are  you pregnant or is there a chance you could become       pregnant during the next month?   No   Have you received any vaccinations in the past 4 weeks?   No     Immunization questionnaire answers were all negative.        Per orders of Dr. Mccloud, injection of Shingrix, flu and TD given by Serena Dejesus MA. Patient instructed to remain in clinic for 15 minutes afterwards, and to report any adverse reaction to me immediately.       Screening performed by Serena Dejesus MA on 12/7/2020 at 3:18 PM.

## 2020-12-07 NOTE — PROGRESS NOTES
SUBJECTIVE:   CC: Geovanna Aguilar is an 60 year old woman who presents for preventive health visit.     Patient has been advised of split billing requirements and indicates understanding: Yes  Healthy Habits:    Do you get at least three servings of calcium containing foods daily (dairy, green leafy vegetables, etc.)? yes    Amount of exercise or daily activities, outside of work: 0 day(s) per week    Problems taking medications regularly No    Medication side effects: No    Have you had an eye exam in the past two years? yes    Do you see a dentist twice per year? no    Do you have sleep apnea, excessive snoring or daytime drowsiness?yes    GERD - stable with bid PPI. No new or worsening symptoms.    Hyperlipidemia Follow-Up      Are you regularly taking any medication or supplement to lower your cholesterol?   Yes- simvastatin    Are you having muscle aches or other side effects that you think could be caused by your cholesterol lowering medication?  No    Hypertension Follow-up      Do you check your blood pressure regularly outside of the clinic? Yes     Are you following a low salt diet? Yes    Are your blood pressures ever more than 140 on the top number (systolic) OR more   than 90 on the bottom number (diastolic), for example 140/90? No    Schizophrenia Followup    How are you doing with your mood since your last visit? No change    Are you having other symptoms that might be associated with depression? No    Have you had a significant life event?  No     Are you feeling anxious or having panic attacks?   No    Do you have any concerns with your use of alcohol or other drugs? No     Seeing psychiatry and medication are stable.    Social History     Tobacco Use     Smoking status: Passive Smoke Exposure - Never Smoker     Smokeless tobacco: Never Used     Tobacco comment: boyfriend smokes   Substance Use Topics     Alcohol use: Yes     Comment: rarely     Drug use: No     PHQ 9/12/2018   PHQ-9 Total  Score 15   Q9: Thoughts of better off dead/self-harm past 2 weeks Not at all     ALEXANDRO-7 SCORE 9/12/2018   Total Score 14       Suicide Assessment Five-step Evaluation and Treatment (SAFE-T)    Hypothyroidism Follow-up      Since last visit, patient describes the following symptoms: Weight stable, no hair loss, no skin changes, no constipation, no loose stools    Chronic Pain Follow-Up    Where in your body do you have pain? hips  How has your pain affected your ability to work? Not applicable  Which of these pain treatments have you tried since your last clinic visit? Activity or exercise and Rest  How well are you sleeping? Good  How has your mood been since your last visit? About the same  Have you had a significant life event? No  Other aggravating factors: prolonged walking and standing  Taking medication as directed? Yes    PHQ-9 SCORE 12/14/2012 7/28/2015 9/12/2018   PHQ-9 Total Score 2 13 -   PHQ-9 Total Score - - 15     ALEXANDRO-7 SCORE 9/12/2018   Total Score 14     No flowsheet data found.  Encounter-Level CSA:    There are no encounter-level csa.     Patient-Level CSA:    There are no patient-level csa.         Today's PHQ-2 Score:   PHQ-2 ( 1999 Pfizer) 12/7/2020 9/12/2018   Q1: Little interest or pleasure in doing things 0 3   Q2: Feeling down, depressed or hopeless 0 2   PHQ-2 Score 0 5       Abuse: Current or Past(Physical, Sexual or Emotional)- No  Do you feel safe in your environment? Yes    Have you ever done Advance Care Planning? (For example, a Health Directive, POLST, or a discussion with a medical provider or your loved ones about your wishes): No, advance care planning information given to patient to review.  Patient plans to discuss their wishes with loved ones or provider.      Social History     Tobacco Use     Smoking status: Passive Smoke Exposure - Never Smoker     Smokeless tobacco: Never Used     Tobacco comment: boyfriend smokes   Substance Use Topics     Alcohol use: Yes     Comment:  "rarely     If you drink alcohol do you typically have >3 drinks per day or >7 drinks per week? No                     Reviewed orders with patient.  Reviewed health maintenance and updated orders accordingly - Yes  Labs reviewed in [facility]    Mammogram Screening: Patient over age 50, mutual decision to screen reflected in health maintenance.    Pertinent mammograms are reviewed under the imaging tab.  History of abnormal Pap smear: Status post benign hysterectomy. Health Maintenance and Surgical History updated.  PAP / HPV 6/18/2013   PAP NIL     Reviewed and updated as needed this visit by clinical staff  Tobacco  Allergies  Meds  Problems  Med Hx  Surg Hx  Fam Hx          Reviewed and updated as needed this visit by Provider  Tobacco  Allergies  Meds  Problems  Med Hx  Surg Hx  Fam Hx             ROS:  10 point ROS of systems including Constitutional, Eyes, Respiratory, Cardiovascular, Gastroenterology, Genitourinary, Integumentary, Muscularskeletal, Psychiatric were all negative except for pertinent positives noted in my HPI.     OBJECTIVE:   /82   Pulse 82   Temp 97.7  F (36.5  C) (Oral)   Ht 1.651 m (5' 5\")   Wt 142.3 kg (313 lb 12.8 oz)   LMP  (LMP Unknown)   SpO2 96%   BMI 52.22 kg/m    EXAM:  GENERAL: healthy, alert, no distress and obese  EYES: Eyes grossly normal to inspection, PERRL and conjunctivae and sclerae normal  HENT: ear canals and TM's normal, nose and mouth without ulcers or lesions  NECK: no adenopathy, no asymmetry, masses, or scars and thyroid normal to palpation  RESP: lungs clear to auscultation - no rales, rhonchi or wheezes  CV: regular rate and rhythm, normal S1 S2, no S3 or S4, no murmur, click or rub, no peripheral edema and peripheral pulses strong  ABDOMEN: soft, nontender, no hepatosplenomegaly, no masses and bowel sounds normal  MS: no gross musculoskeletal defects noted, no edema  SKIN: no suspicious lesions or rashes  NEURO: Normal strength and tone, " mentation intact and speech normal  PSYCH: mentation appears normal and affect flat    Diagnostic Test Results:  Labs reviewed in Epic    ASSESSMENT/PLAN:   1. Routine general medical examination at a health care facility  Routine preventive discussed    2. Morbid obesity due to excess calories (H)  Low carb diet    3. Schizophrenia, unspecified type (H)  Continue per psychiatry    4. Hyperlipidemia LDL goal <130  Stable - reviewed labs and continue current treatment  - simvastatin (ZOCOR) 40 MG tablet; Take 1 tablet (40 mg) by mouth At Bedtime  Dispense: 90 tablet; Refill: 3    5. Gastroesophageal reflux disease without esophagitis  Stable - continue current treatment.  - esomeprazole (NEXIUM) 40 MG DR capsule; Take 1 capsule (40 mg) by mouth 2 times daily Take 30-60 minutes before eating.  Dispense: 180 capsule; Refill: 3    6. Hypertension goal BP (blood pressure) < 140/90  Controlled - continue current treatment.  - metoprolol tartrate (LOPRESSOR) 25 MG tablet; Take 1 tablet (25 mg) by mouth 2 times daily  Dispense: 180 tablet; Refill: 3    7. Acquired hypothyroidism  Stable - continue current treatment.  - levothyroxine (SYNTHROID) 150 MCG tablet; Take 1 tablet (150 mcg) by mouth daily  Dispense: 90 tablet; Refill: 1    8. Primary osteoarthritis of both hips  Stable - continue current very sparing use.  - traMADol (ULTRAM) 50 MG tablet; Take 1-2 tablets ( mg) by mouth every 6 hours as needed for severe pain Needs to be seen for more.  Dispense: 90 tablet; Refill: 0    9. Need for prophylactic vaccination and inoculation against influenza    - C RIV4 (FLUBLOK) VACCINE RECOMBINANT DNA PRSRV ANTIBIO FREE, IM [73966]  - ADMIN 1st VACCINE    10. Need for prophylactic vaccination with tetanus-diphtheria (Td)    - EA ADD'L VACCINE  - TD PRESERV FREE, IM (7+ YRS)    11. Need for shingles vaccine    - EA ADD'L VACCINE  - ZOSTER VACCINE RECOMBINANT ADJUVANTED IM NJX    The uses and side effects, including black box  "warnings as appropriate, were discussed in detail.  All patient questions were answered.  The patient was instructed to call immediately if any side effects developed.     Patient has been advised of split billing requirements and indicates understanding: Yes  COUNSELING:   Reviewed preventive health counseling, as reflected in patient instructions    Estimated body mass index is 52.22 kg/m  as calculated from the following:    Height as of this encounter: 1.651 m (5' 5\").    Weight as of this encounter: 142.3 kg (313 lb 12.8 oz).    Weight management plan: Discussed healthy diet and exercise guidelines    She reports that she is a non-smoker but has been exposed to tobacco smoke. She has been exposed to 0.00 packs per day for the past 0.00 years. She has never used smokeless tobacco.      Counseling Resources:  ATP IV Guidelines  Pooled Cohorts Equation Calculator  Breast Cancer Risk Calculator  BRCA-Related Cancer Risk Assessment: FHS-7 Tool  FRAX Risk Assessment  ICSI Preventive Guidelines  Dietary Guidelines for Americans, 2010  USDA's MyPlate  ASA Prophylaxis  Lung CA Screening    Leah Rhoades MD  [facility]  "

## 2020-12-07 NOTE — PATIENT INSTRUCTIONS
At [facility], we strive to deliver an exceptional experience to you, every time we see you. If you receive a survey, please complete it as we do value your feedback.  If you have MyChart, you can expect to receive results automatically within 24 hours of their completion.  Your provider will send a note interpreting your results as well.   If you do not have MyChart, you should receive your results in about a week by mail.    Your care team:                            Family Medicine Internal Medicine   MD Jesus Mendez, MD Octavio Foster MD Katya Georgiev PA-C  Noemi Haddad, APRN CNP    Mauricio Brown, MD Pediatrics   Raad Suero, PADustinC  Gely Tyson, CNP MD Shanda Ibarra APRN CNP   MD Chantal Lamas MD Deborah Mielke, MD Sapna Rehman, APRN CNP  Helene Ceron, PARAFAEL Galan, CNP  MD Martine Pierre MD Angela Wermerskirchen, MD      Clinic hours: Monday - Thursday 7 am-7 pm; Fridays 7 am-5 pm.   Urgent care: Monday - Friday 11 am-9 pm; Saturday and Sunday 9 am-5 pm.    Clinic: [phone]       [facility]: Monday - Thursday 8 am - 7 pm; Friday 8 am - 6 pm  [facility]: [phone]     Use www.[email or web address].org for 24/7 diagnosis and treatment of dozens of conditions.    Preventive Health Recommendations  Female Ages 50 - 64    Yearly exam: See your health care provider every year in order to  o Review health changes.   o Discuss preventive care.    o Review your medicines if your doctor has prescribed any.      Get a Pap test every three years (unless you have an abnormal result and your provider advises testing more often).    If you get Pap tests with HPV test, you only need to test every 5 years, unless you have an abnormal result.     You do not need a Pap test if your uterus was removed (hysterectomy) and you have  not had cancer.    You should be tested each year for STDs (sexually transmitted diseases) if you're at risk.     Have a mammogram every 1 to 2 years.    Have a colonoscopy at age 50, or have a yearly FIT test (stool test). These exams screen for colon cancer.      Have a cholesterol test every 5 years, or more often if advised.    Have a diabetes test (fasting glucose) every three years. If you are at risk for diabetes, you should have this test more often.     If you are at risk for osteoporosis (brittle bone disease), think about having a bone density scan (DEXA).    Shots: Get a flu shot each year. Get a tetanus shot every 10 years.    Nutrition:     Eat at least 5 servings of fruits and vegetables each day.    Eat whole-grain bread, whole-wheat pasta and brown rice instead of white grains and rice.    Get adequate Calcium and Vitamin D.     Lifestyle    Exercise at least 150 minutes a week (30 minutes a day, 5 days a week). This will help you control your weight and prevent disease.    Limit alcohol to one drink per day.    No smoking.     Wear sunscreen to prevent skin cancer.     See your dentist every six months for an exam and cleaning.    See your eye doctor every 1 to 2 years.

## 2021-02-01 ENCOUNTER — OFFICE VISIT (OUTPATIENT)
Dept: FAMILY MEDICINE | Facility: CLINIC | Age: 61
End: 2021-02-01
Payer: COMMERCIAL

## 2021-02-01 VITALS
TEMPERATURE: 98.6 F | OXYGEN SATURATION: 94 % | HEART RATE: 78 BPM | SYSTOLIC BLOOD PRESSURE: 137 MMHG | DIASTOLIC BLOOD PRESSURE: 85 MMHG | WEIGHT: 293 LBS | BODY MASS INDEX: 51.29 KG/M2

## 2021-02-01 DIAGNOSIS — E66.01 MORBID OBESITY DUE TO EXCESS CALORIES (H): ICD-10-CM

## 2021-02-01 DIAGNOSIS — N93.9 VAGINAL BLEEDING: Primary | ICD-10-CM

## 2021-02-01 DIAGNOSIS — B37.31 CANDIDIASIS OF VAGINA: ICD-10-CM

## 2021-02-01 DIAGNOSIS — L08.1 ERYTHRASMA: ICD-10-CM

## 2021-02-01 DIAGNOSIS — F20.9 SCHIZOPHRENIA, UNSPECIFIED TYPE (H): ICD-10-CM

## 2021-02-01 PROCEDURE — 99214 OFFICE O/P EST MOD 30 MIN: CPT | Performed by: FAMILY MEDICINE

## 2021-02-01 RX ORDER — FLUCONAZOLE 150 MG/1
150 TABLET ORAL WEEKLY
Qty: 4 TABLET | Refills: 0 | Status: SHIPPED | OUTPATIENT
Start: 2021-02-01 | End: 2021-12-13

## 2021-02-01 RX ORDER — OMEGA-3 FATTY ACIDS/FISH OIL 300-1000MG
200 CAPSULE ORAL EVERY 4 HOURS PRN
COMMUNITY

## 2021-02-01 RX ORDER — CLARITHROMYCIN 500 MG
1000 TABLET ORAL ONCE
Qty: 2 TABLET | Refills: 0 | Status: SHIPPED | OUTPATIENT
Start: 2021-02-01 | End: 2021-02-01

## 2021-02-01 ASSESSMENT — PAIN SCALES - GENERAL: PAINLEVEL: NO PAIN (0)

## 2021-02-01 NOTE — PROGRESS NOTES
Assessment & Plan     Vaginal bleeding  Suspect related to bacterial and yeast infections - treat as below    Candidiasis of vagina  Oral treatment   - fluconazole (DIFLUCAN) 150 MG tablet; Take 1 tablet (150 mg) by mouth once a week for 4 doses    Erythrasma  Oral treatment  - clarithromycin (BIAXIN) 500 MG tablet; Take 2 tablets (1,000 mg) by mouth once for 1 dose    Schizophrenia, unspecified type (H)  Continue current treatment    Morbid obesity due to excess calories (H)  Low carb diet, consider bariatric management as above conditions likely complicated by obesity and hip issues.                The uses and side effects, including black box warnings as appropriate, were discussed in detail.  All patient questions were answered.  The patient was instructed to call immediately if any side effects developed.     Return in about 2 weeks (around 2/15/2021), or if symptoms worsen or fail to improve.    Leah Rhoades MD  [facility] is a 60 year old who presents to clinic today for the following health issues     HPI       Genitourinary - Female  Onset/Duration:  1 day   Description:   Painful urination (Dysuria): no           Frequency: no  Blood in urine (Hematuria): no  Delay in urine (Hesitency): no  Intensity: 0/10  Progression of Symptoms:  intermittent  Accompanying Signs & Symptoms:  Fever/chills: no  Flank pain: no  Nausea and vomiting: no  Vaginal symptoms: abnormal vaginal bleeding  Abdominal/Pelvic Pain: no  History:   History of frequent UTI s: no  History of kidney stones: no  Sexually Active: no  Possibility of pregnancy: No  Precipitating or alleviating factors: None  Therapies tried and outcome:  none         Review of Systems   Constitutional, HEENT, cardiovascular, pulmonary, gi and gu systems are negative, except as otherwise noted.      Objective    /85   Pulse 78   Temp 98.6  F (37  C)   Wt 139.8 kg (308 lb 3.2 oz)    LMP  (LMP Unknown)   SpO2 94%   Breastfeeding No   BMI 51.29 kg/m    Body mass index is 51.29 kg/m .  Physical Exam   GENERAL: healthy, alert, no distress and obese  NECK: no adenopathy, no asymmetry, masses, or scars and thyroid normal to palpation  RESP: lungs clear to auscultation - no rales, rhonchi or wheezes  CV: regular rate and rhythm, normal S1 S2, no S3 or S4, no murmur, click or rub, no peripheral edema and peripheral pulses strong  ABDOMEN: soft, nontender, no hepatosplenomegaly, no masses and bowel sounds normal   (female): normal female external genitalia, normal urethral meatus  and vaginal skin findings: thick white discharge, erythema of labia majora and vaginal mucosa  SKIN: erythema with raised border on bilateral thighs.

## 2021-02-01 NOTE — PATIENT INSTRUCTIONS
At [facility], we strive to deliver an exceptional experience to you, every time we see you. If you receive a survey, please complete it as we do value your feedback.  If you have MyChart, you can expect to receive results automatically within 24 hours of their completion.  Your provider will send a note interpreting your results as well.   If you do not have MyChart, you should receive your results in about a week by mail.    Your care team:                            Family Medicine Internal Medicine   MD Jesus Mendez MD Shantel Branch-Fleming, MD Srinivasa Vaka, MD Katya Belousova, PARAFAEL Haddad, APRN CNP    Mauricio Brown, MD Pediatrics   Raad Suero, PARAFAEL Tyson, CNP MD Shanda Ibarra APRN CNP   MD Chantal Lamas MD Deborah Mielke, MD Sapna Rehman, APRN [facility]      Clinic hours: Monday - Thursday 7 am-6 pm; Fridays 7 am-5 pm.   Urgent care: Monday - Friday 11 am-9 pm; Saturday and Sunday 9 am-5 pm.    Clinic: [phone]       [facility]: Monday - Thursday 8 am - 7 pm; Friday 8 am - 6 pm  [facility]: [phone]     Use [email or web address] for 24/7 diagnosis and treatment of dozens of conditions.

## 2021-02-22 ENCOUNTER — TELEPHONE (OUTPATIENT)
Dept: FAMILY MEDICINE | Facility: CLINIC | Age: 61
End: 2021-02-22

## 2021-02-22 DIAGNOSIS — K21.9 GASTROESOPHAGEAL REFLUX DISEASE WITHOUT ESOPHAGITIS: ICD-10-CM

## 2021-02-22 RX ORDER — ESOMEPRAZOLE MAGNESIUM 40 MG/1
40 CAPSULE, DELAYED RELEASE ORAL 2 TIMES DAILY
Qty: 180 CAPSULE | Refills: 3 | Status: CANCELLED | OUTPATIENT
Start: 2021-02-22

## 2021-02-22 NOTE — TELEPHONE ENCOUNTER
Patient calling she received a letter from her insurance that her generic nexium will  on 3/24/21. Patient will need a new prescription sent in and a PA done. Patient usually have to do a PA on this medication every 6 months. Please write a prescription for 90 day supply to her mail order pharmacy and do the PA.  If there is any questions please call patient.  Jerson Bell,  For 1st Floor Primary Care

## 2021-03-18 NOTE — TELEPHONE ENCOUNTER
PA Initiation    Medication: esomeprazole (NEXIUM) 40 MG DR capsule  Insurance Company: [employer] - Phone [phone] Fax [phone]  Pharmacy Filling the Rx:  PHARMACY - [place], [place] - [address] AT [address] TO [facility] [address]  Filling Pharmacy Phone: [phone]  Filling Pharmacy Fax: [phone]  Start Date: 3/18/2021

## 2021-03-18 NOTE — TELEPHONE ENCOUNTER
Prior Authorization Approval    Authorization Effective Date: 2/16/2021  Authorization Expiration Date: 3/18/2022  Medication: esomeprazole (NEXIUM) 40 MG DR capsule  Approved Dose/Quantity:   Reference #: [id number]   Insurance Company: [employer] - Phone [phone] Fax [phone]  Expected CoPay:       CoPay Card Available:      Foundation Assistance Needed:    Which Pharmacy is filling the prescription (Not needed for infusion/clinic administered): [facility] - [place], [place] - [address] AT [address] TO [facility]  Pharmacy Notified: Yes  Patient Notified: Yes, **Instructed pharmacy to notify patient when script is ready to /ship.**

## 2021-04-13 ENCOUNTER — NURSE TRIAGE (OUTPATIENT)
Dept: NURSING | Facility: CLINIC | Age: 61
End: 2021-04-13

## 2021-04-13 DIAGNOSIS — E78.5 HYPERLIPIDEMIA LDL GOAL <130: ICD-10-CM

## 2021-04-13 RX ORDER — SIMVASTATIN 40 MG
40 TABLET ORAL AT BEDTIME
Qty: 90 TABLET | Refills: 2 | Status: SHIPPED | OUTPATIENT
Start: 2021-04-13 | End: 2021-12-13

## 2021-04-13 NOTE — TELEPHONE ENCOUNTER
"Patient is requesting 3 month refill of Simvastin sent to [facility] in [place]. Patient [facility] [place] [facility].    Reordered Simvastatin to [facility] per e-prescribe. Last prescribed 12/7/20. Patient has current refills.     simvastatin (ZOCOR) 40 MG tablet 90 tablet 3 12/7/2020  No   Sig - Route: Take 1 tablet (40 mg) by mouth At Bedtime - Oral   Sent to pharmacy as: Simvastatin 40 MG Oral Tablet (ZOCOR)   Class: E-Prescribe   Order: [id number]   E-Prescribing Status: Receipt confirmed by pharmacy (12/7/2020  2:43 PM CST)       Faina Marmolejo RN  [facility]      Additional Information    Negative: Drug overdose and nurse unable to answer question    Negative: Caller requesting information not related to medicine    Negative: Caller requesting a prescription for Strep throat and has a positive culture result    Negative: Rash while taking a medication or within 3 days of stopping it    Negative: Immunization reaction suspected    Negative: [1] Asthma AND [2] having symptoms of asthma (cough, wheezing, etc)    Negative: MORE THAN A DOUBLE DOSE of a prescription or over-the-counter (OTC) drug    Negative: [1] DOUBLE DOSE (an extra dose or lesser amount) of over-the-counter (OTC) drug AND [2] any symptoms (e.g., dizziness, nausea, pain, sleepiness)    Negative: [1] DOUBLE DOSE (an extra dose or lesser amount) of prescription drug AND [2] any symptoms (e.g., dizziness, nausea, pain, sleepiness)    Negative: Took another person's prescription drug    Negative: [1] DOUBLE DOSE (an extra dose or lesser amount) of prescription drug AND [2] NO symptoms (Exception: a double dose of antibiotics)    Negative: Diabetes drug error or overdose (e.g., insulin or extra dose)    Negative: [1] Request for URGENT new prescription or refill of \"essential\" medication (i.e., likelihood of harm to patient if not taken) AND [2] triager unable to fill per unit policy    Negative: [1] " Prescription not at pharmacy AND [2] was prescribed today by PCP    Negative: Pharmacy calling with prescription questions and triager unable to answer question    Negative: Caller has urgent medication question about med that PCP prescribed and triager unable to answer question    Negative: Caller has NON-URGENT medication question about med that PCP prescribed and triager unable to answer question    Negative: Caller requesting a NON-URGENT new prescription or refill and triager unable to refill per unit policy    Negative: Caller has medication question about med not prescribed by PCP and triager unable to answer question (e.g., compatibility with other med, storage)    Negative: [1] DOUBLE DOSE (an extra dose or lesser amount) of over-the-counter (OTC) drug AND [2] NO symptoms    Negative: [1] DOUBLE DOSE (an extra dose or lesser amount) of antibiotic drug AND [2] NO symptoms    Caller has medication question only, adult not sick, and triager answers question    Protocols used: MEDICATION QUESTION CALL-A-

## 2021-06-10 ENCOUNTER — TRANSFERRED RECORDS (OUTPATIENT)
Dept: HEALTH INFORMATION MANAGEMENT | Facility: CLINIC | Age: 61
End: 2021-06-10

## 2021-06-30 ENCOUNTER — OFFICE VISIT (OUTPATIENT)
Dept: FAMILY MEDICINE | Facility: CLINIC | Age: 61
End: 2021-06-30
Payer: COMMERCIAL

## 2021-06-30 VITALS
WEIGHT: 293 LBS | TEMPERATURE: 98.7 F | BODY MASS INDEX: 48.82 KG/M2 | OXYGEN SATURATION: 96 % | SYSTOLIC BLOOD PRESSURE: 124 MMHG | RESPIRATION RATE: 20 BRPM | DIASTOLIC BLOOD PRESSURE: 60 MMHG | HEART RATE: 79 BPM | HEIGHT: 65 IN

## 2021-06-30 DIAGNOSIS — K59.1 FUNCTIONAL DIARRHEA: Primary | ICD-10-CM

## 2021-06-30 DIAGNOSIS — Z23 NEED FOR SHINGLES VACCINE: ICD-10-CM

## 2021-06-30 DIAGNOSIS — Z12.11 SCREEN FOR COLON CANCER: ICD-10-CM

## 2021-06-30 PROCEDURE — 99213 OFFICE O/P EST LOW 20 MIN: CPT | Mod: 25 | Performed by: FAMILY MEDICINE

## 2021-06-30 PROCEDURE — 90471 IMMUNIZATION ADMIN: CPT | Performed by: FAMILY MEDICINE

## 2021-06-30 PROCEDURE — 90750 HZV VACC RECOMBINANT IM: CPT | Performed by: FAMILY MEDICINE

## 2021-06-30 ASSESSMENT — PAIN SCALES - GENERAL: PAINLEVEL: NO PAIN (0)

## 2021-06-30 ASSESSMENT — MIFFLIN-ST. JEOR: SCORE: 1986.1

## 2021-06-30 NOTE — PATIENT INSTRUCTIONS
Try an intestinal probiotic pill daily on an empty stomach (culturelle is an example).At [facility], we strive to deliver an exceptional experience to you, every time we see you. If you receive a survey, please complete it as we do value your feedback.  If you have MyChart, you can expect to receive results automatically within 24 hours of their completion.  Your provider will send a note interpreting your results as well.   If you do not have MyChart, you should receive your results in about a week by mail.    Your care team:                            Family Medicine Internal Medicine   MD Jesus Mendez MD Shantel Branch-Fleming, MD Octavio Celestin, MD Amy Wyatt, PARAFAEL Haddad, APRN CNP    Mauricio Brown, MD Pediatrics   Raad Suero, RHIANNON Tyson, MD Shanda Pearson APRN CNP   MD Chantal Lamas MD Deborah Mielke, MD Kim Thein, APRN [facility]      Clinic hours: Monday - Thursday 7 am-6 pm; Fridays 7 am-5 pm.   Urgent care: Monday - Friday 10 am- 8 pm; Saturday and Sunday 9 am-5 pm.    Clinic: [phone]       [facility]: Monday - Thursday 8 am - 7 pm; Friday 8 am - 6 pm  [facility]: [phone]     Use [email or web address] for 24/7 diagnosis and treatment of dozens of conditions.

## 2021-06-30 NOTE — NURSING NOTE
Prior to immunization administration, verified patients identity using patient s name and date of birth. Please see Immunization Activity for additional information.     Screening Questionnaire for Adult Immunization    Are you sick today?   No   Do you have allergies to medications, food, a vaccine component or latex?   No   Have you ever had a serious reaction after receiving a vaccination?   No   Do you have a long-term health problem with heart, lung, kidney, or metabolic disease (e.g., diabetes), asthma, a blood disorder, no spleen, complement component deficiency, a cochlear implant, or a spinal fluid leak?  Are you on long-term aspirin therapy?   No   Do you have cancer, leukemia, HIV/AIDS, or any other immune system problem?   No   Do you have a parent, brother, or sister with an immune system problem?   No   In the past 3 months, have you taken medications that affect  your immune system, such as prednisone, other steroids, or anticancer drugs; drugs for the treatment of rheumatoid arthritis, Crohn s disease, or psoriasis; or have you had radiation treatments?   No   Have you had a seizure, or a brain or other nervous system problem?   No   During the past year, have you received a transfusion of blood or blood    products, or been given immune (gamma) globulin or antiviral drug?   No   For women: Are you pregnant or is there a chance you could become       pregnant during the next month?   No   Have you received any vaccinations in the past 4 weeks?   No     Immunization questionnaire answers were all negative.        Per orders of Dr. Lynn, injection of shingrix given by Ailyn Howard MA. Patient instructed to remain in clinic for 15 minutes afterwards, and to report any adverse reaction to me immediately.       Screening performed by Ailyn Howard MA on 6/30/2021 at 6:14 PM.
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
## 2021-07-01 ENCOUNTER — OFFICE VISIT (OUTPATIENT)
Dept: OPTOMETRY | Facility: CLINIC | Age: 61
End: 2021-07-01
Payer: COMMERCIAL

## 2021-07-01 DIAGNOSIS — H26.8 OTHER CATARACT OF LEFT EYE: Primary | ICD-10-CM

## 2021-07-01 PROCEDURE — 92012 INTRM OPH EXAM EST PATIENT: CPT | Performed by: OPTOMETRIST

## 2021-07-01 ASSESSMENT — EXTERNAL EXAM - RIGHT EYE: OD_EXAM: NORMAL

## 2021-07-01 ASSESSMENT — REFRACTION_WEARINGRX
OS_SPHERE: -5.00
OD_CYLINDER: +3.00
OD_SPHERE: -6.00
OD_AXIS: 175
OS_ADD: +2.50
OD_ADD: +2.50
OS_AXIS: 004
OS_CYLINDER: +0.25

## 2021-07-01 ASSESSMENT — VISUAL ACUITY
OD_CC+: -2
METHOD: SNELLEN - LINEAR
OS_CC: HM
OS_PH_CC: HM
OD_CC: 20/40
CORRECTION_TYPE: GLASSES

## 2021-07-01 ASSESSMENT — EXTERNAL EXAM - LEFT EYE: OS_EXAM: NORMAL

## 2021-07-01 ASSESSMENT — CUP TO DISC RATIO: OD_RATIO: 0.35

## 2021-07-01 ASSESSMENT — SLIT LAMP EXAM - LIDS
COMMENTS: NORMAL
COMMENTS: NORMAL

## 2021-07-01 ASSESSMENT — TONOMETRY
IOP_METHOD: TONOPEN
OD_IOP_MMHG: 19
OS_IOP_MMHG: 15

## 2021-07-01 NOTE — PROGRESS NOTES
Chief Complaint   Patient presents with     Blurred Vision Evaluation     Blurred Vision Evaluation   HPI    Blurred Vision Evaluation      Laterality:  left eye     Onset:  gradual     Onset:  2 months ago     Quality:  hazy     Severity:  moderate     Frequency:  constantly     Course:  gradually worsening       No history of steroid use or trauma.    Was sent to specialist on [phone] many years ago to look inside eye.  Epic records found in media [facility] 4/28/2011 and Vitreoretinal Surgery - 5-5-11.  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      Medical, surgical and family histories reviewed and updated 7/1/2021.       OBJECTIVE: See Ophthalmology exam    ASSESSMENT:    ICD-10-CM    1. Other cataract of left eye  H26.8 EYE ADULT REFERRAL    White cataract- dense and visually significant      PLAN:     Patient Instructions   Referral to [facility] ophthalmology for cataract evaluation left eye.    Jesse Friedman, OD

## 2021-07-01 NOTE — PATIENT INSTRUCTIONS
Referral to [facility] ophthalmology for cataract evaluation left eye.    Jesse Friedman, OD

## 2021-07-01 NOTE — LETTER
7/1/2021         RE: Geovanna Aguilar  [address] [place] [address]        Dear Colleague,    Thank you for referring your patient, Geovanna Aguilar, to the [facility]. Please see a copy of my visit note below.    Chief Complaint   Patient presents with     Blurred Vision Evaluation     Blurred Vision Evaluation   HPI    Blurred Vision Evaluation      Laterality:  left eye     Onset:  gradual     Onset:  2 months ago     Quality:  hazy     Severity:  moderate     Frequency:  constantly     Course:  gradually worsening       No history of steroid use or trauma.    Was sent to specialist on [phone] many years ago to look inside eye.  Epic records found in media tab- [facility] 4/28/2011 and Vitreoretinal Surgery - 5-5-11.  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      Medical, surgical and family histories reviewed and updated 7/1/2021.       OBJECTIVE: See Ophthalmology exam    ASSESSMENT:    ICD-10-CM    1. Other cataract of left eye  H26.8 EYE ADULT REFERRAL    White cataract- dense and visually significant      PLAN:     Patient Instructions   Referral to [facility] ophthalmology for cataract evaluation left eye.    Jesse Friedman, GABI           Again, thank you for allowing me to participate in the care of your patient.        Sincerely,        Jesse Friedman, OD

## 2021-07-02 ENCOUNTER — TELEPHONE (OUTPATIENT)
Dept: NURSING | Facility: CLINIC | Age: 61
End: 2021-07-02

## 2021-07-02 NOTE — TELEPHONE ENCOUNTER
Pt went in to get the Shingles vaccine. She was told that she does not have to wait 2 weeks to get her 1st Covid vaccine.  I confirmed this information.    Pt would like to schedule an appointment for her 1st Covid vaccine. She wants the Pfizer vaccine.    Pt recently saw Dr Singh, on 6/30/2021.    Pt requesting that Dr Singh place the order for the pt to receive the Pfizer vaccines.  Message sent to Dr Singh.  Pt advised that scheduling will call her to set up this appointment, once the order has been placed.  Neli Sanders RN 07/02/21 10:37 AM  [facility] Nurse Advisor

## 2021-07-05 ENCOUNTER — NURSE TRIAGE (OUTPATIENT)
Dept: NURSING | Facility: CLINIC | Age: 61
End: 2021-07-05

## 2021-07-06 ENCOUNTER — IMMUNIZATION (OUTPATIENT)
Dept: NURSING | Facility: CLINIC | Age: 61
End: 2021-07-06
Payer: COMMERCIAL

## 2021-07-06 ENCOUNTER — OFFICE VISIT (OUTPATIENT)
Dept: FAMILY MEDICINE | Facility: CLINIC | Age: 61
End: 2021-07-06
Payer: COMMERCIAL

## 2021-07-06 VITALS
HEART RATE: 88 BPM | DIASTOLIC BLOOD PRESSURE: 78 MMHG | BODY MASS INDEX: 51.84 KG/M2 | TEMPERATURE: 98.2 F | SYSTOLIC BLOOD PRESSURE: 136 MMHG | WEIGHT: 293 LBS | OXYGEN SATURATION: 97 %

## 2021-07-06 DIAGNOSIS — T63.484A ALLERGIC REACTION TO INSECT STING, UNDETERMINED INTENT, INITIAL ENCOUNTER: Primary | ICD-10-CM

## 2021-07-06 PROCEDURE — 99213 OFFICE O/P EST LOW 20 MIN: CPT | Performed by: FAMILY MEDICINE

## 2021-07-06 PROCEDURE — 91300 PR COVID VAC PFIZER DIL RECON 30 MCG/0.3 ML IM: CPT

## 2021-07-06 PROCEDURE — 0001A PR COVID VAC PFIZER DIL RECON 30 MCG/0.3 ML IM: CPT

## 2021-07-06 RX ORDER — TRIAMCINOLONE ACETONIDE 1 MG/G
CREAM TOPICAL 2 TIMES DAILY
Qty: 453.6 G | Refills: 0 | Status: SHIPPED | OUTPATIENT
Start: 2021-07-06 | End: 2022-12-13

## 2021-07-06 RX ORDER — CEPHALEXIN 500 MG/1
500 CAPSULE ORAL 2 TIMES DAILY
Qty: 14 CAPSULE | Refills: 0 | Status: SHIPPED | OUTPATIENT
Start: 2021-07-06 | End: 2021-07-13

## 2021-07-06 NOTE — TELEPHONE ENCOUNTER
Triage Call:  Patient was stung by a wasp on thursday on her left wrist. 2 inches of redness where she was stung. Red streak about 2.5 inches. Itchy. Put hydrogen peroxide. Took tylenol. Temp 97.3. Not warm to touch. No pain. Per protocol guidelines patient was informed that the on call provider will be notified and decide whether she should be seen in the ED tonight or be seen in clinic tomorrow. Patient stated she would prefer to be seen in clinic tomorrow and wanted to try benadryl.     Paging Melvin Briggs 8:44pm and at 8:57pm and 9:07pm and [phone] with no response.     Paging secondary provider Dr Hardy: Per MD Try benadryl, and be seen in clinic tomorrow or urgent care. If worsening redness or develops pain go into ER.    Patient was called back and given Dr Hardy's advisement. Patient stated understanding and was connected to scheduling to make an appointment.     COVID 19 Nurse Triage Plan/Patient Instructions    Please be aware that novel coronavirus (COVID-19) may be circulating in the community. If you develop symptoms such as fever, cough, or SOB or if you have concerns about the presence of another infection including coronavirus (COVID-19), please contact your health care provider or visit [email or web address].     Disposition/Instructions    In-Person Visit with provider recommended. Reference Visit Selection Guide.    Thank you for taking steps to prevent the spread of this virus.  o Limit your contact with others.  o Wear a simple mask to cover your cough.  o Wash your hands well and often.    Resources    [facility]: About COVID-19: [email or web address]    CDC: What to Do If You're Sick: www.cdc.gov/coronavirus/2019-ncov/about/steps-when-sick.html    CDC: Ending Home Isolation: www.cdc.gov/coronavirus/2019-ncov/hcp/disposition-in-home-patients.html     CDC: Caring for Someone: www.cdc.gov/coronavirus/2019-ncov/if-you-are-sick/care-for-someone.html     [facility]: Interim Guidance for  Hospital Discharge to Home: www.health.[facility].[place]/diseases/coronavirus/hcp/hospdischarge.pdf    [facility] clinical trials (COVID-19 research studies): [email or web address].[facility]/umn-clinical-trials     Below are the COVID-19 hotlines at the [place] Department of Health ([facility]). Interpreters are available.   o For health questions: Call 224-910-3559 or 1-710.225.9129 (7 a.m. to 7 p.m.)  o For questions about schools and childcare: Call 061-540-6870 or 1-690.472.5851 (7 a.m. to 7 p.m.)     Raissa Guerra RN Nursing Advisor 7/5/2021 9:42 PM     Reason for Disposition    [1] Red streak or red line AND [2] length > 2 inches (5 cm)    Additional Information    Negative: [1] Life-threatening reaction (anaphylaxis) in the past to sting AND [2] < 2 hours since sting    Negative: Attacked by swarm of bees now    Negative: Passed out (i.e., lost consciousness, collapsed and was not responding)    Negative: Wheezing, stridor, or difficulty breathing    Negative: [1] Hoarseness or cough AND [2] sudden onset following sting    Negative: [1] Tightness in the throat or chest AND [2] sudden onset following sting    Negative: [1] Swollen tongue or difficulty swallowing AND [2] sudden onset following sting    Negative: Sounds like a life-threatening emergency to the triager    Negative: Not a bee, wasp, hornet, or yellow jacket sting    Negative: Ring stuck on swollen finger (or toe) following bee sting    Negative: [1] Hives, itching, or swelling elsewhere on body (i.e., not a site of sting) AND [2] started within 2 hours of sting (Exception: only at site of sting)    Negative: [1] Vomiting or abdominal cramps AND [2] started within 2 hours of sting    Negative: [1] Gave epinephrine shot AND [2] no symptoms now    Negative: Sting inside the mouth    Negative: Sting on eyeball (e.g., cornea)    Negative: Patient sounds very sick or weak to the triager    Negative: More than 50 stings    Negative: [1] Fever AND [2] red  area    Negative: [1] Fever AND [2] area is very tender to touch    Protocols used: BEE OR YELLOW JACKET STING-A-AH

## 2021-07-06 NOTE — PROGRESS NOTES
Assessment & Plan       ICD-10-CM    1. Allergic reaction to insect sting, undetermined intent, initial encounter  T63.484A cephALEXin (KEFLEX) 500 MG capsule     triamcinolone (KENALOG) 0.1 % external cream         Review of external notes as documented elsewhere in note         Patient Instructions     [facility]   [address] [facility]   [place] [id number]   Phone: [phone]   ophtho referral information above         Patient Education     Insect Sting Allergy, Generalized  You are having an allergic reaction to an insect sting. This may occur after a sting by a wasp, honeybee, yellow jacket, fire ant, or other insect. This may cause an itchy rash and swelling in the face or other parts of the body. A more severe reaction may cause you to feel dizzy, faint, or have trouble breathing or swallowing. Other warning signs are listed below.   Symptoms can include:    Rash, hives, redness, welts, or blisters in places other than the sting site    Itching, burning, stinging, pain in places other than the sting site    Dry, flaky, cracking, scaly skin    Swelling in places other than the sting site     Stomach pain or cramps  More severe symptoms are:    Swelling of the face or lips or drooling    Trouble swallowing, feeling like your throat is closing    Trouble breathing, wheezing    Dizziness or a sudden drop in blood pressure    Hoarse voice or trouble speaking    Severe nausea, vomiting, or diarrhea    Feeling faint or lightheaded    Rapid heart rate  Home care  Medicine  The healthcare provider may prescribe medicines to ease swelling, itching, and pain. Follow the provider s instructions when taking these medicines.     If you had a severe reaction, the provider may prescribe an injectable epinephrine kit. Epinephrine will stop the progression of an allergic reaction. Before you leave the hospital, be sure that you know when and how to use this medicine.    Oral diphenhydramine is an  over-the-counter antihistamine available at pharmacies and grocery stores. Unless a prescription antihistamine was given, diphenhydramine may be used to reduce itching if large areas of the skin are involved. It may make you sleepy. So be careful using it in the daytime or when going to school, working, or driving. Note: Don t use diphenhydramine if you have glaucoma or if you are a man with trouble urinating due to an enlarged prostate. There are other antihistamines that cause less drowsiness and are good choices for daytime use. Ask your healthcare provider or pharmacist for suggestions.    Don t use diphenhydramine cream on your skin. It can cause a further reaction in some people.    Calamine lotion or oatmeal baths sometimes help with itching.    You may use acetaminophen or ibuprofen to control pain, unless another pain medicine was prescribed. Note: If you have chronic liver or kidney disease or ever had a stomach ulcer or gastrointestinal bleeding, talk with your provider before using these medicines.  General care    Don't wear tight clothing. And stay away from things that heat up your skin (such as hot showers or baths, or direct sunlight). Heat makes the itching worse.   An ice pack will relieve local areas of intense itching and redness. Apply 5 to 10 minutes. To make an ice pack, put ice cubes in a plastic bag that seals at the top. Wrap the bag in a clean, thin towel or cloth. Don t put ice directly on the skin.   Stings  Wasps, yellow jackets, and hornets don t leave a stinger behind. But if a honeybee stings you, a stinger may stay in your skin. The stinger of a honeybee releases a substance that will attract other bees to you. So try to move away from the nest right away. Once you are away from the nest, then take out the stinger as quickly as possible by:     Scraping the stinger out with the edge of a dull knife or plastic card (credit card).    Don't use tweezers or your fingers to remove the  stinger. That may squeeze more toxin from the stinger.    Wash the affected area with soap and warm water 2 to 3 times a day. Don't break a blister, if there is one.    Next apply an ice pack for 5 to 10 minutes. To make an ice pack, put ice cubes in a plastic bag that seals at the top. Wrap the bag in a clean, thin towel or cloth. Don t put ice directly on the skin.    Contact your healthcare provider and ask what can be used to help decrease the swelling and itching to the affected area.     To prevent an infection, don't scratch the affected areas. Always check the sting site for signs of an infection. These include increased redness, swelling, drainage, or pain.  Preventing future reactions  Future reactions could be worse than this one. So try to stay away from situations where you might be stung:     Don't walk in grass wearing sandals or without shoes.     Don't leave food uncovered when eating outside. Sweet treats, watermelon, and ice cream attract insects.    Don't drink from uncovered sweetened drinks in cans when outside. Insects are attracted to soda drink cans. They can sometimes crawl inside of them.    Don't wear bright colored clothes with flowery prints and patterns when outside.    Don t wear perfume when outside. Smell attracts insects.    Wear long pants, long-sleeved shirts, socks, and work gloves when working outside.    Be aware that honeybees nest in trees. Wasps and yellow jackets nest in the ground, trees, or roof eaves. Stay away from garbage cans when outside.  Auto-injectable epinephrine    If you are at high risk for another sting due to where you work or play, or if you had dizziness, fainting, or trouble breathing or swallowing from the sting, an auto-injectable epinephrine may be prescribed. If not, ask your healthcare provider for one. Always carry it with you. Learn how to use the device. If you start to feel the symptoms of another reaction in the future, use the auto-injectable  epinephrine to inject yourself. Then call 911.  Don't wait until symptoms become severe.     Remember that the auto-injectable epinephrine is a rescue medicine only. You still need someone to take you to the hospital or call 911 after you have received the medicine.    Follow-up care  Follow up with your healthcare provider, or as advised if your symptoms do not keep improving.   Call 911  Call 911 if any of these occur:     Trouble breathing or swallowing, wheezing     Cool, moist, pale skin    Hoarse voice or trouble speaking    Confusion    Very drowsy or trouble waking up    Fainting or loss of consciousness    Rapid heart rate    Low blood pressure or feeling dizzy or weak    Feeling of doom    Severe nausea, vomiting, or diarrhea    Seizure    Swelling in the face, eyelids, lips, mouth, throat, or tongue    Drooling  When to seek medical advice  Call your healthcare provider right away or seek medical care right away if any of the following occur:     Spreading areas of itching, redness, or swelling    Headache, fever, chills, muscle or joint aching    Increased pain or swelling    Signs of infection of the affected area:  ? Spreading redness  ? Increase in pain or swelling  ? Fluid or colored drainage from the site  [employer] last reviewed this educational content on 6/1/2019 2000-2021 [employer]. All rights reserved. This information is not intended as a substitute for professional medical care. Always follow your healthcare professional's instructions.           Patient Education     Local Reaction to an Insect Sting    You have been stung or bitten by an insect. The insect s venom or body fluid is causing your skin to react in the area where you were stung or bitten. This often causes redness, itching, and swelling. This reaction will often fade over a few hours. But it can last a few days. An insect bite or sting can become infected 1 to 3 days later. So watch for the signs below.  "Sometimes it is hard to tell the difference between a local reaction to the insect bite or sting and an early infection. Your healthcare provider may give you antibiotics.  Common stinging insects that cause reactions are wasps, bees, yellow jackets, fire ants, and hornets. Common bites are from spiders, mosquitoes, fleas, or ticks. Other types of insects may be more common in different parts of the country or world.  Insect venom causes \"local\" toxic reactions in everyone. Allergic reactions occur only in those who are already sensitive to the venom. The severity of your reaction to the insect sting depends on the dose of venom and the degree to which you are already sensitive to it. Most people think of allergic reactions when someone has a rash or itchy skin. But most stings cause \"localized\" symptoms that are not allergic. These symptoms can include:    Rash, redness, welts, or blisters around the sting or bite    Itching, burning, stinging, or pain    Swelling around the sting area, which may spread and become uncomfortable    Home care  Medicines  The healthcare provider may prescribe medicines to ease swelling, itching, and pain. Follow the provider s instructions when taking these medicines.    Diphenhydramine is an oral antihistamine you can find in stores.. You can use this medicine to reduce itching and swelling. The medicine may make you sleepy. So be careful using it in the daytime or when going to school, working, or driving. Don t use diphenhydramine if you have glaucoma or if you are a man with trouble urinating because of an enlarged prostate. Other antihistamines may cause less drowsiness. They may be better choices for daytime use. Ask your pharmacist for suggestions.    If you have large areas of localized swelling, you may be prescribed oral corticosteroids, such as prednisone. These can help decrease the swelling and discomfort.    Don t use diphenhydramine cream on your skin. In some people, " it can cause more of a localized skin rash due to allergy to the cream.    Calamine lotion or oatmeal baths sometimes help with itching.    You may use acetaminophen or ibuprofen to control pain, unless another pain medicine was prescribed. Talk with your healthcare provider before using these medicines if you have chronic liver or kidney disease. Also talk with your provider if you ve had a stomach ulcer or gastrointestinal (GI) bleeding.    If you had a severe reaction, the provider may prescribe an epinephrine auto-injector. Epinephrine is an emergency medicine that will stop an allergic reaction from getting worse. Before you leave the hospital, be sure that you understand when and how to use this medicine.  General care      If itching is a problem, don t take hot showers or baths. Stay out of direct sunlight. These heat up your skin and will make the itching worse.    Use an ice pack to reduce local areas of redness, swelling, and itching. You can make your own ice pack by putting ice cubes in a bag that seals and wrapping it in a thin towel. Don t put the ice directly on your skin, because it can damage the skin.    Try not to scratch any affected areas to prevent damage to the skin or infection.    If oral antibiotics or oral corticosteroids were prescribed, be sure to take them as directed until finished.  Tips for dealing with insect stings    Be aware that honeybees nest in trees. Wasps and yellow jackets nest in the ground, trees, or roof eaves.    If you are stung by a honeybee, a stinger may remain in your skin. Wasps, yellow jackets, and hornets don t leave a stinger behind. Move away from the nest area right away. The stinger of a honeybee releases a substance that will attract other bees to you. Once you are away from the nest, remove the stinger as quickly as possible.    After any sting, you may apply ice and take diphenhydramine or another antihistamine. If you develop any of the warning signs  below, seek help right away.    If your reaction includes dizziness, fainting, or trouble breathing or swallowing, ask your healthcare provider if you need epinephrine auto-injectors.    Follow-up care  Follow up with your healthcare provider in 2 days, or as advised, if your symptoms don t start to get better.  Call 911  Call 911 if any of these occur:    Trouble breathing or swallowing, or wheezing    New or worsening swelling in the mouth, throat, or tongue    Hoarse voice or trouble speaking    Confusion    Severe drowsiness or trouble awakening    Fainting or loss of consciousness    Rapid heart rate    Low blood pressure    Feeling of doom    Nausea, vomiting, abdominal pain, or diarrhea    Vomiting blood, or large amounts of blood in stool    Seizure  When to seek medical advice  Call your healthcare provider or get medical care right away if any of these occur:    Spreading areas of itching, redness, or swelling    New or worse swelling in the face, eyelids, or lips    Dizziness or weakness  Also call your provider right away if you have signs of infection:    Increasing pain, redness, or swelling    Fever of 100.4 F (38 C) or higher, or as directed by your healthcare provider    Fluid or pus draining from the sting area   [employer] last reviewed this educational content on 10/1/2019    6208-1525 [employer]. All rights reserved. This information is not intended as a substitute for professional medical care. Always follow your healthcare professional's instructions.               No follow-ups on file.    Chaparro Luz MD  [facility] ULICES [place] is a 61 year old who presents for the following health issues     HPI     Concern - Wasp sting  Onset: 4 days   Description: Left wrist wasp sting, redness, swelling  Intensity: moderate, severe  Progression of Symptoms:  worsening  Accompanying Signs & Symptoms: Itching, redness spreading,   Previous history of  similar problem:   Precipitating factors:        Worsened by: Itching  Alleviating factors:        Improved by:   Therapies tried and outcome: Hydrogen peroxide, alcohol, calamine lotion, tylenol and benadryl      Thursday afternoon  Wasp sting left wrist  Itchy, raised   Benadryl last night, which helped a little        Review of Systems   Constitutional, HEENT, cardiovascular, pulmonary, gi and gu systems are negative, except as otherwise noted.      Objective    /78   Pulse 88   Temp 98.2  F (36.8  C) (Oral)   Wt 141.3 kg (311 lb 8 oz)   LMP  (LMP Unknown)   SpO2 97%   BMI 51.84 kg/m    Body mass index is 51.84 kg/m .  Physical Exam   GENERAL: healthy, alert and no distress  EYES: Eyes grossly normal to inspection, PERRL and conjunctivae and sclerae normal  NECK: no adenopathy, no asymmetry, masses, or scars and thyroid normal to palpation  SKIN: 2.5cm circular region of erythema, without fluctuance or induration, non-tender, (+)warmth  PSYCH: mentation appears normal, affect normal/bright

## 2021-07-06 NOTE — PATIENT INSTRUCTIONS
Ophthalmology   [address] [facility]   [place] [address]   Phone: [phone]   ophtho referral information above         Patient Education     Insect Sting Allergy, Generalized  You are having an allergic reaction to an insect sting. This may occur after a sting by a wasp, honeybee, yellow jacket, fire ant, or other insect. This may cause an itchy rash and swelling in the face or other parts of the body. A more severe reaction may cause you to feel dizzy, faint, or have trouble breathing or swallowing. Other warning signs are listed below.   Symptoms can include:    Rash, hives, redness, welts, or blisters in places other than the sting site    Itching, burning, stinging, pain in places other than the sting site    Dry, flaky, cracking, scaly skin    Swelling in places other than the sting site     Stomach pain or cramps  More severe symptoms are:    Swelling of the face or lips or drooling    Trouble swallowing, feeling like your throat is closing    Trouble breathing, wheezing    Dizziness or a sudden drop in blood pressure    Hoarse voice or trouble speaking    Severe nausea, vomiting, or diarrhea    Feeling faint or lightheaded    Rapid heart rate  Home care  Medicine  The healthcare provider may prescribe medicines to ease swelling, itching, and pain. Follow the provider s instructions when taking these medicines.     If you had a severe reaction, the provider may prescribe an injectable epinephrine kit. Epinephrine will stop the progression of an allergic reaction. Before you leave the hospital, be sure that you know when and how to use this medicine.    Oral diphenhydramine is an over-the-counter antihistamine available at pharmacies and grocery stores. Unless a prescription antihistamine was given, diphenhydramine may be used to reduce itching if large areas of the skin are involved. It may make you sleepy. So be careful using it in the daytime or when going to school, working, or driving.  Note: Don t use diphenhydramine if you have glaucoma or if you are a man with trouble urinating due to an enlarged prostate. There are other antihistamines that cause less drowsiness and are good choices for daytime use. Ask your healthcare provider or pharmacist for suggestions.    Don t use diphenhydramine cream on your skin. It can cause a further reaction in some people.    Calamine lotion or oatmeal baths sometimes help with itching.    You may use acetaminophen or ibuprofen to control pain, unless another pain medicine was prescribed. Note: If you have chronic liver or kidney disease or ever had a stomach ulcer or gastrointestinal bleeding, talk with your provider before using these medicines.  General care    Don't wear tight clothing. And stay away from things that heat up your skin (such as hot showers or baths, or direct sunlight). Heat makes the itching worse.   An ice pack will relieve local areas of intense itching and redness. Apply 5 to 10 minutes. To make an ice pack, put ice cubes in a plastic bag that seals at the top. Wrap the bag in a clean, thin towel or cloth. Don t put ice directly on the skin.   Stings  Wasps, yellow jackets, and hornets don t leave a stinger behind. But if a honeybee stings you, a stinger may stay in your skin. The stinger of a honeybee releases a substance that will attract other bees to you. So try to move away from the nest right away. Once you are away from the nest, then take out the stinger as quickly as possible by:     Scraping the stinger out with the edge of a dull knife or plastic card (credit card).    Don't use tweezers or your fingers to remove the stinger. That may squeeze more toxin from the stinger.    Wash the affected area with soap and warm water 2 to 3 times a day. Don't break a blister, if there is one.    Next apply an ice pack for 5 to 10 minutes. To make an ice pack, put ice cubes in a plastic bag that seals at the top. Wrap the bag in a clean,  thin towel or cloth. Don t put ice directly on the skin.    Contact your healthcare provider and ask what can be used to help decrease the swelling and itching to the affected area.     To prevent an infection, don't scratch the affected areas. Always check the sting site for signs of an infection. These include increased redness, swelling, drainage, or pain.  Preventing future reactions  Future reactions could be worse than this one. So try to stay away from situations where you might be stung:     Don't walk in grass wearing sandals or without shoes.     Don't leave food uncovered when eating outside. Sweet treats, watermelon, and ice cream attract insects.    Don't drink from uncovered sweetened drinks in cans when outside. Insects are attracted to soda drink cans. They can sometimes crawl inside of them.    Don't wear bright colored clothes with flowery prints and patterns when outside.    Don t wear perfume when outside. Smell attracts insects.    Wear long pants, long-sleeved shirts, socks, and work gloves when working outside.    Be aware that honeybees nest in trees. Wasps and yellow jackets nest in the ground, trees, or roof eaves. Stay away from garbage cans when outside.  Auto-injectable epinephrine    If you are at high risk for another sting due to where you work or play, or if you had dizziness, fainting, or trouble breathing or swallowing from the sting, an auto-injectable epinephrine may be prescribed. If not, ask your healthcare provider for one. Always carry it with you. Learn how to use the device. If you start to feel the symptoms of another reaction in the future, use the auto-injectable epinephrine to inject yourself. Then call 911.  Don't wait until symptoms become severe.     Remember that the auto-injectable epinephrine is a rescue medicine only. You still need someone to take you to the hospital or call 911 after you have received the medicine.    Follow-up care  Follow up with  your healthcare provider, or as advised if your symptoms do not keep improving.   Call 911  Call 911 if any of these occur:     Trouble breathing or swallowing, wheezing     Cool, moist, pale skin    Hoarse voice or trouble speaking    Confusion    Very drowsy or trouble waking up    Fainting or loss of consciousness    Rapid heart rate    Low blood pressure or feeling dizzy or weak    Feeling of doom    Severe nausea, vomiting, or diarrhea    Seizure    Swelling in the face, eyelids, lips, mouth, throat, or tongue    Drooling  When to seek medical advice  Call your healthcare provider right away or seek medical care right away if any of the following occur:     Spreading areas of itching, redness, or swelling    Headache, fever, chills, muscle or joint aching    Increased pain or swelling    Signs of infection of the affected area:  ? Spreading redness  ? Increase in pain or swelling  ? Fluid or colored drainage from the site  Valeria last reviewed this educational content on 6/1/2019 2000-2021 [employer]. All rights reserved. This information is not intended as a substitute for professional medical care. Always follow your healthcare professional's instructions.           Patient Education     Local Reaction to an Insect Sting    You have been stung or bitten by an insect. The insect s venom or body fluid is causing your skin to react in the area where you were stung or bitten. This often causes redness, itching, and swelling. This reaction will often fade over a few hours. But it can last a few days. An insect bite or sting can become infected 1 to 3 days later. So watch for the signs below. Sometimes it is hard to tell the difference between a local reaction to the insect bite or sting and an early infection. Your healthcare provider may give you antibiotics.  Common stinging insects that cause reactions are wasps, bees, yellow jackets, fire ants, and hornets. Common bites are from spiders,  "mosquitoes, fleas, or ticks. Other types of insects may be more common in different parts of the country or world.  Insect venom causes \"local\" toxic reactions in everyone. Allergic reactions occur only in those who are already sensitive to the venom. The severity of your reaction to the insect sting depends on the dose of venom and the degree to which you are already sensitive to it. Most people think of allergic reactions when someone has a rash or itchy skin. But most stings cause \"localized\" symptoms that are not allergic. These symptoms can include:    Rash, redness, welts, or blisters around the sting or bite    Itching, burning, stinging, or pain    Swelling around the sting area, which may spread and become uncomfortable    Home care  Medicines  The healthcare provider may prescribe medicines to ease swelling, itching, and pain. Follow the provider s instructions when taking these medicines.    Diphenhydramine is an oral antihistamine you can find in stores.. You can use this medicine to reduce itching and swelling. The medicine may make you sleepy. So be careful using it in the daytime or when going to school, working, or driving. Don t use diphenhydramine if you have glaucoma or if you are a man with trouble urinating because of an enlarged prostate. Other antihistamines may cause less drowsiness. They may be better choices for daytime use. Ask your pharmacist for suggestions.    If you have large areas of localized swelling, you may be prescribed oral corticosteroids, such as prednisone. These can help decrease the swelling and discomfort.    Don t use diphenhydramine cream on your skin. In some people, it can cause more of a localized skin rash due to allergy to the cream.    Calamine lotion or oatmeal baths sometimes help with itching.    You may use acetaminophen or ibuprofen to control pain, unless another pain medicine was prescribed. Talk with your healthcare provider before using these medicines if " you have chronic liver or kidney disease. Also talk with your provider if you ve had a stomach ulcer or gastrointestinal (GI) bleeding.    If you had a severe reaction, the provider may prescribe an epinephrine auto-injector. Epinephrine is an emergency medicine that will stop an allergic reaction from getting worse. Before you leave the hospital, be sure that you understand when and how to use this medicine.  General care      If itching is a problem, don t take hot showers or baths. Stay out of direct sunlight. These heat up your skin and will make the itching worse.    Use an ice pack to reduce local areas of redness, swelling, and itching. You can make your own ice pack by putting ice cubes in a bag that seals and wrapping it in a thin towel. Don t put the ice directly on your skin, because it can damage the skin.    Try not to scratch any affected areas to prevent damage to the skin or infection.    If oral antibiotics or oral corticosteroids were prescribed, be sure to take them as directed until finished.  Tips for dealing with insect stings    Be aware that honeybees nest in trees. Wasps and yellow jackets nest in the ground, trees, or roof eaves.    If you are stung by a honeybee, a stinger may remain in your skin. Wasps, yellow jackets, and hornets don t leave a stinger behind. Move away from the nest area right away. The stinger of a honeybee releases a substance that will attract other bees to you. Once you are away from the nest, remove the stinger as quickly as possible.    After any sting, you may apply ice and take diphenhydramine or another antihistamine. If you develop any of the warning signs below, seek help right away.    If your reaction includes dizziness, fainting, or trouble breathing or swallowing, ask your healthcare provider if you need epinephrine auto-injectors.    Follow-up care  Follow up with your healthcare provider in 2 days, or as advised, if your symptoms don t start to get  better.  Call 911  Call 911 if any of these occur:    Trouble breathing or swallowing, or wheezing    New or worsening swelling in the mouth, throat, or tongue    Hoarse voice or trouble speaking    Confusion    Severe drowsiness or trouble awakening    Fainting or loss of consciousness    Rapid heart rate    Low blood pressure    Feeling of doom    Nausea, vomiting, abdominal pain, or diarrhea    Vomiting blood, or large amounts of blood in stool    Seizure  When to seek medical advice  Call your healthcare provider or get medical care right away if any of these occur:    Spreading areas of itching, redness, or swelling    New or worse swelling in the face, eyelids, or lips    Dizziness or weakness  Also call your provider right away if you have signs of infection:    Increasing pain, redness, or swelling    Fever of 100.4 F (38 C) or higher, or as directed by your healthcare provider    Fluid or pus draining from the sting area   Valeria last reviewed this educational content on 10/1/2019    8087-8728 [employer]. All rights reserved. This information is not intended as a substitute for professional medical care. Always follow your healthcare professional's instructions.

## 2021-07-21 ENCOUNTER — TELEPHONE (OUTPATIENT)
Dept: FAMILY MEDICINE | Facility: CLINIC | Age: 61
End: 2021-07-21

## 2021-07-21 DIAGNOSIS — E03.9 ACQUIRED HYPOTHYROIDISM: ICD-10-CM

## 2021-07-21 RX ORDER — LEVOTHYROXINE SODIUM 150 UG/1
150 TABLET ORAL DAILY
Qty: 90 TABLET | Refills: 0 | Status: SHIPPED | OUTPATIENT
Start: 2021-07-21 | End: 2021-09-13

## 2021-07-21 NOTE — TELEPHONE ENCOUNTER
Patient is calling for refill on her synthroid pended. She said that she needs a 90 day supply sent to [facility].Meme Guajardo

## 2021-07-26 ENCOUNTER — TELEPHONE (OUTPATIENT)
Dept: GASTROENTEROLOGY | Facility: CLINIC | Age: 61
End: 2021-07-26

## 2021-07-26 DIAGNOSIS — Z11.59 ENCOUNTER FOR SCREENING FOR OTHER VIRAL DISEASES: ICD-10-CM

## 2021-07-26 NOTE — TELEPHONE ENCOUNTER
Screening Questions  1. Are you active on mychart? Yes     2. What insurance is in the chart? BC[employer]     2.  Ordering/Referring Provider: Leah Mae MD in  FP/IM/PEDS    3. BMI  5'5/312# = 51.9     4. Are you on daily home oxygen? No - does use CPAP machine     5. Do you have a history of difficult airway? No     6. Have you had a heart, lung, or liver transplant? No     7. Are you currently on dialysis? No     8. Have you had a stroke or Transient ischemic atttack (TIA) within 6 months? No     9. In the past 6 months, have you had any heart related issues including cardiomyopathy or heart attack?         If yes, did it require cardiac stenting or other implantable device? No     10. Do you have any implantable devices in your body (pacemaker, defib, LVAD)? n     11. Do you take nitroglycerin? If yes, how often? No     12. Are you currently taking any blood thinners? No     13. Are you a diabetic? No     14. (Females) Are you currently pregnant? No   If yes, how many weeks?    15. Have you had a procedure in the past that was difficult to tolerate with conscious sedation? Any allergies to Fentanyl or Versed  No     16. Are you taking any scheduled prescription narcotics more than once daily? No     17. Do you have any chemical dependencies such as alcohol, street drugs, or methadone? No     18. Do you have any history of post-traumatic stress syndrome or mental health issues?  [employer]    19. Do you transfer independently? Will need some assistance uses walker because she is waiting to have a Hip Replacement.     20.  Do you have any issues with constipation? Yes- this is her normal but recently with stress she has had more loose stools which was raised the thought of needing another colonoscopy.     21. Preferred Pharmacy for Pre Prescription [facility]    Scheduling Details    Colonoscopy  Prep Sent?:  Extended Prep -mailed   Procedure Scheduled: Colonoscopy   Provider/Surgeon:   Sea   Date of Procedure: 08/12/2021   Location: [facility]   Caller (Please ask for phone number if not scheduled by patient): Patient       Sedation Type: MAC - had it at [facility] with MAC also is [facility]   Conscious Sedation- Needs  for 6 hours after the procedure  MAC/General-Needs  for 24 hours after procedure    Pre-op Required at [facility], [place], [place] and OR for MAC sedation: YES   (if yes advise patient they will need a pre-op prior to procedure)      Is patient on blood thinners? -no  (If yes- inform patient to follow up with PCP or provider for follow up instructions)     Informed patient they will need an adult  YES  Cannot take any type of public or medical transportation alone    Informed Patient of COVID Test Requirement  YES- SCHEDULED     Confirmed Nurse will call to complete assessment YES     Additional comments:

## 2021-07-26 NOTE — LETTER
July 26, 2021      Geovanna Aguilar  [address] [address]              Dear Geovanna,    Colonoscopy  Your exam is on 08/12/2021  Arrival Time: 8:50AM  Please note that your procedure time may change  Check in at: [facility]; [address] , [place] [address]    Please arrive with an adult who can drive you home after the exam and stay with you for the next 24 hours, unless your provider says otherwise.   The medicines used in the exam will make you sleepy. You will not be able to drive. You cannot take a medical cab, taxi, Uber, train or bus by yourself.    What is a colonoscopy?  A colonoscopy lets the doctor look inside your large intestine (colon). The doctor guides a scope, or small camera, through the entire colon. The scope is attached to a long, flexible tube. The image from the scope appears on a large TV screen.     The scope will send air into your colon. This opens the colon so the doctor can see it better on the screen.    The exam looks for defects such as inflamed tissue, polyps, ulcers (sores), diverticula (pouches in the wall of the colon) and signs of cancer.    Getting ready  Read your guidelines for cleansing the colon. It is highly important that you follow the directions closely. If your colon is empty and clean, your exam will be more thorough and take less time.     Be sure to tell your doctor or nurse if you have ever had lung or heart disease (including endocarditis or an artificial valve); diabetes; hepatitis; or any allergies to medicines.      Dress in comfortable, loose clothing.    Bring your insurance card. Leave your purse, billfold, credit cards and other valuables at home.    Bring a list of your medicines and known allergies. If you have a pacemaker or ICD, please bring your information card.    We do our best to stay on time, but there may be a delay. Please bring something to pass the time, such as a newspaper or  book.    Seven days before the exam - Date: 08/05/2021    Talk to your doctor: If you take blood-thinners (such as Coumadin, Plavix, Xarelto), your prescription or schedule may need to change before the test.    Continue taking prescribed aspirin; talk to your prescribing doctor with any concerns.      If you have diabetes: Ask to have your exam early in the morning. Also, ask your doctor if you should change your diet or medicines    You will receive a call prior to your appointment to discuss bowel prep. Please let us know if you have any outstanding questions from these instructions.     One day before the exam - Date: 08/11/2021    Stop eating all solid foods, the timing can change based on your prep instructions. You may drink clear liquids.    Day of the exam - Date: 08/12/2021    You may drink clear liquids until 6 hours before your exam.    You may take all of your morning medicines (except for diabetes pills) as usual with 4 oz. of water up to 4 hours before your test.    If you take diabetes medicine (pills): do not take them the morning of your test.    Bring a list of your medicines and known allergies.    Please arrive with an adult who can take you home after the test: The medicine will make you sleepy. If you do not have a , we may cancel your test.     What are clear liquids?   You may have:  - Water, tea, coffee (no cream)  - Soda pop, Gatorade (not red or purple)  - Clear nutrition drinks (Enlive, Resource Breeze)   - Jell-O, Popsicles (no milk or fruit pieces) or sorbet (not red or purple)  - Fat-free soup broth or bouillon  - Plain hard candy, such as clear life savers (not red or purple)  - Clear juices and fruit-flavored drinks such as apple juice, white grape juice, Hi-C and Kiran-Aid (not red or purple)   Do not have:  - Milk or milk products such as ice cream, malts or shakes  - Red or purple drinks of any kind such as cranberry juice or grape juice. Avoid red or purple Jell-O,  Popsicles, Kiran-Aid, sorbet and candy  - Juices with pulp such as orange, grapefruit, pineapple or tomato juice  - Cream soups of any kind  - Alcohol           What happens when I arrive?    You will fill out some paperwork, then we will take you to a private area. A nurse will check your records and ask you questions.    You will change into a hospital gown. We may ask you to remove any jewelry.     We will review the risks and benefits of the exam. You will need to sign a consent form.    We will insert an IV (thin tube) into a vein in your hand or arm. We will use this to give you medicine.    During the exam  The exam takes from 15 minutes to one hour. You may ask questions of the doctor.    You will lie on your left side on a table.     You will receive medicines to relieve pain and help you relax.     The doctor will insert the scope into your rectum (bottom). The scope is on a long, thin tube. The doctor will slowly guide the scope into your colon. The tube bends, so it can move through the curves of your colon.     We may ask you to change positions to help the doctor move the scope.     If we see any polyps (growths), we will remove them. We do this because polyps can cause bleeding or turn into cancer. For some polyps, we will take tissue (a biopsy) to test in the lab. Most polyps turn out to be benign (not cancer).     Will it hurt?  You should feel little or no discomfort. The air will make you will feel full, and you will notice some pressure as the tube passes through your colon. Tell your doctor or nurse if you feel any pain. If you have cramps, breathe slowly to help your body relax.     After the exam    We will take you to the recovery area. We will watch you for up to one hour or until most of the medicine has worn off.     We will remove the IV. You will receive a report about your exam.    Discomfort: It is normal to feel bloated, or pass air. Walking will help relieve it. You may take a  non-aspirin pain reliever containing acetaminophen, such as Tylenol. You may have some minor bleeding if a polyp was removed.    Your first meal should be light. Slowly return to normal meals, unless your doctor gives you other orders. Take it easy the rest of the day.    Have a responsible adult stay with you the rest of the day. Do not drive for 24 hours.    After sedation: You may have slowed reaction time and poor judgment for 24 hours. Do not have anyone under your care that day. Avoid making important decisions or signing legal documents.    If you had a polyp removed:     Avoid heavy exercise for one week (no heavy lifting, sports or other activity).    You may have bleeding for several days after your exam. Call your doctor if bleeding is heavy or you have black or bloody stools (bowel movements).    If you normally take aspirin or blood thinners, ask the prescribing doctor when you can start taking them again.    You may receive more than one bill for your exam. (Separate charges may come from the clinic, doctor, lab, etc.).    Call us at once if you have:    Unusual pain or problems swallowing, unusual stomach or chest pain.    Vomit that looks like coffee grounds or black or bloody stools (bowel movements).    A fever above 100.6  F (37.5  C) when taken under the tongue.    Test results  - You will receive your results in 7 to 10 business days by phone, letter or MyChart.    You will receive a call prior to your appointment to discuss bowel prep. Please let us know if you have any outstanding questions from these instructions.     Your covid test is scheduled on 08/08/2021 AT 11:20am at the [facility] ([address] [place], [place] [id number]).   Please ensure your COVID test is scheduled within 96 hours or 4 days of your procedure. If you have not been contacted to schedule please call [phone].    For questions or appointments, call:  [facility]  Endoscopy: [phone], option 2  Monday through Friday, 8 a.m. to 4:30 p.m.  (If it is after hours, call [phone]. Ask for the GI fellow resident on call.)    You should be aware that your endoscopist may be a part of a study to improve endoscopy procedures.  As part of a study, pictures gathered from your procedure may be stored and analyzed in a de-identified manner.    Extended Colonoscopy Prep    The inside of your intestine must be clean in order to allow examination of the colon for presence of any growths and abnormalities, as well as their biopsy or removal. The cleansing presents an extra challenge in patients with cystic fibrosis and these instructions are specifically designed for patients with this disease. Please review these instructions carefully well in advance. A number of tips are included in order to make this part of the procedure as comfortable as possible.    Immediately:     Discontinue iron supplements and multivitamins    Fill your prescription for Golytely (2 containers), a bottle of Magnesium Citrate, and 2 Dulcolax tablets at the pharmacy.    It is very important that you stay well hydrated during the colonoscopy prep. The large volume of the bowel cleansing liquid is designed to clean your colon, but it will not provide hydration. While you are getting the prescribed prep, you may also get 64 oz. of Gatorade or similar sports drink product. (Avoid red and purple colors)    Discontinue Fiber supplements    3 days prior:     Begin restricted, low residue diet    2 days prior:     Remember to discontinue NSAIDs, example: Advil, Aleve, Ibuprofen, Naproxen, Motrin    Make sure to stay well hydrated, drink at least 4 large glasses of Gatorade or similar sports drink    Drink to be well hydrated, this is important.    Discontinue non-steroidal anti-inflammatory medications as these drugs may increase the risk of bleeding.    4:00 PM: Drink 10oz Magnesium Citrate (one bottle)    1 day  prior:     Plan on being at home this day.    Begin clear liquid diet only.    Avoid any red or purple colored items    10:00 AM:  Take 2 Dulcolax tablets at 10 AM    3:00 PM:  You will start drinking the Golytely solution.  Drink one, eight oz. glass every 10-15 minutes until   of the 1st container of Golytely is gone.    You will likely start having frequent liquid bowel movements within an hour of drinking the Golytely solution.    8:00 PM: Drink the second half of the 1st container of Golytely.  Drink one, eight oz. glass every 10-15 minutes until   of the 1st container of Golytely is gone.    The prep liquid will not keep you hydrated. You should continue drinking clear liquids throughout the day.    Before you go to bed, mix the 2nd container of Golytely.    Procedure day:     6 hours before your check-in time , drink one, eight oz. glass every 10-15 minutes until   of the 2nd  container of Golytely is gone.    You may take your necessary morning medications.    You can have clear liquids until 4 hours prior to your exam.    Be sure to have a     Please do your nebs and airway clearance therapy in the morning prior to the procedure.    Please arrive with an adult who can drive you home after the exam. If using public transportation you must have someone to ride with you.    Do not have:     Corn in any form    Raw vegetables    Foods with seeds    Whole wheat breads and cereals    Brown or Wild Rice    Granola    Raisins and dried fruit    Berries    Popcorn    Raw Fruits    You can have:    White breads, rolls, biscuits, plain crackers    White rice    Skinless potatoes    Low fiber cereals such as Rice Krispies    Chicken, Turkey, Fish, Seafood    Eggs    Applesauce, pear sauce    Ripe bananas    Canned fruit  without seeds or skins    Cooked vegetables or canned vegetables without seeds      Clear Liquid Diet:    Sports drinks such as Gatorade, PowerAde, etc.    Coffee, tea, water    Pulp free  juices    Lemonade from powdered mixes    Fruit flavored drinks such as Kiran Aid, Crystal Light, ect    Carbonated beverages and soda    Fat Free broth    Plain or flavored gelatins    Fruit ices, Italian Ices    Sorbet    Popsicles without milk or added fruit pieces    Clear liquid nutritional drinks such as Enlive (clear drink), Resource Breeze    Do not have:    Alcoholic beverages    Red or Purple colored items

## 2021-07-27 ENCOUNTER — IMMUNIZATION (OUTPATIENT)
Dept: NURSING | Facility: CLINIC | Age: 61
End: 2021-07-27
Attending: FAMILY MEDICINE
Payer: COMMERCIAL

## 2021-07-27 PROCEDURE — 0002A PR COVID VAC PFIZER DIL RECON 30 MCG/0.3 ML IM: CPT

## 2021-07-27 PROCEDURE — 91300 PR COVID VAC PFIZER DIL RECON 30 MCG/0.3 ML IM: CPT

## 2021-07-30 ENCOUNTER — OFFICE VISIT (OUTPATIENT)
Dept: FAMILY MEDICINE | Facility: CLINIC | Age: 61
End: 2021-07-30
Payer: COMMERCIAL

## 2021-07-30 VITALS
HEART RATE: 66 BPM | SYSTOLIC BLOOD PRESSURE: 132 MMHG | TEMPERATURE: 97.9 F | WEIGHT: 293 LBS | DIASTOLIC BLOOD PRESSURE: 82 MMHG | BODY MASS INDEX: 48.82 KG/M2 | RESPIRATION RATE: 18 BRPM | OXYGEN SATURATION: 96 % | HEIGHT: 65 IN

## 2021-07-30 DIAGNOSIS — Z12.11 SCREEN FOR COLON CANCER: ICD-10-CM

## 2021-07-30 DIAGNOSIS — I10 HYPERTENSION GOAL BP (BLOOD PRESSURE) < 140/90: ICD-10-CM

## 2021-07-30 DIAGNOSIS — F31.31 MILD DEPRESSED BIPOLAR I DISORDER (H): ICD-10-CM

## 2021-07-30 DIAGNOSIS — E66.01 MORBID OBESITY (H): ICD-10-CM

## 2021-07-30 DIAGNOSIS — F20.9 SCHIZOPHRENIA, UNSPECIFIED TYPE (H): ICD-10-CM

## 2021-07-30 DIAGNOSIS — Z01.818 PRE-OPERATIVE EXAMINATION: Primary | ICD-10-CM

## 2021-07-30 DIAGNOSIS — E03.9 ACQUIRED HYPOTHYROIDISM: ICD-10-CM

## 2021-07-30 DIAGNOSIS — G47.33 OSA (OBSTRUCTIVE SLEEP APNEA): ICD-10-CM

## 2021-07-30 LAB
ANION GAP SERPL CALCULATED.3IONS-SCNC: 7 MMOL/L (ref 3–14)
BUN SERPL-MCNC: 9 MG/DL (ref 7–30)
CALCIUM SERPL-MCNC: 9.1 MG/DL (ref 8.5–10.1)
CHLORIDE BLD-SCNC: 110 MMOL/L (ref 94–109)
CO2 SERPL-SCNC: 25 MMOL/L (ref 20–32)
CREAT SERPL-MCNC: 0.78 MG/DL (ref 0.52–1.04)
GFR SERPL CREATININE-BSD FRML MDRD: 83 ML/MIN/1.73M2
GLUCOSE BLD-MCNC: 99 MG/DL (ref 70–99)
HGB BLD-MCNC: 12.9 G/DL (ref 11.7–15.7)
POTASSIUM BLD-SCNC: 4.1 MMOL/L (ref 3.4–5.3)
SODIUM SERPL-SCNC: 142 MMOL/L (ref 133–144)
TSH SERPL DL<=0.005 MIU/L-ACNC: 1.89 MU/L (ref 0.4–4)

## 2021-07-30 PROCEDURE — 84443 ASSAY THYROID STIM HORMONE: CPT | Performed by: PREVENTIVE MEDICINE

## 2021-07-30 PROCEDURE — 80048 BASIC METABOLIC PNL TOTAL CA: CPT | Performed by: PREVENTIVE MEDICINE

## 2021-07-30 PROCEDURE — 99214 OFFICE O/P EST MOD 30 MIN: CPT | Performed by: PREVENTIVE MEDICINE

## 2021-07-30 PROCEDURE — 36415 COLL VENOUS BLD VENIPUNCTURE: CPT | Performed by: PREVENTIVE MEDICINE

## 2021-07-30 PROCEDURE — 85018 HEMOGLOBIN: CPT | Performed by: PREVENTIVE MEDICINE

## 2021-07-30 ASSESSMENT — MIFFLIN-ST. JEOR: SCORE: 1967.96

## 2021-07-30 ASSESSMENT — PAIN SCALES - GENERAL: PAINLEVEL: NO PAIN (0)

## 2021-07-30 NOTE — RESULT ENCOUNTER NOTE
Geovanna,     Hemoglobin is normal, you are not anemic. Other results are pending.     Please do not hesitate to call us at [phone] if you have any questions or concerns.    Thank you,    Connie Martinez MD MPH

## 2021-07-30 NOTE — PATIENT INSTRUCTIONS
At [facility], we strive to deliver an exceptional experience to you, every time we see you. If you receive a survey, please complete it as we do value your feedback.  If you have MyChart, you can expect to receive results automatically within 24 hours of their completion.  Your provider will send a note interpreting your results as well.   If you do not have MyChart, you should receive your results in about a week by mail.    Your care team:                            Family Medicine Internal Medicine   MD Jesus Mendez MD Shantel Branch-Fleming, MD Srinivasa Vaka, MD Katya Belousova, PARAFAEL Haddad, APRN CNP    Mauricio Brown, MD Pediatrics   Raad Suero, PARAFAEL Tyson, CNP MD Shanda Ibarra APRN CNP   MD Chantal Lamas MD Deborah Mielke, MD Sapna Rehman, APRN [facility]      Clinic hours: Monday - Thursday 7 am-6 pm; Fridays 7 am-5 pm.   Urgent care: Monday - Friday 10 am- 8 pm; Saturday and Sunday 9 am-5 pm.    Clinic: [phone]       [facility]: Monday - Thursday 8 am - 7 pm; Friday 8 am - 6 pm  [facility]: [phone]     Use [email or web address] for 24/7 diagnosis and treatment of dozens of conditions.

## 2021-07-30 NOTE — PROGRESS NOTES
[address] [address]  Phone: [phone]  Primary Provider: Leah Mae  Pre-op Performing Provider: BUTCH LOPEZ      PREOPERATIVE EVALUATION:  Today's date: 7/30/2021    Geovanna Aguilar is a 61 year old female who presents for a preoperative evaluation.    Surgical Information:  Surgery/Procedure: Colonoscopy  Surgery Location:   Surgeon: Laura Osei MD  Surgery Date: 8/12/21  Time of Surgery: 10:20 AM  Where patient plans to recover: At home with family  Fax number for surgical facility: Note does not need to be faxed, will be available electronically in Epic.    Type of Anesthesia Anticipated: Monitor Anesthesia Care    Assessment & Plan     The proposed surgical procedure is considered LOW risk.    Pre-operative examination  -Colonoscopy scheduled for 8/12/21   - REVIEW OF HEALTH MAINTENANCE PROTOCOL ORDERS  - Hemoglobin  - Basic metabolic panel  (Ca, Cl, CO2, Creat, Gluc, K, Na, BUN)  - TSH with free T4 reflex  - Hemoglobin  - Basic metabolic panel  (Ca, Cl, CO2, Creat, Gluc, K, Na, BUN)  - TSH with free T4 reflex    Screen for colon cancer  -Plan is for colonoscopy     Schizophrenia, unspecified type (H)  -per Psychiatry     Mild depressed bipolar I disorder (H)  -per Psychiatry  -no recent medication changes     Morbid obesity (H)  -some weight loss   Wt Readings from Last 2 Encounters:   07/30/21 139.7 kg (308 lb)   07/06/21 141.3 kg (311 lb 8 oz)     Acquired hypothyroidism  -On Levothyroxine  -await labs   - TSH with free T4 reflex  - TSH with free T4 reflex    Hypertension goal BP (blood pressure) < 140/90  -at goal   - Basic metabolic panel  (Ca, Cl, CO2, Creat, Gluc, K, Na, BUN)  - Basic metabolic panel  (Ca, Cl, CO2, Creat, Gluc, K, Na, BUN)    KALPESH (obstructive sleep apnea)  -Consistent use of CPAP         Risks and Recommendations:  The patient has the following additional risks and  recommendations for perioperative complications:   - Morbid obesity (BMI >40)    Obstructive Sleep Apnea:   -Consistent use of CPAP     Medication Instructions:  Patient is to take all scheduled medications on the day of surgery EXCEPT for modifications listed below:   - Beta Blockers: Continue taking on the day of surgery.   - Statins: Continue taking on the day of surgery.    - ibuprofen (Advil, Motrin): HOLD 1 day before surgery.    - SSRIs, SNRIs, TCAs, Antipsychotics: Continue without modification.     RECOMMENDATION:  APPROVAL GIVEN to proceed with proposed procedure, without further diagnostic evaluation.      35 minutes spent on the date of the encounter doing chart review, history and exam, documentation and further activities per the note        Subjective     HPI related to upcoming procedure: Schedule for screening colonoscopy.     Does not use My Chart.     Preop Questions 7/30/2021   1. Have you ever had a heart attack or stroke? No   2. Have you ever had surgery on your heart or blood vessels, such as a stent placement, a coronary artery bypass, or surgery on an artery in your head, neck, heart, or legs? YES - Cardiac ablation for Supra ventricular arrhythmias in 2001, no problems since then    3. Do you have chest pain with activity? No   4. Do you have a history of  heart failure? No   5. Do you currently have a cold, bronchitis or symptoms of other infection? No   6. Do you have a cough, shortness of breath, or wheezing? No   7. Do you or anyone in your family have previous history of blood clots? No   8. Do you or does anyone in your family have a serious bleeding problem such as prolonged bleeding following surgeries or cuts? No   9. Have you ever had problems with anemia or been told to take iron pills? No   10. Have you had any abnormal blood loss such as black, tarry or bloody stools, or abnormal vaginal bleeding? No   11. Have you ever had a blood transfusion? No   12. Are you willing to have  a blood transfusion if it is medically needed before, during, or after your surgery? Yes   13. Have you or any of your relatives ever had problems with anesthesia? No   14. Do you have sleep apnea, excessive snoring or daytime drowsiness? YES - Uses CPAP daily    14a. Do you have a CPAP machine? Yes   15. Do you have any artifical heart valves or other implanted medical devices like a pacemaker, defibrillator, or continuous glucose monitor? No   16. Do you have artificial joints? No   17. Are you allergic to latex? YES: Breaks out in to a rash    18. Is there any chance that you may be pregnant? No     Health Care Directive:  Patient does not have a Health Care Directive or Living Will: Discussed advance care planning with patient; information given to patient to review.    Preoperative Review of :   reviewed - controlled substances reflected in medication list.      Status of Chronic Conditions:    HYPERTENSION - Patient has longstanding history of HTN , currently denies any symptoms referable to elevated blood pressure. Specifically denies chest pain, palpitations, dyspnea, orthopnea, PND or peripheral edema. Blood pressure readings have been in normal range. Current medication regimen is as listed below. Patient denies any side effects of medication.     HYPOTHYROIDISM - Patient has a longstanding history of chronic Hypothyroidism. Patient has been doing well, noting no tremor, insomnia, hair loss or changes in skin texture. Continues to take medications as directed, without adverse reactions or side effects. Last TSH   Lab Results   Component Value Date    TSH 3.28 11/23/2020       SLEEP PROBLEM - Patient has a longstanding history of sleep apnea. Patient uses CPAP consistently.     BIPOLAR DISORDER: Stable on medication per Psychiatry     Review of Systems  CONSTITUTIONAL: NEGATIVE for fever, chills, change in weight  INTEGUMENTARY/SKIN: NEGATIVE for worrisome rashes, moles or lesions  EYES: NEGATIVE for  vision changes or irritation  ENT/MOUTH: NEGATIVE for ear, mouth and throat problems  RESP: NEGATIVE for significant cough or SOB  CV: NEGATIVE for chest pain, palpitations or peripheral edema  GI: NEGATIVE for nausea, abdominal pain, heartburn, or change in bowel habits  : NEGATIVE for frequency, dysuria, or hematuria  NEURO: NEGATIVE for weakness, dizziness or paresthesias  ENDOCRINE: NEGATIVE for temperature intolerance, skin/hair changes  HEME: NEGATIVE for bleeding problems  PSYCHIATRIC: NEGATIVE for changes in mood or affect    Patient Active Problem List    Diagnosis Date Noted     Slow transit constipation 09/12/2018     Priority: Medium     Morbid obesity due to excess calories (H) 06/21/2017     Priority: Medium     Bladder spasm 03/27/2017     Priority: Medium     Bursitis, hip 08/11/2015     Priority: Medium     Degenerative joint disease (DJD) of hip 06/29/2015     Priority: Medium     Mild depressed bipolar I disorder (H) 10/28/2011     Priority: Medium     Problem list name updated by automated process. Provider to review       Hyperlipidemia LDL goal <130 10/03/2011     Priority: Medium     Hypertension goal BP (blood pressure) < 140/90 12/03/2010     Priority: Medium     Anemia 10/16/2010     Priority: Medium     Hyperglycemia 10/16/2010     Priority: Medium     Chest pain 10/16/2010     Priority: Medium     Schizophrenia (H) 10/16/2010     Priority: Medium     KALPESH (obstructive sleep apnea) 03/03/2010     Priority: Medium     Hypothyroidism      Priority: Medium     Peripheral edema      Priority: Medium     GERD (gastroesophageal reflux disease) 12/11/2009     Priority: Medium      Past Medical History:   Diagnosis Date     Arthritis      Bipolar 2 disorder (H)      Bone and joint disord back, pelvis, leg complicat preg, childb, puerp      Chronic constipation      Depressive disorder      Esophageal reflux      GERD (gastroesophageal reflux disease)      Heart rate problem     maybe     Hiatal  hernia      Hyperlipidemia LDL goal < 100      Hypertension      Hypothyroidism      Migraines      Obesity      Obstructive sleep apnea      Osteoporosis     not sure     Other mental problems      Peripheral edema      Schizophrenia (H)      Urinary incontinence     pulsating urge with spasms to pee     Past Surgical History:   Procedure Laterality Date     C ABLATION SUPRAVENT ARRHYTHMOGENIC FOCUS  2001     C CARDIAC SURG PROCEDURE UNLIST       C STOMACH SURGERY PROCEDURE UNLISTED       CRYOTHERAPY, CERVICAL       GENITOURINARY SURGERY       HC TOOTH EXTRACTION W/FORCEP       HYSTERECTOMY SUPRACERVICAL  5/06    due to endometrial hyperplasia     SALPINGO OOPHORECTOMY,R/L/KEVIN  5/06    Salpingo Oophorectomy, RT     SURGICAL HISTORY OF -   1974    Repair soft tissue of right arm     TUBAL LIGATION  1994     [facility]  1-4-11     VASCULAR SURGERY       Current Outpatient Medications   Medication Sig Dispense Refill     AMBIEN 10 MG OR TABS 1 TABLET AT BEDTIME       BUPROPION HCL# 300 MG OR TB24 1 TABLET DAILY       divalproex (DEPAKOTE ER) 500 MG 24 hr tablet Reported on 3/27/2017       esomeprazole (NEXIUM) 40 MG DR capsule Take 1 capsule (40 mg) by mouth 2 times daily Take 30-60 minutes before eating. 180 capsule 3     ibuprofen (ADVIL/MOTRIN) 200 MG capsule Take 200 mg by mouth every 4 hours as needed for fever       levothyroxine (SYNTHROID) 150 MCG tablet Take 1 tablet (150 mcg) by mouth daily 90 tablet 0     metoprolol tartrate (LOPRESSOR) 25 MG tablet Take 1 tablet (25 mg) by mouth 2 times daily 180 tablet 3     order for DME Walker with 4 wheels, brakes, seat 1 each 0     simvastatin (ZOCOR) 40 MG tablet Take 1 tablet (40 mg) by mouth At Bedtime 90 tablet 2     traMADol (ULTRAM) 50 MG tablet Take 1-2 tablets ( mg) by mouth every 6 hours as needed for severe pain Needs to be seen for more. 90 tablet 0     triamcinolone (KENALOG) 0.1 % external cream Apply topically 2 times daily 453.6 g 0     ZYPREXA 5 MG  "OR TABS Reported on 3/27/2017         Allergies   Allergen Reactions     Abilify [Aripiprazole]      Carafate [Sucralfate]      Lisinopril Cough     cough        Social History     Tobacco Use     Smoking status: Passive Smoke Exposure - Never Smoker     Smokeless tobacco: Never Used     Tobacco comment: boyfriend smokes   Substance Use Topics     Alcohol use: Yes     Comment: rarely     Family History   Problem Relation Age of Onset     C.A.D. Father      Alzheimer Disease Paternal Grandmother      Migraines Paternal Grandmother      Cancer Mother 69        lung cancer now      Depression Mother      Other Cancer Mother              Migraines Mother      Thyroid Disease Maternal Grandmother      Diabetes Son         Type 1     Substance Abuse Sister      Depression Sister      Migraines Sister      Alcoholism Sister      Substance Abuse Sister      Depression Sister      Alcoholism Sister      Diabetes Son      Diabetes Sister      History   Drug Use No         Objective     /82   Pulse 66   Temp 97.9  F (36.6  C) (Tympanic)   Resp 18   Ht 1.651 m (5' 5\")   Wt 139.7 kg (308 lb)   LMP  (LMP Unknown)   SpO2 96%   BMI 51.25 kg/m      Physical Exam  GENERAL APPEARANCE: healthy, alert and no distress, ambulating with a walker   EYES: Eyes grossly normal to inspection and conjunctivae and sclerae normal  NECK: no adenopathy and trachea midline and normal to palpation, I did not hear any carotid bruits   RESP: lungs clear to auscultation - no rales, rhonchi or wheezes  CV: regular rates and rhythm, normal S1 S2, no S3 or S4 and no murmur, click or rub  ABDOMEN: soft, non-tender and no rebound or guarding   MS: extremities normal- no gross deformities noted and peripheral pulses normal  SKIN: no suspicious lesions or rashes  NEURO: Normal strength and tone, mentation intact and speech normal  PSYCH: mentation appears normal      Recent Labs   Lab Test 20  1055 19  1143   HGB 12.6 " 13.2    271    142   POTASSIUM 4.2 4.5   CR 0.73 0.91   A1C 5.3 4.9        Diagnostics:  Labs pending at this time.  Results will be reviewed when available.   No EKG required, no history of coronary heart disease, significant arrhythmia, peripheral arterial disease or other structural heart disease.    Revised Cardiac Risk Index (RCRI):  The patient has the following serious cardiovascular risks for perioperative complications:   - No serious cardiac risks = 0 points     RCRI Interpretation: 0 points: Class I (very low risk - 0.4% complication rate)           Signed Electronically by: Connie Martinez MD MPH    Copy of this evaluation report is provided to requesting physician.

## 2021-08-02 NOTE — RESULT ENCOUNTER NOTE
Geovanna,     Electrolytes, glucose, kidney function and thyroid function are within normal limits.     Please do not hesitate to call us at [phone] if you have any questions or concerns.    Thank you,    Connie Martinez MD MPH

## 2021-08-04 ENCOUNTER — TELEPHONE (OUTPATIENT)
Dept: GASTROENTEROLOGY | Facility: CLINIC | Age: 61
End: 2021-08-04

## 2021-08-04 DIAGNOSIS — Z12.11 ENCOUNTER FOR SCREENING COLONOSCOPY: Primary | ICD-10-CM

## 2021-08-04 NOTE — TELEPHONE ENCOUNTER
Attempted to contact patient regarding upcoming colonoscopy procedure on 8.12.2021 for pre assessment questions. No answer.     Left message to return call to [phone] #2    Covid test scheduled: 8.8.2021    Pre-op: 7.30.2021 with Dr. Martinez    Arrival time: 0850    Facility location: [facility]    Sedation type: MAC    Bowel prep recommendation: Extended prep d/t BMI    [facility] in [place] does not have Golytely or a substitute available.  [employer] message sent to pt. Extended prep pending.    Cinthya He RN

## 2021-08-06 RX ORDER — BISACODYL 5 MG
TABLET, DELAYED RELEASE (ENTERIC COATED) ORAL
Qty: 2 TABLET | Refills: 0 | Status: SHIPPED | OUTPATIENT
Start: 2021-08-06 | End: 2022-12-13

## 2021-08-06 NOTE — TELEPHONE ENCOUNTER
Writer reviewed pre-assessment questions with patient prior to upcoming colonoscopy on 8.12.2021.      Covid test scheduled: 8.8.2021    Pre-op: 7.30.2021 with Dr. Martinez    Arrival time: 0850    Facility location: [facility]    Sedation type: MAC    Implantable devices? No    Blood thinners/Antiplatelet medication? No    Designated  policy reviewed with patient.     Pt has concerns about extended prep.  Pt does not want to take this prep d/t mobility issues with her hip post hip surgery.  Pt wants to know if she can have the miralax and magnesium citrate prep with an extra bottle of magnesium citrate.  Message sent to Dr. Osei.    Cinthya He RN

## 2021-08-08 ENCOUNTER — LAB (OUTPATIENT)
Dept: URGENT CARE | Facility: URGENT CARE | Age: 61
End: 2021-08-08
Payer: COMMERCIAL

## 2021-08-08 DIAGNOSIS — Z11.59 ENCOUNTER FOR SCREENING FOR OTHER VIRAL DISEASES: ICD-10-CM

## 2021-08-08 PROCEDURE — U0003 INFECTIOUS AGENT DETECTION BY NUCLEIC ACID (DNA OR RNA); SEVERE ACUTE RESPIRATORY SYNDROME CORONAVIRUS 2 (SARS-COV-2) (CORONAVIRUS DISEASE [COVID-19]), AMPLIFIED PROBE TECHNIQUE, MAKING USE OF HIGH THROUGHPUT TECHNOLOGIES AS DESCRIBED BY CMS-2020-01-R: HCPCS

## 2021-08-08 PROCEDURE — U0005 INFEC AGEN DETEC AMPLI PROBE: HCPCS

## 2021-08-09 LAB — SARS-COV-2 RNA RESP QL NAA+PROBE: NEGATIVE

## 2021-08-10 NOTE — TELEPHONE ENCOUNTER
Patient returned call.     Informed patient of note below.     Patient will continue with extended prep.     Patient had multiple questions from Covid testing, vaccinations, transferring, helping with getting dressed,  parking, recovery process and time, pre op exam necessity, anesthesia, prep medication purpose and clarifications of prep instructions.     Questioned answered to patient's satisfaction. Patient had no further questions at this time.     Yulissa Smith RN

## 2021-08-10 NOTE — TELEPHONE ENCOUNTER
RN attempted to reach back out to patient re: prep as per last conversation pt had some prep concerns.  RN has not gotten approval of the miralax and magnesium citrate prep with an extra bottle of magnesium citrate per pt's request.    No answer. Left message to return call [phone] #2    Cinthya He RN

## 2021-08-11 DIAGNOSIS — Z12.11 ENCOUNTER FOR SCREENING COLONOSCOPY: Primary | ICD-10-CM

## 2021-08-12 ENCOUNTER — ANESTHESIA EVENT (OUTPATIENT)
Dept: GASTROENTEROLOGY | Facility: CLINIC | Age: 61
End: 2021-08-12
Payer: COMMERCIAL

## 2021-08-12 ENCOUNTER — ANESTHESIA (OUTPATIENT)
Dept: GASTROENTEROLOGY | Facility: CLINIC | Age: 61
End: 2021-08-12
Payer: COMMERCIAL

## 2021-08-12 ENCOUNTER — HOSPITAL ENCOUNTER (OUTPATIENT)
Facility: CLINIC | Age: 61
Discharge: HOME | End: 2021-08-12
Attending: INTERNAL MEDICINE | Admitting: INTERNAL MEDICINE
Payer: COMMERCIAL

## 2021-08-12 VITALS
BODY MASS INDEX: 48.82 KG/M2 | TEMPERATURE: 97.9 F | RESPIRATION RATE: 16 BRPM | SYSTOLIC BLOOD PRESSURE: 142 MMHG | OXYGEN SATURATION: 100 % | DIASTOLIC BLOOD PRESSURE: 71 MMHG | HEIGHT: 65 IN | WEIGHT: 293 LBS | HEART RATE: 69 BPM

## 2021-08-12 LAB — COLONOSCOPY: NORMAL

## 2021-08-12 PROCEDURE — 88305 TISSUE EXAM BY PATHOLOGIST: CPT | Mod: TC | Performed by: INTERNAL MEDICINE

## 2021-08-12 PROCEDURE — 250N000011 HC RX IP 250 OP 636: Performed by: NURSE ANESTHETIST, CERTIFIED REGISTERED

## 2021-08-12 PROCEDURE — 88305 TISSUE EXAM BY PATHOLOGIST: CPT | Mod: 26 | Performed by: PATHOLOGY

## 2021-08-12 PROCEDURE — 258N000003 HC RX IP 258 OP 636: Performed by: NURSE ANESTHETIST, CERTIFIED REGISTERED

## 2021-08-12 PROCEDURE — 999N000127 HC STATISTIC PERIPHERAL IV START W US GUIDANCE

## 2021-08-12 PROCEDURE — 250N000009 HC RX 250: Performed by: NURSE ANESTHETIST, CERTIFIED REGISTERED

## 2021-08-12 PROCEDURE — 45380 COLONOSCOPY AND BIOPSY: CPT | Performed by: INTERNAL MEDICINE

## 2021-08-12 PROCEDURE — 370N000017 HC ANESTHESIA TECHNICAL FEE, PER MIN: Performed by: INTERNAL MEDICINE

## 2021-08-12 RX ORDER — SODIUM CHLORIDE 9 MG/ML
INJECTION, SOLUTION INTRAVENOUS CONTINUOUS PRN
Status: DISCONTINUED | OUTPATIENT
Start: 2021-08-12 | End: 2021-08-12

## 2021-08-12 RX ORDER — ONDANSETRON 4 MG/1
4 TABLET, ORALLY DISINTEGRATING ORAL EVERY 6 HOURS PRN
Status: DISCONTINUED | OUTPATIENT
Start: 2021-08-12 | End: 2021-08-12 | Stop reason: HOSPADM

## 2021-08-12 RX ORDER — PROPOFOL 10 MG/ML
INJECTION, EMULSION INTRAVENOUS PRN
Status: DISCONTINUED | OUTPATIENT
Start: 2021-08-12 | End: 2021-08-12

## 2021-08-12 RX ORDER — PROCHLORPERAZINE MALEATE 10 MG
10 TABLET ORAL EVERY 6 HOURS PRN
Status: DISCONTINUED | OUTPATIENT
Start: 2021-08-12 | End: 2021-08-12 | Stop reason: HOSPADM

## 2021-08-12 RX ORDER — NALOXONE HYDROCHLORIDE 0.4 MG/ML
0.4 INJECTION, SOLUTION INTRAMUSCULAR; INTRAVENOUS; SUBCUTANEOUS
Status: DISCONTINUED | OUTPATIENT
Start: 2021-08-12 | End: 2021-08-12 | Stop reason: HOSPADM

## 2021-08-12 RX ORDER — ONDANSETRON 2 MG/ML
4 INJECTION INTRAMUSCULAR; INTRAVENOUS
Status: DISCONTINUED | OUTPATIENT
Start: 2021-08-12 | End: 2021-08-12 | Stop reason: HOSPADM

## 2021-08-12 RX ORDER — KETAMINE HYDROCHLORIDE 10 MG/ML
INJECTION INTRAMUSCULAR; INTRAVENOUS PRN
Status: DISCONTINUED | OUTPATIENT
Start: 2021-08-12 | End: 2021-08-12

## 2021-08-12 RX ORDER — NALOXONE HYDROCHLORIDE 0.4 MG/ML
0.2 INJECTION, SOLUTION INTRAMUSCULAR; INTRAVENOUS; SUBCUTANEOUS
Status: DISCONTINUED | OUTPATIENT
Start: 2021-08-12 | End: 2021-08-12 | Stop reason: HOSPADM

## 2021-08-12 RX ORDER — LIDOCAINE 40 MG/G
CREAM TOPICAL
Status: DISCONTINUED | OUTPATIENT
Start: 2021-08-12 | End: 2021-08-12 | Stop reason: HOSPADM

## 2021-08-12 RX ORDER — FLUMAZENIL 0.1 MG/ML
0.2 INJECTION, SOLUTION INTRAVENOUS
Status: DISCONTINUED | OUTPATIENT
Start: 2021-08-12 | End: 2021-08-12 | Stop reason: HOSPADM

## 2021-08-12 RX ORDER — ONDANSETRON 2 MG/ML
4 INJECTION INTRAMUSCULAR; INTRAVENOUS EVERY 6 HOURS PRN
Status: DISCONTINUED | OUTPATIENT
Start: 2021-08-12 | End: 2021-08-12 | Stop reason: HOSPADM

## 2021-08-12 RX ADMIN — PROPOFOL 50 MG: 10 INJECTION, EMULSION INTRAVENOUS at 10:36

## 2021-08-12 RX ADMIN — PROPOFOL 50 MG: 10 INJECTION, EMULSION INTRAVENOUS at 10:42

## 2021-08-12 RX ADMIN — Medication 10 MG: at 10:50

## 2021-08-12 RX ADMIN — PROPOFOL 50 MG: 10 INJECTION, EMULSION INTRAVENOUS at 10:55

## 2021-08-12 RX ADMIN — SODIUM CHLORIDE: 9 INJECTION, SOLUTION INTRAVENOUS at 10:28

## 2021-08-12 RX ADMIN — PROPOFOL 70 MG: 10 INJECTION, EMULSION INTRAVENOUS at 10:47

## 2021-08-12 RX ADMIN — PROPOFOL 50 MG: 10 INJECTION, EMULSION INTRAVENOUS at 10:50

## 2021-08-12 RX ADMIN — Medication 20 MG: at 10:32

## 2021-08-12 RX ADMIN — PROPOFOL 30 MG: 10 INJECTION, EMULSION INTRAVENOUS at 10:45

## 2021-08-12 ASSESSMENT — MIFFLIN-ST. JEOR
SCORE: 1947.88
SCORE: 1947.88

## 2021-08-12 ASSESSMENT — ENCOUNTER SYMPTOMS: DYSRHYTHMIAS: 1

## 2021-08-12 NOTE — ANESTHESIA PREPROCEDURE EVALUATION
Anesthesia Pre-Procedure Evaluation    Patient: Geovanna Aguilar   MRN: [id number] : 1960        Preoperative Diagnosis: Screen for colon cancer [Z12.11]   Procedure : Procedure(s):  COLONOSCOPY     Past Medical History:   Diagnosis Date     Arthritis      Bipolar 2 disorder (H)      Bone and joint disord back, pelvis, leg complicat preg, childb, puerp      Chronic constipation      Depressive disorder      Esophageal reflux      GERD (gastroesophageal reflux disease)      Heart rate problem     maybe     Hiatal hernia      Hyperlipidemia LDL goal < 100      Hypertension      Hypothyroidism      Migraines      Obesity      Obstructive sleep apnea      Osteoporosis     not sure     Other mental problems      Peripheral edema      Schizophrenia (H)      Urinary incontinence     pulsating urge with spasms to pee      Past Surgical History:   Procedure Laterality Date     C ABLATION SUPRAVENT ARRHYTHMOGENIC FOCUS       C CARDIAC SURG PROCEDURE UNLIST       C STOMACH SURGERY PROCEDURE UNLISTED       CRYOTHERAPY, CERVICAL       GENITOURINARY SURGERY       HC TOOTH EXTRACTION W/FORCEP       HYSTERECTOMY SUPRACERVICAL      due to endometrial hyperplasia     SALPINGO OOPHORECTOMY,R/L/KEVIN      Salpingo Oophorectomy, RT     SURGICAL HISTORY OF -       Repair soft tissue of right arm     TUBAL LIGATION       [facility]  11     VASCULAR SURGERY        Allergies   Allergen Reactions     Abilify [Aripiprazole]      Carafate [Sucralfate]      Latex      Pt unsure of reaction     Lisinopril Cough     cough      Social History     Tobacco Use     Smoking status: Passive Smoke Exposure - Never Smoker     Smokeless tobacco: Never Used     Tobacco comment: boyfriend smokes   Substance Use Topics     Alcohol use: Yes     Comment: rarely      Wt Readings from Last 1 Encounters:   21 137.7 kg (303 lb 9.2 oz)        Anesthesia Evaluation   Pt has had prior anesthetic.     No history of anesthetic  complications       ROS/MED HX  ENT/Pulmonary:     (+) sleep apnea, uses CPAP,     Neurologic:     (+) migraines,     Cardiovascular:     (+) hypertension-----dysrhythmias (Postural tachycardia s/p ablation),     METS/Exercise Tolerance:     Hematologic:       Musculoskeletal:   (+) arthritis,     GI/Hepatic:     (+) GERD, Asymptomatic on medication, hiatal hernia,     Renal/Genitourinary:       Endo:     (+) thyroid problem, hypothyroidism, Obesity,     Psychiatric/Substance Use:     (+) psychiatric history schizophrenia and bipolar     Infectious Disease:       Malignancy:       Other:            Physical Exam    Airway        Mallampati: III   TM distance: > 3 FB   Neck ROM: full   Mouth opening: > 3 cm    Respiratory Devices and Support         Dental  no notable dental history         Cardiovascular             Pulmonary                   OUTSIDE LABS:  CBC:   Lab Results   Component Value Date    WBC 7.8 11/23/2020    WBC 9.8 09/16/2019    HGB 12.9 07/30/2021    HGB 12.6 11/23/2020    HCT 39.4 11/23/2020    HCT 41.3 09/16/2019     11/23/2020     09/16/2019     BMP:   Lab Results   Component Value Date     07/30/2021     11/23/2020    POTASSIUM 4.1 07/30/2021    POTASSIUM 4.2 11/23/2020    CHLORIDE 110 (H) 07/30/2021    CHLORIDE 106 11/23/2020    CO2 25 07/30/2021    CO2 27 11/23/2020    BUN 9 07/30/2021    BUN 12 11/23/2020    CR 0.78 07/30/2021    CR 0.73 11/23/2020    GLC 99 07/30/2021     (H) 11/23/2020     COAGS: No results found for: PTT, INR, FIBR  POC: No results found for: BGM, HCG, HCGS  HEPATIC:   Lab Results   Component Value Date    ALBUMIN 3.4 11/23/2020    PROTTOTAL 7.3 11/23/2020    ALT 28 11/23/2020    ALT 29 11/23/2020    AST 9 11/23/2020    ALKPHOS 82 11/23/2020    BILITOTAL 0.4 11/23/2020     OTHER:   Lab Results   Component Value Date    A1C 5.3 11/23/2020    DAMARIS 9.1 07/30/2021    TSH 1.89 07/30/2021    T4 1.22 09/12/2018       Anesthesia Plan    ASA Status:   3   NPO Status:  NPO Appropriate    Anesthesia Type: MAC.     - Reason for MAC: chronic cardiopulmonary disease   Induction: Intravenous, Propofol.   Maintenance: TIVA.        Consents    Anesthesia Plan(s) and associated risks, benefits, and realistic alternatives discussed. Questions answered and patient/representative(s) expressed understanding.     - Discussed with:  Patient         Postoperative Care       PONV prophylaxis: Background Propofol Infusion     Comments:                Hussain Mackey MD

## 2021-08-12 NOTE — OR NURSING
Pt had colonoscopy with polypectomy and biopsy under MAC. Pt tolerated procedure well. See anesthesia report for airway notes. Pt stable and talking at time of transfer to 3C recovery, accompanied by RN and CRNA. Report given to 3C recovery RN.

## 2021-08-12 NOTE — ANESTHESIA CARE TRANSFER NOTE
Patient: Geovanna Aguilar    Procedure(s):  COLONOSCOPY, WITH POLYPECTOMY AND BIOPSY    Diagnosis: Screen for colon cancer [Z12.11]  Diagnosis Additional Information: No value filed.    Anesthesia Type:   MAC     Note:    Oropharynx: oropharynx clear of all foreign objects  Level of Consciousness: awake  Oxygen Supplementation: room air    Independent Airway: airway patency satisfactory and stable  Dentition: dentition unchanged  Vital Signs Stable: post-procedure vital signs reviewed and stable  Report to RN Given: handoff report given  Patient transferred to: Phase II    Handoff Report: Identifed the Patient, Identified the Reponsible Provider, Reviewed the pertinent medical history, Discussed the surgical course, Reviewed Intra-OP anesthesia mangement and issues during anesthesia, Set expectations for post-procedure period and Allowed opportunity for questions and acknowledgement of understanding      Vitals:  Vitals Value Taken Time   BP     Temp     Pulse     Resp     SpO2         Electronically Signed By: KATI Vyas CRNA  August 12, 2021  11:04 AM

## 2021-08-12 NOTE — ANESTHESIA POSTPROCEDURE EVALUATION
Patient: Geovanna Aguilar    Procedure(s):  COLONOSCOPY, WITH POLYPECTOMY AND BIOPSY    Diagnosis:Screen for colon cancer [Z12.11]  Diagnosis Additional Information: No value filed.    Anesthesia Type:  MAC    Note:  Disposition: Outpatient   Postop Pain Control: Uneventful            Sign Out: Well controlled pain   PONV: No   Neuro/Psych: Uneventful            Sign Out: Acceptable/Baseline neuro status   Airway/Respiratory: Uneventful            Sign Out: Acceptable/Baseline resp. status   CV/Hemodynamics: Uneventful            Sign Out: Acceptable CV status; No obvious hypovolemia; No obvious fluid overload   Other NRE: NONE   DID A NON-ROUTINE EVENT OCCUR? No           Last vitals:  Vitals Value Taken Time   /65 08/12/21 1110   Temp 36.5  C (97.7  F) 08/12/21 1105   Pulse 70 08/12/21 1110   Resp 12 08/12/21 1105   SpO2 100 % 08/12/21 1119   Vitals shown include unvalidated device data.    Electronically Signed By: Hussain Mackey MD  August 12, 2021  11:24 AM

## 2021-08-13 ENCOUNTER — OFFICE VISIT (OUTPATIENT)
Dept: OPHTHALMOLOGY | Facility: CLINIC | Age: 61
End: 2021-08-13
Payer: COMMERCIAL

## 2021-08-13 DIAGNOSIS — H25.811 COMBINED FORMS OF AGE-RELATED CATARACT OF RIGHT EYE: ICD-10-CM

## 2021-08-13 DIAGNOSIS — H25.812 COMBINED FORMS OF AGE-RELATED CATARACT OF LEFT EYE: Primary | ICD-10-CM

## 2021-08-13 DIAGNOSIS — H43.811 POSTERIOR VITREOUS DETACHMENT OF RIGHT EYE: ICD-10-CM

## 2021-08-13 LAB
PATH REPORT.COMMENTS IMP SPEC: NORMAL
PATH REPORT.FINAL DX SPEC: NORMAL
PATH REPORT.GROSS SPEC: NORMAL
PATH REPORT.MICROSCOPIC SPEC OTHER STN: NORMAL
PATH REPORT.RELEVANT HX SPEC: NORMAL
PHOTO IMAGE: NORMAL

## 2021-08-13 PROCEDURE — 92134 CPTRZ OPH DX IMG PST SGM RTA: CPT | Performed by: OPHTHALMOLOGY

## 2021-08-13 PROCEDURE — 92002 INTRM OPH EXAM NEW PATIENT: CPT | Performed by: OPHTHALMOLOGY

## 2021-08-13 ASSESSMENT — REFRACTION_MANIFEST
OD_CYLINDER: +3.00
OS_SPHERE: -11.50
OS_CYLINDER: SPHERE
OD_SPHERE: -6.00
OD_AXIS: 165

## 2021-08-13 ASSESSMENT — VISUAL ACUITY
OD_CC+: -2
CORRECTION_TYPE: GLASSES
METHOD: SNELLEN - LINEAR
OS_CC: CF @ 1'
OD_CC: 20/50

## 2021-08-13 ASSESSMENT — REFRACTION_WEARINGRX
OS_CYLINDER: +0.25
OD_SPHERE: -6.00
OS_ADD: +2.50
OS_AXIS: 004
OD_ADD: +2.50
OD_CYLINDER: +3.00
OS_SPHERE: -5.00
OD_AXIS: 175

## 2021-08-13 ASSESSMENT — TONOMETRY
OS_IOP_MMHG: 18
IOP_METHOD: APPLANATION
OD_IOP_MMHG: 18

## 2021-08-13 ASSESSMENT — CONF VISUAL FIELD
OD_NORMAL: 1
OS_INFERIOR_NASAL_RESTRICTION: 3
OS_INFERIOR_TEMPORAL_RESTRICTION: 3
OS_SUPERIOR_NASAL_RESTRICTION: 3
OS_SUPERIOR_TEMPORAL_RESTRICTION: 3

## 2021-08-13 NOTE — PATIENT INSTRUCTIONS
We will schedule cataract surgery for your left eye in early October.  Our schedulers will call you to arrange preop appointments.  Damon Acuña M.D.  [phone]

## 2021-08-13 NOTE — LETTER
8/13/2021         RE: Geovanna Aguliar  [address] [place] [address]        Dear Colleague,    Thank you for referring your patient, Geovanna Aguilar, to the [facility]. Please see a copy of my visit note below.     Current Eye Medications:  None     Subjective:  Cataract evaluation per Dr. Friedman. PT notes blurry vision left eye >>Right eye. Has difficulty reading TV print. Denies floaters and flashes.    RH.  GMFT; occasionally off for near.  Lives with son; he can drive.  Plan [place].     Objective:  See Ophthalmology Exam.       Assessment:  Visually significant cataract left eye.      Plan:  We will schedule cataract surgery for your left eye in early October.  Our schedulers will call you to arrange preop appointments.  Damon Acuña M.D.  [phone]             Again, thank you for allowing me to participate in the care of your patient.        Sincerely,        Damon Acuña MD

## 2021-08-13 NOTE — PROGRESS NOTES
Current Eye Medications:  None     Subjective:  Cataract evaluation per Dr. Friedman. PT notes blurry vision left eye >>Right eye. Has difficulty reading TV print. Denies floaters and flashes.    RH.  GMFT; occasionally off for near.  Lives with son; he can drive.  Plan [place].     Objective:  See Ophthalmology Exam.       Assessment:  Visually significant cataract left eye.      Plan:  We will schedule cataract surgery for your left eye in early October.  Our schedulers will call you to arrange preop appointments.  Damon Acuña M.D.  [phone]

## 2021-08-15 ASSESSMENT — SLIT LAMP EXAM - LIDS
COMMENTS: 2+ SCLERAL SHOW
COMMENTS: 2+ SCLERAL SHOW

## 2021-08-15 ASSESSMENT — EXTERNAL EXAM - RIGHT EYE: OD_EXAM: MILD BROW

## 2021-08-15 ASSESSMENT — CUP TO DISC RATIO: OD_RATIO: 0.4

## 2021-08-15 ASSESSMENT — EXTERNAL EXAM - LEFT EYE: OS_EXAM: MILD BROW

## 2021-09-03 ENCOUNTER — TELEPHONE (OUTPATIENT)
Dept: FAMILY MEDICINE | Facility: CLINIC | Age: 61
End: 2021-09-03

## 2021-09-03 NOTE — LETTER
"September 3, 2021      Geovanna Aguilar  [address] [facility] [place] [address]      Ms. Aguilar,      I am writing to let you know the results of the polyps removed at the time of your colonoscopy.  The polyps returned as an \"adenomatous polyps\".  An adenoma is considered a pre-cancerous polyp.  There was no cancer in your polyp.  Your polyps were completely removed, however you are at risk to grow more adenomatous polyps in the future.       Because of this I recommend that you repeat a colonoscopy to screen for new polyps in seven (7) to ten (10) years.  Of course should you develop any symptoms (such as unintended weight loss, blood in your stool or change in bowel pattern), you should let your primary care doctor know as you may need an earlier procedure.       The remainder of the biopsies were normal and did not show a cause for your diarrhea. Follow up with the referring provider.    Maggie AGUILERA RN    "

## 2021-09-03 NOTE — TELEPHONE ENCOUNTER
Patient calling for colonoscopy results  Gave verbal results to pt and mailed copy  Told pt unsure how to further help her - transferred to [facility] in [facility] clinic  Maggie AGUILERA RN

## 2021-09-08 ENCOUNTER — TELEPHONE (OUTPATIENT)
Dept: OPHTHALMOLOGY | Facility: CLINIC | Age: 61
End: 2021-09-08

## 2021-09-08 DIAGNOSIS — H25.812 COMBINED FORMS OF AGE-RELATED CATARACT OF LEFT EYE: Primary | ICD-10-CM

## 2021-09-08 NOTE — TELEPHONE ENCOUNTER
Reason for Call:  Other Orders    Detailed comments: Per voice mail 9-7 4:15pm-Patient is concerned as she was seen 8-13-21 but hasn't received a call to set up her left eye surgery. No orders placed. Need orders.    Phone Number Patient can be reached at: Home number on file [phone] (home)    Best Time: any    Can we leave a detailed message on this number? YES    Call taken on 9/8/2021 at 7:18 AM by Grecia Rivero

## 2021-09-10 DIAGNOSIS — E03.9 ACQUIRED HYPOTHYROIDISM: ICD-10-CM

## 2021-09-13 RX ORDER — LEVOTHYROXINE SODIUM 150 UG/1
150 TABLET ORAL DAILY
Qty: 90 TABLET | Refills: 3 | Status: SHIPPED | OUTPATIENT
Start: 2021-09-13 | End: 2022-11-28

## 2021-09-13 NOTE — TELEPHONE ENCOUNTER
Prescription approved per [facility] Refill Protocol.  Stacey Valdez RN, BSN   [facility]

## 2021-09-14 DIAGNOSIS — E03.9 ACQUIRED HYPOTHYROIDISM: ICD-10-CM

## 2021-09-14 RX ORDER — LEVOTHYROXINE SODIUM 150 UG/1
150 TABLET ORAL DAILY
Qty: 90 TABLET | Refills: 3 | OUTPATIENT
Start: 2021-09-14

## 2021-09-14 NOTE — TELEPHONE ENCOUNTER
Patient is calling back again on status of orders being placed to have this done in October. Please advise. Thank you.

## 2021-09-14 NOTE — TELEPHONE ENCOUNTER
Patient calling for a refill on her Synthroid to be sent to [facility] Mail order.  Charlee Kline [facility]  2nd Floor  Primary Care

## 2021-09-14 NOTE — TELEPHONE ENCOUNTER
Duplicate order prescription filled on 9/13/2021 with 3 refills.     Stacey Valdez RN, BSN   [facility]

## 2021-09-16 ENCOUNTER — TELEPHONE (OUTPATIENT)
Dept: OPHTHALMOLOGY | Facility: CLINIC | Age: 61
End: 2021-09-16

## 2021-10-05 NOTE — PROGRESS NOTES
{PROVIDER CHARTING PREFERENCE:058840}    Eliz Austin is a 61 year old who presents for the following health issues {ACCOMPANIED BY STATEMENT (Optional):252944}    HPI     {SUPERLIST (Optional):805363}  {additonal problems for provider to add (Optional):668103}    Review of Systems   {ROS COMP (Optional):859430}      Objective    LMP  (LMP Unknown)   There is no height or weight on file to calculate BMI.  Physical Exam   {Exam List (Optional):359902}    {Diagnostic Test Results (Optional):752196}    {AMBULATORY ATTESTATION (Optional):821042}

## 2021-10-06 ENCOUNTER — TELEPHONE (OUTPATIENT)
Dept: OPHTHALMOLOGY | Facility: CLINIC | Age: 61
End: 2021-10-06

## 2021-10-06 NOTE — TELEPHONE ENCOUNTER
Type of surgery: Kelman phacoemulsification with intraocular lens implant  CPT 51394   Combined forms of age-related cataract, marked, of left eye H25.812      Location of surgery: Other: [facility]  Date and time of surgery: 10-21-21  12:30pm  Surgeon: Dr Acuña  Pre-Op Appt Date: 10-8-21  Post-Op Appt Date: 10-22-21, 10-28-21 & 11-19-21   Packet sent out: Yes  Pre-cert/Authorization completed:    No prior auth needed for [employer] fed  Date: 10/06/21    Thank you,   Gisel William   Prior Authorization Department  [phone]

## 2021-10-06 NOTE — TELEPHONE ENCOUNTER
Called patient about switching her Preop time to 2:30 pm tomorrow. She has a 1pm preop in [facility] and will come straight over after that appointment. Talked a bit about the drops she will need to take after the surgery and CPAP. Told patient she will need to sleep in recliner the night after surgery due to having a full face mask and sleeping on her stomach. She agreed to plan and will come on Friday for preop

## 2021-10-08 ENCOUNTER — OFFICE VISIT (OUTPATIENT)
Dept: OPHTHALMOLOGY | Facility: CLINIC | Age: 61
End: 2021-10-08
Payer: COMMERCIAL

## 2021-10-08 ENCOUNTER — OFFICE VISIT (OUTPATIENT)
Dept: FAMILY MEDICINE | Facility: CLINIC | Age: 61
End: 2021-10-08
Payer: COMMERCIAL

## 2021-10-08 VITALS
SYSTOLIC BLOOD PRESSURE: 131 MMHG | HEART RATE: 69 BPM | TEMPERATURE: 97.1 F | DIASTOLIC BLOOD PRESSURE: 80 MMHG | WEIGHT: 293 LBS | RESPIRATION RATE: 20 BRPM | HEIGHT: 65 IN | BODY MASS INDEX: 48.82 KG/M2 | OXYGEN SATURATION: 97 %

## 2021-10-08 DIAGNOSIS — F31.31 MILD DEPRESSED BIPOLAR I DISORDER (H): ICD-10-CM

## 2021-10-08 DIAGNOSIS — E03.9 ACQUIRED HYPOTHYROIDISM: ICD-10-CM

## 2021-10-08 DIAGNOSIS — E66.01 MORBID OBESITY (H): ICD-10-CM

## 2021-10-08 DIAGNOSIS — H26.9 CATARACT OF LEFT EYE, UNSPECIFIED CATARACT TYPE: ICD-10-CM

## 2021-10-08 DIAGNOSIS — H25.812 COMBINED FORMS OF AGE-RELATED CATARACT OF LEFT EYE: Primary | ICD-10-CM

## 2021-10-08 DIAGNOSIS — R19.5 LOOSE STOOLS: ICD-10-CM

## 2021-10-08 DIAGNOSIS — Z01.818 PRE-OP EXAM: Primary | ICD-10-CM

## 2021-10-08 DIAGNOSIS — Z23 ENCOUNTER FOR IMMUNIZATION: ICD-10-CM

## 2021-10-08 LAB
ALBUMIN SERPL-MCNC: 3.6 G/DL (ref 3.4–5)
ALP SERPL-CCNC: 94 U/L (ref 40–150)
ALT SERPL W P-5'-P-CCNC: 29 U/L (ref 0–50)
ANION GAP SERPL CALCULATED.3IONS-SCNC: 7 MMOL/L (ref 3–14)
AST SERPL W P-5'-P-CCNC: 11 U/L (ref 0–45)
BASOPHILS # BLD AUTO: 0 10E3/UL (ref 0–0.2)
BASOPHILS NFR BLD AUTO: 0 %
BILIRUB SERPL-MCNC: 0.5 MG/DL (ref 0.2–1.3)
BUN SERPL-MCNC: 13 MG/DL (ref 7–30)
CALCIUM SERPL-MCNC: 9.2 MG/DL (ref 8.5–10.1)
CHLORIDE BLD-SCNC: 106 MMOL/L (ref 94–109)
CO2 SERPL-SCNC: 26 MMOL/L (ref 20–32)
CREAT SERPL-MCNC: 0.78 MG/DL (ref 0.52–1.04)
EOSINOPHIL # BLD AUTO: 0.1 10E3/UL (ref 0–0.7)
EOSINOPHIL NFR BLD AUTO: 1 %
ERYTHROCYTE [DISTWIDTH] IN BLOOD BY AUTOMATED COUNT: 13.9 % (ref 10–15)
GFR SERPL CREATININE-BSD FRML MDRD: 83 ML/MIN/1.73M2
GLUCOSE BLD-MCNC: 101 MG/DL (ref 70–99)
HCT VFR BLD AUTO: 40.7 % (ref 35–47)
HGB BLD-MCNC: 13.3 G/DL (ref 11.7–15.7)
IMM GRANULOCYTES # BLD: 0.1 10E3/UL
IMM GRANULOCYTES NFR BLD: 1 %
LYMPHOCYTES # BLD AUTO: 3.1 10E3/UL (ref 0.8–5.3)
LYMPHOCYTES NFR BLD AUTO: 32 %
MCH RBC QN AUTO: 29.5 PG (ref 26.5–33)
MCHC RBC AUTO-ENTMCNC: 32.7 G/DL (ref 31.5–36.5)
MCV RBC AUTO: 90 FL (ref 78–100)
MONOCYTES # BLD AUTO: 0.5 10E3/UL (ref 0–1.3)
MONOCYTES NFR BLD AUTO: 5 %
NEUTROPHILS # BLD AUTO: 6 10E3/UL (ref 1.6–8.3)
NEUTROPHILS NFR BLD AUTO: 61 %
PLATELET # BLD AUTO: 239 10E3/UL (ref 150–450)
POTASSIUM BLD-SCNC: 4 MMOL/L (ref 3.4–5.3)
PROT SERPL-MCNC: 7.4 G/DL (ref 6.8–8.8)
RBC # BLD AUTO: 4.51 10E6/UL (ref 3.8–5.2)
SODIUM SERPL-SCNC: 139 MMOL/L (ref 133–144)
T4 FREE SERPL-MCNC: 1.02 NG/DL (ref 0.76–1.46)
TSH SERPL DL<=0.005 MIU/L-ACNC: 4.6 MU/L (ref 0.4–4)
WBC # BLD AUTO: 9.8 10E3/UL (ref 4–11)

## 2021-10-08 PROCEDURE — 84439 ASSAY OF FREE THYROXINE: CPT | Performed by: PREVENTIVE MEDICINE

## 2021-10-08 PROCEDURE — 99213 OFFICE O/P EST LOW 20 MIN: CPT | Performed by: OPHTHALMOLOGY

## 2021-10-08 PROCEDURE — 99214 OFFICE O/P EST MOD 30 MIN: CPT | Mod: 25 | Performed by: PREVENTIVE MEDICINE

## 2021-10-08 PROCEDURE — 90471 IMMUNIZATION ADMIN: CPT | Performed by: PREVENTIVE MEDICINE

## 2021-10-08 PROCEDURE — 92136 OPHTHALMIC BIOMETRY: CPT | Mod: 26 | Performed by: OPHTHALMOLOGY

## 2021-10-08 PROCEDURE — 36415 COLL VENOUS BLD VENIPUNCTURE: CPT | Performed by: PREVENTIVE MEDICINE

## 2021-10-08 PROCEDURE — 90682 RIV4 VACC RECOMBINANT DNA IM: CPT | Performed by: PREVENTIVE MEDICINE

## 2021-10-08 PROCEDURE — 80050 GENERAL HEALTH PANEL: CPT | Performed by: PREVENTIVE MEDICINE

## 2021-10-08 RX ORDER — KETOROLAC TROMETHAMINE 5 MG/ML
1 SOLUTION OPHTHALMIC
Qty: 5 ML | Refills: 0 | Status: SHIPPED | OUTPATIENT
Start: 2021-10-22 | End: 2021-11-19

## 2021-10-08 RX ORDER — PREDNISOLONE ACETATE 10 MG/ML
1 SUSPENSION/ DROPS OPHTHALMIC 3 TIMES DAILY
Qty: 10 ML | Refills: 0 | Status: SHIPPED | OUTPATIENT
Start: 2021-10-22 | End: 2021-11-19

## 2021-10-08 ASSESSMENT — PAIN SCALES - GENERAL: PAINLEVEL: NO PAIN (0)

## 2021-10-08 ASSESSMENT — MIFFLIN-ST. JEOR: SCORE: 1954.35

## 2021-10-08 ASSESSMENT — VISUAL ACUITY
OD_CC: 20/60
METHOD: SNELLEN - LINEAR
CORRECTION_TYPE: GLASSES
OS_CC: CF

## 2021-10-08 ASSESSMENT — TONOMETRY
OS_IOP_MMHG: 10
IOP_METHOD: ICARE

## 2021-10-08 NOTE — PROGRESS NOTES
[address] [address]  Phone: [phone]  Primary Provider: Leah Mae  Pre-op Performing Provider: BUTCH LOPEZ      PREOPERATIVE EVALUATION:  Today's date: 10/8/2021    Geovanna Aguilar is a 61 year old female who presents for a preoperative evaluation.    Surgical Information:  Surgery/Procedure: LEFT KELMAN PHACOEMULSIFICATION INTRAOCULAR LENS IMPLANT  Surgery Location: [facility]  Surgeon:  Damon Acuña MD  Surgery Date: 10/21/21  Time of Surgery: 11am   Where patient plans to recover: At home with family  Fax number for surgical facility: Fax: [phone]    Type of Anesthesia Anticipated: Local with MAC    Assessment & Plan     The proposed surgical procedure is considered LOW risk.    Pre-op exam  -procedure on 10/21/21     Cataract of left eye, unspecified cataract type  -Scheduled for Cataract surgery     Loose stools  -since 4/21  -screening colonoscopy showed polyps, repeat in 7 years   - GASTROENTEROLOGY ADULT REF CONSULT ONLY  -await labs  - CBC with Platelets & Differential  - Comprehensive metabolic panel  -we discussed adding fiber as stool bulking agent, can use Imodium sparingly if going out     Acquired hypothyroidism  -on replacement   - TSH  - T4 free    Morbid obesity (H)  Wt Readings from Last 2 Encounters:   10/08/21 138.3 kg (305 lb)   08/12/21 137.7 kg (303 lb 9.2 oz)       Mild depressed bipolar I disorder (H)  -per Psychiatry     Encounter for immunization  - MI RIV4 (FLUBLOK) VACCINE RECOMBINANT DNA PRSRV ANTIBIO FREE, IM ([id number])      Risks and Recommendations:  The patient has the following additional risks and recommendations for perioperative complications:   - Morbid obesity (BMI >40)    Obstructive Sleep Apnea:   -consistent use of CPAP     Medication Instructions:    Patient is to take all scheduled medications on the day of  surgery EXCEPT for modifications listed below:   - Beta Blockers: Continue taking on the day of surgery.   - Statins: Continue taking on the day of surgery.    - ibuprofen (Advil, Motrin): HOLD 1 day before surgery.    - SSRIs, SNRIs, TCAs, Antipsychotics: Continue without modification.    RECOMMENDATION:  APPROVAL GIVEN to proceed with proposed procedure, without further diagnostic evaluation.        38 minutes spent on the date of the encounter doing chart review, history and exam, documentation and further activities per the note        Subjective     HPI related to upcoming procedure: Blurry vision, left side more than right side.     Preop Questions 10/8/2021   1. Have you ever had a heart attack or stroke? No   2. Have you ever had surgery on your heart or blood vessels, such as a stent placement, a coronary artery bypass, or surgery on an artery in your head, neck, heart, or legs? YES - Cardiac ablation for Supra ventricular arrhythmias in 2001, no problems since then    3. Do you have chest pain with activity? No   4. Do you have a history of  heart failure? No   5. Do you currently have a cold, bronchitis or symptoms of other infection? No   6. Do you have a cough, shortness of breath, or wheezing? No   7. Do you or anyone in your family have previous history of blood clots? No   8. Do you or does anyone in your family have a serious bleeding problem such as prolonged bleeding following surgeries or cuts? No   9. Have you ever had problems with anemia or been told to take iron pills? No   10. Have you had any abnormal blood loss such as black, tarry or bloody stools, or abnormal vaginal bleeding? No   11. Have you ever had a blood transfusion? No   12. Are you willing to have a blood transfusion if it is medically needed before, during, or after your surgery? Yes   13. Have you or any of your relatives ever had problems with anesthesia? No   14. Do you have sleep apnea, excessive snoring or daytime  drowsiness? YES - Uses CPAP daily, about 8-10 hours   14a. Do you have a CPAP machine? Yes   15. Do you have any artifical heart valves or other implanted medical devices like a pacemaker, defibrillator, or continuous glucose monitor? No   16. Do you have artificial joints? No   17. Are you allergic to latex? YES: Yes breaks out in a rash    18. Is there any chance that you may be pregnant? No     Diarrhea:  -since 4/21  -seen 6/21 by PCP, was advised colonoscopy  -soft stools with every episode  -a little more orange and brown  -no blood  -no melena  -less harder than before, has always had constipation   -does have cramping prior to BM  -no medication changes  -no weight change  -was taking Omega 3 supplement, stopped after colonoscopy but still with diarrhea   -no sense of incomplete emptying  -has to go several times in a short time  -mush like in consistency  -On wiping is orangish in color   -no pale stools   -some abdominal cramping before BM that resolves after BM  -no triggers  -not associated with any specific foods       Health Care Directive:  Patient does not have a Health Care Directive or Living Will: Discussed advance care planning with patient; information given to patient to review.    Preoperative Review of :   reviewed - controlled substances reflected in medication list.      Status of Chronic Conditions:  See problem list for active medical problems.  Problems all longstanding and stable, except as noted/documented.  See ROS for pertinent symptoms related to these conditions.      Review of Systems  CONSTITUTIONAL: NEGATIVE for fever, chills, change in weight  INTEGUMENTARY/SKIN: NEGATIVE for worrisome rashes, moles or lesions  EYES: NEGATIVE for vision changes or irritation  ENT/MOUTH: NEGATIVE for ear, mouth and throat problems  RESP: NEGATIVE for significant cough or SOB  CV: NEGATIVE for chest pain, palpitations or peripheral edema  : NEGATIVE for frequency, dysuria, or  hematuria  MUSCULOSKELETAL: NEGATIVE for significant arthralgias or myalgia  NEURO: NEGATIVE for weakness, dizziness or paresthesias  ENDOCRINE: NEGATIVE for temperature intolerance, skin/hair changes  HEME: NEGATIVE for bleeding problems  PSYCHIATRIC: NEGATIVE for changes in mood or affect    Patient Active Problem List    Diagnosis Date Noted     Slow transit constipation 09/12/2018     Priority: Medium     Morbid obesity due to excess calories (H) 06/21/2017     Priority: Medium     Bladder spasm 03/27/2017     Priority: Medium     Bursitis, hip 08/11/2015     Priority: Medium     Degenerative joint disease (DJD) of hip 06/29/2015     Priority: Medium     Mild depressed bipolar I disorder (H) 10/28/2011     Priority: Medium     Problem list name updated by automated process. Provider to review       Hyperlipidemia LDL goal <130 10/03/2011     Priority: Medium     Hypertension goal BP (blood pressure) < 140/90 12/03/2010     Priority: Medium     Anemia 10/16/2010     Priority: Medium     Hyperglycemia 10/16/2010     Priority: Medium     Chest pain 10/16/2010     Priority: Medium     Schizophrenia (H) 10/16/2010     Priority: Medium     KALPESH (obstructive sleep apnea) 03/03/2010     Priority: Medium     Hypothyroidism      Priority: Medium     Peripheral edema      Priority: Medium     GERD (gastroesophageal reflux disease) 12/11/2009     Priority: Medium      Past Medical History:   Diagnosis Date     Arthritis      Bipolar 2 disorder (H)      Bone and joint disord back, pelvis, leg complicat preg, childb, puerp      Chronic constipation      Depressive disorder      Esophageal reflux      GERD (gastroesophageal reflux disease)      Heart rate problem     maybe     Hiatal hernia      Hyperlipidemia LDL goal < 100      Hypertension      Hypothyroidism      Migraines      Obesity      Obstructive sleep apnea      Osteoporosis     not sure     Other mental problems      Peripheral edema      Schizophrenia (H)       Urinary incontinence     pulsating urge with spasms to pee     Past Surgical History:   Procedure Laterality Date     C ABLATION SUPRAVENT ARRHYTHMOGENIC FOCUS  2001     C CARDIAC SURG PROCEDURE UNLIST       C STOMACH SURGERY PROCEDURE UNLISTED       COLONOSCOPY N/A 8/12/2021    Procedure: COLONOSCOPY, WITH POLYPECTOMY AND BIOPSY;  Surgeon: Laura Osei MD;  Location:  GI     CRYOTHERAPY, CERVICAL       GENITOURINARY SURGERY       HC TOOTH EXTRACTION W/FORCEP       HYSTERECTOMY SUPRACERVICAL  5/06    due to endometrial hyperplasia     SALPINGO OOPHORECTOMY,R/L/KEVIN  5/06    Salpingo Oophorectomy, RT     SURGICAL HISTORY OF -   1974    Repair soft tissue of right arm     TUBAL LIGATION  1994     [facility]  1-4-11     VASCULAR SURGERY       Current Outpatient Medications   Medication Sig Dispense Refill     AMBIEN 10 MG OR TABS 1 TABLET AT BEDTIME       bisacodyl (DULCOLAX) 5 MG EC tablet Take 2 tablets by mouth at 10am the day before procedure. 2 tablet 0     BUPROPION HCL# 300 MG OR TB24 1 TABLET DAILY       divalproex (DEPAKOTE ER) 500 MG 24 hr tablet Reported on 3/27/2017       esomeprazole (NEXIUM) 40 MG DR capsule Take 1 capsule (40 mg) by mouth 2 times daily Take 30-60 minutes before eating. 180 capsule 3     ibuprofen (ADVIL/MOTRIN) 200 MG capsule Take 200 mg by mouth every 4 hours as needed for fever       levothyroxine (SYNTHROID) 150 MCG tablet Take 1 tablet (150 mcg) by mouth daily 90 tablet 3     magnesium citrate solution Drink entire bottle at 4pm two days prior to procedure. 296 mL 0     metoprolol tartrate (LOPRESSOR) 25 MG tablet Take 1 tablet (25 mg) by mouth 2 times daily 180 tablet 3     order for [facility] Walker with 4 wheels, brakes, seat 1 each 0     polyethylene glycol (GOLYTELY) 236 g suspension Day before procedure: Start drinking the Golytely solution at 3pm.  Drink one, eight oz. glass every 10-15 minutes until half of the 1st container of Golytely is gone.  At 8pm drink the  "second half of the 1st container of Golytely.  Drink one, eight oz. glass every 10-15 minutes until gone.  Before you go to bed, mix the 2nd container of Golytely.  Day of procedure:  6 hours before your check-in time @ 2:50am, drink one, eight oz. glass every 10-15 minutes until half of the 2nd  container of Golytely is gone. 8000 mL 0     simvastatin (ZOCOR) 40 MG tablet Take 1 tablet (40 mg) by mouth At Bedtime 90 tablet 2     traMADol (ULTRAM) 50 MG tablet Take 1-2 tablets ( mg) by mouth every 6 hours as needed for severe pain Needs to be seen for more. 90 tablet 0     triamcinolone (KENALOG) 0.1 % external cream Apply topically 2 times daily 453.6 g 0     ZYPREXA 5 MG OR TABS Reported on 3/27/2017         Allergies   Allergen Reactions     Abilify [Aripiprazole]      Carafate [Sucralfate]      Latex      Pt unsure of reaction     Lisinopril Cough     cough        Social History     Tobacco Use     Smoking status: Passive Smoke Exposure - Never Smoker     Smokeless tobacco: Never Used     Tobacco comment: boyfriend smokes   Substance Use Topics     Alcohol use: Yes     Comment: rarely     Family History   Problem Relation Age of Onset     C.A.D. Father      Alzheimer Disease Paternal Grandmother      Migraines Paternal Grandmother      Cancer Mother 69        lung cancer now      Depression Mother      Other Cancer Mother              Migraines Mother      Thyroid Disease Maternal Grandmother      Diabetes Son         Type 1     Substance Abuse Sister      Depression Sister      Migraines Sister      Alcoholism Sister      Substance Abuse Sister      Depression Sister      Alcoholism Sister      Diabetes Son      Diabetes Sister      History   Drug Use No         Objective     /80 (BP Location: Left arm, Patient Position: Chair, Cuff Size: Adult Large)   Pulse 69   Temp 97.1  F (36.2  C) (Tympanic)   Resp 20   Ht 1.651 m (5' 5\")   Wt 138.3 kg (305 lb)   LMP  (LMP Unknown)   " SpO2 97%   Breastfeeding No   BMI 50.75 kg/m      Physical Exam    GENERAL APPEARANCE: healthy, alert and no distress, ambulating with a walker   EYES: Eyes grossly normal to inspection and conjunctivae and sclerae normal  NECK: no adenopathy and trachea midline and normal to palpation  RESP: lungs clear to auscultation - no rales, rhonchi or wheezes  CV: regular rates and rhythm, normal S1 S2, no S3 or S4 and no murmur, click or rub  ABDOMEN: soft, non-tender and no rebound or guarding   MS: extremities normal- no gross deformities noted and peripheral pulses normal  SKIN: no suspicious lesions or rashes  NEURO: Normal strength and tone, mentation intact and speech normal  PSYCH: mentation appears normal    Recent Labs   Lab Test 07/30/21  1414 11/23/20  1055   HGB 12.9 12.6   PLT  --  235    141   POTASSIUM 4.1 4.2   CR 0.78 0.73   A1C  --  5.3        Diagnostics:  No labs were ordered during this visit.   No EKG required for low risk surgery (cataract, skin procedure, breast biopsy, etc).    Revised Cardiac Risk Index (RCRI):  The patient has the following serious cardiovascular risks for perioperative complications:   - No serious cardiac risks = 0 points     RCRI Interpretation: 0 points: Class I (very low risk - 0.4% complication rate)           Signed Electronically by: Connie Martinez MD MPH    Copy of this evaluation report is provided to requesting physician.

## 2021-10-08 NOTE — LETTER
October 14, 2021      Geovanna Aguilar  [address]  [facility] [address]        Dear MsSilvestreavnipetar,    We are writing to inform you of your test results.    Electrolytes, non fasting glucose, kidney function, liver function and thyroid function are within normal limits.   Basic blood count is not showing anemia or infection.   Plan of care and follow up as discussed in clinic.   Please let me know if you have any questions and thank you for choosing [facility].        Resulted Orders   Comprehensive metabolic panel   Result Value Ref Range    Sodium 139 133 - 144 mmol/L    Potassium 4.0 3.4 - 5.3 mmol/L    Chloride 106 94 - 109 mmol/L    Carbon Dioxide (CO2) 26 20 - 32 mmol/L    Anion Gap 7 3 - 14 mmol/L    Urea Nitrogen 13 7 - 30 mg/dL    Creatinine 0.78 0.52 - 1.04 mg/dL    Calcium 9.2 8.5 - 10.1 mg/dL    Glucose 101 (H) 70 - 99 mg/dL    Alkaline Phosphatase 94 40 - 150 U/L    AST 11 0 - 45 U/L    ALT 29 0 - 50 U/L    Protein Total 7.4 6.8 - 8.8 g/dL    Albumin 3.6 3.4 - 5.0 g/dL    Bilirubin Total 0.5 0.2 - 1.3 mg/dL    GFR Estimate 83 >60 mL/min/1.73m2      Comment:      As of July 11, 2021, eGFR is calculated by the CKD-EPI creatinine equation, without race adjustment. eGFR can be influenced by muscle mass, exercise, and diet. The reported eGFR is an estimation only and is only applicable if the renal function is stable.   TSH   Result Value Ref Range    TSH 4.60 (H) 0.40 - 4.00 mU/L   T4 free   Result Value Ref Range    Free T4 1.02 0.76 - 1.46 ng/dL   CBC with platelets and differential   Result Value Ref Range    WBC Count 9.8 4.0 - 11.0 10e3/uL    RBC Count 4.51 3.80 - 5.20 10e6/uL    Hemoglobin 13.3 11.7 - 15.7 g/dL    Hematocrit 40.7 35.0 - 47.0 %    MCV 90 78 - 100 fL    MCH 29.5 26.5 - 33.0 pg    MCHC 32.7 31.5 - 36.5 g/dL    RDW 13.9 10.0 - 15.0 %    Platelet Count 239 150 - 450 10e3/uL    % Neutrophils 61 %    % Lymphocytes 32 %    % Monocytes 5 %    % Eosinophils 1 %    % Basophils 0 %    %  Immature Granulocytes 1 %    Absolute Neutrophils 6.0 1.6 - 8.3 10e3/uL    Absolute Lymphocytes 3.1 0.8 - 5.3 10e3/uL    Absolute Monocytes 0.5 0.0 - 1.3 10e3/uL    Absolute Eosinophils 0.1 0.0 - 0.7 10e3/uL    Absolute Basophils 0.0 0.0 - 0.2 10e3/uL    Absolute Immature Granulocytes 0.1 (H) <=0.0 10e3/uL       If you have any questions or concerns, please call the clinic at the number listed above.       Sincerely,      Connie Martinez MD

## 2021-10-08 NOTE — PROGRESS NOTES
Current Eye Medications:       Subjective:  Pre op kpe left eye  Scheduled on 10/21/2021.     Objective:  See Ophthalmology Exam.       Assessment:  Visually significant cataract left eye.      Plan:  Plan Kelman phacoemulsification/ posterior chamber lens left eye local standby.  Risks, benefits, complications, and alternatives discussed with patient including possibility of limitations from coexistent eye disease and loss of vision.  Target refraction and multifocal lens options discussed.  Patient understands and consents.  Damon Acuña M.D.

## 2021-10-08 NOTE — LETTER
10/8/2021         RE: Geovanna Aguilar  [address]  [place] [address]        Dear Colleague,    Thank you for referring your patient, Geovanna Aguilar, to the [facility]. Please see a copy of my visit note below.     Current Eye Medications:       Subjective:  Pre op kpe left eye  Scheduled on 10/21/2021.     Objective:  See Ophthalmology Exam.       Assessment:  Visually significant cataract left eye.      Plan:  Plan Kelman phacoemulsification/ posterior chamber lens left eye local standby.  Risks, benefits, complications, and alternatives discussed with patient including possibility of limitations from coexistent eye disease and loss of vision.  Target refraction and multifocal lens options discussed.  Patient understands and consents.  Damon Acuña M.D.           Again, thank you for allowing me to participate in the care of your patient.        Sincerely,        Damon Acuña MD

## 2021-10-08 NOTE — PATIENT INSTRUCTIONS
PRE-OP CATARACT INSTRUCTIONS    *You will be picking up 2 eye drop bottles from your pharmacy.  You will use these the day after surgery.    *No solid food after midnight.    *Clear liquids: coffee (no cream), tea, water, and jello are OK before 6:30 A.M.  You may take your regular pills with this.    *If you are taking glaucoma drops, continue as usual.    Damon Acuña M.D.  [phone]

## 2021-10-12 ASSESSMENT — SLIT LAMP EXAM - LIDS
COMMENTS: 2+ SCLERAL SHOW
COMMENTS: 2+ SCLERAL SHOW

## 2021-10-12 ASSESSMENT — EXTERNAL EXAM - LEFT EYE: OS_EXAM: MILD BROW

## 2021-10-12 ASSESSMENT — EXTERNAL EXAM - RIGHT EYE: OD_EXAM: MILD BROW

## 2021-10-14 NOTE — RESULT ENCOUNTER NOTE
Please send a letter:    Dear Geovanna Aguilar,    Electrolytes, non fasting glucose, kidney function, liver function and thyroid function are within normal limits.   Basic blood count is not showing anemia or infection.  Plan of care and follow up as discussed in clinic.  Please let me know if you have any questions and thank you for choosing [facility].    Regards,    Connie Martinez MD MPH

## 2021-10-14 NOTE — TELEPHONE ENCOUNTER
REFERRAL INFORMATION:    Referring Provider:  Dr. Connie Martinez     Referring Clinic:   [facility]     Reason for Visit/Diagnosis: Diarrhea      FUTURE VISIT INFORMATION:    Appointment Date: 2/2/2022    Appointment Time: 4:20 PM      NOTES STATUS DETAILS   OFFICE NOTE from Referring Provider Internal 10/8/2021 Office visit with Dr. Martinez     OFFICE NOTE from Other Specialist Internal 6/30/2021 Office visit with Dr. Leah Rhoades ([facility])      HOSPITAL DISCHARGE SUMMARY/  ED VISITS N/A    OPERATIVE REPORT N/A    MEDICATION LIST Internal         ENDOSCOPY  Received  [facility]: [facility]   COLONOSCOPY Internal/ Received  8/12/2021 11/30/11 ([facility])     ERCP N/A    EUS N/A    STOOL TESTING N/A    PERTINENT LABS Internal    PATHOLOGY REPORTS (RELATED) Internal 8/12/2021   IMAGING (CT, MRI, EGD, MRCP, Small Bowel Follow Through/SBT, MR/CT Enterography) N/A

## 2021-10-21 PROBLEM — Z96.1 PSEUDOPHAKIA: Status: ACTIVE | Noted: 2021-10-21

## 2021-10-22 ENCOUNTER — OFFICE VISIT (OUTPATIENT)
Dept: OPHTHALMOLOGY | Facility: CLINIC | Age: 61
End: 2021-10-22
Payer: COMMERCIAL

## 2021-10-22 DIAGNOSIS — Z96.1 PSEUDOPHAKIA: Primary | ICD-10-CM

## 2021-10-22 PROCEDURE — 99024 POSTOP FOLLOW-UP VISIT: CPT | Performed by: OPHTHALMOLOGY

## 2021-10-22 ASSESSMENT — VISUAL ACUITY
OS_SC: 20/200
METHOD: SNELLEN - LINEAR
OS_PH_SC: 20/60
OS_PH_SC+: -1

## 2021-10-22 ASSESSMENT — TONOMETRY
OS_IOP_MMHG: 15
IOP_METHOD: APPLANATION

## 2021-10-22 NOTE — PROGRESS NOTES
Current Eye Medications:  Has post op drops. Has not started yet.      Subjective:  1 day post op cataract surgery left eye. No eye pain. Vision is blurry.      Objective:  See Ophthalmology Exam.       Assessment:  Doing well status/post Kelman phacoemulsification/ posterior chamber lens left eye.      Plan:   See Patient Instructions.

## 2021-10-22 NOTE — LETTER
10/22/2021         RE: Geovanna Aguilar  [address]  [place] [address]        Dear Colleague,    Thank you for referring your patient, Geovanna Aguilar, to the [facility]. Please see a copy of my visit note below.     Current Eye Medications:  Has post op drops. Has not started yet.      Subjective:  1 day post op cataract surgery left eye. No eye pain. Vision is blurry.      Objective:  See Ophthalmology Exam.       Assessment:  Doing well status/post Kelman phacoemulsification/ posterior chamber lens left eye.      Plan:   See Patient Instructions.           Again, thank you for allowing me to participate in the care of your patient.        Sincerely,        Damon Acuña MD

## 2021-10-22 NOTE — PATIENT INSTRUCTIONS
POST-OP CATARACT INSTRUCTIONS    Use the following drops in the left eye:    Prednisolone (pink or white cap) 3 times a day  Ketorolac (gray cap) 3 times a day    ~Wait at least 5 minutes between drops.    ~Wear eye shield at night for one week.    ~Do not exert yourself to the point that you are red in the face for one week.    ~If your vision worsens, eye becomes increasingly red, or becomes painful, call [phone].    ~If you are taking glaucoma medications, continue as usual.    ~OK to resume aspirin and/or other blood thinners.    Damon Acuña M.D.

## 2021-10-23 ASSESSMENT — EXTERNAL EXAM - LEFT EYE: OS_EXAM: NORMAL

## 2021-10-23 ASSESSMENT — SLIT LAMP EXAM - LIDS: COMMENTS: NORMAL

## 2021-10-28 ENCOUNTER — OFFICE VISIT (OUTPATIENT)
Dept: OPHTHALMOLOGY | Facility: CLINIC | Age: 61
End: 2021-10-28
Payer: COMMERCIAL

## 2021-10-28 ENCOUNTER — ANCILLARY PROCEDURE (OUTPATIENT)
Dept: MAMMOGRAPHY | Facility: CLINIC | Age: 61
End: 2021-10-28
Attending: FAMILY MEDICINE
Payer: COMMERCIAL

## 2021-10-28 ENCOUNTER — TRANSFERRED RECORDS (OUTPATIENT)
Dept: HEALTH INFORMATION MANAGEMENT | Facility: CLINIC | Age: 61
End: 2021-10-28

## 2021-10-28 DIAGNOSIS — Z12.31 VISIT FOR SCREENING MAMMOGRAM: ICD-10-CM

## 2021-10-28 DIAGNOSIS — Z96.1 PSEUDOPHAKIA: Primary | ICD-10-CM

## 2021-10-28 PROCEDURE — 77067 SCR MAMMO BI INCL CAD: CPT | Mod: TC | Performed by: RADIOLOGY

## 2021-10-28 PROCEDURE — 99024 POSTOP FOLLOW-UP VISIT: CPT | Performed by: OPHTHALMOLOGY

## 2021-10-28 ASSESSMENT — VISUAL ACUITY
OS_PH_SC: 20/40
OS_PH_SC+: +1
METHOD: SNELLEN - LINEAR
OS_SC: J5
OS_SC: 20/150

## 2021-10-28 ASSESSMENT — SLIT LAMP EXAM - LIDS: COMMENTS: NORMAL

## 2021-10-28 ASSESSMENT — REFRACTION_MANIFEST
OS_AXIS: 070
OS_CYLINDER: +1.50
OS_ADD: +2.50
OS_SPHERE: -3.75

## 2021-10-28 ASSESSMENT — EXTERNAL EXAM - LEFT EYE: OS_EXAM: NORMAL

## 2021-10-28 NOTE — PROGRESS NOTES
Current Eye Medications:  Prednisolone (pink or white cap) 3 times a day left eye    Ketorolac (gray cap) 3 times a day left eye    Subjective:  Here for 1 week post op cataract. Vision has improved. Near vision is blurry. No eye pain.      Objective:  See Ophthalmology Exam.       Assessment: 1 week sp Giacomo phacoemulsification left eye doing well.      Plan:   See Patient Instructions.

## 2021-10-28 NOTE — PATIENT INSTRUCTIONS
1)   Continue Ketorolac (gray) and Prednisolone (pink) three times daily until each is gone.    2)   Stop shield one week following surgery.    3)   No eye rubbing.    4)   Return one month for final exam.     Damon Acuña M.D.  [phone]

## 2021-10-29 ENCOUNTER — TRANSFERRED RECORDS (OUTPATIENT)
Dept: HEALTH INFORMATION MANAGEMENT | Facility: CLINIC | Age: 61
End: 2021-10-29
Payer: COMMERCIAL

## 2021-11-08 ENCOUNTER — TELEPHONE (OUTPATIENT)
Dept: OPHTHALMOLOGY | Facility: CLINIC | Age: 61
End: 2021-11-08

## 2021-11-08 NOTE — TELEPHONE ENCOUNTER
Patient called stating that she needs another refill for the ketorolac (ACULAR) 0.5 % ophthalmic solution and needs this refilled as soon as possible. She would like some clarification on these eye drops as well and would like to discuss. She would like this to be sent over to the [facility] [address] [place] [address], [address] when able. Her number to get a hold of her is at: [phone].     Thank You,      Chad BURGESS.  Patient Rep

## 2021-11-08 NOTE — TELEPHONE ENCOUNTER
Called, left patient a message to call back. She was only to keep taking the Ketorolac until the drops were gone. She does not need a refill sent over.

## 2021-11-09 ENCOUNTER — TELEPHONE (OUTPATIENT)
Dept: OPHTHALMOLOGY | Facility: CLINIC | Age: 61
End: 2021-11-09
Payer: COMMERCIAL

## 2021-11-09 NOTE — TELEPHONE ENCOUNTER
Patient was calling back to speak to Lauren. I relayed the message that Lauren had left for her yesterday regarding to only use the Ketoralac until, and this will not need to be refilled.  But she indicated she would like a return phone call as well. Please return phone call to: [phone].

## 2021-11-11 NOTE — TELEPHONE ENCOUNTER
Reassured pt that it is okay for her to continue using pred drops until they are gone or up until next apt with Dr. Acuña (11/19/2021).

## 2021-11-11 NOTE — TELEPHONE ENCOUNTER
Patient called back stating she has a few more questions regarding the prednisoLONE acetate (PRED FORTE) 1 % ophthalmic suspension and the ketorolac (ACULAR) 0.5 % ophthalmic solution. She would like a call back to discuss her concerns on this as soon as possible at: [phone] as soon as we are able.     Thank You,    Chad Tang

## 2021-11-19 ENCOUNTER — OFFICE VISIT (OUTPATIENT)
Dept: OPHTHALMOLOGY | Facility: CLINIC | Age: 61
End: 2021-11-19
Payer: COMMERCIAL

## 2021-11-19 DIAGNOSIS — H02.823 EPIDERMAL INCLUSION CYST OF EYELID, RIGHT: ICD-10-CM

## 2021-11-19 DIAGNOSIS — Z96.1 PSEUDOPHAKIA: Primary | ICD-10-CM

## 2021-11-19 PROCEDURE — 68020 INCISE/DRAIN EYELID LINING: CPT | Mod: 79 | Performed by: OPHTHALMOLOGY

## 2021-11-19 PROCEDURE — 99024 POSTOP FOLLOW-UP VISIT: CPT | Performed by: OPHTHALMOLOGY

## 2021-11-19 ASSESSMENT — REFRACTION_WEARINGRX
OD_CYLINDER: +3.00
OS_AXIS: 004
OS_SPHERE: -5.00
OD_SPHERE: -6.00
OD_ADD: +2.50
OS_ADD: +2.50
OS_CYLINDER: +0.25
OD_AXIS: 175

## 2021-11-19 ASSESSMENT — REFRACTION_MANIFEST
OS_ADD: +2.75
OS_AXIS: 065
OD_CYLINDER: +3.00
OS_SPHERE: -4.50
OD_AXIS: 165
OD_ADD: +2.75
OS_CYLINDER: +1.75
OD_SPHERE: -6.00

## 2021-11-19 ASSESSMENT — VISUAL ACUITY
OD_CC+: -2
OD_CC: 20/40
OS_PH_CC+: -1
OS_PH_CC: 20/50
OS_SC: J2
OS_CC: 20/400
METHOD: SNELLEN - LINEAR

## 2021-11-19 ASSESSMENT — TONOMETRY
IOP_METHOD: APPLANATION
OS_IOP_MMHG: 16

## 2021-11-19 ASSESSMENT — SLIT LAMP EXAM - LIDS: COMMENTS: NORMAL

## 2021-11-19 ASSESSMENT — EXTERNAL EXAM - LEFT EYE: OS_EXAM: NORMAL

## 2021-11-19 ASSESSMENT — CUP TO DISC RATIO: OS_RATIO: 0.4

## 2021-11-19 ASSESSMENT — EXTERNAL EXAM - RIGHT EYE: OD_EXAM: MILD BROW

## 2021-11-19 NOTE — PATIENT INSTRUCTIONS
1)  Discontinue all drops, unless used prior to cataract surgery.    2)   Fill Rx for new glasses or drugstore readers.    3)  May use artificial tears up to 4 times daily both eyes. (Refresh Tears, Systane Ultra/Balance, or Theratears)     4)   Return to clinic in three months for a pressure check or earlier if problems should arise.     Damon Acuña M.D.  [phone]

## 2021-11-19 NOTE — LETTER
11/19/2021         RE: Geovanna Aguilar  [address]  [place] [address]        Dear Colleague,    Thank you for referring your patient, Geovanna Aguilar, to the [facility]. Please see a copy of my visit note below.     Current Eye Medications:  Pred drops three times a day.      Subjective:  Here for final MR left eye. Pt complains of white bump on right upper lid and would like it removed. No eye pain. Vision improved since last visit.     Objective:  See Ophthalmology Exam.       Assessment: 1 month sp Kelman phacoemulsification left eye doing well.      Plan:  Small inclusion cyst RUL incised and expressed without difficulty.  Damon Acuña M.D.  [phone]    See Patient Instructions.           Again, thank you for allowing me to participate in the care of your patient.        Sincerely,        Damon Acuña MD

## 2021-11-19 NOTE — PROGRESS NOTES
Current Eye Medications:  Pred drops three times a day.      Subjective:  Here for final MR left eye. Pt complains of white bump on right upper lid and would like it removed. No eye pain. Vision improved since last visit.     Objective:  See Ophthalmology Exam.       Assessment: 1 month sp Kelman phacoemulsification left eye doing well.      Plan:  Small inclusion cyst RUL incised and expressed without difficulty.  Damon Acuña M.D.  [phone]    See Patient Instructions.

## 2021-12-01 ENCOUNTER — TELEPHONE (OUTPATIENT)
Dept: OPHTHALMOLOGY | Facility: CLINIC | Age: 61
End: 2021-12-01
Payer: COMMERCIAL

## 2021-12-01 DIAGNOSIS — Z96.1 PSEUDOPHAKIA: Primary | ICD-10-CM

## 2021-12-01 RX ORDER — PREDNISOLONE ACETATE 10 MG/ML
1 SUSPENSION/ DROPS OPHTHALMIC 2 TIMES DAILY
Qty: 5 ML | Refills: 0 | Status: SHIPPED | OUTPATIENT
Start: 2021-12-01 | End: 2021-12-30

## 2021-12-01 NOTE — TELEPHONE ENCOUNTER
Spoke with pt. Pt complains of eye redness/tearing for 3 days. No vision changes. I spoke with Dr. Acuña and he said its most likely due to mild rebound inflammation following the cataract surgery and that she should start taking prednisolone eye drops again in the left eye twice a day for 1 week and then once a day for the following week then stop. I relayed message to patient.

## 2021-12-01 NOTE — TELEPHONE ENCOUNTER
Patient called - her son went to  her Prednisolone 1% prescription and was told she would have to pay $47.00 out of pocket for another bottle of drops.  The pharmacy stated the original quantity (10mL) should've lasted her 3 months, so insurance would not cover any additional.    I called [facility] pharmacy, and was told that her prescription was ready for , and the cost was $22.50 (this is the same amount that she paid originally).  Called the patient and informed her of the updated cost.  She will have her son pick it up.

## 2021-12-01 NOTE — TELEPHONE ENCOUNTER
Patient states that her left eye is been tearing up and has been experiencing eye redness/irritation after she was in the shower. Patient states she is wondering if there is something that she could do to help this. This is a post cataract eye according to the patient. Patient would like a call back as soon as possible at [phone] when able.    Thank You,    Chad Tang  Patient Rep

## 2021-12-09 NOTE — PATIENT INSTRUCTIONS
Preventive Health Recommendations  Female Ages 50 - 64    Yearly exam: See your health care provider every year in order to  o Review health changes.   o Discuss preventive care.    o Review your medicines if your doctor has prescribed any.      Get a Pap test every three years (unless you have an abnormal result and your provider advises testing more often).    If you get Pap tests with HPV test, you only need to test every 5 years, unless you have an abnormal result.     You do not need a Pap test if your uterus was removed (hysterectomy) and you have not had cancer.    You should be tested each year for STDs (sexually transmitted diseases) if you're at risk.     Have a mammogram every 1 to 2 years.    Have a colonoscopy at age 50, or have a yearly FIT test (stool test). These exams screen for colon cancer.      Have a cholesterol test every 5 years, or more often if advised.    Have a diabetes test (fasting glucose) every three years. If you are at risk for diabetes, you should have this test more often.     If you are at risk for osteoporosis (brittle bone disease), think about having a bone density scan (DEXA).    Shots: Get a flu shot each year. Get a tetanus shot every 10 years.    Nutrition:     Eat at least 5 servings of fruits and vegetables each day.    Eat whole-grain bread, whole-wheat pasta and brown rice instead of white grains and rice.    Get adequate Calcium and Vitamin D.     Lifestyle    Exercise at least 150 minutes a week (30 minutes a day, 5 days a week). This will help you control your weight and prevent disease.    Limit alcohol to one drink per day.    No smoking.     Wear sunscreen to prevent skin cancer.     See your dentist every six months for an exam and cleaning.    See your eye doctor every 1 to 2 years.    At [facility], we strive to deliver an exceptional experience to you, every time we see you. If you receive a survey, please complete it as we do  value your feedback.  If you have MyChart, you can expect to receive results automatically within 24 hours of their completion.  Your provider will send a note interpreting your results as well.   If you do not have MyChart, you should receive your results in about a week by mail.    Your care team:                            Family Medicine Internal Medicine   MD Jesus Mendez MD Shantel Branch-Fleming, MD Octavio Celestin, MD mAy Wyatt, PARAFAEL Haddad, APRDARIO Brown, MD Pediatrics   Raad Suero, RHIANNON Tyson, MD Shanda Pearson APRN CNP   MD Chantal Lamas MD Deborah Mielke, MD Kim Thein, APR[facility]      Clinic hours: Monday - Thursday 7 am-6 pm; Fridays 7 am-5 pm.   Urgent care: Monday - Friday 10 am- 8 pm; Saturday and Sunday 9 am-5 pm.    Clinic: [phone]       [facility]: Monday - Thursday 8 am - 7 pm; Friday 8 am - 6 pm  [facility]: [phone]     Use [email or web address] for 24/7 diagnosis and treatment of dozens of conditions.

## 2021-12-09 NOTE — PROGRESS NOTES
SUBJECTIVE:   CC: Geovanna Aguilar is an 61 year old woman who presents for preventive health visit.     Patient has been advised of split billing requirements and indicates understanding: Yes  Healthy Habits:    Getting at least 3 servings of Calcium per day:  NO    Bi-annual eye exam:  Yes    Dental care twice a year:  NO    Sleep apnea or symptoms of sleep apnea:  Sleep apnea    Diet:  Other (FODMAP)    Frequency of exercise:  None    Duration of exercise:  N/A    Taking medications regularly:  Yes    Barriers to taking medications:  None    Medication side effects:  None    PHQ-2 Total Score:    Additional concerns today:  Yes      Lipid/HTN/GERD - tolerating medications without side effects.  Vaginal itch for few weeks.  Similar to symptoms in Feb. Wearing a panty liner daily.      Today's PHQ-2 Score:   PHQ-2 ( 1999 Pfizer) 6/30/2021   Q1: Little interest or pleasure in doing things 1   Q2: Feeling down, depressed or hopeless 1   PHQ-2 Score 2   PHQ-2 Total Score (12-17 Years)- Positive if 3 or more points; Administer PHQ-A if positive 2       Abuse: Current or Past (Physical, Sexual or Emotional) - No  Do you feel safe in your environment? Yes        Social History     Tobacco Use     Smoking status: Passive Smoke Exposure - Never Smoker     Smokeless tobacco: Never Used     Tobacco comment: boyfriend smokes   Substance Use Topics     Alcohol use: Yes     Comment: rarely         Alcohol Use 8/28/2017   Prescreen: >3 drinks/day or >7 drinks/week? The patient does not drink >3 drinks per day nor >7 drinks per week.       Reviewed orders with patient.  Reviewed health maintenance and updated orders accordingly - Yes  Labs reviewed in EPIC    Breast Cancer Screening:  Any new diagnosis of family breast, ovarian, or bowel cancer? No    FHS-7:   Breast CA Risk Assessment (FHS-7) 10/28/2021   Did any of your first-degree relatives have breast or ovarian cancer? No   Did any of your relatives have bilateral  "breast cancer? No   Did any man in your family have breast cancer? No   Did any woman in your family have breast and ovarian cancer? No   Did any woman in your family have breast cancer before age 50 y? No   Do you have 2 or more relatives with breast and/or ovarian cancer? No   Do you have 2 or more relatives with breast and/or bowel cancer? No       Mammogram Screening: Recommended mammography every 1-2 years with patient discussion and risk factor consideration  Pertinent mammograms are reviewed under the imaging tab.    History of abnormal Pap smear: Status post benign hysterectomy. Health Maintenance and Surgical History updated.  PAP / HPV 6/18/2013   PAP (Historical) NIL     Reviewed and updated as needed this visit by clinical staff  Tobacco  Allergies  Meds  Problems  Med Hx  Surg Hx  Fam Hx         Reviewed and updated as needed this visit by Provider  Tobacco  Allergies  Meds  Problems  Med Hx  Surg Hx  Fam Hx            Review of Systems  10 point ROS of systems including Constitutional, Eyes, Respiratory, Cardiovascular, Gastroenterology, Genitourinary, Integumentary, Muscularskeletal, Psychiatric were all negative except for pertinent positives noted in my HPI.      OBJECTIVE:   /84   Pulse 71   Temp 96.9  F (36.1  C) (Tympanic)   Ht 1.651 m (5' 5\")   Wt 136.5 kg (301 lb)   LMP  (LMP Unknown)   SpO2 96%   Breastfeeding No   BMI 50.09 kg/m    Physical Exam  GENERAL: healthy, alert, no distress and obese  EYES: Eyes grossly normal to inspection, PERRL and conjunctivae and sclerae normal  HENT: ear canals and TM's normal, nose and mouth without ulcers or lesions  NECK: no adenopathy, no asymmetry, masses, or scars and thyroid normal to palpation  RESP: lungs clear to auscultation - no rales, rhonchi or wheezes  CV: regular rate and rhythm, normal S1 S2, no S3 or S4, no murmur, click or rub, no peripheral edema and peripheral pulses strong  ABDOMEN: soft, nontender, no " "hepatosplenomegaly, no masses and bowel sounds normal  MS: no gross musculoskeletal defects noted, no edema  SKIN: skin tag on left inferior breast, dermatitis face  PSYCH: mentation appears normal and anxious    Diagnostic Test Results:  Labs reviewed in Epic    ASSESSMENT/PLAN:   (Z00.00) Routine general medical examination at a health care facility  (primary encounter diagnosis)  Comment:   Plan: Routine preventive discussed and low carb diet recommended.    (E78.5) Hyperlipidemia LDL goal <130  Comment:   Plan: Lipid panel reflex to direct LDL Fasting,         simvastatin (ZOCOR) 40 MG tablet        Check labs and refill medication    (I10) Hypertension goal BP (blood pressure) < 140/90  Comment: controlled  Plan: Albumin Random Urine Quantitative with Creat         Ratio, metoprolol tartrate (LOPRESSOR) 25 MG         tablet        Continue current treatment and check lab    (N89.8) Vaginal itching  Comment:   Plan: fluconazole (DIFLUCAN) 150 MG tablet        Empirically treat and follow up in 1 month if not improved.    (K21.9) Gastroesophageal reflux disease without esophagitis  Comment: stable  Plan: esomeprazole (NEXIUM) 40 MG DR capsule        Refill    The uses and side effects, including black box warnings as appropriate, were discussed in detail.  All patient questions were answered.  The patient was instructed to call immediately if any side effects developed.     Patient has been advised of split billing requirements and indicates understanding: Yes  COUNSELING:  Reviewed preventive health counseling, as reflected in patient instructions    Estimated body mass index is 50.09 kg/m  as calculated from the following:    Height as of this encounter: 1.651 m (5' 5\").    Weight as of this encounter: 136.5 kg (301 lb).    Weight management plan: Discussed healthy diet and exercise guidelines    She reports that she is a non-smoker but has been exposed to tobacco smoke. She has been exposed to 0.00 packs per " day for the past 0.00 years. She has never used smokeless tobacco.      Counseling Resources:  ATP IV Guidelines  Pooled Cohorts Equation Calculator  Breast Cancer Risk Calculator  BRCA-Related Cancer Risk Assessment: FHS-7 Tool  FRAX Risk Assessment  ICSI Preventive Guidelines  Dietary Guidelines for Americans, 2010  USDA's MyPlate  ASA Prophylaxis  Lung CA Screening    Leah Rhoades MD  [facility]

## 2021-12-13 ENCOUNTER — OFFICE VISIT (OUTPATIENT)
Dept: FAMILY MEDICINE | Facility: CLINIC | Age: 61
End: 2021-12-13
Payer: COMMERCIAL

## 2021-12-13 VITALS
TEMPERATURE: 96.9 F | BODY MASS INDEX: 48.82 KG/M2 | OXYGEN SATURATION: 96 % | WEIGHT: 293 LBS | HEIGHT: 65 IN | HEART RATE: 71 BPM | SYSTOLIC BLOOD PRESSURE: 131 MMHG | DIASTOLIC BLOOD PRESSURE: 84 MMHG

## 2021-12-13 DIAGNOSIS — I10 HYPERTENSION GOAL BP (BLOOD PRESSURE) < 140/90: ICD-10-CM

## 2021-12-13 DIAGNOSIS — F31.73 MANIC BIPOLAR I DISORDER IN PARTIAL REMISSION (H): ICD-10-CM

## 2021-12-13 DIAGNOSIS — E78.5 HYPERLIPIDEMIA LDL GOAL <130: ICD-10-CM

## 2021-12-13 DIAGNOSIS — K21.9 GASTROESOPHAGEAL REFLUX DISEASE WITHOUT ESOPHAGITIS: ICD-10-CM

## 2021-12-13 DIAGNOSIS — N89.8 VAGINAL ITCHING: ICD-10-CM

## 2021-12-13 DIAGNOSIS — Z00.00 ROUTINE GENERAL MEDICAL EXAMINATION AT A HEALTH CARE FACILITY: Primary | ICD-10-CM

## 2021-12-13 LAB
CHOLEST SERPL-MCNC: 206 MG/DL
FASTING STATUS PATIENT QL REPORTED: YES
HBA1C MFR BLD: 5.4 % (ref 0–5.6)
HDLC SERPL-MCNC: 46 MG/DL
LDLC SERPL CALC-MCNC: 125 MG/DL
NONHDLC SERPL-MCNC: 160 MG/DL
TRIGL SERPL-MCNC: 175 MG/DL
VALPROATE SERPL-MCNC: 85 MG/L

## 2021-12-13 PROCEDURE — 80061 LIPID PANEL: CPT | Performed by: FAMILY MEDICINE

## 2021-12-13 PROCEDURE — 80164 ASSAY DIPROPYLACETIC ACD TOT: CPT | Performed by: FAMILY MEDICINE

## 2021-12-13 PROCEDURE — 36415 COLL VENOUS BLD VENIPUNCTURE: CPT | Performed by: FAMILY MEDICINE

## 2021-12-13 PROCEDURE — 83036 HEMOGLOBIN GLYCOSYLATED A1C: CPT | Performed by: FAMILY MEDICINE

## 2021-12-13 PROCEDURE — 99214 OFFICE O/P EST MOD 30 MIN: CPT | Mod: 25 | Performed by: FAMILY MEDICINE

## 2021-12-13 PROCEDURE — 82043 UR ALBUMIN QUANTITATIVE: CPT | Performed by: FAMILY MEDICINE

## 2021-12-13 PROCEDURE — 99396 PREV VISIT EST AGE 40-64: CPT | Performed by: FAMILY MEDICINE

## 2021-12-13 RX ORDER — FLUCONAZOLE 150 MG/1
150 TABLET ORAL WEEKLY
Qty: 4 TABLET | Refills: 0 | Status: SHIPPED | OUTPATIENT
Start: 2021-12-13 | End: 2022-12-13

## 2021-12-13 RX ORDER — METOPROLOL TARTRATE 25 MG/1
25 TABLET, FILM COATED ORAL 2 TIMES DAILY
Qty: 180 TABLET | Refills: 3 | Status: SHIPPED | OUTPATIENT
Start: 2021-12-13 | End: 2023-02-20

## 2021-12-13 RX ORDER — SIMVASTATIN 40 MG
40 TABLET ORAL AT BEDTIME
Qty: 90 TABLET | Refills: 3 | Status: SHIPPED | OUTPATIENT
Start: 2021-12-13 | End: 2023-02-20

## 2021-12-13 RX ORDER — ESOMEPRAZOLE MAGNESIUM 40 MG/1
40 CAPSULE, DELAYED RELEASE ORAL 2 TIMES DAILY
Qty: 180 CAPSULE | Refills: 3 | Status: SHIPPED | OUTPATIENT
Start: 2021-12-13 | End: 2023-09-25

## 2021-12-13 ASSESSMENT — MIFFLIN-ST. JEOR: SCORE: 1936.21

## 2021-12-13 ASSESSMENT — PAIN SCALES - GENERAL: PAINLEVEL: NO PAIN (0)

## 2021-12-13 NOTE — LETTER
December 14, 2021      Geovanna Aguilar  [address]  [place] [address]        Dear MsSilvestreavnipetar,    We are writing to inform you of your test results.    Your labs are stable for you.     Please contact the clinic if you have additional questions.  Thank you.     Resulted Orders   Lipid panel reflex to direct LDL Fasting   Result Value Ref Range    Cholesterol 206 (H) <200 mg/dL    Triglycerides 175 (H) <150 mg/dL    Direct Measure HDL 46 (L) >=50 mg/dL    LDL Cholesterol Calculated 125 (H) <=100 mg/dL    Non HDL Cholesterol 160 (H) <130 mg/dL    Patient Fasting > 8hrs? Yes     Narrative    Cholesterol  Desirable:  <200 mg/dL    Triglycerides  Normal:  Less than 150 mg/dL  Borderline High:  150-199 mg/dL  High:  200-499 mg/dL  Very High:  Greater than or equal to 500 mg/dL    Direct Measure HDL  Female:  Greater than or equal to 50 mg/dL   Male:  Greater than or equal to 40 mg/dL    LDL Cholesterol  Desirable:  <100mg/dL  Above Desirable:  100-129 mg/dL   Borderline High:  130-159 mg/dL   High:  160-189 mg/dL   Very High:  >= 190 mg/dL    Non HDL Cholesterol  Desirable:  130 mg/dL  Above Desirable:  130-159 mg/dL  Borderline High:  160-189 mg/dL  High:  190-219 mg/dL  Very High:  Greater than or equal to 220 mg/dL   Albumin Random Urine Quantitative with Creat Ratio   Result Value Ref Range    Creatinine Urine mg/dL 265 mg/dL    Albumin Urine mg/L 24 mg/L    Albumin Urine mg/g Cr 9.06 0.00 - 25.00 mg/g Cr   Valproic acid   Result Value Ref Range    Valproic acid 85   mg/L      Comment:      Therapeutic Range:  mg/L   Epilepsy and janice patients:  mg/L  Some patients require and can tolerate values up to 150 mg/L    Critical:  Greater than 150 mg/L   Hemoglobin A1c   Result Value Ref Range    Hemoglobin A1C 5.4 0.0 - 5.6 %      Comment:      Normal <5.7%   Prediabetes 5.7-6.4%    Diabetes 6.5% or higher     Note: Adopted from ADA consensus guidelines.       If you have any questions or  concerns, please call the clinic at the number listed above.       Sincerely,      Leah Rhoades MD

## 2021-12-14 LAB
CREAT UR-MCNC: 265 MG/DL
MICROALBUMIN UR-MCNC: 24 MG/L
MICROALBUMIN/CREAT UR: 9.06 MG/G CR (ref 0–25)

## 2021-12-30 ENCOUNTER — TELEPHONE (OUTPATIENT)
Dept: OPHTHALMOLOGY | Facility: CLINIC | Age: 61
End: 2021-12-30
Payer: COMMERCIAL

## 2021-12-30 DIAGNOSIS — Z96.1 PSEUDOPHAKIA: ICD-10-CM

## 2021-12-30 RX ORDER — PREDNISOLONE ACETATE 10 MG/ML
1 SUSPENSION/ DROPS OPHTHALMIC 2 TIMES DAILY
Qty: 5 ML | Refills: 0 | Status: SHIPPED | OUTPATIENT
Start: 2021-12-30 | End: 2022-01-03

## 2021-12-30 NOTE — TELEPHONE ENCOUNTER
Spoke with Pt. Pt complains of symptoms of eye redness,watering, irritation returning after discontinuing prednisolone again. Vision is stable.     Offered appointment with Dr. Acuña 12/31/2021 but pt unable to find ride and will come in 01/03/2022 to see Dr. Acuña for IOP check.

## 2021-12-30 NOTE — TELEPHONE ENCOUNTER
Patient had cataract surgery on left eye. Eye has been running and now is red. Patient is asking for refill for Prednisolone. She uses [facility] in [place]. She is asking for a refill on eye drops or a return phone call to discuss. Phone: [phone].

## 2022-01-03 ENCOUNTER — OFFICE VISIT (OUTPATIENT)
Dept: OPHTHALMOLOGY | Facility: CLINIC | Age: 62
End: 2022-01-03
Payer: COMMERCIAL

## 2022-01-03 DIAGNOSIS — Z96.1 PSEUDOPHAKIA: Primary | ICD-10-CM

## 2022-01-03 PROCEDURE — 99024 POSTOP FOLLOW-UP VISIT: CPT | Performed by: OPHTHALMOLOGY

## 2022-01-03 RX ORDER — PREDNISOLONE ACETATE 10 MG/ML
1 SUSPENSION/ DROPS OPHTHALMIC 2 TIMES DAILY
Qty: 5 ML | Refills: 3 | Status: SHIPPED | OUTPATIENT
Start: 2022-01-03 | End: 2022-02-21

## 2022-01-03 ASSESSMENT — VISUAL ACUITY
OS_PH_SC+: -1
OS_SC: 20/60
OS_PH_SC: 20/50
OD_CC: 20/40
OS_CC: 20/80
OS_SC+: -1
OS_CC+: -1
METHOD: SNELLEN - LINEAR
CORRECTION_TYPE: GLASSES

## 2022-01-03 ASSESSMENT — TONOMETRY
OS_IOP_MMHG: 11
IOP_METHOD: APPLANATION
OD_IOP_MMHG: 17

## 2022-01-03 NOTE — PROGRESS NOTES
" Current Eye Medications:  Prednisolone as needed. Only used 4 times since Thursday.     Subjective:  Watering left eye real bad on Thursday. Left eye has felt different since having cataract surgery. Drop helped with the watering left eye. Patient states \"Sometimes gets a twinge left eye and slight headache\". Vision is doing well both eyes.    Improved after starting Prednisolone.  No flashing/curtain.  Clear tearing; no significant mattering.      Objective:  See Ophthalmology Exam.       Assessment: Epiphora and mild superior conjunctival injection after cataract surgery left eye.      Plan:  Continue Prednisolone drop left eye twice daily until next appointment.  May use artificial tears up to 4 times daily both eyes.  (Refresh Tears, Systane Ultra/Balance, or Theratears).  Call if symptoms recur in interim.  Damon Acuña M.D.  [phone]           "

## 2022-01-03 NOTE — PATIENT INSTRUCTIONS
Continue Prednisolone drop left eye twice daily until next appointment.  May use artificial tears up to 4 times daily both eyes.  (Refresh Tears, Systane Ultra/Balance, or Theratears).  Call if symptoms recur in interim.  Damon Acuña M.D.  [phone]

## 2022-01-03 NOTE — LETTER
"    1/3/2022         RE: Geovanna Aguilar  [address] [place] [address]        Dear Colleague,    Thank you for referring your patient, Geovanna Aguilar, to the [facility]. Please see a copy of my visit note below.     Current Eye Medications:  Prednisolone as needed. Only used 4 times since Thursday.     Subjective:  Watering left eye real bad on Thursday. Left eye has felt different since having cataract surgery. Drop helped with the watering left eye. Patient states \"Sometimes gets a twinge left eye and slight headache\". Vision is doing well both eyes.    Improved after starting Prednisolone.  No flashing/curtain.  Clear tearing; no significant mattering.      Objective:  See Ophthalmology Exam.       Assessment: Epiphora and mild superior conjunctival injection after cataract surgery left eye.      Plan:  Continue Prednisolone drop left eye twice daily until next appointment.  May use artificial tears up to 4 times daily both eyes.  (Refresh Tears, Systane Ultra/Balance, or Theratears).  Call if symptoms recur in interim.  Damon Acuña M.D.  [phone]               Again, thank you for allowing me to participate in the care of your patient.        Sincerely,        Damon Acuña MD    "

## 2022-01-08 ASSESSMENT — EXTERNAL EXAM - RIGHT EYE: OD_EXAM: MILD BROW

## 2022-01-08 ASSESSMENT — SLIT LAMP EXAM - LIDS: COMMENTS: NORMAL

## 2022-01-08 ASSESSMENT — EXTERNAL EXAM - LEFT EYE: OS_EXAM: NORMAL

## 2022-01-27 ENCOUNTER — IMMUNIZATION (OUTPATIENT)
Dept: NURSING | Facility: CLINIC | Age: 62
End: 2022-01-27
Payer: COMMERCIAL

## 2022-01-27 PROCEDURE — 0054A COVID-19,PF,PFIZER (12+ YRS): CPT

## 2022-01-27 PROCEDURE — 91305 COVID-19,PF,PFIZER (12+ YRS): CPT

## 2022-02-02 ENCOUNTER — PRE VISIT (OUTPATIENT)
Dept: GASTROENTEROLOGY | Facility: CLINIC | Age: 62
End: 2022-02-02

## 2022-02-21 ENCOUNTER — OFFICE VISIT (OUTPATIENT)
Dept: OPHTHALMOLOGY | Facility: CLINIC | Age: 62
End: 2022-02-21
Payer: COMMERCIAL

## 2022-02-21 DIAGNOSIS — Z96.1 PSEUDOPHAKIA: ICD-10-CM

## 2022-02-21 PROCEDURE — 92012 INTRM OPH EXAM EST PATIENT: CPT | Performed by: OPHTHALMOLOGY

## 2022-02-21 ASSESSMENT — VISUAL ACUITY
CORRECTION_TYPE: GLASSES
OD_CC+: -2
OS_CC+: -1
OD_CC: 20/30
METHOD: SNELLEN - LINEAR
OS_CC: 20/70
OS_PH_CC+: -2
OS_PH_CC: 20/40

## 2022-02-21 ASSESSMENT — EXTERNAL EXAM - LEFT EYE: OS_EXAM: NORMAL

## 2022-02-21 ASSESSMENT — EXTERNAL EXAM - RIGHT EYE: OD_EXAM: MILD BROW

## 2022-02-21 ASSESSMENT — SLIT LAMP EXAM - LIDS: COMMENTS: NORMAL

## 2022-02-21 ASSESSMENT — TONOMETRY
IOP_METHOD: APPLANATION
OS_IOP_MMHG: 16

## 2022-02-21 NOTE — PROGRESS NOTES
Current Eye Medications:  Prednisolone-hasn't done drops in the last couple days left eye. No artificial tears     Subjective:  Here for IOP check today. Eye gets read and weepy. Has a full face mask on her CPAP and thinks it may be touching the eye. Would be open to using an artificial tear gel before bed  Sometimes has a pain in left forehead. Conjunctival injection last time, has one in the lower right lid this time and upper left eyelid.     Objective:  See Ophthalmology Exam.       Assessment:  Doing well status/post Kelman phacoemulsification/ posterior chamber lens left eye.  Persistent mild injection at wound.      Plan:  Continue Prednisolone drop left eye twice daily.  May use artificial tears up to 4 times daily both eyes.  (Refresh Tears, Systane Ultra/Balance, or Theratears).  Try Celluvisc or Refresh PM at bedtime left eye.  Possible clouding of posterior capsule left eye discussed.   Return visit in 3 months for an intraocular pressure check.  Damon Acuña M.D.  [phone]

## 2022-02-21 NOTE — PATIENT INSTRUCTIONS
Continue Prednisolone drop left eye twice daily.  May use artificial tears up to 4 times daily both eyes.  (Refresh Tears, Systane Ultra/Balance, or Theratears).  Try Celluvisc or Refresh PM at bedtime left eye.  Possible clouding of posterior capsule left eye discussed.   Return visit in 3 months for an intraocular pressure check.  Damon Acuña M.D.  [phone]

## 2022-02-21 NOTE — LETTER
2/21/2022         RE: Geovanna Aguilar  [address]  [place] [address]        Dear Colleague,    Thank you for referring your patient, Geovanna Aguilar, to the [facility]. Please see a copy of my visit note below.     Current Eye Medications:  Prednisolone-hasn't done drops in the last couple days left eye. No artificial tears     Subjective:  Here for IOP check today. Eye gets read and weepy. Has a full face mask on her CPAP and thinks it may be touching the eye. Would be open to using an artificial tear gel before bed  Sometimes has a pain in left forehead. Conjunctival injection last time, has one in the lower right lid this time and upper left eyelid.     Objective:  See Ophthalmology Exam.       Assessment:  Doing well status/post Kelman phacoemulsification/ posterior chamber lens left eye.  Persistent mild injection at wound.      Plan:  Continue Prednisolone drop left eye twice daily.  May use artificial tears up to 4 times daily both eyes.  (Refresh Tears, Systane Ultra/Balance, or Theratears).  Try Celluvisc or Refresh PM at bedtime left eye.  Possible clouding of posterior capsule left eye discussed.   Return visit in 3 months for an intraocular pressure check.  Damon Acuña M.D.  [phone]           Again, thank you for allowing me to participate in the care of your patient.        Sincerely,        Damon Acuña MD

## 2022-02-24 RX ORDER — PREDNISOLONE ACETATE 10 MG/ML
1 SUSPENSION/ DROPS OPHTHALMIC 2 TIMES DAILY
Qty: 5 ML | Refills: 3 | Status: SHIPPED | OUTPATIENT
Start: 2022-02-24 | End: 2022-12-19

## 2022-04-22 ENCOUNTER — OFFICE VISIT (OUTPATIENT)
Dept: URGENT CARE | Facility: URGENT CARE | Age: 62
End: 2022-04-22
Payer: COMMERCIAL

## 2022-04-22 ENCOUNTER — TELEPHONE (OUTPATIENT)
Dept: FAMILY MEDICINE | Facility: CLINIC | Age: 62
End: 2022-04-22

## 2022-04-22 ENCOUNTER — NURSE TRIAGE (OUTPATIENT)
Dept: FAMILY MEDICINE | Facility: CLINIC | Age: 62
End: 2022-04-22
Payer: COMMERCIAL

## 2022-04-22 VITALS
HEART RATE: 112 BPM | OXYGEN SATURATION: 95 % | BODY MASS INDEX: 50.79 KG/M2 | WEIGHT: 293 LBS | DIASTOLIC BLOOD PRESSURE: 84 MMHG | SYSTOLIC BLOOD PRESSURE: 145 MMHG | TEMPERATURE: 98.3 F

## 2022-04-22 DIAGNOSIS — N30.00 ACUTE CYSTITIS WITHOUT HEMATURIA: Primary | ICD-10-CM

## 2022-04-22 DIAGNOSIS — I10 ELEVATED BLOOD PRESSURE READING IN OFFICE WITH DIAGNOSIS OF HYPERTENSION: ICD-10-CM

## 2022-04-22 DIAGNOSIS — Z90.710 S/P HYSTERECTOMY: ICD-10-CM

## 2022-04-22 DIAGNOSIS — R39.9 URINARY SYMPTOM OR SIGN: ICD-10-CM

## 2022-04-22 DIAGNOSIS — N89.8 VAGINAL DISCHARGE: ICD-10-CM

## 2022-04-22 DIAGNOSIS — Z96.649 STATUS POST HIP REPLACEMENT, UNSPECIFIED LATERALITY: ICD-10-CM

## 2022-04-22 LAB
ALBUMIN UR-MCNC: NEGATIVE MG/DL
APPEARANCE UR: ABNORMAL
BACTERIA #/AREA URNS HPF: ABNORMAL /HPF
BILIRUB UR QL STRIP: NEGATIVE
CLUE CELLS: ABNORMAL
COLOR UR AUTO: YELLOW
GLUCOSE UR STRIP-MCNC: NEGATIVE MG/DL
HGB UR QL STRIP: ABNORMAL
KETONES UR STRIP-MCNC: NEGATIVE MG/DL
LEUKOCYTE ESTERASE UR QL STRIP: ABNORMAL
NITRATE UR QL: NEGATIVE
PH UR STRIP: 6 [PH] (ref 5–7)
RBC #/AREA URNS AUTO: ABNORMAL /HPF
SP GR UR STRIP: 1.01 (ref 1–1.03)
SQUAMOUS #/AREA URNS AUTO: ABNORMAL /LPF
TRICHOMONAS, WET PREP: ABNORMAL
UROBILINOGEN UR STRIP-ACNC: 0.2 E.U./DL
WBC #/AREA URNS AUTO: ABNORMAL /HPF
WBC'S/HIGH POWER FIELD, WET PREP: ABNORMAL
YEAST, WET PREP: ABNORMAL

## 2022-04-22 PROCEDURE — 87086 URINE CULTURE/COLONY COUNT: CPT | Performed by: PHYSICIAN ASSISTANT

## 2022-04-22 PROCEDURE — 81001 URINALYSIS AUTO W/SCOPE: CPT | Performed by: PHYSICIAN ASSISTANT

## 2022-04-22 PROCEDURE — 87186 SC STD MICRODIL/AGAR DIL: CPT | Performed by: PHYSICIAN ASSISTANT

## 2022-04-22 PROCEDURE — 99214 OFFICE O/P EST MOD 30 MIN: CPT | Performed by: PHYSICIAN ASSISTANT

## 2022-04-22 PROCEDURE — 87210 SMEAR WET MOUNT SALINE/INK: CPT | Performed by: PHYSICIAN ASSISTANT

## 2022-04-22 RX ORDER — NITROFURANTOIN 25; 75 MG/1; MG/1
100 CAPSULE ORAL 2 TIMES DAILY
Qty: 14 CAPSULE | Refills: 0 | Status: SHIPPED | OUTPATIENT
Start: 2022-04-22 | End: 2022-04-29

## 2022-04-22 NOTE — TELEPHONE ENCOUNTER
Per provider, patient is scheduled for virtual visit at 10 am this morning.     Writer left a detailed message for patient informing of visit at 10 am and asked the patient to call the clinic back.     Yulissa Norris RN  [facility]

## 2022-04-22 NOTE — PROGRESS NOTES
Chief Complaint   Patient presents with     Vaginal Problem     Possible UTI or yeast infection-sx since easter        Results for orders placed or performed in visit on 04/22/22   UA Macro with Reflex to Micro and Culture - lab collect     Status: Abnormal    Specimen: Urine, Clean Catch   Result Value Ref Range    Color Urine Yellow Colorless, Straw, Light Yellow, Yellow    Appearance Urine Slightly Cloudy (A) Clear    Glucose Urine Negative Negative mg/dL    Bilirubin Urine Negative Negative    Ketones Urine Negative Negative mg/dL    Specific Gravity Urine 1.010 1.003 - 1.035    Blood Urine Trace (A) Negative    pH Urine 6.0 5.0 - 7.0    Protein Albumin Urine Negative Negative mg/dL    Urobilinogen Urine 0.2 0.2, 1.0 E.U./dL    Nitrite Urine Negative Negative    Leukocyte Esterase Urine Small (A) Negative   Urine Microscopic     Status: Abnormal   Result Value Ref Range    Bacteria Urine Few (A) None Seen /HPF    RBC Urine 0-2 0-2 /HPF /HPF    WBC Urine 5-10 (A) 0-5 /HPF /HPF    Squamous Epithelials Urine Few (A) None Seen /LPF    Narrative    Urine Culture not indicated   Wet prep - Clinic Collect     Status: Abnormal    Specimen: Vagina; Swab   Result Value Ref Range    Trichomonas Absent Absent    Yeast Absent Absent    Clue Cells Absent Absent    WBCs/high power field 2+ (A) None           ASSESSMENT:    ICD-10-CM    1. Acute cystitis without hematuria  N30.00 nitroFURantoin macrocrystal-monohydrate (MACROBID) 100 MG capsule     Urine Culture Aerobic Bacterial - lab collect     Urine Culture Aerobic Bacterial - lab collect   2. Urinary symptom or sign  R39.9 Wet prep - Clinic Collect     UA Macro with Reflex to Micro and Culture - lab collect     UA Macro with Reflex to Micro and Culture - lab collect     Urine Microscopic     nitroFURantoin macrocrystal-monohydrate (MACROBID) 100 MG capsule     Urine Culture Aerobic Bacterial - lab collect     Urine Culture Aerobic Bacterial - lab collect   3. Vaginal  discharge  N89.8 Wet prep - Clinic Collect     UA Macro with Reflex to Micro and Culture - lab collect     UA Macro with Reflex to Micro and Culture - lab collect     Urine Microscopic   4. Elevated blood pressure reading in office with diagnosis of hypertension  I10    5. Status post hip replacement, unspecified laterality  Z96.649    6. S/P hysterectomy  Z90.710              PLAN: Mildly elevated blood pressure.  Recheck outside of clinic and follow-up if any concerns.  UTI, treated with Macrobid.  Normal kidney function in October 2021 after chart review.  Urine culture pending.  As per ordered above.  Drink plenty of fluids.  Prevention and treatment of UTI's discussed. Follow up with primary care physician if not improving.  Advised about symptoms which might herald more serious problems.          Sapna Joyner PA-C        Subjective:   60 yo female with strange feeling when she urinates since 417/2022.  Also has had yeast infections in the past and noted some brown discharge.  Status post bilateral hip replacement.  Unable to get a vaginal sample at the lab on her own.    Allergies   Allergen Reactions     Abilify [Aripiprazole]      Carafate [Sucralfate]      Latex      Pt unsure of reaction     Lisinopril Cough     cough     Adhesive Tape Rash       Past Medical History:   Diagnosis Date     Arthritis      Bipolar 2 disorder (H)      Bone and joint disord back, pelvis, leg complicat preg, childb, puerp      Chronic constipation      Depressive disorder      Esophageal reflux      GERD (gastroesophageal reflux disease)      Heart rate problem     maybe     Hiatal hernia      Hyperlipidemia LDL goal < 100      Hypertension      Hypothyroidism      Migraines      Obesity      Obstructive sleep apnea      Osteoporosis     not sure     Other mental problems      Peripheral edema      Schizophrenia (H)      Urinary incontinence     pulsating urge with spasms to pee       AMBIEN 10 MG OR TABS, 1 TABLET AT  BEDTIME  BUPROPION HCL# 300 MG OR TB24, 1 TABLET DAILY  divalproex (DEPAKOTE ER) 500 MG 24 hr tablet, Reported on 3/27/2017  esomeprazole (NEXIUM) 40 MG DR capsule, Take 1 capsule (40 mg) by mouth 2 times daily Take 30-60 minutes before eating.  ibuprofen (ADVIL/MOTRIN) 200 MG capsule, Take 200 mg by mouth every 4 hours as needed for fever  levothyroxine (SYNTHROID) 150 MCG tablet, Take 1 tablet (150 mcg) by mouth daily  metoprolol tartrate (LOPRESSOR) 25 MG tablet, Take 1 tablet (25 mg) by mouth 2 times daily  prednisoLONE acetate (PRED FORTE) 1 % ophthalmic suspension, Place 1 drop Into the left eye 2 times daily  simvastatin (ZOCOR) 40 MG tablet, Take 1 tablet (40 mg) by mouth At Bedtime  traMADol (ULTRAM) 50 MG tablet, Take 1-2 tablets ( mg) by mouth every 6 hours as needed for severe pain Needs to be seen for more.  ZYPREXA 5 MG OR TABS, Reported on 3/27/2017  bisacodyl (DULCOLAX) 5 MG EC tablet, Take 2 tablets by mouth at 10am the day before procedure. (Patient not taking: Reported on 4/22/2022)  fluconazole (DIFLUCAN) 150 MG tablet, Take 1 tablet (150 mg) by mouth once a week (Patient not taking: Reported on 4/22/2022)  magnesium citrate solution, Drink entire bottle at 4pm two days prior to procedure. (Patient not taking: Reported on 4/22/2022)  order for DME, Walker with 4 wheels, brakes, seat  triamcinolone (KENALOG) 0.1 % external cream, Apply topically 2 times daily (Patient not taking: Reported on 4/22/2022)    No current facility-administered medications on file prior to visit.      Social History     Tobacco Use     Smoking status: Passive Smoke Exposure - Never Smoker     Smokeless tobacco: Never Used     Tobacco comment: boyfriend smokes   Substance Use Topics     Alcohol use: Yes     Comment: rarely     Drug use: No       ROS:  General: negative for fever  ABD: Denies abd pain  : as above    OBJECTIVE:  BP (!) 165/94 (BP Location: Left arm, Patient Position: Sitting, Cuff Size: Adult Large)    Pulse 112   Temp 98.3  F (36.8  C) (Tympanic)   Wt 138.4 kg (305 lb 3.2 oz)   LMP  (LMP Unknown)   SpO2 95%   BMI 50.79 kg/m     General:   awake, alert, and cooperative.  NAD.   Head: Normocephalic, atraumatic.  Eyes: Conjunctiva clear, non icteric.   ABD: soft, no tenderness to palpation , no rigidity, guarding or rebound . No CVAT  Neuro: Alert and oriented - normal speech.   Vaginal swab obtained with patient supine on table.  Pulled the pants and underwear long-term down.

## 2022-04-22 NOTE — TELEPHONE ENCOUNTER
"Patient called back and is frustrated that an appointment was set up for her when she states, \"I told the other nurse I talked to that I was not going to be home.\" She wants to make sure that she will not be charged for appointment. I offered several different options to get her needs addressed and nothing works for her. She states, \"I don't have a ride to get in to the clinic today.\" I let her know her best choice is to be seen in the Urgent care this weekend to see what is going on. She was concerned about the cost of an urgent care visit so I gave her the number to speak with financial services. To see what they are able to do for her. She made an appointment for May 2nd and I advised her to not wait that long for an appointment and come in to  this weekend. She states, \"I will talk to my son when he gets home.\"    Lila Dasilva RN [facility]    "

## 2022-04-22 NOTE — TELEPHONE ENCOUNTER
"Nurse Triage SBAR    Is this a 2nd Level Triage? YES, LICENSED PRACTITIONER REVIEW IS REQUIRED    Situation: Patient states it feels \"strange\" when she urinates. Patient states she has had symptoms since 4/17/22.     Background: Patient states she has had yeast infections in the past and has been given medication to treat them in the past.    She states since having hip displacement issues 4.5 years ago she has noticed she has frequency and urgency and can only drink 1/4 cup of fluids and then has to rush to the bathroom.     Assessment: She states this feels different than past yeast infections. She states her discharge looks dark brown at times and at other times she notes it appears yellow. She denies it appearing like cottage cheese or having a foul odor.     She states she is not able to wipe well due to her hip being displaced. She states when she coughs and sneezes she urinates a little and wears a pad all the time.     Protocol Recommended Disposition:   See More Appropriate Protocol    Recommendation: Patient would like to be seen but states she does not have a ride to the clinic today. There are no available appointments in the clinic today. The first available appointment in the clinic is on 4/26 at 11 am.  There are virtual visits available Monday, but with a different provider than PCP. Please advise if okay to do virtual visit and come in only for labs.    Routed to provider    Yulissa Norris RN  [facility]     Does the patient meet one of the following criteria for ADS visit consideration? 16+ years old, with an [facility] PCP     TIP  Providers, please consider if this condition is appropriate for management at one of our Acute and Diagnostic Services sites.     If patient is a good candidate, please use dotphrase <dot>triageresponse and select Refer to ADS to document.      Reason for Disposition    Vaginal discharge    Additional Information    Negative: Shock suspected (e.g., " "cold/pale/clammy skin, too weak to stand, low BP, rapid pulse)    Negative: Sounds like a life-threatening emergency to the triager    Negative: Followed a genital area injury    Negative: Followed a genital area injury (penis, scrotum)    Negative: Pus (white, yellow) or bloody discharge from end of penis    Negative: Discomfort (pain, burning or stinging) when passing urine and pregnant    Negative: Discomfort (pain, burning or stinging) when passing urine and female    Negative: Discomfort (pain, burning or stinging) when passing urine and male    Negative: Pain or itching in the vulvar area    Negative: Pain in scrotum is main symptom    Negative: Blood in the urine is main symptom    Negative: Symptoms arising from use of a urinary catheter (Aguilar or Coude)    Negative: Pain or burning with passing urine (urination) is main symptom    Negative: Pregnant with vaginal discharge    Negative: SEVERE abdominal pain (e.g., excruciating)    Negative: Patient sounds very sick or weak to the triager    Negative: Yellow or green vaginal discharge and has a fever    Negative: Constant abdominal pain lasting > 2 hours    Patient wants to be seen    Negative: Mild lower abdominal pain comes and goes (cramps) that lasts > 24 hours    Negative: Genital area looks infected (e.g., draining sore, spreading redness)    Negative: Rash is tiny water blisters (3 or more)    Answer Assessment - Initial Assessment Questions  1. SYMPTOM: \"What's the main symptom you're concerned about?\" (e.g., frequency, incontinence)      Stress incontinence. \"Feels strange when I pee\". Having frequency and urgency.  2. ONSET: \"When did the strange feeling start?\"      [facility], April 17, 2022.  3. PAIN: \"Is there any pain?\" If so, ask: \"How bad is it?\" (Scale: 1-10; mild, moderate, severe)      No  4. CAUSE: \"What do you think is causing the symptoms?\"      Not sure  5. OTHER SYMPTOMS: \"Do you have any other symptoms?\" (e.g., fever, flank pain, " "blood in urine, pain with urination)      No  6. PREGNANCY: \"Is there any chance you are pregnant?\" \"When was your last menstrual period?\"      No    Answer Assessment - Initial Assessment Questions  1. DISCHARGE: \"Describe the discharge.\" (e.g., white, yellow, green, gray, foamy, cottage cheese-like)      Brown discharge and yellow   2. ODOR: \"Is there a bad odor?\"      No  3. ONSET: \"When did the discharge begin?\"      4/17/22  4. RASH: \"Is there a rash in that area?\" If so, ask: \"Describe it.\" (e.g., redness, blisters, sores, bumps)      Not sure  5. ABDOMINAL PAIN: \"Are you having any abdominal pain?\" If yes: \"What does it feel like? \" (e.g., crampy, dull, intermittent, constant)       No   6. ABDOMINAL PAIN SEVERITY: If present, ask: \"How bad is it?\"  (e.g., mild, moderate, severe)   - MILD - doesn't interfere with normal activities    - MODERATE - interferes with normal activities or awakens from sleep    - SEVERE - patient doesn't want to move (R/O peritonitis)       None  7. CAUSE: \"What do you think is causing the discharge?\" \"Have you had the same problem before? What happened then?\"      A yeast infection. Yes she has had a yeast infection in the past but she feels her symptoms are different at this time, she notes right now she is having tingling sensations.  8. OTHER SYMPTOMS: \"Do you have any other symptoms?\" (e.g., fever, itching, vaginal bleeding, pain with urination, injury to genital area, vaginal foreign body)      No  9. PREGNANCY: \"Is there any chance you are pregnant?\" \"When was your last menstrual period?\"      No    Protocols used: URINARY SYMPTOMS-A-OH, VAGINAL [id number]    JAJA Parker [facility]     "

## 2022-04-24 LAB
BACTERIA UR CULT: ABNORMAL
BACTERIA UR CULT: ABNORMAL

## 2022-06-10 ENCOUNTER — TELEPHONE (OUTPATIENT)
Dept: FAMILY MEDICINE | Facility: CLINIC | Age: 62
End: 2022-06-10
Payer: COMMERCIAL

## 2022-06-10 NOTE — TELEPHONE ENCOUNTER
Patient calling to report that her pharmacy, [facility], will be faxing over a request for her esomeprazole 40 mg capsules to the clinic.     Prior auth will be needed.     To TC team as FYI - Please watch for this fax, will need to be sent to PCP for review.     Thank you,   Jennifer Salamanca RN, BSN  [facility]

## 2022-06-13 NOTE — TELEPHONE ENCOUNTER
Plan does not cover esomeprazole (NEXIUM) 40 MG DR capsule.  Please go to [email or web address] to initiate Prior Auth or switch to alternative medication.    Insurance type and ID number: Key [id number]      Additional Information:     Samaria Garcia

## 2022-06-15 NOTE — TELEPHONE ENCOUNTER
PA Initiation    Medication: esomeprazole (NEXIUM) 40 MG DR capsule  Insurance Company: [employer] - Phone [phone] Fax [phone]  Pharmacy Filling the Rx: [facility] - [place], [place] - [address] AT PORTAL TO [facility] SITES  Filling Pharmacy Phone: [phone]  Filling Pharmacy Fax: [phone]  Start Date: 6/15/2022

## 2022-06-16 NOTE — TELEPHONE ENCOUNTER
Prior Authorization Approval    Authorization Effective Date: 5/16/2022  Authorization Expiration Date: 6/15/2023  Medication: esomeprazole (NEXIUM) 40 MG DR capsule  Approved Dose/Quantity:   Reference #: [id number]   Insurance Company: [employer] - Phone [phone] Fax [phone]  Which Pharmacy is filling the prescription (Not needed for infusion/clinic administered): [facility] - [place], [place] - [address] AT PORTAL TO [facility]  Pharmacy Notified: Yes  Patient Notified: Yes

## 2022-06-24 ENCOUNTER — TELEPHONE (OUTPATIENT)
Dept: FAMILY MEDICINE | Facility: CLINIC | Age: 62
End: 2022-06-24

## 2022-06-24 NOTE — TELEPHONE ENCOUNTER
Patient Quality Outreach    Patient is due for the following:   Cervical Cancer Screening - PAP Needed    NEXT STEPS:   No follow up needed at this time. PAP is not listed in the CARE gaps for this patient.     Type of outreach:    Chart review performed, no outreach needed.      Questions for provider review:    None     Margy Garcia

## 2022-07-27 ENCOUNTER — IMMUNIZATION (OUTPATIENT)
Dept: NURSING | Facility: CLINIC | Age: 62
End: 2022-07-27
Payer: COMMERCIAL

## 2022-07-27 PROCEDURE — 91305 COVID-19,PF,PFIZER (12+ YRS): CPT

## 2022-07-27 PROCEDURE — 0054A COVID-19,PF,PFIZER (12+ YRS): CPT

## 2022-09-12 ENCOUNTER — TRANSFERRED RECORDS (OUTPATIENT)
Dept: HEALTH INFORMATION MANAGEMENT | Facility: CLINIC | Age: 62
End: 2022-09-12

## 2022-11-21 ENCOUNTER — OFFICE VISIT (OUTPATIENT)
Dept: URGENT CARE | Facility: URGENT CARE | Age: 62
End: 2022-11-21
Payer: COMMERCIAL

## 2022-11-21 VITALS
TEMPERATURE: 98 F | WEIGHT: 291.2 LBS | SYSTOLIC BLOOD PRESSURE: 143 MMHG | OXYGEN SATURATION: 97 % | DIASTOLIC BLOOD PRESSURE: 82 MMHG | BODY MASS INDEX: 48.46 KG/M2 | HEART RATE: 78 BPM

## 2022-11-21 DIAGNOSIS — N30.00 ACUTE CYSTITIS WITHOUT HEMATURIA: Primary | ICD-10-CM

## 2022-11-21 DIAGNOSIS — Z23 NEED FOR PROPHYLACTIC VACCINATION AND INOCULATION AGAINST INFLUENZA: ICD-10-CM

## 2022-11-21 LAB
ALBUMIN UR-MCNC: NEGATIVE MG/DL
APPEARANCE UR: ABNORMAL
BACTERIA #/AREA URNS HPF: ABNORMAL /HPF
BILIRUB UR QL STRIP: NEGATIVE
COLOR UR AUTO: YELLOW
GLUCOSE UR STRIP-MCNC: NEGATIVE MG/DL
HGB UR QL STRIP: ABNORMAL
KETONES UR STRIP-MCNC: NEGATIVE MG/DL
LEUKOCYTE ESTERASE UR QL STRIP: ABNORMAL
NITRATE UR QL: NEGATIVE
PH UR STRIP: 6.5 [PH] (ref 5–7)
RBC #/AREA URNS AUTO: ABNORMAL /HPF
SP GR UR STRIP: 1.01 (ref 1–1.03)
SQUAMOUS #/AREA URNS AUTO: ABNORMAL /LPF
UROBILINOGEN UR STRIP-ACNC: 1 E.U./DL
WBC #/AREA URNS AUTO: ABNORMAL /HPF

## 2022-11-21 PROCEDURE — 99213 OFFICE O/P EST LOW 20 MIN: CPT | Mod: 25 | Performed by: EMERGENCY MEDICINE

## 2022-11-21 PROCEDURE — 90686 IIV4 VACC NO PRSV 0.5 ML IM: CPT | Performed by: EMERGENCY MEDICINE

## 2022-11-21 PROCEDURE — 87086 URINE CULTURE/COLONY COUNT: CPT | Performed by: EMERGENCY MEDICINE

## 2022-11-21 PROCEDURE — 90471 IMMUNIZATION ADMIN: CPT | Performed by: EMERGENCY MEDICINE

## 2022-11-21 PROCEDURE — 87186 SC STD MICRODIL/AGAR DIL: CPT | Performed by: EMERGENCY MEDICINE

## 2022-11-21 PROCEDURE — 81001 URINALYSIS AUTO W/SCOPE: CPT | Performed by: EMERGENCY MEDICINE

## 2022-11-21 RX ORDER — NITROFURANTOIN 25; 75 MG/1; MG/1
100 CAPSULE ORAL 2 TIMES DAILY
Qty: 10 CAPSULE | Refills: 0 | Status: SHIPPED | OUTPATIENT
Start: 2022-11-21 | End: 2022-11-26

## 2022-11-21 NOTE — PROGRESS NOTES
Assessment & Plan     Diagnosis:    (N30.00) Acute cystitis without hematuria  (primary encounter diagnosis)  Plan Urine Culture,         nitroFURantoin macrocrystal-monohydrate         (MACROBID) 100 MG capsule      (Z23) Need for prophylactic vaccination and inoculation against influenza  Plan: INFLUENZA VACCINE IM > 6 MONTHS VALENT IIV4         (AFLURIA/FLUZONE)            Medical Decision Making:  Geovanna Aguilar is a 62 year old female who presents to clinic today for evaluation of urinary frequency, urgency and dysuria.     This clinically is consistent with a urinary tract infection.  Urinalysis confirms the infection.  There has been no fever, confusion, flank pain or significant abdominal pain.  The patient is not concerned about STDs and testing not indicated. There is no clinical evidence of pyelonephritis, appendicitis, colitis, diverticulitis or any intraabdominal catastrophe. The patient will be started on antibiotics for the infection. Go to the ER if increasing pain, vomiting, fever, or inability to tolerate the oral antibiotic.  Follow up with primary physician is indicated if not improving in 2-3 days.    Sky Pang PA-C  [facility]    Subjective     Geovanna Aguilar is a 62 year old female who presents to clinic today for the following health issues:  Chief Complaint   Patient presents with     Urgent Care     Pretty sure it is bladder infection, sx for 5 days, when peeing at the end still feel like have to go      UTI     Imm/Inj     Want Flu shot      Imm/Inj     Flu Shot         HPI  Patient states that for the past 5 days they experienced a burning sensation, and frequency and urgency of urination. This has gotten worse over time. They note that they have no lower abdominal pain. Patient denies any flank or back pain, fever, nausea, vomiting, diarrhea, inability to urinate, vaginal discharge/bleeding or concerns for STDS. Patient is requesting a flu shot as well.      Review of Systems    See HPI    Objective      Vitals:    Patient Vitals for the past 24 hrs:   BP Temp Temp src Pulse SpO2 Weight   11/21/22 1342 (!) 143/82 98  F (36.7  C) Tympanic 78 97 % 132.1 kg (291 lb 3.2 oz)         Vital signs reviewed by: Sky Pang PA-C    Physical Exam   Constitutional: The patient is oriented to person, place, and time. Alert and cooperative. No acute distress.  Mouth: Mucous membranes are moist.  Cardiovascular: Regular rate and rhythm.  Pulmonary/Chest: Effort normal. No respiratory distress.   GI: Abdomen is soft, non-tender and non-distended throughout. No rebound or guarding. No McBurney point tenderness.   MSK: No CVA tenderness bilaterally.  Neurological: Alert and oriented x3.     Labs/Imaging:    Results for orders placed or performed in visit on 11/21/22   UA Macro with Reflex to Micro and Culture - lab collect     Status: Abnormal    Specimen: Urine, Clean Catch   Result Value Ref Range    Color Urine Yellow Colorless, Straw, Light Yellow, Yellow    Appearance Urine Cloudy (A) Clear    Glucose Urine Negative Negative mg/dL    Bilirubin Urine Negative Negative    Ketones Urine Negative Negative mg/dL    Specific Gravity Urine 1.010 1.003 - 1.035    Blood Urine Small (A) Negative    pH Urine 6.5 5.0 - 7.0    Protein Albumin Urine Negative Negative mg/dL    Urobilinogen Urine 1.0 0.2, 1.0 E.U./dL    Nitrite Urine Negative Negative    Leukocyte Esterase Urine Small (A) Negative   Urine Microscopic     Status: Abnormal   Result Value Ref Range    Bacteria Urine Few (A) None Seen /HPF    RBC Urine 0-2 0-2 /HPF /HPF    WBC Urine 25-50 (A) 0-5 /HPF /HPF    Squamous Epithelials Urine Few (A) None Seen /LPF     Urine Culture Pending      Sky Pang PA-C, November 21, 2022

## 2022-11-23 LAB — BACTERIA UR CULT: ABNORMAL

## 2022-11-27 DIAGNOSIS — E03.9 ACQUIRED HYPOTHYROIDISM: ICD-10-CM

## 2022-11-27 NOTE — TELEPHONE ENCOUNTER
SYNTHROID 150 MCG tablet   Last Written Prescription Date:  2/26/2020  Last Fill Quantity: 90 TABLET,   # refills: 1  Last Office Visit: 12/13/2021 JENNY PATINO  Future Office visit:       Routing refill request to provider for review/approval because:  Drug not active on patient's medication list

## 2022-11-28 RX ORDER — LEVOTHYROXINE SODIUM 150 UG/1
150 TABLET ORAL DAILY
Qty: 90 TABLET | Refills: 0 | Status: SHIPPED | OUTPATIENT
Start: 2022-11-28 | End: 2023-01-02

## 2022-11-28 NOTE — TELEPHONE ENCOUNTER
Patient called back to say that she scheduled a Physical, soonest was 2/20/2023.  Charlee Kline MA  [facility]  2nd Floor  Primary Care

## 2022-12-13 ENCOUNTER — ANCILLARY PROCEDURE (OUTPATIENT)
Dept: MAMMOGRAPHY | Facility: CLINIC | Age: 62
End: 2022-12-13
Payer: COMMERCIAL

## 2022-12-13 ENCOUNTER — OFFICE VISIT (OUTPATIENT)
Dept: OPHTHALMOLOGY | Facility: CLINIC | Age: 62
End: 2022-12-13
Payer: COMMERCIAL

## 2022-12-13 ENCOUNTER — OFFICE VISIT (OUTPATIENT)
Dept: URGENT CARE | Facility: URGENT CARE | Age: 62
End: 2022-12-13
Payer: COMMERCIAL

## 2022-12-13 VITALS
SYSTOLIC BLOOD PRESSURE: 144 MMHG | BODY MASS INDEX: 49.99 KG/M2 | OXYGEN SATURATION: 97 % | TEMPERATURE: 96.7 F | HEART RATE: 77 BPM | DIASTOLIC BLOOD PRESSURE: 76 MMHG | WEIGHT: 293 LBS

## 2022-12-13 DIAGNOSIS — E03.9 HYPOTHYROIDISM, UNSPECIFIED TYPE: ICD-10-CM

## 2022-12-13 DIAGNOSIS — L97.821 SKIN ULCER OF LEFT PRETIBIAL REGION LIMITED TO BREAKDOWN OF SKIN (H): Primary | ICD-10-CM

## 2022-12-13 DIAGNOSIS — Z01.01 ENCOUNTER FOR EXAMINATION OF EYES AND VISION WITH ABNORMAL FINDINGS: ICD-10-CM

## 2022-12-13 DIAGNOSIS — Z96.1 PSEUDOPHAKIA: ICD-10-CM

## 2022-12-13 DIAGNOSIS — H52.4 PRESBYOPIA: ICD-10-CM

## 2022-12-13 DIAGNOSIS — Z12.31 VISIT FOR SCREENING MAMMOGRAM: ICD-10-CM

## 2022-12-13 DIAGNOSIS — H43.813 POSTERIOR VITREOUS DETACHMENT OF BOTH EYES: ICD-10-CM

## 2022-12-13 DIAGNOSIS — H25.811 COMBINED FORMS OF AGE-RELATED CATARACT OF RIGHT EYE: Primary | ICD-10-CM

## 2022-12-13 LAB
BASOPHILS # BLD AUTO: 0 10E3/UL (ref 0–0.2)
BASOPHILS NFR BLD AUTO: 0 %
EOSINOPHIL # BLD AUTO: 0.1 10E3/UL (ref 0–0.7)
EOSINOPHIL NFR BLD AUTO: 1 %
ERYTHROCYTE [DISTWIDTH] IN BLOOD BY AUTOMATED COUNT: 14.7 % (ref 10–15)
HBA1C MFR BLD: 5.3 % (ref 0–5.6)
HCT VFR BLD AUTO: 38.8 % (ref 35–47)
HGB BLD-MCNC: 12.6 G/DL (ref 11.7–15.7)
IMM GRANULOCYTES # BLD: 0.1 10E3/UL
IMM GRANULOCYTES NFR BLD: 1 %
LYMPHOCYTES # BLD AUTO: 2.8 10E3/UL (ref 0.8–5.3)
LYMPHOCYTES NFR BLD AUTO: 39 %
MCH RBC QN AUTO: 29.7 PG (ref 26.5–33)
MCHC RBC AUTO-ENTMCNC: 32.5 G/DL (ref 31.5–36.5)
MCV RBC AUTO: 92 FL (ref 78–100)
MONOCYTES # BLD AUTO: 0.5 10E3/UL (ref 0–1.3)
MONOCYTES NFR BLD AUTO: 6 %
NEUTROPHILS # BLD AUTO: 3.8 10E3/UL (ref 1.6–8.3)
NEUTROPHILS NFR BLD AUTO: 52 %
PLATELET # BLD AUTO: 233 10E3/UL (ref 150–450)
RBC # BLD AUTO: 4.24 10E6/UL (ref 3.8–5.2)
WBC # BLD AUTO: 7.2 10E3/UL (ref 4–11)

## 2022-12-13 PROCEDURE — 99214 OFFICE O/P EST MOD 30 MIN: CPT | Performed by: PHYSICIAN ASSISTANT

## 2022-12-13 PROCEDURE — 87205 SMEAR GRAM STAIN: CPT | Performed by: PHYSICIAN ASSISTANT

## 2022-12-13 PROCEDURE — 80061 LIPID PANEL: CPT | Performed by: PHYSICIAN ASSISTANT

## 2022-12-13 PROCEDURE — 77067 SCR MAMMO BI INCL CAD: CPT | Mod: TC | Performed by: RADIOLOGY

## 2022-12-13 PROCEDURE — 83036 HEMOGLOBIN GLYCOSYLATED A1C: CPT | Performed by: PHYSICIAN ASSISTANT

## 2022-12-13 PROCEDURE — 80050 GENERAL HEALTH PANEL: CPT | Performed by: PHYSICIAN ASSISTANT

## 2022-12-13 PROCEDURE — 92015 DETERMINE REFRACTIVE STATE: CPT | Performed by: OPHTHALMOLOGY

## 2022-12-13 PROCEDURE — 36415 COLL VENOUS BLD VENIPUNCTURE: CPT | Performed by: PHYSICIAN ASSISTANT

## 2022-12-13 PROCEDURE — 87070 CULTURE OTHR SPECIMN AEROBIC: CPT | Performed by: PHYSICIAN ASSISTANT

## 2022-12-13 PROCEDURE — 92014 COMPRE OPH EXAM EST PT 1/>: CPT | Performed by: OPHTHALMOLOGY

## 2022-12-13 RX ORDER — MUPIROCIN 20 MG/G
OINTMENT TOPICAL 2 TIMES DAILY
Qty: 15 G | Refills: 0 | Status: SHIPPED | OUTPATIENT
Start: 2022-12-13 | End: 2022-12-23

## 2022-12-13 RX ORDER — CEPHALEXIN 500 MG/1
500 CAPSULE ORAL 3 TIMES DAILY
Qty: 30 CAPSULE | Refills: 0 | Status: SHIPPED | OUTPATIENT
Start: 2022-12-13 | End: 2022-12-23

## 2022-12-13 ASSESSMENT — VISUAL ACUITY
OD_CC: 20/50
OS_PH_CC+: +2
METHOD: SNELLEN - LINEAR
OS_CC: 20/50
CORRECTION_TYPE: GLASSES
OS_CC+: +2
OD_PH_CC: 20/40
OS_PH_CC: 20/40

## 2022-12-13 ASSESSMENT — CUP TO DISC RATIO
OS_RATIO: 0.4
OD_RATIO: 0.4

## 2022-12-13 ASSESSMENT — REFRACTION_MANIFEST
OS_SPHERE: -4.50
OD_CYLINDER: +4.00
OS_CYLINDER: +1.50
OS_AXIS: 180
OD_ADD: +2.50
OD_SPHERE: -6.50
OD_AXIS: 173
OS_ADD: +2.50

## 2022-12-13 ASSESSMENT — REFRACTION_WEARINGRX
OS_CYLINDER: +0.25
SPECS_TYPE: PAL
OD_CYLINDER: +3.00
OS_AXIS: 001
OS_SPHERE: -5.00
OD_AXIS: 175
OS_ADD: +2.50
OD_ADD: +2.50
OD_SPHERE: -6.00

## 2022-12-13 ASSESSMENT — TONOMETRY
IOP_METHOD: APPLANATION
OS_IOP_MMHG: 23
OD_IOP_MMHG: 21

## 2022-12-13 ASSESSMENT — SLIT LAMP EXAM - LIDS
COMMENTS: 1+ DERMATOCHALASIS, 1+ SCLERAL SHOW, 1+ MEIBOMIAN GLAND DYSFUNCTION
COMMENTS: 1+ DERMATOCHALASIS, 1+ SCLERAL SHOW, 1+ MEIBOMIAN GLAND DYSFUNCTION

## 2022-12-13 ASSESSMENT — EXTERNAL EXAM - RIGHT EYE: OD_EXAM: MILD BROW

## 2022-12-13 ASSESSMENT — EXTERNAL EXAM - LEFT EYE: OS_EXAM: MILD BROW

## 2022-12-13 NOTE — LETTER
December 18, 2022      Geovanna Aguilar  [address]  [place] [place] [address]        Dear ,    We are writing to inform you of your test results.    Test results indicate you may require additional follow up, see comment below. Your final wound culture only grew normal skin organisms.  The Gram stain however, which is a different test, did show gram-positive cocci which often will coincide with strep type bacteria. Please continue the oral Keflex and topical Bactroban. Please follow up in clinic is your condition worsens or fails to improve.      Resulted Orders   Wound Aerobic Bacterial Culture Routine with Gram Stain   Result Value Ref Range    Culture 4+ Normal timur     Gram Stain Result 2+ Gram positive cocci (A)     Gram Stain Result 2+ WBC seen (A)    Lipid panel reflex to direct LDL Non-fasting   Result Value Ref Range    Cholesterol 186 <200 mg/dL    Triglycerides 183 (H) <150 mg/dL    Direct Measure HDL 40 (L) >=50 mg/dL    LDL Cholesterol Calculated 109 (H) <=100 mg/dL    Non HDL Cholesterol 146 (H) <130 mg/dL    Patient Fasting > 8hrs? Yes     Narrative    Cholesterol  Desirable:  <200 mg/dL    Triglycerides  Normal:  Less than 150 mg/dL  Borderline High:  150-199 mg/dL  High:  200-499 mg/dL  Very High:  Greater than or equal to 500 mg/dL    Direct Measure HDL  Female:  Greater than or equal to 50 mg/dL   Male:  Greater than or equal to 40 mg/dL    LDL Cholesterol  Desirable:  <100mg/dL  Above Desirable:  100-129 mg/dL   Borderline High:  130-159 mg/dL   High:  160-189 mg/dL   Very High:  >= 190 mg/dL    Non HDL Cholesterol  Desirable:  130 mg/dL  Above Desirable:  130-159 mg/dL  Borderline High:  160-189 mg/dL  High:  190-219 mg/dL  Very High:  Greater than or equal to 220 mg/dL   Hemoglobin A1c   Result Value Ref Range    Hemoglobin A1C 5.3 0.0 - 5.6 %      Comment:      Normal <5.7%   Prediabetes 5.7-6.4%    Diabetes 6.5% or higher     Note: Adopted from ADA consensus guidelines.    TSH with free T4 reflex   Result Value Ref Range    TSH 3.66 0.40 - 4.00 mU/L   Comprehensive metabolic panel (BMP + Alb, Alk Phos, ALT, AST, Total. Bili, TP)   Result Value Ref Range    Sodium 137 133 - 144 mmol/L    Potassium 4.6 3.4 - 5.3 mmol/L    Chloride 104 94 - 109 mmol/L    Carbon Dioxide (CO2) 26 20 - 32 mmol/L    Anion Gap 7 3 - 14 mmol/L    Urea Nitrogen 15 7 - 30 mg/dL    Creatinine 0.77 0.52 - 1.04 mg/dL    Calcium 9.4 8.5 - 10.1 mg/dL    Glucose 90 70 - 99 mg/dL    Alkaline Phosphatase 72 40 - 150 U/L    AST 14 0 - 45 U/L    ALT 26 0 - 50 U/L    Protein Total 7.6 6.8 - 8.8 g/dL    Albumin 3.6 3.4 - 5.0 g/dL    Bilirubin Total 0.6 0.2 - 1.3 mg/dL    GFR Estimate 87 >60 mL/min/1.73m2      Comment:      Effective December 21, 2021 eGFRcr in adults is calculated using the 2021 CKD-EPI creatinine equation which includes age and gender (Sharon chavira al., [place], DOI: 10.1056/PRXTyj5431393)   CBC with platelets and differential   Result Value Ref Range    WBC Count 7.2 4.0 - 11.0 10e3/uL    RBC Count 4.24 3.80 - 5.20 10e6/uL    Hemoglobin 12.6 11.7 - 15.7 g/dL    Hematocrit 38.8 35.0 - 47.0 %    MCV 92 78 - 100 fL    MCH 29.7 26.5 - 33.0 pg    MCHC 32.5 31.5 - 36.5 g/dL    RDW 14.7 10.0 - 15.0 %    Platelet Count 233 150 - 450 10e3/uL    % Neutrophils 52 %    % Lymphocytes 39 %    % Monocytes 6 %    % Eosinophils 1 %    % Basophils 0 %    % Immature Granulocytes 1 %    Absolute Neutrophils 3.8 1.6 - 8.3 10e3/uL    Absolute Lymphocytes 2.8 0.8 - 5.3 10e3/uL    Absolute Monocytes 0.5 0.0 - 1.3 10e3/uL    Absolute Eosinophils 0.1 0.0 - 0.7 10e3/uL    Absolute Basophils 0.0 0.0 - 0.2 10e3/uL    Absolute Immature Granulocytes 0.1 <=0.4 10e3/uL       If you have any questions or concerns, please call the clinic at the number listed above.       Sincerely,      Thais Joyner PA-C

## 2022-12-13 NOTE — PATIENT INSTRUCTIONS
Glasses prescription given - optional  Stop Prednisolone drop.  May use artificial tears up to 4 times daily both eyes.  (Refresh Tears, Systane Ultra/Balance, or Theratears).  Try Celluvisc or Refresh PM at bedtime left eye.  Lid hygiene discussed and encouraged for both eyes a few times daily as needed.   Possible clouding of posterior capsule left eye discussed.   Signs and symptoms of retinal detachment (shower of black floaters, frequent flashing even during day, curtain over part of visual field) discussed.   Call in August 2023 for an appointment in December 2023 for Complete Exam    Dr. Acuña [phone]

## 2022-12-13 NOTE — LETTER
12/13/2022         RE: Geovanna Aguilar  [address]  [place] [address]        Dear Colleague,    Thank you for referring your patient, Geovanna Aguilar, to the [facility]. Please see a copy of my visit note below.     Current Eye Medications:  Prednisolone 1% left eye as needed for irritation, but not every day.       Subjective:  Follow up persistent mild injection at wound, left eye.  Patient is here for a pressure check.  She misplaced her glasses prescription, so she is still wearing her older glasses.  She has been aware of spider webs in the vision of both eyes intermittently.  Both eyes are comfortable.  Epiphora and mucous in her left eye following cataract surgery was present following surgery, but now resolved.      Objective:  See Ophthalmology Exam.       Assessment:  Stable eye exam.  Wound injection resolved.      ICD-10-CM    1. Combined forms of age-related cataract, mild, of right eye  H25.811       2. Pseudophakia, os  Z96.1       3. Posterior vitreous detachment of both eyes  H43.813       4. Encounter for examination of eyes and vision with abnormal findings  Z01.01       5. Presbyopia  H52.4            Plan:  Glasses prescription given - optional  Stop Prednisolone drop.  May use artificial tears up to 4 times daily both eyes.  (Refresh Tears, Systane Ultra/Balance, or Theratears).  Try Celluvisc or Refresh PM at bedtime left eye.  Lid hygiene discussed and encouraged for both eyes a few times daily as needed.   Possible clouding of posterior capsule left eye discussed.   Signs and symptoms of retinal detachment (shower of black floaters, frequent flashing even during day, curtain over part of visual field) discussed.   Call in August 2023 for an appointment in December 2023 for Complete Exam    Dr. Acuña [phone]          Again, thank you for allowing me to participate in the care of your patient.        Sincerely,        Damon Acuña,  MD

## 2022-12-13 NOTE — PROGRESS NOTES
Current Eye Medications:  Prednisolone 1% left eye as needed for irritation, but not every day.       Subjective:  Follow up persistent mild injection at wound, left eye.  Patient is here for a pressure check.  She misplaced her glasses prescription, so she is still wearing her older glasses.  She has been aware of spider webs in the vision of both eyes intermittently.  Both eyes are comfortable.  Epiphora and mucous in her left eye following cataract surgery was present following surgery, but now resolved.      Objective:  See Ophthalmology Exam.       Assessment:  Stable eye exam.  Wound injection resolved.      ICD-10-CM    1. Combined forms of age-related cataract, mild, of right eye  H25.811       2. Pseudophakia, os  Z96.1       3. Posterior vitreous detachment of both eyes  H43.813       4. Encounter for examination of eyes and vision with abnormal findings  Z01.01       5. Presbyopia  H52.4            Plan:  Glasses prescription given - optional  Stop Prednisolone drop.  May use artificial tears up to 4 times daily both eyes.  (Refresh Tears, Systane Ultra/Balance, or Theratears).  Try Celluvisc or Refresh PM at bedtime left eye.  Lid hygiene discussed and encouraged for both eyes a few times daily as needed.   Possible clouding of posterior capsule left eye discussed.   Signs and symptoms of retinal detachment (shower of black floaters, frequent flashing even during day, curtain over part of visual field) discussed.   Call in August 2023 for an appointment in December 2023 for Complete Exam    Dr. Acuña [phone]

## 2022-12-14 ENCOUNTER — TELEPHONE (OUTPATIENT)
Dept: WOUND CARE | Facility: CLINIC | Age: 62
End: 2022-12-14

## 2022-12-14 LAB
ALBUMIN SERPL-MCNC: 3.6 G/DL (ref 3.4–5)
ALP SERPL-CCNC: 72 U/L (ref 40–150)
ALT SERPL W P-5'-P-CCNC: 26 U/L (ref 0–50)
ANION GAP SERPL CALCULATED.3IONS-SCNC: 7 MMOL/L (ref 3–14)
AST SERPL W P-5'-P-CCNC: 14 U/L (ref 0–45)
BILIRUB SERPL-MCNC: 0.6 MG/DL (ref 0.2–1.3)
BUN SERPL-MCNC: 15 MG/DL (ref 7–30)
CALCIUM SERPL-MCNC: 9.4 MG/DL (ref 8.5–10.1)
CHLORIDE BLD-SCNC: 104 MMOL/L (ref 94–109)
CHOLEST SERPL-MCNC: 186 MG/DL
CO2 SERPL-SCNC: 26 MMOL/L (ref 20–32)
CREAT SERPL-MCNC: 0.77 MG/DL (ref 0.52–1.04)
FASTING STATUS PATIENT QL REPORTED: YES
GFR SERPL CREATININE-BSD FRML MDRD: 87 ML/MIN/1.73M2
GLUCOSE BLD-MCNC: 90 MG/DL (ref 70–99)
HDLC SERPL-MCNC: 40 MG/DL
LDLC SERPL CALC-MCNC: 109 MG/DL
NONHDLC SERPL-MCNC: 146 MG/DL
POTASSIUM BLD-SCNC: 4.6 MMOL/L (ref 3.4–5.3)
PROT SERPL-MCNC: 7.6 G/DL (ref 6.8–8.8)
SODIUM SERPL-SCNC: 137 MMOL/L (ref 133–144)
TRIGL SERPL-MCNC: 183 MG/DL
TSH SERPL DL<=0.005 MIU/L-ACNC: 3.66 MU/L (ref 0.4–4)

## 2022-12-14 NOTE — TELEPHONE ENCOUNTER
[facility]    Who is the name of the provider?:  New    What is the location you see this provider at/preferred location?: [place]  Person calling: Self  Phone number:  [phone]  Nurse call back needed:  Not Applicable     Reason for call:   Seen [facility]  las night, 12/13.  Referred to [facility] for non-healing wound on left shin.      Pharmacy location:  NA  Outside Imaging: Not Applicable   Can we leave a detailed message on this number?  YES

## 2022-12-14 NOTE — PROGRESS NOTES
Chief Complaint   Patient presents with     Foot Swelling     Pt went and spent a nought at her friend house over a week ago, pt slept in recliner where her knees where more elevated then her legs, pt noticed that both feet have become swollen, normally they would go down, but this time it went down but became swollen again.      Sore     Pt has sore on back of left leg from sleeping in a recliner, also bruised purple on the back off both legs, pt said she may need to be treated with antibiotics                    ASSESSMENT:       ICD-10-CM    1. Skin ulcer of left pretibial region limited to breakdown of skin (H)  L97.821 Swab Aerobic Bacterial Culture Routine with Gram Stain     cephALEXin (KEFLEX) 500 MG capsule     mupirocin (BACTROBAN) 2 % external ointment     Comprehensive metabolic panel (BMP + Alb, Alk Phos, ALT, AST, Total. Bili, TP)     CBC with platelets and differential     Wound Care Referral     Hemoglobin A1c     TSH with free T4 reflex     Lipid panel reflex to direct LDL Non-fasting     Lipid panel reflex to direct LDL Non-fasting     Hemoglobin A1c     TSH with free T4 reflex     CBC with platelets and differential     Comprehensive metabolic panel (BMP + Alb, Alk Phos, ALT, AST, Total. Bili, TP)            PLAN: Nonhealing skin wound, venous ulcer versus pressure ulcer.  Possible infection as yellow exudate present.  Comprehensive metabolic panel, TSH, hemoglobin A1c, CBC, lipid panel, TSH.  Follow-up with primary within 1 month.  Wash wound once daily with tiny bit of Hibiclens in sudsy warm water.  Be sure to wash off all the clipped Hibiclens and do not leave any remaining on skin.  Wound culture obtained.  Bactroban.  Oral Keflex.  Change dressing 2 times daily.  I have discussed clinical findings with patient.  Side effects of medications discussed.  Symptomatic care is discussed.  I have discussed the possibility of  worsening symptoms and indication to RTC or go to the ER if they  occur.  All questions are answered, patient indicates understanding of these issues and is in agreement with plan.   Patient care instructions are discussed/given at the end of visit.   Lots of rest and fluids.      Thais Joyner PA-C      SUBJECTIVE:  62-year-old female with hypothyroidism presents for left shin ulcer for many months.  Using over the counter iodine spray without relief.  Also has had some increased lower extremity edema over the last few days.  Spends much of her time during the day in the recliner.  No chest pain or shortness of breath.  States she is not diabetic.  Has not had kidney or liver function testing for over 1 year.      Allergies   Allergen Reactions     Abilify [Aripiprazole]      Carafate [Sucralfate]      Latex      Pt unsure of reaction     Lisinopril Cough     cough     Adhesive Tape Rash       Past Medical History:   Diagnosis Date     Arthritis      Bipolar 2 disorder (H)      Bone and joint disord back, pelvis, leg complicat preg, childb, puerp      Chronic constipation      Depressive disorder      Esophageal reflux      GERD (gastroesophageal reflux disease)      Heart rate problem     maybe     Hiatal hernia      Hyperlipidemia LDL goal < 100      Hypertension      Hypothyroidism      Migraines      Obesity      Obstructive sleep apnea      Osteoporosis     not sure     Other mental problems      Peripheral edema      Schizophrenia (H)      Urinary incontinence     pulsating urge with spasms to pee       BUPROPION HCL# 300 MG OR TB24, 1 TABLET DAILY  divalproex (DEPAKOTE ER) 500 MG 24 hr tablet, Reported on 3/27/2017  esomeprazole (NEXIUM) 40 MG DR capsule, Take 1 capsule (40 mg) by mouth 2 times daily Take 30-60 minutes before eating.  ibuprofen (ADVIL/MOTRIN) 200 MG capsule, Take 200 mg by mouth every 4 hours as needed for fever  levothyroxine (SYNTHROID) 150 MCG tablet, Take 1 tablet (150 mcg) by mouth daily  metoprolol tartrate (LOPRESSOR) 25 MG tablet, Take 1  tablet (25 mg) by mouth 2 times daily  order for DME, Walker with 4 wheels, brakes, seat  prednisoLONE acetate (PRED FORTE) 1 % ophthalmic suspension, Place 1 drop Into the left eye 2 times daily  simvastatin (ZOCOR) 40 MG tablet, Take 1 tablet (40 mg) by mouth At Bedtime  traMADol (ULTRAM) 50 MG tablet, Take 1-2 tablets ( mg) by mouth every 6 hours as needed for severe pain Needs to be seen for more.  ZYPREXA 5 MG OR TABS, Reported on 3/27/2017  AMBIEN 10 MG OR TABS, 1 TABLET AT BEDTIME    No current facility-administered medications on file prior to visit.      Social History     Tobacco Use     Smoking status: Never     Passive exposure: Yes     Smokeless tobacco: Never     Tobacco comments:     boyfriend smokes   Substance Use Topics     Alcohol use: Yes     Comment: rarely       ROS:  CONSTITUTIONAL: Negative for fatigue or fever.  EYES: Negative for eye problems.  ENT: As above.  RESP: As above.  CV: Negative for chest pains.  GI: Negative for vomiting.  MUSCULOSKELETAL:  Negative for significant muscle or joint pains.  NEUROLOGIC: Negative for headaches.  SKIN: Negative for rash.  PSYCH: Normal mentation for age.    OBJECTIVE:  BP (!) 144/76 (BP Location: Left arm, Patient Position: Sitting, Cuff Size: Adult Large)   Pulse 77   Temp (!) 96.7  F (35.9  C) (Tympanic)   Wt 136.3 kg (300 lb 6.4 oz)   LMP  (LMP Unknown)   SpO2 97%   BMI 49.99 kg/m    GENERAL APPEARANCE: Healthy, alert and no distress.  EYES:Conjunctiva/sclera clear.  NECK: Supple, nontender, no lymphadenopathy.  RESP: Lungs clear to auscultation - no rales, rhonchi or wheezes  CV: Regular rate and rhythm, normal S1 S2, no murmur noted.  NEURO: Awake, alert    SKIN: No rashes  No calf tenderness.  Negative Homans.  Good palpable dorsalis pedis pulses bilaterally.  Lower legs with brawny skin color changes consistent with venous stasis.  Left shin is with a quarter sized ulcer, 2 mm in depth approximately.  Some soupy yellow exudate  present.  Culture obtained.  No subcutaneous fat showing at this time.      Thais Joyner PA-C

## 2022-12-15 NOTE — TELEPHONE ENCOUNTER
Consult received via Phone from Urgent Care for wound of the Left shin.    Please schedule with Dr. Kat, Dr. Lopez, or Mackenzie JURADO at [facility] for next available appointment.    Is patient a RACHEAL lift? PLEASE INQUIRE WHEN MAKING THE APPOINTMENT AND PUT IN APPOINTMENT NOTES    Routing to  Wound Healing Scheduling.

## 2022-12-16 LAB
BACTERIA WND CULT: ABNORMAL
GRAM STAIN RESULT: ABNORMAL
GRAM STAIN RESULT: ABNORMAL

## 2022-12-17 PROBLEM — H43.813 POSTERIOR VITREOUS DETACHMENT OF BOTH EYES: Status: ACTIVE | Noted: 2022-12-17

## 2022-12-17 PROBLEM — H25.811 COMBINED FORMS OF AGE-RELATED CATARACT OF RIGHT EYE: Status: ACTIVE | Noted: 2022-12-17

## 2022-12-18 ENCOUNTER — TELEPHONE (OUTPATIENT)
Dept: URGENT CARE | Facility: URGENT CARE | Age: 62
End: 2022-12-18

## 2022-12-18 NOTE — RESULT ENCOUNTER NOTE
I attempted to contact the patient and there was no answer so left a voicemail message for her to call back regarding results. I will also send a results letter out today with the information as per Bimal Potter instructions.  Kaye Garay MA

## 2022-12-18 NOTE — TELEPHONE ENCOUNTER
----- Message from Thais Joyner PA-C sent at 12/16/2022 11:41 AM CST -----  Your final wound culture only grew normal skin organisms.  The Gram stain however, which is a different test,did show gram-positive cocci which often will coincide with strep type bacteria.  Continue the oral Keflex and topical Bactroban.  Please notify with results.

## 2022-12-19 ENCOUNTER — HOSPITAL ENCOUNTER (OUTPATIENT)
Dept: WOUND CARE | Facility: CLINIC | Age: 62
Discharge: HOME | End: 2022-12-19
Attending: SURGERY | Admitting: SURGERY
Payer: COMMERCIAL

## 2022-12-19 VITALS
TEMPERATURE: 98.6 F | HEIGHT: 65 IN | BODY MASS INDEX: 48.82 KG/M2 | SYSTOLIC BLOOD PRESSURE: 153 MMHG | DIASTOLIC BLOOD PRESSURE: 75 MMHG | HEART RATE: 66 BPM | WEIGHT: 293 LBS

## 2022-12-19 DIAGNOSIS — L97.821 SKIN ULCER OF LEFT PRETIBIAL REGION LIMITED TO BREAKDOWN OF SKIN (H): ICD-10-CM

## 2022-12-19 DIAGNOSIS — L97.922 ULCER OF LEFT LOWER EXTREMITY WITH FAT LAYER EXPOSED (H): ICD-10-CM

## 2022-12-19 PROCEDURE — 11042 DBRDMT SUBQ TIS 1ST 20SQCM/<: CPT | Performed by: SURGERY

## 2022-12-19 PROCEDURE — G0463 HOSPITAL OUTPT CLINIC VISIT: HCPCS | Mod: 25

## 2022-12-19 PROCEDURE — 88342 IMHCHEM/IMCYTCHM 1ST ANTB: CPT | Mod: 26 | Performed by: PATHOLOGY

## 2022-12-19 PROCEDURE — 11106 INCAL BX SKN SINGLE LES: CPT | Performed by: SURGERY

## 2022-12-19 PROCEDURE — 88342 IMHCHEM/IMCYTCHM 1ST ANTB: CPT | Mod: TC | Performed by: SURGERY

## 2022-12-19 PROCEDURE — 99203 OFFICE O/P NEW LOW 30 MIN: CPT | Mod: 25 | Performed by: SURGERY

## 2022-12-19 PROCEDURE — 93923 UPR/LXTR ART STDY 3+ LVLS: CPT | Performed by: SURGERY

## 2022-12-19 PROCEDURE — 88305 TISSUE EXAM BY PATHOLOGIST: CPT | Mod: 26 | Performed by: PATHOLOGY

## 2022-12-19 NOTE — PROGRESS NOTES
Patient arrived for wound care visit. Certified Wound Care Nurse time spent evaluating patient record, completed a full evaluation and documented wound(s) & kalyn-wound skin; provided recommendation based on treatment plan. Applied dressing, reviewed discharge instructions, patient education, and discussed plan of care with appropriate medical team staff members and patient and/or family members.

## 2022-12-19 NOTE — DISCHARGE INSTRUCTIONS
Geovanna Aguilar      1960    A DME order for supplies has been placed to [facility]. If there are any issues with your order including not receiving the order please call [facility] at [phone] option 3. They can also provide a tracking number for you if you had supplies shipped to you.    Wound Dressing Change: Left anterior lower leg  Cleanse with mild unscented soap and water  Apply 1/10th piece of 4x5 hydrofera blue transfer ready to wound  Then apply Orange Stripe EdemaWear from toes to knee. EdemaWear should be worn 24/7 unless bathing/showering or changing the dressing. You will wash and reuse the EdemaWear. DO NOT CUT THE EDEMAWEAR. IF IT IS TOO LONG THEN CUFF THE EDEMAWEAR (the EdemaWear can shrink length wise with washing).  Then apply 1 4x4 gauze over the EdemaWear and secure with 1 roll gauze and tape  Change daily    Compression:   Please remove compression dressing if toes turn blue and/or tingle and can not be relieved by raising the leg for one hour. If unable to reapply in the morning, keep compression on until next dressing change.    You can wear the EdemaWear on your right leg as well    Walk as much as you can, as you are able. Whenever you sit raise your ankle above your hips to promote wound healing.     A diet high in protein is important for wound healing, we recommend getting 90 grams of protein per day. Taking protein shakes or bars are a good way to get extra protein in your diet.     Good sources of protein:  Pork 26g per 3 oz  Whey protein powder - 24g per scoop (on average)  Greek yogurt - 23g per 8oz   Chicken or Turkey - 23g per 3oz  Fish - 20-25g per 3oz  Beef - 18-23g per 3oz  Navy beans - 20g per cup  Cottage cheese - 14g per 1/2 cup   Lentils - 13g per 1/4 cup  Beef jerky 13g per 1oz  2% milk - 8g per cup  Peanut butter - 8g per 2 tablespoons  Eggs - 6g per egg  Mixed nuts - 6g per 2oz         Rikki Lopez M.D. December 19, 2022    Call us at  [phone] if you have any questions about your wounds, have redness or swelling around your wound, have a fever of 101 or greater or if you have any other problems or concerns. We answer the phone Monday through Friday 8 am to 4 pm, please leave a message as we check the voicemail frequently throughout the day.     If you had a positive experience please indicate that on your patient satisfaction survey form that [facility] will be sending you.    It was a pleasure meeting with you today.  Thank you for allowing me and my team the privilege of caring for you today.  YOU are the reason we are here, and I truly hope we provided you with the excellent service you deserve.  Please let us know if there is anything else we can do for you so that we can be sure you are leaving completely satisfied with your care experience.      If you have any billing related questions please call the [facility] office at [phone]. The clinic staff does not handle billing related matters.    If you are scheduled to have a follow up appointment, you will receive a reminder call the day before your visit. On the appointment day please arrive 15 minutes prior to your appointment time. If you are unable to keep that appointment, please call the clinic to cancel or reschedule. If you are more than 10 minutes late or greater for your appointment, the clinic policy is that you may be asked to reschedule.

## 2022-12-19 NOTE — PROGRESS NOTES
[facility]ound [facility]aling [place] Progress Note    Subject: Geovanna Aguilar is referred to see us today at the wound clinic for evaluation of left chronic nonhealing left proximal anterior tibial ulcer.  Patient is unsure how this happened but may have been from the claws of one of her cats when she jumped off her lap.  This is been present for months and possibly even up to a year per patient.    She was seen by her physician approximately a week ago about this ulcer.  Placed on a course of Keflex which she has 5 days left.    Patient has no history of significant infection of this ulcer but failure to heal.  She has morbid obesity (BMI= 50 kg/m    Weight= 136 kg) which does limit her mobility.  She is in a recliner a good portion of the day and uses a wheeled walker to get around.  Reports that her appetite has been fairly good but usually only eats 1 meal daily.    She does live with her son who was supposed to help her with twice daily dressing changes per her physician but he has been unable to do so.  However, he can help her with some of the dressing since she [place] self cannot reach down to her calf or toes.      Patient denies of a history of any venous problems.  She does notice chronic swelling of both her legs which is worse when her legs are dependent even for shorter period of time.      History significant for her morbid obesity and limited mobility  ( could not apply compression stockings herself).  Non-smoker.  On Zocor for hyperlipidemia with recent LDL= 109.  No diabetes with normal A1c.  Normal renal function and electrolytes.  Albumin does not reveal malnutrition.  PMH:   Past Medical History:   Diagnosis Date     Arthritis      Bipolar 2 disorder (H)      Bone and joint disord back, pelvis, leg complicat preg, childb, puerp      Chronic constipation      Combined forms of age-related cataract, mild, of right eye 12/17/2022     Depressive disorder      Esophageal reflux      GERD  (gastroesophageal reflux disease)      Heart rate problem     maybe     Hiatal hernia      Hyperlipidemia LDL goal < 100      Hypertension      Hypothyroidism      Migraines      Obesity      Obstructive sleep apnea      Osteoporosis     not sure     Other mental problems      Peripheral edema      Schizophrenia (H)      Urinary incontinence     pulsating urge with spasms to pee     Patient Active Problem List   Diagnosis     GERD (gastroesophageal reflux disease)     Hypothyroidism     Peripheral edema     KALPESH (obstructive sleep apnea)     Hypertension goal BP (blood pressure) < 140/90     Hyperlipidemia LDL goal <130     Mild depressed bipolar I disorder (H)     Degenerative joint disease (DJD) of hip     Bursitis, hip     Bladder spasm     Anemia     Hyperglycemia     Chest pain     Schizophrenia (H)     Morbid obesity due to excess calories (H)     Slow transit constipation     Pseudophakia, os     Posterior vitreous detachment of both eyes     Combined forms of age-related cataract, mild, of right eye     Social Hx:   Social History     Socioeconomic History     Marital status:      Spouse name: Not on file     Number of children: Not on file     Years of education: Not on file     Highest education level: Not on file   Occupational History     Not on file   Tobacco Use     Smoking status: Never     Passive exposure: Yes     Smokeless tobacco: Never     Tobacco comments:     boyfriend smokes   Substance and Sexual Activity     Alcohol use: Yes     Comment: rarely     Drug use: No     Sexual activity: Yes     Partners: Male   Other Topics Concern     Parent/sibling w/ CABG, MI or angioplasty before 65F 55M? No   Social History Narrative     Not on file     Social Determinants of Health     Financial Resource Strain: Not on file   Food Insecurity: Not on file   Transportation Needs: Not on file   Physical Activity: Not on file   Stress: Not on file   Social Connections: Not on file   Intimate Partner  Violence: Not on file   Housing Stability: Not on file       Surgical Hx:   Past Surgical History:   Procedure Laterality Date     CATARACT IOL, RT/LT       COLONOSCOPY N/A 08/12/2021    Procedure: COLONOSCOPY, WITH POLYPECTOMY AND BIOPSY;  Surgeon: Laura Osei MD;  Location:  GI     CRYOTHERAPY, CERVICAL       GENITOURINARY SURGERY       HC TOOTH EXTRACTION W/FORCEP       HYSTERECTOMY SUPRACERVICAL  05/2006    due to endometrial hyperplasia     PHACOEMULSIFICATION WITH STANDARD INTRAOCULAR LENS IMPLANT Left 10/21/2021     SALPINGO OOPHORECTOMY,R/L/KEVIN  05/2006    Salpingo Oophorectomy, RT     SURGICAL HISTORY OF -   1974    Repair soft tissue of right arm     TUBAL LIGATION  1994     [facility]  01/04/2011     VASCULAR SURGERY       [facility] ABLATION SUPRAVENT ARRHYTHMOGENIC FOCUS  2001     [facility] CARDIAC SURG PROCEDURE UNLIST       [facility] STOMACH SURGERY PROCEDURE UNLISTED         Allergies:    Allergies   Allergen Reactions     Abilify [Aripiprazole]      Carafate [Sucralfate]      Latex      Pt unsure of reaction     Lisinopril Cough     cough     Adhesive Tape Rash       Medications:   Current Outpatient Medications   Medication     AMBIEN 10 MG OR TABS     BUPROPION HCL# 300 MG OR TB24     cephALEXin (KEFLEX) 500 MG capsule     divalproex (DEPAKOTE ER) 500 MG 24 hr tablet     esomeprazole (NEXIUM) 40 MG DR capsule     ibuprofen (ADVIL/MOTRIN) 200 MG capsule     levothyroxine (SYNTHROID) 150 MCG tablet     metoprolol tartrate (LOPRESSOR) 25 MG tablet     mupirocin (BACTROBAN) 2 % external ointment     order for DME     simvastatin (ZOCOR) 40 MG tablet     ZYPREXA 5 MG OR TABS     No current facility-administered medications for this encounter.       Labs:   Recent Labs   Lab Test 12/13/22 1910   ALBUMIN 3.6   HGB 12.6   WBC 7.2   A1C 5.3     Creatinine   Date Value Ref Range Status   12/13/2022 0.77 0.52 - 1.04 mg/dL Final   11/23/2020 0.73 0.52 - 1.04 mg/dL Final     GFR Estimate   Date Value Ref Range  "Status   12/13/2022 87 >60 mL/min/1.73m2 Final     Comment:     Effective December 21, 2021 eGFRcr in adults is calculated using the 2021 CKD-EPI creatinine equation which includes age and gender (Sharon chavira al., NEJM, DOI: 10.1056/KZNEhh0435599)   11/23/2020 89 >60 mL/min/[1.73_m2] Final     Comment:     Non  GFR Calc  Starting 12/18/2018, serum creatinine based estimated GFR (eGFR) will be   calculated using the Chronic Kidney Disease Epidemiology Collaboration   (CKD-EPI) equation.       GFR Estimate If Black   Date Value Ref Range Status   11/23/2020 >90 >60 mL/min/[1.73_m2] Final     Comment:      GFR Calc  Starting 12/18/2018, serum creatinine based estimated GFR (eGFR) will be   calculated using the Chronic Kidney Disease Epidemiology Collaboration   (CKD-EPI) equation.       WBC   Date Value Ref Range Status   11/23/2020 7.8 4.0 - 11.0 10e9/L Final     WBC Count   Date Value Ref Range Status   12/13/2022 7.2 4.0 - 11.0 10e3/uL Final     Lab Results   Component Value Date    CR 0.77 12/13/2022    CR 0.73 11/23/2020        Nutrition requirements were discussed with patient today.  Objective:  BP (!) 153/75   Pulse 66   Temp 98.6  F (37  C) (Temporal)   Ht 1.651 m (5' 5\")   Wt 136.1 kg (300 lb)   LMP  (LMP Unknown)   BMI 49.92 kg/m    Wound (used by OP [facility] only) 12/19/22 1130 Left anterior;lower leg ulceration, venous (Active)   Thickness/Stage full thickness 12/19/22 1100   Base red 12/19/22 1100   Periwound intact 12/19/22 1100   Periwound Temperature warm 12/19/22 1100   Periwound Skin Turgor soft 12/19/22 1100   Edges open 12/19/22 1100   Length (cm) 1.8 12/19/22 1100   Width (cm) 2.1 12/19/22 1100   Depth (cm) 0.3 12/19/22 1100   Wound (cm^2) 3.78 cm^2 12/19/22 1100   Wound Volume (cm^3) 1.13 cm^3 12/19/22 1100   Drainage Characteristics/Odor serosanguineous 12/19/22 1100   Drainage Amount moderate 12/19/22 1100        General:  Patient is alert and orientated, no acute " distress.  Very pleasant and talkative.  Quite comfortable.  Obese.  Chest= clear to auscultation  Cardiovascular= regular rate  Abdomen= obese, nontender  Extremities= left proximal anterior tibial ulcer as documented in photograph.  Surrounding tissue is unremarkable with no cellulitis or induration.    Both calfs are swollen consistent with lymphedema versus venous insufficiency but no evidence of venous insufficiency otherwise such as varicose veins.  Intact sensation.    Vascular: +3 right DP pulse.  Left is more difficult to palpate.  However, bedside Doppler reveals a +3 triphasic Doppler signal on both right and left DP/PTs (clinic Doppler was unable to give us tracings today)      With this chronic ulcer I felt wide excision and excisional biopsy is indicated and this is been discussed with the patient who agrees.    Procedure:   Patient was determined to be capable of making their own medical decisions and informed consent was obtained, topical anesthetic of 4% lidocaine was applied, debridement was performed using a #15 blade scalpel and a full-thickness/subcutaneous fashion.  I excised the entire ulcer including approximately 3 mm of normal surrounding skin down to the subcutaneous tissue and sent this for biopsy.  Very good bleeding is noted that was not excessive.  All tissue looks normal following excisional debridement/biopsy was used to debride ulcer down to and including subcutaneous tissue. Total cm squared debrided was 6 cm  following debridement. Bleeding controlled with light pressure. Patient tolerated procedure well.    Impression: #1.  Chronic nonhealing left tibial ulcer   -- Wide excisional debridement today with biopsy to rule out malignancy due to chronicity.     -- No evidence of arterial insufficiency.     -- Chronic swelling which may be lymphedema or venous insufficiency.  Will treat with EdemaWear.  She may sleep with this since she is unable herself to do removal or reapply this  compression.  Dealing with the swelling may aid in healing but also help her in general since she has noticed that when her feet and ankles do swell it is quite uncomfortable.     -- Discussed the importance of nutrition.  She does take vitamin supplements already.  Recommended a minimum of 30 g of protein supplement drinks daily and 60 g would be better.  Gave her information on this.        Barriers to healing include: Diabetes, proper offloading, smoking, nutrition. Patient education provided on each.     Plan:  We will dress the wounds with Hydrofera Blue to be changed daily or every other day along with EdemaWear.  Protein supplements and vitamin supplements encouraged.  May shower but no soaking (does have a walk-in tub/shower).    She had questions about whether she could leave her dressing off during the night and discussed the importance of the dressing being in contact with the ulcer at all times except for dressing changes.    No evidence of infection but she will finish off the remaining 5 days of her Keflex course but this will not be renewed.      Patient will return to the clinic in 2 weeks time.     Spent over 40 minutes with patient today.         Rikki Lopez MD on 12/19/2022 at 11:39 AM          Dictated using Dragon voice recognition software which may result in transcription errors      Further instructions from your care team       Geovanna Aguilar      1960    A DME order for supplies has been placed to [facility]. If there are any issues with your order including not receiving the order please call [facility] at [phone] option 3. They can also provide a tracking number for you if you had supplies shipped to you.    Wound Dressing Change: Left anterior lower leg  Cleanse with mild unscented soap and water  Apply 1/10th piece of 4x5 hydrofera blue transfer ready to wound  Then apply Orange Stripe EdemaWear from toes to knee. EdemaWear should be worn  24/7 unless bathing/showering or changing the dressing. You will wash and reuse the EdemaWear. DO NOT CUT THE EDEMAWEAR. IF IT IS TOO LONG THEN CUFF THE EDEMAWEAR (the EdemaWear can shrink length wise with washing).  Then apply 1 4x4 gauze over the EdemaWear and secure with 1 roll gauze and tape  Change daily    Compression:   Please remove compression dressing if toes turn blue and/or tingle and can not be relieved by raising the leg for one hour. If unable to reapply in the morning, keep compression on until next dressing change.    You can wear the EdemaWear on your right leg as well    Walk as much as you can, as you are able. Whenever you sit raise your ankle above your hips to promote wound healing.     A diet high in protein is important for wound healing, we recommend getting 90 grams of protein per day. Taking protein shakes or bars are a good way to get extra protein in your diet.     Good sources of protein:  Pork 26g per 3 oz  Whey protein powder - 24g per scoop (on average)  Greek yogurt - 23g per 8oz   Chicken or Turkey - 23g per 3oz  Fish - 20-25g per 3oz  Beef - 18-23g per 3oz  Navy beans - 20g per cup  Cottage cheese - 14g per 1/2 cup   Lentils - 13g per 1/4 cup  Beef jerky 13g per 1oz  2% milk - 8g per cup  Peanut butter - 8g per 2 tablespoons  Eggs - 6g per egg  Mixed nuts - 6g per 2oz         Rikki Lopez M.D. December 19, 2022    Call us at [phone] if you have any questions about your wounds, have redness or swelling around your wound, have a fever of 101 or greater or if you have any other problems or concerns. We answer the phone Monday through Friday 8 am to 4 pm, please leave a message as we check the voicemail frequently throughout the day.     If you had a positive experience please indicate that on your patient satisfaction survey form that [facility] will be sending you.    It was a pleasure meeting with you today.  Thank you for allowing me and my team the privilege of  caring for you today.  YOU are the reason we are here, and I truly hope we provided you with the excellent service you deserve.  Please let us know if there is anything else we can do for you so that we can be sure you are leaving completely satisfied with your care experience.      If you have any billing related questions please call the [facility] Business office at [phone]. The clinic staff does not handle billing related matters.    If you are scheduled to have a follow up appointment, you will receive a reminder call the day before your visit. On the appointment day please arrive 15 minutes prior to your appointment time. If you are unable to keep that appointment, please call the clinic to cancel or reschedule. If you are more than 10 minutes late or greater for your appointment, the clinic policy is that you may be asked to reschedule.

## 2022-12-20 ENCOUNTER — TELEPHONE (OUTPATIENT)
Dept: OTHER | Facility: CLINIC | Age: 62
End: 2022-12-20

## 2022-12-20 ENCOUNTER — TELEPHONE (OUTPATIENT)
Dept: WOUND CARE | Facility: CLINIC | Age: 62
End: 2022-12-20

## 2022-12-20 LAB
PATH REPORT.COMMENTS IMP SPEC: NORMAL
PATH REPORT.COMMENTS IMP SPEC: NORMAL
PATH REPORT.FINAL DX SPEC: NORMAL
PATH REPORT.GROSS SPEC: NORMAL
PATH REPORT.MICROSCOPIC SPEC OTHER STN: NORMAL
PATH REPORT.RELEVANT HX SPEC: NORMAL

## 2022-12-20 NOTE — TELEPHONE ENCOUNTER
Patient has some questions about wound care instructions, including questions on if she bought the correct soap for cleansing her wound, the protein supplement she is taking, which soap should be used for washing the socks she is using and other general wound care questions. She also thought pictures of the back of her legs were to be taken yesterday but were not.    [phone]

## 2022-12-20 NOTE — TELEPHONE ENCOUNTER
Returned call to patient. Discussed with her what soap is appropriate. She also had questions on what side of the hydrofera blue transfer ready to use and how to care for the EdemaWear. Patient would like the purple areas on her legs pictured at the next appointment. Added a note to her next appointment. No further questions or concerns.

## 2022-12-21 ENCOUNTER — HOSPITAL ENCOUNTER (OUTPATIENT)
Dept: WOUND CARE | Facility: CLINIC | Age: 62
Discharge: HOME | End: 2022-12-21
Attending: SURGERY | Admitting: SURGERY
Payer: COMMERCIAL

## 2022-12-21 ENCOUNTER — TELEPHONE (OUTPATIENT)
Dept: WOUND CARE | Facility: CLINIC | Age: 62
End: 2022-12-21

## 2022-12-21 VITALS
RESPIRATION RATE: 16 BRPM | SYSTOLIC BLOOD PRESSURE: 157 MMHG | HEART RATE: 89 BPM | TEMPERATURE: 97.5 F | DIASTOLIC BLOOD PRESSURE: 84 MMHG

## 2022-12-21 DIAGNOSIS — L97.922 ULCER OF LEFT LOWER EXTREMITY WITH FAT LAYER EXPOSED (H): Primary | ICD-10-CM

## 2022-12-21 PROCEDURE — 11106 INCAL BX SKN SINGLE LES: CPT | Performed by: SURGERY

## 2022-12-21 PROCEDURE — 11400 EXC TR-EXT B9+MARG 0.5 CM<: CPT | Mod: 51 | Performed by: SURGERY

## 2022-12-21 PROCEDURE — 88305 TISSUE EXAM BY PATHOLOGIST: CPT | Mod: 26 | Performed by: PATHOLOGY

## 2022-12-21 PROCEDURE — 11107 INCAL BX SKN EA SEP/ADDL: CPT | Performed by: SURGERY

## 2022-12-21 PROCEDURE — 88305 TISSUE EXAM BY PATHOLOGIST: CPT | Mod: TC | Performed by: SURGERY

## 2022-12-21 PROCEDURE — 11603 EXC TR-EXT MAL+MARG 2.1-3 CM: CPT | Performed by: SURGERY

## 2022-12-21 NOTE — RESULT ENCOUNTER NOTE
Notified patient of basal cell cancer with positive margins results. Follow up with me tomorrow morning at the wound clinic for a wider excision.                           Wm Jessica HINTON

## 2022-12-21 NOTE — PROGRESS NOTES
[facility] Wound Healing [place] Progress Note    Subject: Geovanna Aguilar comes to see me today.  She had a nonhealing left tibial wound where we performed an excisional biopsy and started wound care 2 days ago.  Final biopsy returned a basal cell cancer that did involve the distal skin margin and base.  I called her last evening and had her come in today for a wider reexcision.    She also noticed she has had a small firm lesion on her right mid anterior lateral thigh that she is concerned about and also a bluish skin tag in the left inframamillary fold that is increased.  She desired biopsy of these 2 sites also.      Patient Active Problem List   Diagnosis     GERD (gastroesophageal reflux disease)     Hypothyroidism     Peripheral edema     KALPESH (obstructive sleep apnea)     Hypertension goal BP (blood pressure) < 140/90     Hyperlipidemia LDL goal <130     Mild depressed bipolar I disorder (H)     Degenerative joint disease (DJD) of hip     Bursitis, hip     Bladder spasm     Anemia     Hyperglycemia     Chest pain     Schizophrenia (H)     Morbid obesity due to excess calories (H)     Slow transit constipation     Pseudophakia, os     Posterior vitreous detachment of both eyes     Combined forms of age-related cataract, mild, of right eye     Ulcer of left lower extremity with fat layer exposed (H)     Past Medical History:   Diagnosis Date     Arthritis      Bipolar 2 disorder (H)      Bone and joint disord back, pelvis, leg complicat preg, childb, puerp      Chronic constipation      Combined forms of age-related cataract, mild, of right eye 12/17/2022     Depressive disorder      Esophageal reflux      GERD (gastroesophageal reflux disease)      Heart rate problem     maybe     Hiatal hernia      Hyperlipidemia LDL goal < 100      Hypertension      Hypothyroidism      Migraines      Obesity      Obstructive sleep apnea      Osteoporosis     not sure     Other mental problems      Peripheral edema       Schizophrenia (H)      Urinary incontinence     pulsating urge with spasms to pee     Exam:  BP (!) 157/84 (BP Location: Left arm)   Pulse 89   Temp 97.5  F (36.4  C) (Temporal)   Resp 16   LMP  (LMP Unknown)   Wound (used by OP [facility] only) 12/19/22 1130 Left anterior;lower leg ulceration, venous (Active)   Thickness/Stage full thickness 12/21/22 0833   Base red 12/21/22 0833   Periwound intact 12/21/22 0833   Periwound Temperature warm 12/21/22 0833   Periwound Skin Turgor soft 12/21/22 0833   Edges open 12/21/22 0833   Length (cm) 3 12/21/22 0833   Width (cm) 2.6 12/21/22 0833   Depth (cm) 0.3 12/21/22 0833   Wound (cm^2) 7.8 cm^2 12/21/22 0833   Wound Volume (cm^3) 2.34 cm^3 12/21/22 0833   Wound healing % -106.35 12/21/22 0833   Drainage Characteristics/Odor serosanguineous 12/21/22 0833   Drainage Amount moderate 12/21/22 0833   Care, Wound debrided 12/21/22 0833     Alert and appropriate.  Very comfortable.  Aquacel Ag removed from left thigh ulcer.  No infection.  2 mm firm exophytic nodule right anterior lateral thigh.  4 x 2 mm skin tag like lesion in the left inframammary fold.    Procedure:   Patient was determined to be capable of making their own medical decisions and informed consent was obtained.  All areas were prepped with Betadine.  Field blocks on the 3 sites performed with 0.5% Marcaine used a total of 8 mL with excellent results.    Left tibial lesion: Wider excision was performed at least 3 mm of normal pilling squealing circumferentially and reexcised the deep base.  This came out as a single segment.  Blue ink was placed at the 12 o'clock position for orientation of the pathologist.  Bleeding stopped with simple pressure.  Hydrofera Blue ready applied followed by Spandagrip.  Size after excision 3 x 2.6 cm    Left inframamillary fold breast lesion some: Using a 15 blade scalpel again we excised the base of this wound and this was sent to pathology as a single specimen.  Wound edges  reapproximated the single interrupted 4-0 Monocryl suture followed by a Band-Aid    Right thigh lesion: Excised with a 15 blade scalpel with at least 2 mm skin margin.  Sent to pathology.  Approximated with two 4-0 Monocryl sutures followed by Band-Aid.    Impression: #1.  Wider excision of the left tibial nonhealing ulcer secondary to base of cancer.  We will continue with Hydrofera Blue ready dressings will to be changed daily or every other day with the help of her son.  Follow-up with me in 2 weeks.     #2.  Excision of clinically benign right thigh and left inframamillary fold skin lesions.  I will remove the sutures in 2 weeks.    I will call the patient with path results in the next several days when available.    Plan: We will dress the wounds with above.  Patient will return to the clinic in 2 weeks time      Rikki Lopez MD on 12/21/2022 at 8:46 AM    Dictated using Dragon voice recognition software which may result in transcription errors      Further instructions from your care team       Geovanna Aguilar      1960    A DME order was not completed because supplies were not needed as the supplies were ordered 12/19/22.    Wound Dressing Change: Left anterior lower leg  Cleanse with mild unscented soap and water  Apply 1/10th piece of 4x5 hydrofera blue transfer ready to wound  Then apply Orange Stripe EdemaWear from toes to knee. EdemaWear should be worn  24/7 unless bathing/showering or changing the dressing. You will wash and reuse the  EdemaWear. DO NOT CUT THE EDEMAWEAR. IF IT IS TOO LONG THEN CUFF THE  EDEMAWEAR (the EdemaWear can shrink length wise with washing).  Then apply 1 4x4 gauze over the EdemaWear and secure with 1 roll gauze and tape  Change daily    Compression:  Please remove compression dressing if toes turn blue and/or tingle and can not be  relieved by raising the leg for one hour. If unable to reapply in the morning, keep  compression on until next dressing  change.    You can wear the EdemaWear on your right leg as well    Walk as much as you can, as you are able. Whenever you sit raise your ankle  above your hips to promote wound healing.    Band-aids were placed on the right thigh and left breast fold after biopsy with sutures. Dr. Lopez will remove the sutures at your next visit 1/2/23. Ok to keep the areas open to air or keep covered with a band-aid that is changed daily.     Rikki Lopez M.D. December 21, 2022    Call us at [phone] if you have any questions about your wounds, have redness or swelling around your wound, have a fever of 101 or greater or if you have any other problems or concerns. We answer the phone Monday through Friday 8 am to 4 pm, please leave a message as we check the voicemail frequently throughout the day.     If you had a positive experience please indicate that on your patient satisfaction survey form that [facility] will be sending you.    It was a pleasure meeting with you today.  Thank you for allowing me and my team the privilege of caring for you today.  YOU are the reason we are here, and I truly hope we provided you with the excellent service you deserve.  Please let us know if there is anything else we can do for you so that we can be sure you are leaving completely satisfied with your care experience.      If you have any billing related questions please call the [facility] Business office at [phone]. The clinic staff does not handle billing related matters.    If you are scheduled to have a follow up appointment, you will receive a reminder call the day before your visit. On the appointment day please arrive 15 minutes prior to your appointment time. If you are unable to keep that appointment, please call the clinic to cancel or reschedule. If you are more than 10 minutes late or greater for your appointment, the clinic policy is that you may be asked to reschedule.

## 2022-12-21 NOTE — TELEPHONE ENCOUNTER
Patient called stating that her leg was burning on the way home from her visit with Dr Lopez today and seemed to be bleeding though the gauze at that time too. She has since arrived home, taken some ibuprofen and is resting. She thinks the bleeding may have stopped but is concerned about the bleeding starting again, the amount of blood and being unable to get in, if needed, with the weather being so bad.    Please call [phone]

## 2022-12-21 NOTE — TELEPHONE ENCOUNTER
Returned call to patient. Discussed with her that she should have her dressing changed to determine how much bleeding is still happening. Her son is not home at this time so she is not able to have her dressing changed. Discussed with patient to have him change it as soon as he gets home. She does not see any new reddened spots on the dressing. Discussed with patient how to care for wound if bleeding has continued and to go to the ER if she and son are not able to control the bleeding. Patient verbalized understanding. No further questions or concerns.

## 2022-12-21 NOTE — TELEPHONE ENCOUNTER
WOUND CARE    Geovanna Aguilar was seen by me in the wound clinic on 12/19/2022 for a nonhealing left anterior tibial ulcer.  I performed a excision of the ulcer and sent this for biopsy.  Wound was treated with appropriate dressings to heal by secondary intent.    Pathology was just returned and this is a basal cell cancer which certainly would explain the nonhealing.  However the margins were close and will need further excision down to negative margins.    I tried to speak with the patient this evening on the phone but was unable to reach her and left a message.    Rikki Lopez MD     ADDENDUM: I was able to reach the patient 1835 hrs.  I told her about the diagnosis and that a wider excision is necessary.  She will try to meet with the wound clinic 0830 hrs tomorrow morning but it may depend on whether whether she can have her son of her.    JESIKA

## 2022-12-21 NOTE — DISCHARGE INSTRUCTIONS
Geovanna Aguilar      1960    A DME order was not completed because supplies were not needed as the supplies were ordered 12/19/22.    Wound Dressing Change: Left anterior lower leg  Cleanse with mild unscented soap and water  Apply 1/10th piece of 4x5 hydrofera blue transfer ready to wound  Then apply Orange Stripe EdemaWear from toes to knee. EdemaWear should be worn  24/7 unless bathing/showering or changing the dressing. You will wash and reuse the  EdemaWear. DO NOT CUT THE EDEMAWEAR. IF IT IS TOO LONG THEN CUFF THE  EDEMAWEAR (the EdemaWear can shrink length wise with washing).  Then apply 1 4x4 gauze over the EdemaWear and secure with 1 roll gauze and tape  Change daily    Compression:  Please remove compression dressing if toes turn blue and/or tingle and can not be  relieved by raising the leg for one hour. If unable to reapply in the morning, keep  compression on until next dressing change.    You can wear the EdemaWear on your right leg as well    Walk as much as you can, as you are able. Whenever you sit raise your ankle  above your hips to promote wound healing.    Band-aids were placed on the right thigh and left breast fold after biopsy with sutures. Dr. Lopez will remove the sutures at your next visit 1/2/23. Ok to keep the areas open to air or keep covered with a band-aid that is changed daily.     Rikki Lopez M.D. December 21, 2022    Call us at [phone] if you have any questions about your wounds, have redness or swelling around your wound, have a fever of 101 or greater or if you have any other problems or concerns. We answer the phone Monday through Friday 8 am to 4 pm, please leave a message as we check the voicemail frequently throughout the day.     If you had a positive experience please indicate that on your patient satisfaction survey form that [facility] will be sending you.    It was a pleasure meeting with you today.  Thank you for allowing me and my team the  privilege of caring for you today.  YOU are the reason we are here, and I truly hope we provided you with the excellent service you deserve.  Please let us know if there is anything else we can do for you so that we can be sure you are leaving completely satisfied with your care experience.      If you have any billing related questions please call the [facility] Business office at [phone]. The clinic staff does not handle billing related matters.    If you are scheduled to have a follow up appointment, you will receive a reminder call the day before your visit. On the appointment day please arrive 15 minutes prior to your appointment time. If you are unable to keep that appointment, please call the clinic to cancel or reschedule. If you are more than 10 minutes late or greater for your appointment, the clinic policy is that you may be asked to reschedule.

## 2022-12-22 LAB
PATH REPORT.COMMENTS IMP SPEC: ABNORMAL
PATH REPORT.COMMENTS IMP SPEC: YES
PATH REPORT.FINAL DX SPEC: ABNORMAL
PATH REPORT.GROSS SPEC: ABNORMAL
PATH REPORT.MICROSCOPIC SPEC OTHER STN: ABNORMAL
PATH REPORT.RELEVANT HX SPEC: ABNORMAL
PHOTO IMAGE: ABNORMAL

## 2022-12-23 ENCOUNTER — TELEPHONE (OUTPATIENT)
Dept: OTHER | Facility: CLINIC | Age: 62
End: 2022-12-23

## 2022-12-23 NOTE — TELEPHONE ENCOUNTER
WOUND CARE    I called about her pathology results from rafaelCocuong Aguilar last Monday.  The left inframamillary fold breast lesion is a benign hemangioma.  Also benign lesion of the right thigh.    The reexcision basal cell cancer from her left tibial region had negative skin margin circumferentially.  There were few cancer cells at the base of the wound but this is not clinically significant since that will be debrided in the clinic if these tumor cells actually are viable with our wound care.    Discussed with her today.  She will follow-up with me in 10 days in the wound clinic    Rikki Lopez MD

## 2022-12-23 NOTE — RESULT ENCOUNTER NOTE
Notified patient of of pathology results.  See epic telephone call.  We will follow-up in wound clinic in 2 weeks                           Wm Jessica HINTON

## 2023-01-02 ENCOUNTER — HOSPITAL ENCOUNTER (OUTPATIENT)
Dept: WOUND CARE | Facility: CLINIC | Age: 63
Discharge: HOME | End: 2023-01-02
Attending: SURGERY | Admitting: SURGERY
Payer: COMMERCIAL

## 2023-01-02 VITALS — HEART RATE: 76 BPM | DIASTOLIC BLOOD PRESSURE: 72 MMHG | TEMPERATURE: 98.2 F | SYSTOLIC BLOOD PRESSURE: 146 MMHG

## 2023-01-02 DIAGNOSIS — L97.922 ULCER OF LEFT LOWER EXTREMITY WITH FAT LAYER EXPOSED (H): ICD-10-CM

## 2023-01-02 DIAGNOSIS — C44.91 CANCER OF THE SKIN, BASAL CELL: ICD-10-CM

## 2023-01-02 DIAGNOSIS — E03.9 ACQUIRED HYPOTHYROIDISM: ICD-10-CM

## 2023-01-02 PROCEDURE — 88305 TISSUE EXAM BY PATHOLOGIST: CPT | Mod: TC | Performed by: SURGERY

## 2023-01-02 PROCEDURE — 11042 DBRDMT SUBQ TIS 1ST 20SQCM/<: CPT | Performed by: SURGERY

## 2023-01-02 PROCEDURE — 88305 TISSUE EXAM BY PATHOLOGIST: CPT | Mod: 26 | Performed by: PATHOLOGY

## 2023-01-02 PROCEDURE — 11106 INCAL BX SKN SINGLE LES: CPT | Performed by: SURGERY

## 2023-01-02 RX ORDER — LEVOTHYROXINE SODIUM 150 UG/1
150 TABLET ORAL DAILY
Qty: 90 TABLET | Refills: 0 | Status: SHIPPED | OUTPATIENT
Start: 2023-01-02 | End: 2023-09-24

## 2023-01-02 NOTE — TELEPHONE ENCOUNTER
Patient calling to follow up on her labs drawn in  on 12/13/22. RN relayed normal labs to patient. RN advised patient any interpretation of abnormal labs needs to come from the provider. Patient states she is not concerned about the labs at this time and will follow up with her PCP on 2/20/23.     Patient asking for refill for synthroid. RN routing to refill pool.     Patient denies further questions or concerns at this time.     Yulissa Norris, SE[place], RN

## 2023-01-02 NOTE — TELEPHONE ENCOUNTER
Prescription approved per [facility] Refill Protocol.    Yulissa Norris, BSN, RN

## 2023-01-02 NOTE — DISCHARGE INSTRUCTIONS
Geovanna Aguilar      1960    A DME order for supplies has been placed to [facility]. If there are any issues with your order including not receiving the order please call [facility] at [phone] option 3. They can also provide a tracking number for you if you had supplies shipped to you.    Wound Dressing Change:   Left anterior lower leg  Cleanse with mild unscented soap and water  Apply 1/4 piece of 4x5 hydrofera blue transfer ready to wound  Then apply 1 4x4 gauze as a bolster in the area of the wound to achieve contact of hydrofera with wound,  Then apply Orange Stripe EdemaWear from toes to above the knee.   EdemaWear should be worn 24/7 unless bathing/showering or changing the dressing.   You will wash and reuse the EdemaWear.   DO NOT CUT THE EDEMAWEAR. IF IT IS TOO LONG THEN CUFF THE EDEMAWEAR   (the EdemaWear can shrink length wise with washing).  EdemaWear and secure with 1 roll gauze and tape  Change daily    Compression:  Please remove compression dressing if toes turn blue and/or tingle and can not be  relieved by raising the leg for one hour. If unable to reapply in the morning, keep  compression on until next dressing change.  You can wear the EdemaWear on your right leg as well  Walk as much as you can, as you are able. Whenever you sit raise your ankle  above your hips to promote wound healing.  Band-aids were placed on the right thigh and left breast fold after biopsy with sutures.  Dr. Lopez will remove the sutures at your next visit 1/2/23. Ok to keep the areas open to  air or keep covered with a band-aid that is changed daily.     Rikki Lopez M.D. January 2, 2023    Call us at [phone] if you have any questions about your wounds, have redness or swelling around your wound, have a fever of 101 or greater or if you have any other problems or concerns. We answer the phone Monday through Friday 8 am to 4 pm, please leave a message as we check the voicemail  frequently throughout the day.     If you had a positive experience please indicate that on your patient satisfaction survey form that [facility] will be sending you.    It was a pleasure meeting with you today.  Thank you for allowing me and my team the privilege of caring for you today.  YOU are the reason we are here, and I truly hope we provided you with the excellent service you deserve.  Please let us know if there is anything else we can do for you so that we can be sure you are leaving completely satisfied with your care experience.      If you have any billing related questions please call the [facility] Business office at [phone]. The clinic staff does not handle billing related matters.    If you are scheduled to have a follow up appointment, you will receive a reminder call the day before your visit. On the appointment day please arrive 15 minutes prior to your appointment time. If you are unable to keep that appointment, please call the clinic to cancel or reschedule. If you are more than 10 minutes late or greater for your appointment, the clinic policy is that you may be asked to reschedule.

## 2023-01-02 NOTE — PROGRESS NOTES
[facility] Wound [facility] [place] Progress Note    Subject: Geovanna Aguilar returns for follow-up today.  She had a nonhealing left tibial ulcer.  Excision revealed basal cell cancer with the lower margin and base positive.  Reexcision on 12/21/2022 with clearing the margins of the skin but still focal area at the deep tissue that should resolve with wound further debridement.    Benign hemangioma from the left breast lower fold excisional biopsy and a benign keratosis from the right lateral thigh biopsy.    Her son is to help her with IntraSite gel and Hydrofera Blue transfer to the left tibial region.  No significant pain or other concerns.  Taking supplements and vitamins.    Patient Active Problem List   Diagnosis     GERD (gastroesophageal reflux disease)     Hypothyroidism     Peripheral edema     KALPESH (obstructive sleep apnea)     Hypertension goal BP (blood pressure) < 140/90     Hyperlipidemia LDL goal <130     Mild depressed bipolar I disorder (H)     Degenerative joint disease (DJD) of hip     Bursitis, hip     Bladder spasm     Anemia     Hyperglycemia     Chest pain     Schizophrenia (H)     Morbid obesity due to excess calories (H)     Slow transit constipation     Pseudophakia, os     Posterior vitreous detachment of both eyes     Combined forms of age-related cataract, mild, of right eye     Ulcer of left lower extremity with fat layer exposed (H)     Past Medical History:   Diagnosis Date     Arthritis      Bipolar 2 disorder (H)      Bone and joint disord back, pelvis, leg complicat preg, childb, puerp      Chronic constipation      Combined forms of age-related cataract, mild, of right eye 12/17/2022     Depressive disorder      Esophageal reflux      GERD (gastroesophageal reflux disease)      Heart rate problem     maybe     Hiatal hernia      Hyperlipidemia LDL goal < 100      Hypertension      Hypothyroidism      Migraines      Obesity      Obstructive sleep apnea      Osteoporosis     not  sure     Other mental problems      Peripheral edema      Schizophrenia (H)      Urinary incontinence     pulsating urge with spasms to pee     Exam:  BP (!) 146/72 (BP Location: Left arm)   Pulse 76   Temp 98.2  F (36.8  C)   LMP  (LMP Unknown)   Wound (used by OP [facility] only) 12/19/22 1130 Left anterior;lower leg ulceration, venous (Active)   Thickness/Stage full thickness 12/21/22 0833   Base red 12/21/22 0833   Periwound intact 12/21/22 0833   Periwound Temperature warm 12/21/22 0833   Periwound Skin Turgor soft 12/21/22 0833   Edges open 12/21/22 0833   Length (cm) 3 12/21/22 0833   Width (cm) 2.6 12/21/22 0833   Depth (cm) 0.3 12/21/22 0833   Wound (cm^2) 7.8 cm^2 12/21/22 0833   Wound Volume (cm^3) 2.34 cm^3 12/21/22 0833   Wound healing % -106.35 12/21/22 0833   Drainage Characteristics/Odor serosanguineous 12/21/22 0833   Drainage Amount moderate 12/21/22 0833   Care, Wound debrided 12/21/22 0833     Alert appropriate.  Very comfortable.  Well-healed right anterior thigh and left breast crease incisions.  Sutures were removed    Left tibial ulcer with a mild amount of bioburden.  Skin edges are very healthy with no undermining.  No swelling.  No infection.    Procedure:   Patient was determined to be capable of making their own medical decisions and informed consent was obtained. Topical anesthetic of 4% lidocaine was applied, debridement was performed using a #15 blade down to and including subcutaneous tissue.  I reexcised the base of the wound and sent 3 tissue biopsies to make sure the base has no further basal cell cancer cells.  Slightly debrided the skin edges.  All bioburden removed.  Good bleeding noted.  Bleeding controlled with light pressure. Patient tolerated procedure well.  Debrided area approximately 3 cm     Impression: #1.  Left basal cell thank you cancer tibial ulcer.  Debrided and reexcised base today and sent to pathology and make sure we have negative margins.  Wound is quite healthy  with good blood supply.  We will use Hydrofera Blue transfer directly on ulcer to be changed daily.  This will need to heal by secondary intent.     #2.  Benign right thigh and left breast fold lesions.  No specific treatment following excision and complete healing.    Plan: We will dress the wounds with Hydrofera Blue transfer to left tibial region..  Patient will return to the clinic in 3 weeks time    Letter was sent to the patient's athletic club since she is unable to use the pool with her open ulcer.              Rikki Lopez MD on 1/2/2023 at 9:59 AM    Dictated using Dragon voice recognition software which may result in transcription errors

## 2023-01-03 LAB
PATH REPORT.COMMENTS IMP SPEC: NORMAL
PATH REPORT.COMMENTS IMP SPEC: NORMAL
PATH REPORT.FINAL DX SPEC: NORMAL
PATH REPORT.GROSS SPEC: NORMAL
PATH REPORT.MICROSCOPIC SPEC OTHER STN: NORMAL
PATH REPORT.RELEVANT HX SPEC: NORMAL
PHOTO IMAGE: NORMAL

## 2023-01-19 ENCOUNTER — TELEPHONE (OUTPATIENT)
Dept: FAMILY MEDICINE | Facility: CLINIC | Age: 63
End: 2023-01-19
Payer: COMMERCIAL

## 2023-01-19 NOTE — TELEPHONE ENCOUNTER
Patient Quality Outreach    Patient is due for the following:   Hypertension -  BP check    Next Steps:   Patient has upcoming appointment, these items will be addressed at that time. Appointment on 2/20/2023    Type of outreach:    did not contact      Questions for provider review:    None     Diane L. Schoenherr, RN

## 2023-02-06 ENCOUNTER — HOSPITAL ENCOUNTER (OUTPATIENT)
Dept: WOUND CARE | Facility: CLINIC | Age: 63
Discharge: HOME | End: 2023-02-06
Attending: SURGERY | Admitting: SURGERY
Payer: COMMERCIAL

## 2023-02-06 VITALS — HEART RATE: 72 BPM | TEMPERATURE: 98.3 F | DIASTOLIC BLOOD PRESSURE: 72 MMHG | SYSTOLIC BLOOD PRESSURE: 155 MMHG

## 2023-02-06 DIAGNOSIS — L97.922 ULCER OF LEFT LOWER EXTREMITY WITH FAT LAYER EXPOSED (H): Primary | ICD-10-CM

## 2023-02-06 PROCEDURE — 97602 WOUND(S) CARE NON-SELECTIVE: CPT

## 2023-02-06 PROCEDURE — 99212 OFFICE O/P EST SF 10 MIN: CPT | Performed by: SURGERY

## 2023-02-06 NOTE — DISCHARGE INSTRUCTIONS
Geovanna Aguilar      1960    A DME order was not completed because supplies were not needed given hydrogel in visit    Wound Dressing Change: Left anterior lower leg  Cleanse with mild unscented soap and water  Apply small amount, less than a pea size dab, of Skintegrity Hydrogel to wound  Then apply one 4x4 gauze and tape or a Bandaid over gel  Then apply Orange Stripe EdemaWear from toes to above the knee.  EdemaWear should be worn 24/7 unless bathing/showering or changing the dressing.  You will wash and reuse the EdemaWear.  DO NOT CUT THE EDEMAWEAR. IF IT IS TOO LONG THEN CUFF THE EDEMAWEAR  (the EdemaWear can shrink length wise with washing).  EdemaWear and secure with one roll gauze and tape  Change daily or every other day    Compression:  Please remove compression dressing if toes turn blue and/or tingle and can not be  relieved by raising the leg for one hour. If unable to reapply in the morning, keep  compression on until next dressing change.  You can wear the EdemaWear on your right leg as well  Walk as much as you can, as you are able. Whenever you sit raise your ankle  above your hips to promote wound healing.  Band-aids were placed on the right thigh and left breast fold after biopsy with sutures.  Dr. Lopez will remove the sutures at your next visit 1/2/23. Ok to keep the areas open to  air or keep covered with a band-aid that is changed daily.     Rikki Lopez M.D. February 6, 2023    Call us at [phone] if you have any questions about your wounds, have redness or swelling around your wound, have a fever of 101 or greater or if you have any other problems or concerns. We answer the phone Monday through Friday 8 am to 4 pm, please leave a message as we check the voicemail frequently throughout the day.     If you had a positive experience please indicate that on your patient satisfaction survey form that [facility] will be sending you.    It was a pleasure meeting with you  today.  Thank you for allowing me and my team the privilege of caring for you today.  YOU are the reason we are here, and I truly hope we provided you with the excellent service you deserve.  Please let us know if there is anything else we can do for you so that we can be sure you are leaving completely satisfied with your care experience.      If you have any billing related questions please call the [facility] Business office at [phone]. The clinic staff does not handle billing related matters.    If you are scheduled to have a follow up appointment, you will receive a reminder call the day before your visit. On the appointment day please arrive 15 minutes prior to your appointment time. If you are unable to keep that appointment, please call the clinic to cancel or reschedule. If you are more than 10 minutes late or greater for your appointment, the clinic policy is that you may be asked to reschedule.

## 2023-02-06 NOTE — PROGRESS NOTES
[facility] Wound [facility] [place] Progress Note    Subject: Geovanna Aguilar returns for follow-up of her left mid anterior tibial ulcer.  This was a basal cell cancer there we excised and biopsy eating on her first visit.  Edges were reexcised due to positive margins.  There were few cells still present after the second excision at the base of the ulcer bed which would have been excised with her debridement at the next visit.    Her son has been using Hydrofera Blue transfer over the site with progressive decreasing size.  Using EdemaWear for the swelling but also to hold the dressings in place.  Does elevate her legs for a good portion of the day in a recliner.  No specific complaints.    Previous areas under the left breast and right thigh have completely healed over (benign lesions excised in clinic).    Patient Active Problem List   Diagnosis     GERD (gastroesophageal reflux disease)     Hypothyroidism     Peripheral edema     KALPESH (obstructive sleep apnea)     Hypertension goal BP (blood pressure) < 140/90     Hyperlipidemia LDL goal <130     Mild depressed bipolar I disorder (H)     Degenerative joint disease (DJD) of hip     Bursitis, hip     Bladder spasm     Anemia     Hyperglycemia     Chest pain     Schizophrenia (H)     Morbid obesity due to excess calories (H)     Slow transit constipation     Pseudophakia, os     Posterior vitreous detachment of both eyes     Combined forms of age-related cataract, mild, of right eye     Ulcer of left lower extremity with fat layer exposed (H)     Past Medical History:   Diagnosis Date     Arthritis      Bipolar 2 disorder (H)      Bone and joint disord back, pelvis, leg complicat preg, childb, puerp      Chronic constipation      Combined forms of age-related cataract, mild, of right eye 12/17/2022     Depressive disorder      Esophageal reflux      GERD (gastroesophageal reflux disease)      Heart rate problem     maybe     Hiatal hernia      Hyperlipidemia LDL  goal < 100      Hypertension      Hypothyroidism      Migraines      Obesity      Obstructive sleep apnea      Osteoporosis     not sure     Other mental problems      Peripheral edema      Schizophrenia (H)      Urinary incontinence     pulsating urge with spasms to pee     Exam:  BP (!) 155/72 (BP Location: Left arm, Patient Position: Chair, Cuff Size: Adult Large)   Pulse 72   Temp 98.3  F (36.8  C) (Temporal)   LMP  (LMP Unknown)   Wound (used by OP [facility] only) 12/19/22 1130 Left anterior;lower leg ulceration, venous (Active)   Thickness/Stage full thickness 02/06/23 0842   Base red 02/06/23 0842   Periwound intact 02/06/23 0842   Periwound Temperature warm 02/06/23 0842   Periwound Skin Turgor soft 02/06/23 0842   Edges open 02/06/23 0842   Length (cm) 0.2 02/06/23 0842   Width (cm) 0.2 02/06/23 0842   Depth (cm) 0.1 02/06/23 0842   Wound (cm^2) 0.04 cm^2 02/06/23 0842   Wound Volume (cm^3) 0 cm^3 02/06/23 0842   Wound healing % 98.94 02/06/23 0842   Drainage Characteristics/Odor serosanguineous 02/06/23 0842   Drainage Amount moderate 02/06/23 0842   Care, Wound debrided 01/02/23 0959     Alert and appropriate.  Very comfortable.  Chronic leg swelling controlled with EdemaWear  Near complete healing of the left tibial ulcer with a small opening in the center with very healthy granulation tissue.  Immature epithelium is noted in the rest of the ulcer bed but very healthy.        Procedure: No debridement required    Impression: Almost complete healing of left tibial ulcer from basal cell cancer that has been completely excised.  We will use a small amount of IntraSite gel over the open area and immature epithelium with a Band-Aid followed by the EdemaWear.  May change daily or every other day.  May shower.    Plan: We will dress the wounds with IntraSite gel/Band-Aid/EdemaWear.  Patient will return to the clinic in 2 weeks time      Rikki Lopez MD on 2/6/2023 at 8:45 AM    Dictated using Dragon  voice recognition software which may result in transcription errors        Further instructions from your care team       Geovanna Aguilar      1960    A DME order was not completed because supplies were not needed given hydrogel in visit    Wound Dressing Change: Left anterior lower leg  Cleanse with mild unscented soap and water  Apply small amount, less than a pea size dab, of Skintegrity Hydrogel to wound  Then apply one 4x4 gauze and tape or a Bandaid over gel  Then apply Orange Stripe EdemaWear from toes to above the knee.  EdemaWear should be worn 24/7 unless bathing/showering or changing the dressing.  You will wash and reuse the EdemaWear.  DO NOT CUT THE EDEMAWEAR. IF IT IS TOO LONG THEN CUFF THE EDEMAWEAR  (the EdemaWear can shrink length wise with washing).  EdemaWear and secure with one roll gauze and tape  Change daily or every other day    Compression:  Please remove compression dressing if toes turn blue and/or tingle and can not be  relieved by raising the leg for one hour. If unable to reapply in the morning, keep  compression on until next dressing change.  You can wear the EdemaWear on your right leg as well  Walk as much as you can, as you are able. Whenever you sit raise your ankle  above your hips to promote wound healing.  Band-aids were placed on the right thigh and left breast fold after biopsy with sutures.  Dr. Lopez will remove the sutures at your next visit 1/2/23. Ok to keep the areas open to  air or keep covered with a band-aid that is changed daily.     Rikki Lopez M.D. February 6, 2023    Call us at [phone] if you have any questions about your wounds, have redness or swelling around your wound, have a fever of 101 or greater or if you have any other problems or concerns. We answer the phone Monday through Friday 8 am to 4 pm, please leave a message as we check the voicemail frequently throughout the day.     If you had a positive experience please indicate that on  your patient satisfaction survey form that [facility] will be sending you.    It was a pleasure meeting with you today.  Thank you for allowing me and my team the privilege of caring for you today.  YOU are the reason we are here, and I truly hope we provided you with the excellent service you deserve.  Please let us know if there is anything else we can do for you so that we can be sure you are leaving completely satisfied with your care experience.      If you have any billing related questions please call the [facility] Business office at [phone]. The clinic staff does not handle billing related matters.    If you are scheduled to have a follow up appointment, you will receive a reminder call the day before your visit. On the appointment day please arrive 15 minutes prior to your appointment time. If you are unable to keep that appointment, please call the clinic to cancel or reschedule. If you are more than 10 minutes late or greater for your appointment, the clinic policy is that you may be asked to reschedule.

## 2023-02-20 ENCOUNTER — TELEPHONE (OUTPATIENT)
Dept: FAMILY MEDICINE | Facility: CLINIC | Age: 63
End: 2023-02-20

## 2023-02-20 ENCOUNTER — OFFICE VISIT (OUTPATIENT)
Dept: FAMILY MEDICINE | Facility: CLINIC | Age: 63
End: 2023-02-20
Payer: COMMERCIAL

## 2023-02-20 ENCOUNTER — HOSPITAL ENCOUNTER (OUTPATIENT)
Dept: WOUND CARE | Facility: CLINIC | Age: 63
Discharge: HOME | End: 2023-02-20
Attending: SURGERY | Admitting: SURGERY
Payer: COMMERCIAL

## 2023-02-20 VITALS
BODY MASS INDEX: 48.32 KG/M2 | DIASTOLIC BLOOD PRESSURE: 77 MMHG | HEART RATE: 74 BPM | WEIGHT: 290 LBS | SYSTOLIC BLOOD PRESSURE: 127 MMHG | OXYGEN SATURATION: 94 % | HEIGHT: 65 IN

## 2023-02-20 VITALS — TEMPERATURE: 96.9 F | HEART RATE: 64 BPM | DIASTOLIC BLOOD PRESSURE: 80 MMHG | SYSTOLIC BLOOD PRESSURE: 156 MMHG

## 2023-02-20 DIAGNOSIS — I10 HYPERTENSION GOAL BP (BLOOD PRESSURE) < 140/90: ICD-10-CM

## 2023-02-20 DIAGNOSIS — F31.73 MANIC BIPOLAR I DISORDER IN PARTIAL REMISSION (H): Primary | ICD-10-CM

## 2023-02-20 DIAGNOSIS — L97.922 ULCER OF LEFT LOWER EXTREMITY WITH FAT LAYER EXPOSED (H): Primary | ICD-10-CM

## 2023-02-20 DIAGNOSIS — E78.5 HYPERLIPIDEMIA LDL GOAL <130: ICD-10-CM

## 2023-02-20 DIAGNOSIS — Z00.00 ROUTINE GENERAL MEDICAL EXAMINATION AT A HEALTH CARE FACILITY: Primary | ICD-10-CM

## 2023-02-20 DIAGNOSIS — E03.9 ACQUIRED HYPOTHYROIDISM: ICD-10-CM

## 2023-02-20 DIAGNOSIS — F31.73 MANIC BIPOLAR I DISORDER IN PARTIAL REMISSION (H): ICD-10-CM

## 2023-02-20 DIAGNOSIS — K21.9 GASTROESOPHAGEAL REFLUX DISEASE WITHOUT ESOPHAGITIS: ICD-10-CM

## 2023-02-20 LAB
ALBUMIN SERPL-MCNC: 3.7 G/DL (ref 3.4–5)
ALP SERPL-CCNC: 72 U/L (ref 40–150)
ALT SERPL W P-5'-P-CCNC: 19 U/L (ref 0–50)
AST SERPL W P-5'-P-CCNC: 4 U/L (ref 0–45)
BILIRUB DIRECT SERPL-MCNC: 0.1 MG/DL (ref 0–0.2)
BILIRUB SERPL-MCNC: 0.5 MG/DL (ref 0.2–1.3)
ERYTHROCYTE [DISTWIDTH] IN BLOOD BY AUTOMATED COUNT: 14.4 % (ref 10–15)
HBA1C MFR BLD: 5.7 % (ref 0–5.6)
HCT VFR BLD AUTO: 41.1 % (ref 35–47)
HGB BLD-MCNC: 13.2 G/DL (ref 11.7–15.7)
MCH RBC QN AUTO: 29 PG (ref 26.5–33)
MCHC RBC AUTO-ENTMCNC: 32.1 G/DL (ref 31.5–36.5)
MCV RBC AUTO: 90 FL (ref 78–100)
PLATELET # BLD AUTO: 251 10E3/UL (ref 150–450)
PROT SERPL-MCNC: 7.6 G/DL (ref 6.8–8.8)
RBC # BLD AUTO: 4.55 10E6/UL (ref 3.8–5.2)
VALPROATE SERPL-MCNC: 76.1 UG/ML
WBC # BLD AUTO: 10.9 10E3/UL (ref 4–11)

## 2023-02-20 PROCEDURE — 83036 HEMOGLOBIN GLYCOSYLATED A1C: CPT | Performed by: FAMILY MEDICINE

## 2023-02-20 PROCEDURE — 80076 HEPATIC FUNCTION PANEL: CPT | Performed by: FAMILY MEDICINE

## 2023-02-20 PROCEDURE — 82570 ASSAY OF URINE CREATININE: CPT | Performed by: FAMILY MEDICINE

## 2023-02-20 PROCEDURE — 99214 OFFICE O/P EST MOD 30 MIN: CPT | Mod: 25 | Performed by: FAMILY MEDICINE

## 2023-02-20 PROCEDURE — 99212 OFFICE O/P EST SF 10 MIN: CPT | Performed by: SURGERY

## 2023-02-20 PROCEDURE — 82043 UR ALBUMIN QUANTITATIVE: CPT | Performed by: FAMILY MEDICINE

## 2023-02-20 PROCEDURE — G0463 HOSPITAL OUTPT CLINIC VISIT: HCPCS

## 2023-02-20 PROCEDURE — 99396 PREV VISIT EST AGE 40-64: CPT | Performed by: FAMILY MEDICINE

## 2023-02-20 PROCEDURE — 85027 COMPLETE CBC AUTOMATED: CPT | Performed by: FAMILY MEDICINE

## 2023-02-20 PROCEDURE — 36415 COLL VENOUS BLD VENIPUNCTURE: CPT | Performed by: FAMILY MEDICINE

## 2023-02-20 PROCEDURE — 80164 ASSAY DIPROPYLACETIC ACD TOT: CPT | Performed by: FAMILY MEDICINE

## 2023-02-20 RX ORDER — SIMVASTATIN 40 MG
40 TABLET ORAL AT BEDTIME
Qty: 90 TABLET | Refills: 3 | Status: SHIPPED | OUTPATIENT
Start: 2023-02-20 | End: 2023-11-14 | Stop reason: ALTCHOICE

## 2023-02-20 RX ORDER — METOPROLOL TARTRATE 25 MG/1
25 TABLET, FILM COATED ORAL 2 TIMES DAILY
Qty: 180 TABLET | Refills: 3 | Status: SHIPPED | OUTPATIENT
Start: 2023-02-20 | End: 2024-02-23

## 2023-02-20 ASSESSMENT — ENCOUNTER SYMPTOMS
HEARTBURN: 1
SORE THROAT: 0
COUGH: 0
ARTHRALGIAS: 0
PALPITATIONS: 0
BREAST MASS: 0
NAUSEA: 0
CHILLS: 0
ABDOMINAL PAIN: 0
FREQUENCY: 0
SHORTNESS OF BREATH: 1
HEMATOCHEZIA: 0
PARESTHESIAS: 0
DYSURIA: 0
MYALGIAS: 1
JOINT SWELLING: 0
DIZZINESS: 0
FEVER: 0
HEMATURIA: 0
DIARRHEA: 1
EYE PAIN: 0
HEADACHES: 0
CONSTIPATION: 0
WEAKNESS: 0
NERVOUS/ANXIOUS: 0

## 2023-02-20 ASSESSMENT — PAIN SCALES - GENERAL: PAINLEVEL: NO PAIN (0)

## 2023-02-20 NOTE — DISCHARGE INSTRUCTIONS
Geovanna Aguilar      1960    A DME order was not completed because supplies were not needed    Wound Dressing Change:   Left anterior lower leg  Cleanse with mild unscented soap and water  Apply small amount, less than a pea size dab, of Skintegrity Hydrogel to wound  Then bandaid over gel  Then apply Orange Stripe EdemaWear from toes to above the knee.  EdemaWear should be worn 24/7 unless bathing/showering or changing the dressing.  You will wash and reuse the EdemaWear.  DO NOT CUT THE EDEMAWEAR. IF IT IS TOO LONG THEN CUFF THE EDEMAWEAR  (the EdemaWear can shrink length wise with washing).  EdemaWear and secure with one roll gauze and tape  Change daily or every other day    Continue with that dressing change routine for several weeks, then transition to only lotion to entire leg followed by compression.    Compression:  Please remove compression dressing if toes turn blue and/or tingle and can not be  relieved by raising the leg for one hour. If unable to reapply in the morning, keep  compression on until next dressing change.  You can wear the EdemaWear on your right leg as well  Walk as much as you can, as you are able. Whenever you sit raise your ankle  above your hips to promote wound healing.  Band-aids were placed on the right thigh and left breast fold after biopsy with sutures.  Dr. Lopez will remove the sutures at your next visit 1/2/23. Ok to keep the areas open to  air or keep covered with a band-aid that is changed daily.     Rikki Lopez M.D. February 20, 2023    Call us at [phone] if you have any questions about your wounds, have redness or swelling around your wound, have a fever of 101 or greater or if you have any other problems or concerns. We answer the phone Monday through Friday 8 am to 4 pm, please leave a message as we check the voicemail frequently throughout the day.     If you had a positive experience please indicate that on your patient satisfaction survey form that [facility] will be sending you.    It was a pleasure meeting with you today.  Thank you for allowing me and my team the privilege of caring for you today.  YOU are the reason we are here, and I truly hope we provided you with the excellent service you deserve.  Please let us know if there is anything else we can do for you so that we can be sure you are leaving completely satisfied with your care experience.      If you have any billing related questions please call the [facility] Business office at [phone]. The clinic staff does not handle billing related matters.    If you are scheduled to have a follow up appointment, you will receive a reminder call the day before your visit. On the appointment day please arrive 15 minutes prior to your appointment time. If you are unable to keep that appointment, please call the clinic to cancel or reschedule. If you are more than 10 minutes late or greater for your appointment, the clinic policy is that you may be asked to reschedule.

## 2023-02-20 NOTE — PROGRESS NOTES
[facility] [place] Progress Note    Subject: Geovanna Aguilar returns for follow-up.  She had a nonhealing left tibial ulcer due to a basal cell cancer requiring excision.  Once the cancer was excised she is using Hydrofera Blue transfer and the wound has gradually healed.  Most recently on her visit 2/6/2023 the site was almost healed.  We are using IntraSite gel/Band-Aid for protection.  He has no concerns at all.    Patient Active Problem List   Diagnosis     GERD (gastroesophageal reflux disease)     Hypothyroidism     Peripheral edema     KALPESH (obstructive sleep apnea)     Hypertension goal BP (blood pressure) < 140/90     Hyperlipidemia LDL goal <130     Mild depressed bipolar I disorder (H)     Degenerative joint disease (DJD) of hip     Bursitis, hip     Bladder spasm     Anemia     Hyperglycemia     Chest pain     Schizophrenia (H)     Morbid obesity due to excess calories (H)     Slow transit constipation     Pseudophakia, os     Posterior vitreous detachment of both eyes     Combined forms of age-related cataract, mild, of right eye     Ulcer of left lower extremity with fat layer exposed (H)     Past Medical History:   Diagnosis Date     Arthritis      Bipolar 2 disorder (H)      Bone and joint disord back, pelvis, leg complicat preg, childb, puerp      Chronic constipation      Combined forms of age-related cataract, mild, of right eye 12/17/2022     Depressive disorder      Esophageal reflux      GERD (gastroesophageal reflux disease)      Heart rate problem     maybe     Hiatal hernia      Hyperlipidemia LDL goal < 100      Hypertension      Hypothyroidism      Migraines      Obesity      Obstructive sleep apnea      Osteoporosis     not sure     Other mental problems      Peripheral edema      Schizophrenia (H)      Urinary incontinence     pulsating urge with spasms to pee     Exam:  BP (!) 156/80 (BP Location: Left arm, Patient Position: Sitting)   Pulse 64   Temp 96.9  F (36.1  C)  (Temporal)   LMP  (LMP Unknown)   Wound (used by OP [facility] only) 12/19/22 1130 Left anterior;lower leg ulceration, venous (Active)   Thickness/Stage full thickness 02/06/23 0842   Base red;epithelialization 02/06/23 0842   Periwound intact 02/20/23 1057   Periwound Temperature warm 02/20/23 1057   Periwound Skin Turgor soft 02/20/23 1057   Edges open 02/20/23 1057   Length (cm) 0 02/20/23 1057   Width (cm) 0 02/20/23 1057   Depth (cm) 0 02/20/23 1057   Wound (cm^2) 0 cm^2 02/20/23 1057   Wound Volume (cm^3) 0 cm^3 02/20/23 1057   Wound healing % 100 02/20/23 1057   Drainage Characteristics/Odor serosanguineous 02/20/23 1057   Drainage Amount small 02/20/23 1057   Care, Wound non-select wound debridement performed 02/06/23 0842     Alert and appropriate.  Very comfortable.  Small scab over left tibial site.  Removed with #15 blade(NC) and noted to be completely healed.  Still some discoloration though this should gradually improve but perhaps not completely.      Impression: Healed left tibial ulcer related to basal cell cancer.  We will use the IntraSite gel and a protective Band-Aid for the next several weeks as the skin matures.  May switch over to a regular lotion at that time.  She does want to protect this and she does have a cat at home and jumps on her legs and extra protection is quite appropriate.    To be cognizant if there is any other skin lesions that are unusual with a history of basal cell cancer.    Plan: We will dress the wounds with IntraSite gel/Band-Aid.  Patient will return to the clinic as needed.      Rikki Lopez MD on 2/20/2023 at 11:28 AM      Dictated using Dragon voice recognition software which may result in transcription errors        Further instructions from your care team       Geovanna Aguilar      1960    A DME order was not completed because supplies were not needed    Wound Dressing Change:   Left anterior lower leg  Cleanse with mild unscented soap and water  Apply  small amount, less than a pea size dab, of Skintegrity Hydrogel to wound  Then bandaid over gel  Then apply Orange Stripe EdemaWear from toes to above the knee.  EdemaWear should be worn 24/7 unless bathing/showering or changing the dressing.  You will wash and reuse the EdemaWear.  DO NOT CUT THE EDEMAWEAR. IF IT IS TOO LONG THEN CUFF THE EDEMAWEAR  (the EdemaWear can shrink length wise with washing).  EdemaWear and secure with one roll gauze and tape  Change daily or every other day    Continue with that dressing change routine for several weeks, then transition to only lotion to entire leg followed by compression.    Compression:  Please remove compression dressing if toes turn blue and/or tingle and can not be  relieved by raising the leg for one hour. If unable to reapply in the morning, keep  compression on until next dressing change.  You can wear the EdemaWear on your right leg as well  Walk as much as you can, as you are able. Whenever you sit raise your ankle  above your hips to promote wound healing.  Band-aids were placed on the right thigh and left breast fold after biopsy with sutures.  Dr. Lopez will remove the sutures at your next visit 1/2/23. Ok to keep the areas open to  air or keep covered with a band-aid that is changed daily.     Rikki Lopez M.D. February 20, 2023    Call us at [phone] if you have any questions about your wounds, have redness or swelling around your wound, have a fever of 101 or greater or if you have any other problems or concerns. We answer the phone Monday through Friday 8 am to 4 pm, please leave a message as we check the voicemail frequently throughout the day.     If you had a positive experience please indicate that on your patient satisfaction survey form that [facility] will be sending you.    It was a pleasure meeting with you today.  Thank you for allowing me and my team the privilege of caring for you today.  YOU are the reason we are here, and I  truly hope we provided you with the excellent service you deserve.  Please let us know if there is anything else we can do for you so that we can be sure you are leaving completely satisfied with your care experience.      If you have any billing related questions please call the [facility] Business office at [phone]. The clinic staff does not handle billing related matters.    If you are scheduled to have a follow up appointment, you will receive a reminder call the day before your visit. On the appointment day please arrive 15 minutes prior to your appointment time. If you are unable to keep that appointment, please call the clinic to cancel or reschedule. If you are more than 10 minutes late or greater for your appointment, the clinic policy is that you may be asked to reschedule.

## 2023-02-20 NOTE — PROGRESS NOTES
SUBJECTIVE:   CC: Geovanna is an 62 year old who presents for preventive health visit.   Patient has been advised of split billing requirements and indicates understanding: Yes  Healthy Habits:     Getting at least 3 servings of Calcium per day:  NO    Bi-annual eye exam:  Yes    Dental care twice a year:  NO    Sleep apnea or symptoms of sleep apnea:  Sleep apnea    Diet:  Regular (no restrictions)    Frequency of exercise:  None    Taking medications regularly:  Yes    Medication side effects:  None    PHQ-2 Total Score: 2    Additional concerns today:  No      Hyperlipidemia Follow-Up      Are you regularly taking any medication or supplement to lower your cholesterol?   Yes- simvastatin    Are you having muscle aches or other side effects that you think could be caused by your cholesterol lowering medication?  Yes- Omega XL    Hypertension Follow-up      Do you check your blood pressure regularly outside of the clinic? No     Are you following a low salt diet? Yes    Are your blood pressures ever more than 140 on the top number (systolic) OR more   than 90 on the bottom number (diastolic), for example 140/90? No    Hypothyroidism Follow-up      Since last visit, patient describes the following symptoms: Weight stable, no hair loss, no skin changes, no constipation, no loose stools      Today's PHQ-2 Score:   PHQ-2 ( 1999 Pfizer) 2/20/2023   Q1: Little interest or pleasure in doing things 1   Q2: Feeling down, depressed or hopeless 1   PHQ-2 Score 2   PHQ-2 Total Score (12-17 Years)- Positive if 3 or more points; Administer PHQ-A if positive -   Q1: Little interest or pleasure in doing things Several days   Q2: Feeling down, depressed or hopeless Several days   PHQ-2 Score 2           Social History     Tobacco Use     Smoking status: Never     Passive exposure: Yes     Smokeless tobacco: Never     Tobacco comments:     boyfriend smokes   Substance Use Topics     Alcohol use: Yes     Comment: rarely     If you  drink alcohol do you typically have >3 drinks per day or >7 drinks per week? No    Alcohol Use 2/20/2023   Prescreen: >3 drinks/day or >7 drinks/week? No   Prescreen: >3 drinks/day or >7 drinks/week? -       Reviewed orders with patient.  Reviewed health maintenance and updated orders accordingly - Yes  Labs reviewed in EPIC    Breast Cancer Screening:    Breast CA Risk Assessment (FHS-7) 2/20/2023   Do you have a family history of breast, colon, or ovarian cancer? No / Unknown         Mammogram Screening: Recommended mammography every 1-2 years with patient discussion and risk factor consideration  Pertinent mammograms are reviewed under the imaging tab.    History of abnormal Pap smear: Status post benign hysterectomy. Health Maintenance and Surgical History updated.  PAP / HPV 6/18/2013   PAP (Historical) NIL     Reviewed and updated as needed this visit by clinical staff   Tobacco  Allergies  Meds  Problems  Med Hx  Surg Hx  Fam Hx          Reviewed and updated as needed this visit by Provider   Tobacco  Allergies  Meds  Problems  Med Hx  Surg Hx  Fam Hx             Review of Systems   Constitutional: Negative for chills and fever.   HENT: Negative for congestion, ear pain, hearing loss and sore throat.    Eyes: Negative for pain and visual disturbance.   Respiratory: Positive for shortness of breath. Negative for cough.    Cardiovascular: Positive for peripheral edema. Negative for chest pain and palpitations.   Gastrointestinal: Positive for diarrhea and heartburn. Negative for abdominal pain, constipation, hematochezia and nausea.   Breasts:  Negative for tenderness, breast mass and discharge.   Genitourinary: Negative for dysuria, frequency, genital sores, hematuria, pelvic pain, urgency, vaginal bleeding and vaginal discharge.   Musculoskeletal: Positive for myalgias. Negative for arthralgias and joint swelling.   Skin: Negative for rash.   Neurological: Negative for dizziness, weakness,  "headaches and paresthesias.   Psychiatric/Behavioral: Positive for mood changes. The patient is not nervous/anxious.           OBJECTIVE:   /77   Pulse 74   Ht 1.651 m (5' 5\")   Wt 131.5 kg (290 lb)   LMP  (LMP Unknown)   SpO2 94%   BMI 48.26 kg/m    Physical Exam  GENERAL: healthy, alert, no distress and obese  EYES: Eyes grossly normal to inspection, PERRL and conjunctivae and sclerae normal  HENT: ear canals and TM's normal, nose and mouth without ulcers or lesions  NECK: no adenopathy, no asymmetry, masses, or scars and thyroid normal to palpation  RESP: lungs clear to auscultation - no rales, rhonchi or wheezes  CV: regular rate and rhythm, normal S1 S2, no S3 or S4, no murmur, click or rub, no peripheral edema and peripheral pulses strong  ABDOMEN: soft, nontender, no hepatosplenomegaly, no masses and bowel sounds normal  MS: ambulating with walker  PSYCH: mentation appears normal, affect normal/bright    Diagnostic Test Results:  Labs reviewed in Epic    ASSESSMENT/PLAN:   (Z00.00) Routine general medical examination at a health care facility  (primary encounter diagnosis)  Comment:   Plan: Routine preventive reviewedd    (I10) Hypertension goal BP (blood pressure) < 140/90  Comment: controlled  Plan: Albumin Random Urine Quantitative with Creat         Ratio, metoprolol tartrate (LOPRESSOR) 25 MG         tablet        Check lab and continue current treatment    (E78.5) Hyperlipidemia LDL goal <130  Comment: stable  Plan: simvastatin (ZOCOR) 40 MG tablet        Continue current treatment    (E03.9) Acquired hypothyroidism  Comment: Stable  Plan: Continue current treatment    (K21.9) Gastroesophageal reflux disease without esophagitis  Comment: stable  Plan: Continue current treatment    (F31.73) Manic bipolar I disorder in partial remission (H)  Comment: stable  Plan: Continue per psychiatry    The uses and side effects, including black box warnings as appropriate, were discussed in detail.  All " "patient questions were answered.  The patient was instructed to call immediately if any side effects developed.       COUNSELING:  Reviewed preventive health counseling, as reflected in patient instructions      BMI:   Estimated body mass index is 48.26 kg/m  as calculated from the following:    Height as of this encounter: 1.651 m (5' 5\").    Weight as of this encounter: 131.5 kg (290 lb).   Weight management plan: Discussed healthy diet and exercise guidelines      She reports that she has never smoked. She has been exposed to tobacco smoke. She has never used smokeless tobacco.      Leah Rhoades MD  [facility]  "

## 2023-02-20 NOTE — PATIENT INSTRUCTIONS
At [facility], we strive to deliver an exceptional experience to you, every time we see you. If you receive a survey, please complete it as we do value your feedback.  If you have MyChart, you can expect to receive results automatically within 24 hours of their completion.  Your provider will send a note interpreting your results as well.   If you do not have MyChart, you should receive your results in about a week by mail.    Your care team:                            Family Medicine Internal Medicine   MD Jesus Mendez MD Shantel Branch-Fleming, MD Srinivasa Vaka, MD Katya Belousova, RHIANNON GalarzaHillKATI Daniels CNP, MD Pediatrics   Raad Suero, MD Brigid Chilel MD Amelia Massimini APRN CNP   Sapna Rehman, APRN CNP MD Mateusz Hull MD          Clinic hours: Monday - Thursday 7 am-6 pm; Fridays 7 am-5 pm.   Urgent care: Monday - Friday 10 am- 8 pm; Saturday and Sunday 9 am-5 pm.    Clinic: [phone]       [facility]: Monday - Thursday 8 am - 7 pm; Friday 8 am - 6 pm  [facility]: [phone]     Preventive Health Recommendations  Female Ages 50 - 64    Yearly exam: See your health care provider every year in order to  o Review health changes.   o Discuss preventive care.    o Review your medicines if your doctor has prescribed any.      Get a Pap test every three years (unless you have an abnormal result and your provider advises testing more often).    If you get Pap tests with HPV test, you only need to test every 5 years, unless you have an abnormal result.     You do not need a Pap test if your uterus was removed (hysterectomy) and you have not had cancer.    You should be tested each year for STDs (sexually transmitted diseases) if you're at risk.     Have a mammogram every 1 to 2 years.    Have a colonoscopy at age 50, or have  a yearly FIT test (stool test). These exams screen for colon cancer.      Have a cholesterol test every 5 years, or more often if advised.    Have a diabetes test (fasting glucose) every three years. If you are at risk for diabetes, you should have this test more often.     If you are at risk for osteoporosis (brittle bone disease), think about having a bone density scan (DEXA).    Shots: Get a flu shot each year. Get a tetanus shot every 10 years.    Nutrition:     Eat at least 5 servings of fruits and vegetables each day.    Eat whole-grain bread, whole-wheat pasta and brown rice instead of white grains and rice.    Get adequate Calcium and Vitamin D.     Lifestyle    Exercise at least 150 minutes a week (30 minutes a day, 5 days a week). This will help you control your weight and prevent disease.    Limit alcohol to one drink per day.    No smoking.     Wear sunscreen to prevent skin cancer.     See your dentist every six months for an exam and cleaning.    See your eye doctor every 1 to 2 years.

## 2023-02-20 NOTE — TELEPHONE ENCOUNTER
.Reason for call:  Order   Order or referral being requested: Pt gave urine sample today and is concerned about the blood in urine. She has requested that the sample get tested for UTI  Reason for request: Check for UTI  Date needed: as soon as possible  Has the patient been seen by the PCP for this problem? YES    Additional comments: no    Phone number to reach patient:  Cell number on file:    Telephone Information:   Mobile [phone]       Best Time:  anytime    Can we leave a detailed message on this number?  YES    Travel screening: Not Applicable

## 2023-02-20 NOTE — LETTER
February 22, 2023      Geovanna Aguilar  [address]  [place] [address]        Dear ,    We are writing to inform you of your test results.    Your urine microalbumin test was normal.  This should be done yearly.     Resulted Orders   Albumin Random Urine Quantitative with Creat Ratio   Result Value Ref Range    Creatinine Urine mg/dL 456 mg/dL    Albumin Urine mg/L 91 mg/L    Albumin Urine mg/g Cr 19.96 0.00 - 25.00 mg/g Cr       If you have any questions or concerns, please call the clinic at the number listed above.       Sincerely,      Leah Rhoades MD

## 2023-02-21 LAB
CREAT UR-MCNC: 456 MG/DL
MICROALBUMIN UR-MCNC: 91 MG/L
MICROALBUMIN/CREAT UR: 19.96 MG/G CR (ref 0–25)

## 2023-02-22 NOTE — TELEPHONE ENCOUNTER
She did not mention any symptoms at her visit.  Is she having burning, frequency or other urinary symptoms?    Leah Mccloud M.D.

## 2023-02-22 NOTE — TELEPHONE ENCOUNTER
Pt notified of provider message as written. She states she is not having any sx now.  She said she thought there was some blood in her urine when she left the sample.  She has not seen any other blood in her urine or when wiping.  She said it may have been something she drank or ate. Advised testing probably not needed if no sx or continued blood. Advised if any sx occur or more blood let us know.  Carie HAQN, RN

## 2023-02-22 NOTE — RESULT ENCOUNTER NOTE
MsRamona Aguilar,    Your urine microalbumin test was normal.  This should be done yearly.    Please contact the clinic if you have additional questions.  Thank you.    Sincerely,    Leah Rhoades MD

## 2023-03-14 ENCOUNTER — OFFICE VISIT (OUTPATIENT)
Dept: URGENT CARE | Facility: URGENT CARE | Age: 63
End: 2023-03-14
Payer: COMMERCIAL

## 2023-03-14 VITALS
BODY MASS INDEX: 47.26 KG/M2 | OXYGEN SATURATION: 94 % | HEART RATE: 85 BPM | WEIGHT: 284 LBS | TEMPERATURE: 97.7 F | DIASTOLIC BLOOD PRESSURE: 77 MMHG | SYSTOLIC BLOOD PRESSURE: 147 MMHG

## 2023-03-14 DIAGNOSIS — R10.9 RIGHT FLANK PAIN: Primary | ICD-10-CM

## 2023-03-14 LAB
ALBUMIN UR-MCNC: NEGATIVE MG/DL
APPEARANCE UR: CLEAR
BACTERIA #/AREA URNS HPF: ABNORMAL /HPF
BILIRUB UR QL STRIP: NEGATIVE
COLOR UR AUTO: YELLOW
GLUCOSE UR STRIP-MCNC: NEGATIVE MG/DL
HGB UR QL STRIP: ABNORMAL
KETONES UR STRIP-MCNC: NEGATIVE MG/DL
LEUKOCYTE ESTERASE UR QL STRIP: NEGATIVE
NITRATE UR QL: NEGATIVE
PH UR STRIP: 6.5 [PH] (ref 5–7)
RBC #/AREA URNS AUTO: ABNORMAL /HPF
SP GR UR STRIP: <=1.005 (ref 1–1.03)
SQUAMOUS #/AREA URNS AUTO: ABNORMAL /LPF
UROBILINOGEN UR STRIP-ACNC: 1 E.U./DL
WBC #/AREA URNS AUTO: ABNORMAL /HPF
YEAST #/AREA URNS HPF: ABNORMAL /HPF

## 2023-03-14 PROCEDURE — 99213 OFFICE O/P EST LOW 20 MIN: CPT

## 2023-03-14 PROCEDURE — 81001 URINALYSIS AUTO W/SCOPE: CPT

## 2023-03-14 ASSESSMENT — PAIN SCALES - GENERAL: PAINLEVEL: MILD PAIN (3)

## 2023-03-14 NOTE — PROGRESS NOTES
ASSESSMENT:  (R10.9) Right flank pain  (primary encounter diagnosis)  Plan: UA Macroscopic with reflex to Microscopic and         Culture, UA Microscopic with Reflex to Culture    PLAN:  Informed the patient that the urine test does not show urinary tract infection but there is a trace of blood in the specimen which could be indicative of a small stone obstructing some portion of the urinary system.  We discussed the need to continue to drink plenty of fluids and use ibuprofen for pain.  Informed her to take ibuprofen with food to avoid upset stomach and counseled her on the adverse effects long-term use of ibuprofen can have on her esophageal reflux disease.  Discussed the need to return to clinic with any new or worsening symptoms.  Patient acknowledged her understanding of the above plan.    The use of Dragon/PowerMic dictation services may have been used to construct the content in this note; any grammatical or spelling errors are non-intentional. Please contact the author of this note directly if you are in need of any clarification.      Manish Hoffman, APRN CNP    SUBJECTIVE:  Geovanna Aguilar is a 62 year old female seen in clinic today for evaluation of right sided back pain and at worst a 8 on a scale of 1-10.  The patient describes the pain as a sharp, poking pain.    Symptoms have beening going on for 3 days.    Recent injury:none recalled by the patient  Measures which improve the pain include ibuprofen.  Measures which worsen the pain include certain positions  Personal hx of back pain is recurrent self limited episodes of low back pain in the past.  There is no history of lower extremity numbness/weakness or change in bowel/bladder control.  The patient reports not drinking a lot of water over the past 2 weeks.     ROS:  Negative except noted above.     OBJECTIVE:  Blood pressure (!) 147/77, pulse 85, temperature 97.7  F (36.5  C), temperature source Tympanic, weight 128.8 kg (284 lb), SpO2 94  %, not currently breastfeeding.  GENERAL APPEARANCE: healthy, alert and no distress  RESP: lungs clear to auscultation - no rales, rhonchi or wheezes  CV: regular rates and rhythm, normal S1 S2, no murmur noted  BACK: No CVA tenderness or pain upon palpation over the right flank  ABDOMEN:  soft, nontender, no HSM or masses and bowel sounds normal  SKIN: no suspicious lesions or rashes

## 2023-03-15 NOTE — PATIENT INSTRUCTIONS
Urine test does not show a urinary tract infection.  There is a trace of blood in the specimen which could be indicative of a small stone obstructing the urinary system.  Continue to drink plenty of fluids and use ibuprofen for pain.  Take ibuprofen with food to avoid stomach upset.

## 2023-03-27 NOTE — TELEPHONE ENCOUNTER
LM/ Encouraged to contact Insurance for benefits for upcoming procedure.   Ortho / Neurosurgery NP Note    POD# 4  s/p OPEN REDUCTION INTERNAL FIXATION LEFT LATERAL MALLEOLUS, OPEN REDUCTION INTERNAL FIXATION SYNDESMOSIS AND LATERAL LIGAMENT RECONSTRUCTION(GENERAL WITH POPLITEAL AND ADDUCTOR BLOCK)   Pt seen with no visitor present. Pt resting in recliner chair. Awake and alert, in NAD. Reports postop pain well controlled with oxycodone   Denies numbness or tingling in LLE  No complaints. Tolerating regular diet. No nausea. Hx Chron's, bowel regimen d/c    VSS Afebrile. Room air. Visit Vitals  /79   Pulse 87   Temp 97.7 °F (36.5 °C)   Resp 16   Ht 5' 9\" (1.753 m)   Wt 126.2 kg (278 lb 3.5 oz)   LMP 01/20/2023 (Approximate)   SpO2 100%   BMI 41.09 kg/m²       Voiding status: voiding   Output (mL)  Urine Voided: 350 ml (03/23/23 0455)  Unmeasurable Output  Urine Occurrence(s): 1 (03/26/23 0406)  Stool Occurrence(s): 1 (03/23/23 2129)  Straight Cath  Straight Cath: Nurse performed cath;Sterile technique used (03/22/23 0506)  Number of Attempts: 1 (03/22/23 0506)  Catheter Size: 16 FR (03/22/23 0506)  Time Catheter Inserted: 5607 (03/22/23 0506)  Time Catheter Removed: 7849 (03/22/23 0506)  Urine: 800 mL (03/22/23 0506)      Labs    Lab Results   Component Value Date/Time    HGB 10.7 (L) 03/23/2023 04:08 AM      No results found for: INR, INREXT, INREXT   Lab Results   Component Value Date/Time    Sodium 136 03/22/2023 04:33 AM    Potassium 3.9 03/22/2023 04:33 AM    Chloride 104 03/22/2023 04:33 AM    CO2 27 03/22/2023 04:33 AM    Glucose 237 (H) 03/22/2023 04:33 AM    BUN 13 03/22/2023 04:33 AM    Creatinine 0.58 03/22/2023 04:33 AM    Calcium 8.9 03/22/2023 04:33 AM     Recent Glucose Results:   Lab Results   Component Value Date/Time    GLUCPOC 135 (H) 03/27/2023 11:22 AM    GLUCPOC 128 (H) 03/27/2023 08:15 AM    GLUCPOC 163 (H) 03/26/2023 10:30 PM       Body mass index is 41.09 kg/m². : A BMI > 30 is classified as obesity and > 40 is classified as morbid obesity. Dressing c.d.i - LLE elevated   Calves soft and supple; No pain with passive stretch  Sensation and motor intact. +PF/DF/EHL intact. LLE skin warm/pink, +cap refill, unable to palpate pedal pulse due to cast  Swelling improved over the weekend. Trace edema   SCDs for mechanical DVT proph while in bed     PLAN:  1) PT/OT - [facility]   2) Lovenox 30 mg BID for DVT Prophylaxis for 6 weeks  3) Pain control - scheduled tylenol, and prn  oxycodone  5-10 mg q4 hrs. 4) Uncontrolled DM2 -  A1C 9.2 (3/23). BP improving 102-163. Continue Lantus and SSI, 60 gram diabetic diet. 5) Discharge planning - Lives alone in second floor apartment. Medically stable for discharge.    SNF placement, still waiting acceptance to rehab facility, will also need insurance Thom Briceno NP

## 2023-06-30 ENCOUNTER — TELEPHONE (OUTPATIENT)
Dept: FAMILY MEDICINE | Facility: CLINIC | Age: 63
End: 2023-06-30
Payer: COMMERCIAL

## 2023-08-07 ENCOUNTER — TELEPHONE (OUTPATIENT)
Dept: FAMILY MEDICINE | Facility: CLINIC | Age: 63
End: 2023-08-07
Payer: COMMERCIAL

## 2023-08-07 NOTE — LETTER
August 7, 2023    Geovanna Aguilar  [address]  [place] [address]    Dear Geovanna,    At [facility] we care about your health and are committed to providing quality patient care.     Here is a list of Health Maintenance topics that are due now or due soon:  Health Maintenance Due   Topic Date Due    DIABETIC FOOT EXAM  Never done    Pneumococcal Vaccine: Pediatrics (0 to 5 Years) and At-Risk Patients (6 to 64 Years) (1 - PCV) Never done    A1C  05/20/2023        We are recommending that you:     Schedule a Diabetes Follow-Up Office Appointment: You are due to be seen with your primary care provider for a diabetes follow up office appointment. Please schedule this as soon as you are able.       Diabetic Eye Exam is due:  If this was done within the last 12 months then please contact the clinic or respond to this message with the date and location so we can update your records.      Schedule a Nurse-Only appointment to update your immunizations: Your records indicate that you are not up to date with your immunizations, please schedule a nurse-only appointment to get these updated or update them at your next office visit. If this is incorrect, please disregard.    To schedule an appointment or discuss this further, you may contact us by phone at the [facility] at [phone] or online through the patient portal/[email or web address] @ [email or web address]    Thank you for trusting [facility] and we appreciate the opportunity to serve you.  We look forward to supporting your healthcare needs in the future.    Your partners in health,      Quality Committee at [facility]

## 2023-08-07 NOTE — TELEPHONE ENCOUNTER
Patient Quality Outreach    Patient is due for the following:   Diabetes -  A1C, Diabetic Follow-Up Visit, and Foot Exam      Topic Date Due    Pneumococcal Vaccine (1 - PCV) Never done       Next Steps:   Schedule a Adult Preventative    Type of outreach:    Sent letter.      Questions for provider review:    None           Nay Wadsworth MA

## 2023-08-25 ENCOUNTER — TELEPHONE (OUTPATIENT)
Dept: FAMILY MEDICINE | Facility: CLINIC | Age: 63
End: 2023-08-25
Payer: COMMERCIAL

## 2023-08-25 NOTE — TELEPHONE ENCOUNTER
Patient states she received a letter from the clinic about being due for diabetic foot exam and eye exam. Pt states she doesn't have diabetes. Writer postponed these care caps to next year.     Pt also states her provider advised getting pneumonia vaccine at age 65 and she is currently 63. Writer postponed care gap for this as well.     Pt wondering when flu, new covid, and rsv injections available. Informed pt influenza vaccines should be available after labor day and currently do not have information on a roll out date for rsv and new covid vaccine at this time.     Patient states she will call back in a week or two and see if there are any updates.  Lady Real RN    [facility]- Primary Care

## 2023-09-24 DIAGNOSIS — E03.9 ACQUIRED HYPOTHYROIDISM: ICD-10-CM

## 2023-09-25 ENCOUNTER — TELEPHONE (OUTPATIENT)
Dept: FAMILY MEDICINE | Facility: CLINIC | Age: 63
End: 2023-09-25
Payer: COMMERCIAL

## 2023-09-25 DIAGNOSIS — K21.9 GASTROESOPHAGEAL REFLUX DISEASE WITHOUT ESOPHAGITIS: ICD-10-CM

## 2023-09-25 RX ORDER — LEVOTHYROXINE SODIUM 150 UG/1
150 TABLET ORAL DAILY
Qty: 90 TABLET | Refills: 0 | Status: SHIPPED | OUTPATIENT
Start: 2023-09-25 | End: 2023-11-14

## 2023-09-25 RX ORDER — ESOMEPRAZOLE MAGNESIUM 40 MG/1
40 CAPSULE, DELAYED RELEASE ORAL 2 TIMES DAILY
Qty: 180 CAPSULE | Refills: 3 | Status: SHIPPED | OUTPATIENT
Start: 2023-09-25 | End: 2023-11-10

## 2023-09-25 NOTE — TELEPHONE ENCOUNTER
Patient is typically seen twice per year and was due for visit in August. Since she missed this, labs and virtual visit would be appropriate.    Okay to use omeprazole 40 mg bid while waiting on nexium.    Leah Rhoades MD

## 2023-09-25 NOTE — TELEPHONE ENCOUNTER
Patient calling with multiple requests. She wanted to ask for refills of her synthroid and esomeprazole. RN informed patient synthroid has been sent to her mail order pharmacy, will begin a refill for her esomeprazole.     Patient was informed her synthroid refill states:     Please advise pt they are due for a fasting lab appointment and appointment with their provider for further refills.    Patient stated she has never had to come back between visits for this lab and an appointment with provider before, she questioned if it was accurate. RN stated would route to PCP to clarify.    RN contacted her mail order [facility] pharmacy to verify prescription for esomeprazole had no refills. Staff informed prescription no longer active, no refills, will need a new prescription.     Patient also wanted to know if she could take OTC omeprazole 40 mg capsules twice a day while waiting for her mail order prescription. RN stated would contact her PCP for her and also directed patient to call a pharmacist if needs to know sooner.     Patient stated if we call back with an update, may leave a detailed voice message.    SEVERINO De La RosaN, RN, PHN  [facility]  [facility]

## 2023-09-26 NOTE — TELEPHONE ENCOUNTER
Left detailed message on patient's VM (per patient request) relaying provider's message below. Also left clinic call back number.     If patient calls back, please schedule for virtual visit, can also get A1C checked at lab.      BARRON Myers, RN  [facility] Primary Care [facility]

## 2023-10-05 NOTE — TELEPHONE ENCOUNTER
Pt calling.  States that she does not understand why a visit is needed if she only sees the provider once a year for a physical.     Per pt, she has taken the prescription for years and would like a temporary prescription to make it to the appointment in November.    Routing to provider.      Dennise Shay RN  [facility]

## 2023-10-06 NOTE — TELEPHONE ENCOUNTER
"Patient calling asking what her A1C level was in February 2023. RN notes it was 5.7. There is not a provider note regarding this lab.     She is asking why the Nexium is not with the pharmacy yet. RN notes it was sent 9/25/23. Patient states it needs a prior authorization. RN notes a prior authorization for Nexium does not appear to have been initiated. She states per [facility], the clinic needs to \"over ride\". She provided a number, [phone], select option 1 \"twice\", Key # [id number]. RN advised this will be routed to the prior authorization team.    She is asking why she needs to come in again? She notes she has never needed to come in twice a year in the past. She states she was also never told at her appointment in February she would need to come back in in August.     She is asking what labs need to be done? And if she can get them ordered before Wednesday afternoon, 10/11/213 as she is coming in for a flu shot.     Routing to provider to review and advise.    Yulissa Norris, SEVERINON, RN  [facility]    "

## 2023-10-06 NOTE — TELEPHONE ENCOUNTER
RN received call from patient relations regarding patient's questions and concerns regarding her refill. See previous documentation below by nurse who took patient call on 10/6 at 1630.     At this time encounter has already been routed to PCP to review and advise. Will wait for provider advisement before calling patient with updates.    JAJA Boyd  [facility] Primary Care Triage

## 2023-10-06 NOTE — TELEPHONE ENCOUNTER
Central Prior Authorization Team  Phone: [phone]    PA Initiation    Medication: ESOMEPRAZOLE MAGNESIUM 40 MG PO CPDR  Insurance Company: [employer] - Phone [phone] Fax [phone]  Pharmacy Filling the Rx: [facility] [place] - [address] [facility] AT PORTAL TO REGISTERED [facility] SITES  Filling Pharmacy Phone: [phone]  Filling Pharmacy Fax:    Start Date: 10/6/2023

## 2023-10-09 NOTE — TELEPHONE ENCOUNTER
Central Prior Authorization Team  Phone: [phone]    Prior Authorization Approval    Medication: ESOMEPRAZOLE MAGNESIUM 40 MG PO CPDR  Authorization Effective Date: 9/6/2023  Authorization Expiration Date: 10/5/2024  Approved Dose/Quantity:   Reference #:     Insurance Company: [employer] - Phone [phone] Fax [phone]  Expected CoPay: $    CoPay Card Available:      Financial Assistance Needed:   Which Pharmacy is filling the prescription: [facility] - [place] - ONE [facility] AT PORTAL TO REGISTERED [facility]  Pharmacy Notified: yes  Patient Notified: PHARMACY WILL NOTIFY PT WHEN READY

## 2023-10-11 ENCOUNTER — TELEPHONE (OUTPATIENT)
Dept: FAMILY MEDICINE | Facility: CLINIC | Age: 63
End: 2023-10-11

## 2023-10-11 ENCOUNTER — IMMUNIZATION (OUTPATIENT)
Dept: NURSING | Facility: CLINIC | Age: 63
End: 2023-10-11
Payer: COMMERCIAL

## 2023-10-11 DIAGNOSIS — R73.03 PREDIABETES: Primary | ICD-10-CM

## 2023-10-11 DIAGNOSIS — E03.9 ACQUIRED HYPOTHYROIDISM: ICD-10-CM

## 2023-10-11 DIAGNOSIS — I10 HYPERTENSION GOAL BP (BLOOD PRESSURE) < 140/90: ICD-10-CM

## 2023-10-11 DIAGNOSIS — E78.5 HYPERLIPIDEMIA LDL GOAL <130: ICD-10-CM

## 2023-10-11 PROCEDURE — 90471 IMMUNIZATION ADMIN: CPT

## 2023-10-11 PROCEDURE — 90682 RIV4 VACC RECOMBINANT DNA IM: CPT

## 2023-10-11 PROCEDURE — 91320 SARSCV2 VAC 30MCG TRS-SUC IM: CPT

## 2023-10-11 PROCEDURE — 90480 ADMN SARSCOV2 VAC 1/ONLY CMP: CPT

## 2023-10-11 NOTE — TELEPHONE ENCOUNTER
Called patient and relayed provider message below, patient verbalized understanding. May wait until November appt to get labs done, not sure. No further questions or concerns.      SEVERINO MyersN, RN  [facility] Primary [facility]

## 2023-10-11 NOTE — TELEPHONE ENCOUNTER
Diabetes screen lab ordered.  Her most recent done in February put her in the prediabetes range, so we need to recheck this.  Her other labs aren't due until Dec but she can do them now as well if she likes.

## 2023-10-11 NOTE — TELEPHONE ENCOUNTER
Patient calling to say she needs orders placed for fasting labs for her refill request. Please call patient when orders are placed.  Charlee Kline [facility]   Primary Care

## 2023-11-14 ENCOUNTER — TELEPHONE (OUTPATIENT)
Dept: FAMILY MEDICINE | Facility: CLINIC | Age: 63
End: 2023-11-14
Payer: COMMERCIAL

## 2023-11-14 DIAGNOSIS — E03.9 ACQUIRED HYPOTHYROIDISM: ICD-10-CM

## 2023-11-14 DIAGNOSIS — E78.5 HYPERLIPIDEMIA LDL GOAL <130: Primary | ICD-10-CM

## 2023-11-14 RX ORDER — ATORVASTATIN CALCIUM 80 MG/1
80 TABLET, FILM COATED ORAL DAILY
Qty: 90 TABLET | Refills: 3 | Status: SHIPPED | OUTPATIENT
Start: 2023-11-14 | End: 2024-04-23

## 2023-11-14 RX ORDER — LEVOTHYROXINE SODIUM 150 MCG
150 TABLET ORAL DAILY
Qty: 90 TABLET | Refills: 0 | Status: SHIPPED | OUTPATIENT
Start: 2023-11-14 | End: 2024-02-23

## 2023-11-14 NOTE — LETTER
November 14, 2023      Geovanna Aguilar  [address]  [place] [address]      Dear ,    We are writing to inform you of your test results.    Your labs are stable except your cholesterol is slightly worse. We should change your simvastatin 40 mg to atorvastatin 80 mg.  Your are no longer in the prediabetic range for blood sugars.  This was why you needed to be seen every 6 months so now that is better we can go back to every 12 months.        Sincerely,     Leah Rhoades MD      Resulted Orders   Hemoglobin A1c   Result Value Ref Range    Hemoglobin A1C 5.3 0.0 - 5.6 %      Comment:      Normal <5.7%   Prediabetes 5.7-6.4%    Diabetes 6.5% or higher     Note: Adopted from ADA consensus guidelines.   Comprehensive metabolic panel (BMP + Alb, Alk Phos, ALT, AST, Total. Bili, TP)   Result Value Ref Range    Sodium 136 135 - 145 mmol/L      Comment:      Reference intervals for this test were updated on 09/26/2023 to more accurately reflect our healthy population. There may be differences in the flagging of prior results with similar values performed with this method. Interpretation of those prior results can be made in the context of the updated reference intervals.     Potassium 4.2 3.4 - 5.3 mmol/L    Carbon Dioxide (CO2) 20 (L) 22 - 29 mmol/L    Anion Gap 15 7 - 15 mmol/L    Urea Nitrogen 11.9 8.0 - 23.0 mg/dL    Creatinine 0.85 0.51 - 0.95 mg/dL    GFR Estimate 77 >60 mL/min/1.73m2    Calcium 9.4 8.8 - 10.2 mg/dL    Chloride 101 98 - 107 mmol/L    Glucose 113 (H) 70 - 99 mg/dL    Alkaline Phosphatase 78 35 - 104 U/L    AST 15 0 - 45 U/L      Comment:      Reference intervals for this test were updated on 6/12/2023 to more accurately reflect our healthy population. There may be differences in the flagging of prior results with similar values performed with this method. Interpretation of those prior results can be made in the context of the updated reference intervals.    ALT 11 0 -  50 U/L      Comment:      Reference intervals for this test were updated on 6/12/2023 to more accurately reflect our healthy population. There may be differences in the flagging of prior results with similar values performed with this method. Interpretation of those prior results can be made in the context of the updated reference intervals.      Protein Total 7.2 6.4 - 8.3 g/dL    Albumin 4.1 3.5 - 5.2 g/dL    Bilirubin Total 0.6 <=1.2 mg/dL   Lipid panel reflex to direct LDL Fasting   Result Value Ref Range    Cholesterol 210 (H) <200 mg/dL    Triglycerides 136 <150 mg/dL    Direct Measure HDL 38 (L) >=50 mg/dL    LDL Cholesterol Calculated 145 (H) <=100 mg/dL    Non HDL Cholesterol 172 (H) <130 mg/dL    Narrative    Cholesterol  Desirable:  <200 mg/dL    Triglycerides  Normal:  Less than 150 mg/dL  Borderline High:  150-199 mg/dL  High:  200-499 mg/dL  Very High:  Greater than or equal to 500 mg/dL    Direct Measure HDL  Female:  Greater than or equal to 50 mg/dL   Male:  Greater than or equal to 40 mg/dL    LDL Cholesterol  Desirable:  <100mg/dL  Above Desirable:  100-129 mg/dL   Borderline High:  130-159 mg/dL   High:  160-189 mg/dL   Very High:  >= 190 mg/dL    Non HDL Cholesterol  Desirable:  130 mg/dL  Above Desirable:  130-159 mg/dL  Borderline High:  160-189 mg/dL  High:  190-219 mg/dL  Very High:  Greater than or equal to 220 mg/dL   TSH with free T4 reflex   Result Value Ref Range    TSH 3.81 0.30 - 4.20 uIU/mL

## 2023-11-14 NOTE — TELEPHONE ENCOUNTER
"Called patient and relayed provider result message below:    Please call then send letter.    Your labs are stable except your cholesterol is slightly worse. We should change your simvastatin 40 mg to atorvastatin 80 mg.  Your are no longer in the prediabetic range for blood sugars.  This was why you needed to be seen every 6 months so now that is better we can go back to every 12 months.    Please contact the clinic if you have additional questions.  Thank you.    Sincerely,    Leah Rhoades MD        Patient verbalized understanding. Patient states that she \"just\" received a new bottle of simvastatin, asking if she can take two 40 mg simvastatin pills instead of the atorvastatin, as she does not want to waste these. Patient states [facility] also takes about 10 days to deliver new rxs, asking if she can do this in the meantime.    Informed patient we can reach out to PCP re: med question, and we can call back with response. Patient verbalized understanding.      Routing to provider to review/advise re: med question. Will also route to team coordinators to assist in sending letter, thank you!      BARRON Myers, RN  [facility]  "

## 2024-02-23 ENCOUNTER — OFFICE VISIT (OUTPATIENT)
Dept: FAMILY MEDICINE | Facility: CLINIC | Age: 64
End: 2024-02-23
Payer: COMMERCIAL

## 2024-02-23 VITALS
TEMPERATURE: 97.5 F | SYSTOLIC BLOOD PRESSURE: 120 MMHG | HEART RATE: 59 BPM | DIASTOLIC BLOOD PRESSURE: 77 MMHG | HEIGHT: 65 IN | BODY MASS INDEX: 48.82 KG/M2 | RESPIRATION RATE: 12 BRPM | WEIGHT: 293 LBS | OXYGEN SATURATION: 95 %

## 2024-02-23 DIAGNOSIS — Z00.00 ROUTINE GENERAL MEDICAL EXAMINATION AT A HEALTH CARE FACILITY: Primary | ICD-10-CM

## 2024-02-23 DIAGNOSIS — E03.9 ACQUIRED HYPOTHYROIDISM: ICD-10-CM

## 2024-02-23 DIAGNOSIS — I10 HYPERTENSION GOAL BP (BLOOD PRESSURE) < 140/90: ICD-10-CM

## 2024-02-23 DIAGNOSIS — F20.9 SCHIZOPHRENIA, UNSPECIFIED TYPE (H): ICD-10-CM

## 2024-02-23 DIAGNOSIS — E66.01 MORBID OBESITY DUE TO EXCESS CALORIES (H): ICD-10-CM

## 2024-02-23 DIAGNOSIS — F31.73 MANIC BIPOLAR I DISORDER IN PARTIAL REMISSION (H): ICD-10-CM

## 2024-02-23 DIAGNOSIS — Z29.11 NEED FOR PROPHYLACTIC VACCINATION AND INOCULATION AGAINST RESPIRATORY SYNCYTIAL VIRUS (RSV): ICD-10-CM

## 2024-02-23 DIAGNOSIS — F31.73 MANIC BIPOLAR I DISORDER IN PARTIAL REMISSION (H): Primary | ICD-10-CM

## 2024-02-23 DIAGNOSIS — L97.922 ULCER OF LEFT LOWER EXTREMITY WITH FAT LAYER EXPOSED (H): ICD-10-CM

## 2024-02-23 LAB
ALBUMIN SERPL BCG-MCNC: 4.1 G/DL (ref 3.5–5.2)
ALP SERPL-CCNC: 76 U/L (ref 40–150)
ALT SERPL W P-5'-P-CCNC: 16 U/L (ref 0–50)
AST SERPL W P-5'-P-CCNC: 19 U/L (ref 0–45)
BILIRUB DIRECT SERPL-MCNC: <0.2 MG/DL (ref 0–0.3)
BILIRUB SERPL-MCNC: 0.5 MG/DL
ERYTHROCYTE [DISTWIDTH] IN BLOOD BY AUTOMATED COUNT: 14.2 % (ref 10–15)
HCT VFR BLD AUTO: 40.7 % (ref 35–47)
HGB BLD-MCNC: 13.1 G/DL (ref 11.7–15.7)
MCH RBC QN AUTO: 29.2 PG (ref 26.5–33)
MCHC RBC AUTO-ENTMCNC: 32.2 G/DL (ref 31.5–36.5)
MCV RBC AUTO: 91 FL (ref 78–100)
PLATELET # BLD AUTO: 257 10E3/UL (ref 150–450)
PROT SERPL-MCNC: 7.1 G/DL (ref 6.4–8.3)
RBC # BLD AUTO: 4.48 10E6/UL (ref 3.8–5.2)
VALPROATE SERPL-MCNC: 73 UG/ML
WBC # BLD AUTO: 11 10E3/UL (ref 4–11)

## 2024-02-23 PROCEDURE — 85027 COMPLETE CBC AUTOMATED: CPT | Performed by: FAMILY MEDICINE

## 2024-02-23 PROCEDURE — 90678 RSV VACC PREF BIVALENT IM: CPT | Performed by: FAMILY MEDICINE

## 2024-02-23 PROCEDURE — 80164 ASSAY DIPROPYLACETIC ACD TOT: CPT | Performed by: FAMILY MEDICINE

## 2024-02-23 PROCEDURE — 99396 PREV VISIT EST AGE 40-64: CPT | Mod: 25 | Performed by: FAMILY MEDICINE

## 2024-02-23 PROCEDURE — 99214 OFFICE O/P EST MOD 30 MIN: CPT | Mod: 25 | Performed by: FAMILY MEDICINE

## 2024-02-23 PROCEDURE — 96127 BRIEF EMOTIONAL/BEHAV ASSMT: CPT | Performed by: FAMILY MEDICINE

## 2024-02-23 PROCEDURE — 36415 COLL VENOUS BLD VENIPUNCTURE: CPT | Performed by: FAMILY MEDICINE

## 2024-02-23 PROCEDURE — 80076 HEPATIC FUNCTION PANEL: CPT | Performed by: FAMILY MEDICINE

## 2024-02-23 PROCEDURE — 90471 IMMUNIZATION ADMIN: CPT | Performed by: FAMILY MEDICINE

## 2024-02-23 RX ORDER — LEVOTHYROXINE SODIUM 150 UG/1
150 TABLET ORAL DAILY
Qty: 90 TABLET | Refills: 2 | Status: SHIPPED | OUTPATIENT
Start: 2024-02-23

## 2024-02-23 RX ORDER — METOPROLOL TARTRATE 25 MG/1
25 TABLET, FILM COATED ORAL 2 TIMES DAILY
Qty: 180 TABLET | Refills: 3 | Status: SHIPPED | OUTPATIENT
Start: 2024-02-23 | End: 2024-04-23

## 2024-02-23 RX ORDER — ESOMEPRAZOLE MAGNESIUM 40 MG/1
40 CAPSULE, DELAYED RELEASE ORAL 2 TIMES DAILY
Qty: 180 CAPSULE | Refills: 3 | Status: CANCELLED | OUTPATIENT
Start: 2024-02-23

## 2024-02-23 SDOH — HEALTH STABILITY: PHYSICAL HEALTH: ON AVERAGE, HOW MANY DAYS PER WEEK DO YOU ENGAGE IN MODERATE TO STRENUOUS EXERCISE (LIKE A BRISK WALK)?: 0 DAYS

## 2024-02-23 SDOH — HEALTH STABILITY: PHYSICAL HEALTH: ON AVERAGE, HOW MANY MINUTES DO YOU ENGAGE IN EXERCISE AT THIS LEVEL?: 0 MIN

## 2024-02-23 ASSESSMENT — PATIENT HEALTH QUESTIONNAIRE - PHQ9
SUM OF ALL RESPONSES TO PHQ QUESTIONS 1-9: 12
10. IF YOU CHECKED OFF ANY PROBLEMS, HOW DIFFICULT HAVE THESE PROBLEMS MADE IT FOR YOU TO DO YOUR WORK, TAKE CARE OF THINGS AT HOME, OR GET ALONG WITH OTHER PEOPLE: NOT DIFFICULT AT ALL
SUM OF ALL RESPONSES TO PHQ QUESTIONS 1-9: 12

## 2024-02-23 ASSESSMENT — SOCIAL DETERMINANTS OF HEALTH (SDOH): HOW OFTEN DO YOU GET TOGETHER WITH FRIENDS OR RELATIVES?: NEVER

## 2024-02-23 NOTE — PROGRESS NOTES
"Preventive Care Visit  [facility]  Leah ERIKA Rhoades MD, Family Medicine  Feb 23, 2024    Assessment & Plan     Routine general medical examination at a health care facility  Routine preventive reviewed    Hypertension goal BP (blood pressure) < 140/90  Controlled - continue current treatment  - metoprolol tartrate (LOPRESSOR) 25 MG tablet; Take 1 tablet (25 mg) by mouth 2 times daily    Acquired hypothyroidism  Stable with last labs and no new symptoms - continue current treatment.  - levothyroxine (SYNTHROID) 150 MCG tablet; Take 1 tablet (150 mcg) by mouth daily    Ulcer of left lower extremity with fat layer exposed (H)  No new symptoms - continue to monitor and avoid trauma    Schizophrenia, unspecified type (H)  Continue per psychiatry    Morbid obesity due to excess calories (H)  Needs weight loss for hip replacement - referral as patient is not interested in injectable medications.  - Adult Comprehensive Weight Management  Referral; Future    Need for prophylactic vaccination and inoculation against respiratory syncytial virus (RSV)    - RSV VACCINE (ABRYSVO)      BMI  Estimated body mass index is 49.92 kg/m  as calculated from the following:    Height as of this encounter: 1.651 m (5' 5\").    Weight as of this encounter: 136.1 kg (300 lb).   Weight management plan: Patient has [employer] employee Insurance plan and was referred to the [facility] Weight Management Program GLP-1 Weight Loss RX Criteria    Depression Screening Follow Up        2/23/2024     8:14 AM   PHQ   PHQ-9 Total Score 12   Q9: Thoughts of better off dead/self-harm past 2 weeks Not at all         Follow Up Actions Taken  Crisis resource information provided in After Visit Summary  Referred patient back to current mental health provider.     Counseling  Appropriate preventive services were discussed with this patient, including applicable screening as appropriate for fall prevention, nutrition, physical " activity, Tobacco-use cessation, weight loss and cognition.  Checklist reviewing preventive services available has been given to the patient.  Reviewed patient's diet, addressing concerns and/or questions.   Patient is at risk for social isolation and has been provided with information about the benefit of social connection.   The patient was instructed to see the dentist every 6 months.   The patient's PHQ-9 score is consistent with moderate depression. She was provided with information regarding depression.         Eliz Austin is a 63 year old, presenting for the following:  Physical        2/23/2024     8:14 AM   Additional Questions   Roomed by Breanna         2/23/2024     8:14 AM   Patient Reported Additional Medications   Patient reports taking the following new medications simvastatin 40 mg        Health Care Directive  Patient does not have a Health Care Directive or Living Will: Discussed advance care planning with patient; however, patient declined at this time.    HPI  Chronic conditions stable. No medication side effects.  Son is providing care and meals.  She snacks during the day while he is gone and has a full meal for dinner.          2/23/2024   General Health   How would you rate your overall physical health? (!) FAIR   Feel stress (tense, anxious, or unable to sleep) Not at all         2/23/2024   Nutrition   Three or more servings of calcium each day? (!) NO   Diet: Regular (no restrictions)   How many servings of fruit and vegetables per day? (!) 0-1   How many sweetened beverages each day? (!) 2         2/23/2024   Exercise   Days per week of moderate/strenous exercise 0 days   Average minutes spent exercising at this level 0 min   (!) EXERCISE CONCERN      2/23/2024   Social Factors   Frequency of gathering with friends or relatives Never   Worry food won't last until get money to buy more No   Food not last or not have enough money for food? No   Do you have housing?  Yes   Are you  worried about losing your housing? No   Lack of transportation? Yes   Unable to get utilities (heat,electricity)? No    (!) TRANSPORTATION CONCERN PRESENT(!) SOCIAL CONNECTIONS CONCERN      2/23/2024   Fall Risk   Fallen 2 or more times in the past year? No   Trouble with walking or balance? Yes   Reason Gait Speed Test Not Completed Patient does not tolerate an upright or standing position (e.g. wheelchair)          2/23/2024   Dental   Dentist two times every year? (!) NO         2/23/2024   TB Screening   Were you born outside of [place]?  No       Today's PHQ-9 Score:       2/23/2024     8:14 AM   PHQ-9 SCORE   PHQ-9 Total Score MyChart 12 (Moderate depression)   PHQ-9 Total Score 12         2/23/2024   Substance Use   Alcohol more than 3/day or more than 7/wk No   Do you use any other substances recreationally? No     Social History     Tobacco Use    Smoking status: Never     Passive exposure: Yes    Smokeless tobacco: Never    Tobacco comments:     boyfriend smokes   Substance Use Topics    Alcohol use: Yes     Comment: rarely    Drug use: No             2/23/2024   Breast Cancer Screening   Family history of breast, colon, or ovarian cancer? No / Unknown          No data to display                         2/23/2024   STI Screening   New sexual partner(s) since last STI/HIV test? No     History of abnormal Pap smear: NO - age 30-65 PAP every 5 years with negative HPV co-testing recommended        6/18/2013    12:00 AM   PAP / HPV   PAP (Historical) NIL      ASCVD Risk   The 10-year ASCVD risk score (Rosie ROPER, et al., 2019) is: 6.1%    Values used to calculate the score:      Age: 63 years      Sex: Female      Is Non- : No      Diabetic: No      Tobacco smoker: No      Systolic Blood Pressure: 120 mmHg      Is BP treated: Yes      HDL Cholesterol: 38 mg/dL      Total Cholesterol: 210 mg/dL           Reviewed and updated as needed this visit by Provider   Tobacco  Allergies  Meds  " Problems  Med Hx  Surg Hx  Fam Hx                  Review of Systems  Constitutional, neuro, ENT, endocrine, pulmonary, cardiac, gastrointestinal, genitourinary, musculoskeletal, integument and psychiatric systems are negative, except as otherwise noted.     Objective    Exam  /77 (BP Location: Left arm, Patient Position: Sitting, Cuff Size: Adult Large)   Pulse 59   Temp 97.5  F (36.4  C) (Oral)   Resp 12   Ht 1.651 m (5' 5\")   Wt 136.1 kg (300 lb)   LMP  (LMP Unknown)   SpO2 95%   BMI 49.92 kg/m     Estimated body mass index is 49.92 kg/m  as calculated from the following:    Height as of this encounter: 1.651 m (5' 5\").    Weight as of this encounter: 136.1 kg (300 lb).    Physical Exam  GENERAL: alert, no distress, and obese  EYES: Eyes grossly normal to inspection, PERRL and conjunctivae and sclerae normal  HENT: ear canals and TM's normal, nose and mouth without ulcers or lesions  NECK: no adenopathy, no asymmetry, masses, or scars  RESP: lungs clear to auscultation - no rales, rhonchi or wheezes  CV: regular rate and rhythm, normal S1 S2, no S3 or S4, no murmur, click or rub, no peripheral edema  ABDOMEN: soft, nontender, no hepatosplenomegaly, no masses and bowel sounds normal  MS: ambulating with walker  SKIN: no suspicious lesions or rashes  NEURO: Normal strength and tone, mentation intact and speech normal  PSYCH: mentation appears normal, affect normal/bright      Signed Electronically by: Leah Rhoades MD    Answers submitted by the patient for this visit:  Patient Health Questionnaire (Submitted on 2/23/2024)  If you checked off any problems, how difficult have these problems made it for you to do your work, take care of things at home, or get along with other people?: Not difficult at all  PHQ9 TOTAL SCORE: 12    "

## 2024-02-23 NOTE — PATIENT INSTRUCTIONS
"Eating Healthy Foods: Care Instructions  With every meal, you can make healthy food choices. Try to eat a variety of fruits, vegetables, whole grains, lean proteins, and low-fat dairy products. This can help you get the right balance of nutrients, including vitamins and minerals. Small changes add up over time. You can start by adding one healthy food to your meals each day.    Try to make half your plate fruits and vegetables, one-fourth whole grains, and one-fourth lean proteins. Try including dairy with your meals.   Eat more fruits and vegetables. Try to have them with most meals and snacks.   Foods for healthy eating    Fruits    These can be fresh, frozen, canned, or dried.  Try to choose whole fruit rather than fruit juice.  Eat a variety of colors.    Vegetables    These can be fresh, frozen, canned, or dried.  Beans, peas, and lentils count too.    Whole grains    Choose whole-grain breads, cereals, and noodles.  Try brown rice.    Lean proteins    These can include lean meat, poultry, fish, and eggs.  You can also have tofu, beans, peas, lentils, nuts, and seeds.    Dairy    Try milk, yogurt, and cheese.  Choose low-fat or fat-free when you can.  If you need to, use lactose-free milk or fortified plant-based milk products, such as soy milk.    Water    Drink water when you're thirsty.  Limit sugar-sweetened drinks, including soda, fruit drinks, and sports drinks.  Where can you learn more?  Go to https://www.Vaprema.net/patiented  Enter T756 in the search box to learn more about \"Eating Healthy Foods: Care Instructions.\"  Current as of: February 28, 2023               Content Version: 13.8    8490-1145 [employer].   Care instructions adapted under license by your healthcare professional. If you have questions about a medical condition or this instruction, always ask your healthcare professional. [employer] disclaims any warranty or liability for your use of this " information.      Preventive Care Advice   This is general advice given by our system to help you stay healthy. However, your care team may have specific advice just for you. Please talk to your care team about your preventive care needs.  Nutrition  Eat 5 or more servings of fruits and vegetables each day.  Try wheat bread, brown rice and whole grain pasta (instead of white bread, rice, and pasta).  Get enough calcium and vitamin D. Check the label on foods and aim for 100% of the RDA (recommended daily allowance).  Lifestyle  Exercise at least 150 minutes each week  (30 minutes a day, 5 days a week).  Do muscle strengthening activities 2 days a week. These help control your weight and prevent disease.  No smoking.  Wear sunscreen to prevent skin cancer.  Have a dental exam and cleaning every 6 months.  Yearly exams  See your health care team every year to talk about:  Any changes in your health.  Any medicines your care team has prescribed.  Preventive care, family planning, and ways to prevent chronic diseases.  Shots (vaccines)   HPV shots (up to age 26), if you've never had them before.  Hepatitis B shots (up to age 59), if you've never had them before.  COVID-19 shot: Get this shot when it's due.  Flu shot: Get a flu shot every year.  Tetanus shot: Get a tetanus shot every 10 years.  Pneumococcal, hepatitis A, and RSV shots: Ask your care team if you need these based on your risk.  Shingles shot (for age 50 and up)  General health tests  Diabetes screening:  Starting at age 35, Get screened for diabetes at least every 3 years.  If you are younger than age 35, ask your care team if you should be screened for diabetes.  Cholesterol test: At age 39, start having a cholesterol test every 5 years, or more often if advised.  Bone density scan (DEXA): At age 50, ask your care team if you should have this scan for osteoporosis (brittle bones).  Hepatitis C: Get tested at least once in your life.  STIs (sexually  transmitted infections)  Before age 24: Ask your care team if you should be screened for STIs.  After age 24: Get screened for STIs if you're at risk. You are at risk for STIs (including HIV) if:  You are sexually active with more than one person.  You don't use condoms every time.  You or a partner was diagnosed with a sexually transmitted infection.  If you are at risk for HIV, ask about PrEP medicine to prevent HIV.  Get tested for HIV at least once in your life, whether you are at risk for HIV or not.  Cancer screening tests  Cervical cancer screening: If you have a cervix, begin getting regular cervical cancer screening tests starting at age 21.  Breast cancer scan (mammogram): If you've ever had breasts, begin having regular mammograms starting at age 40. This is a scan to check for breast cancer.  Colon cancer screening: It is important to start screening for colon cancer at age 45.  Have a colonoscopy test every 10 years (or more often if you're at risk) Or, ask your provider about stool tests like a FIT test every year or Cologuard test every 3 years.  To learn more about your testing options, visit:   https://www.Padcom/728783.pdf.  For help making a decision, visit:   https://bit.ly/lt44472.  Prostate cancer screening test: If you have a prostate, ask your care team if a prostate cancer screening test (PSA) at age 55 is right for you.  Lung cancer screening: If you are a current or former smoker ages 50 to 80, ask your care team if ongoing lung cancer screenings are right for you.  For informational purposes only. Not to replace the advice of your health care provider. Copyright   2023 [facility]. All rights reserved. Clinically reviewed by the [facility]. Gather.md 161233 - REV 01/24.    Preventing Falls: Care Instructions  Injuries and health problems such as trouble walking or poor eyesight can increase your risk of falling. So can some medicines. But  "there are things you can do to help prevent falls. You can exercise to get stronger. You can also arrange your home to make it safer.    Talk to your doctor about the medicines you take. Ask if any of them increase the risk of falls and whether they can be changed or stopped.   Try to exercise regularly. It can help improve your strength and balance. This can help lower your risk of falling.     Practice fall safety and prevention.    Wear low-heeled shoes that fit well and give your feet good support. Talk to your doctor if you have foot problems that make this hard.  Carry a cellphone or wear a medical alert device that you can use to call for help.  Use stepladders instead of chairs to reach high objects. Don't climb if you're at risk for falls. Ask for help, if needed.  Wear the correct eyeglasses, if you need them.    Make your home safer.    Remove rugs, cords, clutter, and furniture from walkways.  Keep your house well lit. Use night-lights in hallways and bathrooms.  Install and use sturdy handrails on stairways.  Wear nonskid footwear, even inside. Don't walk barefoot or in socks without shoes.    Be safe outside.    Use handrails, curb cuts, and ramps whenever possible.  Keep your hands free by using a shoulder bag or backpack.  Try to walk in well-lit areas. Watch out for uneven ground, changes in pavement, and debris.  Be careful in the winter. Walk on the grass or gravel when sidewalks are slippery. Use de-icer on steps and walkways. Add non-slip devices to shoes.    Put grab bars and nonskid mats in your shower or tub and near the toilet. Try to use a shower chair or bath bench when bathing.   Get into a tub or shower by putting in your weaker leg first. Get out with your strong side first. Have a phone or medical alert device in the bathroom with you.   Where can you learn more?  Go to https://www.Vidacarewise.net/patiented  Enter G117 in the search box to learn more about \"Preventing Falls: Care " "Instructions.\"  Current as of: July 18, 2023               Content Version: 13.8    8004-2778 [facility] [employer].   Care instructions adapted under license by your healthcare professional. If you have questions about a medical condition or this instruction, always ask your healthcare professional. Healthwise, Incorporated disclaims any warranty or liability for your use of this information.      Relationships for Good Health  Relationships are important for our health and happiness. Social isolation, loneliness and lack of support are bad for your health. Studies show that loneliness can harm health and limit your life span as much as high blood pressure and smoking.   Take some time to reflect on your relationships. Then answer these questions:  Are there people in your life that cause you stress or drain your energy? What can you do to set limits?  ________________________________________________________________________________________________________________________________________________________________________________________________________________________________________________________________________________________________________________________________________________  Who do you enjoy spending time with? Who can you go to for support?  ________________________________________________________________________________________________________________________________________________________________________________________________________________________________________________________________________________________________________________________________________________  What can you do to improve your relationships with " others?  __________________________________________________________________________________________________________________________________________________________________________________________________________________  ______________________________________________________________________________________________________________________________  What do you like most about your relationships with others?  ________________________________________________________________________________________________________________________________________________________________________________________________________________________________________________________________________________________________________________________________________________  My goal: ______________________________________________________________________  I will ______________________________________________________________________________________________________________________________________________________________________________________________    For informational purposes only. Not to replace the advice of your health care provider. Copyright   2018 [facility]. All rights reserved. Clinically reviewed by [facility]ariatric Health  Team. SMARTworks 425097 - Rev 04/21.    Learning About Depression Screening  What is depression screening?  Depression screening is a way to see if you have depression symptoms. It may be done by a doctor or counselor. It's often part of a routine checkup. That's because your mental health is just as important as your physical health.  Depression is a mental health condition that affects how you feel, think, and act. You may:  Have less energy.  Lose interest in your daily activities.  Feel sad and grouchy for a long time.  Depression is very common. It affects people of all ages.  Many things can lead to depression. Some people become depressed after they have a stroke or find out they have a major illness  "like cancer or heart disease. The death of a loved one or a breakup may lead to depression. It can run in families. Most experts believe that a combination of inherited genes and stressful life events can cause it.  What happens during screening?  You may be asked to fill out a form about your depression symptoms. You and the doctor will discuss your answers. The doctor may ask you more questions to learn more about how you think, act, and feel.  What happens after screening?  If you have symptoms of depression, your doctor will talk to you about your options.  Doctors usually treat depression with medicines or counseling. Often, combining the two works best. Many people don't get help because they think that they'll get over the depression on their own. But people with depression may not get better unless they get treatment.  The cause of depression is not well understood. There may be many factors involved. But if you have depression, it's not your fault.  A serious symptom of depression is thinking about death or suicide. If you or someone you care about talks about this or about feeling hopeless, get help right away.  It's important to know that depression can be treated. Medicine, counseling, and self-care may help.  Where can you learn more?  Go to https://www.Amedrix.net/patiented  Enter T185 in the search box to learn more about \"Learning About Depression Screening.\"  Current as of: June 25, 2023               Content Version: 13.8    3825-7229 [employer].   Care instructions adapted under license by your healthcare professional. If you have questions about a medical condition or this instruction, always ask your healthcare professional. [employer] disclaims any warranty or liability for your use of this information.      "

## 2024-02-23 NOTE — COMMUNITY RESOURCES LIST (ENGLISH)
02/23/2024   [facility] geolad  N/A  For questions about this resource list or additional care needs, please contact your primary care clinic or care manager.  Phone: [phone]   Email: N/A   Address: [address] [place] [id number]   Hours: N/A        Exercise and Recreation       Gym or workout facility  1  [facility] [place] Distance: 4.12 miles      In-Person   [address] [place] [address]  Language: English  Hours: Mon - Fri 3:00 PM - 9:00 PM  Fees: Free, Self Pay   Phone: (546) 496-6928 Email: [email or web address] Website: https://www.De Correspondent/parks__destinations/recreation_centers/[facility]_recreation_center/     2  [employer] [facility] Distance: 5.5 miles      In-Person   [address] [place] [address]  Language: English  Hours: Mon - Sun Open 24 Hours  Fees: Insurance, Self Pay, Sliding Fee   Phone: (792) 111-3838 Email: [employer]Kazam Website: https://[email or web address]/gyms/3129/pppvoncvsfe-[email or web address]/          Transportation       Free or low-cost transportation  3  [facility] Pass[facility] - Free or low-cost transportation Distance: 7.66 miles      In-Person   [address] [place] [address]  Language: English  Hours: Tue 9:30 AM - 11:30 AM , Thu 9:30 AM - 11:30 AM  Fees: Free   Phone: (354) 513-5543 Email: info@TASS.Slantrange Website: http://www.NimbusBase/     4  [facility] Distance: 7.71 miles      In-Person   [address] [place] [address]  Language: English  Hours: Mon - Wed 9:30 AM - 12:00 PM , Mon - Wed 1:00 PM - 2:00 PM Appt. Only  Fees: Free   Phone: (525) 656-5436 Email: info@saintolaf.org Website: http://www.saintolaf.Slantrange/     Transportation to medical appointments  5  [facility]   [facility] ([facility]) Distance: 0.5 miles      In-Person   [address] [place] [address]   Language: Vietnamese, Latvian, English, Gujarati, Kelsie, Tajik, Belarusian, Pashto, Yoruba, Welsh  Hours: Mon - Wed 9:00 AM - 5:00 PM , Thu 12:00 PM - 6:00 PM , Fri 9:00 AM - 5:00 PM , Sun 10:30 AM - 2:00 PM Appt. Only  Fees: Free   Phone: (784) 303-6775 Email: info@Anthill.org Website: https://www.Anthill.org/     6  [place] [facility] Distance: 4.75 miles      In-Person   [address] [facility] [address] [place] [address]  Language: English  Hours: Mon - Sat 4:00 AM - 6:00 PM  Fees: Insurance, Self Pay   Phone: (112) 839-4320 Email: viktorkathtransportation1@PromoteU Website: https://helpmeconnect.[facility].us/HelpMeConnect/Providers/[place]_Transportation/Transportation/2?returnUrl=%2FHelpMeConnect%2FSearch%2FBasicNeeds%2FTransportation%2FTransportationServices%3Fstart%3D40          Important Numbers & Websites       Emergency Services   911  City Services   311  Poison Control   (999) 846-8249  Suicide Prevention Lifeline   (710) 983-7074 (TALK)  Child Abuse Hotline   (691) 141-4661 (4-A-Child)  Sexual Assault Hotline   (626) 691-5199 (HOPE)  National Runaway Safeline   (493) 566-2503 (RUNAWAY)  All-Options Talkline   (587) 881-9560  Substance Abuse Referral   (631) 524-1093 (HELP)

## 2024-03-28 ENCOUNTER — TRANSFERRED RECORDS (OUTPATIENT)
Dept: HEALTH INFORMATION MANAGEMENT | Facility: CLINIC | Age: 64
End: 2024-03-28

## 2024-03-31 NOTE — RESULT ENCOUNTER NOTE
Ms. Aguilar,    All of your labs were normal for you.    Please contact the clinic if you have additional questions.  Thank you.    Sincerely,    Leah Rhoades MD N/A

## 2024-04-23 ENCOUNTER — TELEPHONE (OUTPATIENT)
Dept: FAMILY MEDICINE | Facility: CLINIC | Age: 64
End: 2024-04-23
Payer: COMMERCIAL

## 2024-04-23 DIAGNOSIS — E78.5 HYPERLIPIDEMIA LDL GOAL <130: ICD-10-CM

## 2024-04-23 DIAGNOSIS — I10 HYPERTENSION GOAL BP (BLOOD PRESSURE) < 140/90: ICD-10-CM

## 2024-04-23 RX ORDER — METOPROLOL TARTRATE 25 MG/1
25 TABLET, FILM COATED ORAL 2 TIMES DAILY
Qty: 180 TABLET | Refills: 3 | Status: SHIPPED | OUTPATIENT
Start: 2024-04-23

## 2024-04-23 RX ORDER — ATORVASTATIN CALCIUM 80 MG/1
80 TABLET, FILM COATED ORAL DAILY
Qty: 90 TABLET | Refills: 0 | Status: SHIPPED | OUTPATIENT
Start: 2024-04-23

## 2024-04-23 NOTE — TELEPHONE ENCOUNTER
Patient called to clinic to inquire about her Metoprolol and Atorvastatin prescriptions.    Wanting to use [facility] in [place] pharmacy, on [address].  Cheaper for her to fill these medications at [facility], verses mail order pharmacy, [facility].    Per review of chart, previous Rx for Metoprolol tartrate 25 mg tablet was sent to [facility] pharmacy on 2/23/24. #180 with 3 refills was given.    Prescription reordered and sent to [facility] pharmacy in [place], per patient request.    Per review of chart, patient was given Atorvastatin 80 mg tablet back on 11/14/23. Rx was sent to [facility] but should be sent to [facility] pharmacy in [place]. # 90 with 3 refills given at that time. Patient said she was told she could continue and use up her previous Simvastatin 40 mg tablets and then change over to the Atorvastatin.     Prescription for Atorvastatin 80 mg tablet sent to [facility] pharmacy in [place], per patient request.      Clarissa Rubi RN  Clinical Triage/Primary Care  [facility]

## 2024-05-30 NOTE — LETTER
10/28/2021         RE: Gevoanna Aguilar  [address]  [place] [address]        Dear Colleague,    Thank you for referring your patient, Geovanna Aguilar, to the [facility]. Please see a copy of my visit note below.     Current Eye Medications:  Prednisolone (pink or white cap) 3 times a day left eye    Ketorolac (gray cap) 3 times a day left eye    Subjective:  Here for 1 week post op cataract. Vision has improved. Near vision is blurry. No eye pain.      Objective:  See Ophthalmology Exam.       Assessment: 1 week sp Kelman phacoemulsification left eye doing well.      Plan:   See Patient Instructions.           Again, thank you for allowing me to participate in the care of your patient.        Sincerely,        Damon Acuña MD    
No - the patient is unable to be screened due to medical condition

## 2024-07-12 ENCOUNTER — NURSE TRIAGE (OUTPATIENT)
Dept: FAMILY MEDICINE | Facility: CLINIC | Age: 64
End: 2024-07-12
Payer: COMMERCIAL

## 2024-07-12 NOTE — TELEPHONE ENCOUNTER
"Patient started the call with the question, \"what are the signs of a heart attack'.  I informed her that we are going to discus the reason she is asking this question and what symptoms she is having.    Patient reports that about 1.5 hours ago she was having left arm pain. The pain was in her upper arm in her bicep area and in the middle above her elbow. The pain is dull, but she does have a hard time picking a word to describe her pain. She reports that she uses a walker and doesn't get a lot of exercise - she lays around a lot, per patient. She has not had this pain previously.  This pain has been going on, off and on, for about 1.5 hours. The last time about 45 minutes ago. It is not constant. She has not had a heart attack previously. She has HTN and elevated cholesterol and takes medications for it. She recently (in the last month) started taking the increased dose of atorvastatin 80 mg.  She did not start it sooner because she finished her previous dose.   She does not check her blood pressure at home.   She does not have the pain currently.    The pain was rate 4/10 when she was having the pain.  She reports that she feels \"okay\" overall.  She has a rash under her breasts that started today, but that is not unusual for her.  At there very end the patient reports about 2 hours ago she took ibuprofen for a headaches she felt coming on. If she can get to her headache quickly it usually will not get too bad.     Denies: injury, chest pain, SOB, trouble breathing, pain in her neck/shoulder, fever    Nursing advice: Due her age, her medical history and the fact that the patient took ibuprofen, and that could be masking her arm pain, she was advised to go to urgent care now as if she would have answered yes to the question \"Age > 40 and no obvious cause for pain, pain still present even when not moving the arm\", she would have been sent to the E.R.  Patient was given signs and symptoms, and has written them down, to " go to the E.R. or call 911.  Patient verbalizes good understanding, agrees with plan and states she needs no further support. Sasha Trent R.N.    Go to ED Now  Difficulty breathing or unusual sweating (e.g., sweating without exertion)  R/O: cardiac ischemia  YesNo  Chest pain lasting longer than 5 minutes  R/O: cardiac ischemia  YesNo  Age > 40 and no obvious cause for pain, pain still present even when not moving the arm  R/O: cardiac ischemia  Reason for Disposition   Arm pain    Additional Information   Negative: Shock suspected (e.g., cold/pale/clammy skin, too weak to stand, low BP, rapid pulse)   Negative: Similar pain previously and it was from 'heart attack'   Negative: Similar pain previously from 'angina' and not relieved by nitroglycerin   Negative: Sounds like a life-threatening emergency to the triager   Negative: Difficulty breathing or unusual sweating (e.g., sweating without exertion)   Negative: Chest pain lasting longer than 5 minutes   Negative: Age > 40 and no obvious cause for pain, pain still present even when not moving the arm   Negative: Fever and red area (or area very tender to touch)   Negative: Swollen joint and fever   Negative: Entire arm is swollen   Negative: Patient sounds very sick or weak to the triager   Negative: SEVERE pain (e.g., excruciating, unable to do any normal activities)   Negative: Red area or streak > 2 inches (or 5 cm)   Negative: Cast on wrist or arm and now increasing pain   Negative: Weakness (i.e., loss of strength) in hand or fingers  (Exception: Not truly weak; hand feels weak because of pain.)   Negative: Arm pains with exertion (e.g., occurs with walking; goes away on resting)   Negative: Painful rash with multiple small blisters grouped together (i.e., dermatomal distribution or 'band' or 'stripe')   Negative: Looks like a boil, infected sore, deep ulcer, or other infected rash (spreading redness, pus)   Negative: Localized rash is very painful (no  fever)   Negative: Numbness (i.e., loss of sensation) in hand or fingers   Negative: Localized pain, redness or hard lump along vein   Negative: Patient wants to be seen   Negative: MODERATE pain (e.g., interferes with normal activities) and present > 3 days   Negative: Pain is worsened or caused by bending the neck   Negative: MILD pain (e.g., does not interfere with normal activities) and present > 7 days   Negative: Arm pain is a chronic symptom (recurrent or ongoing AND lasting > 4 weeks)    Protocols used: Arm Pain-A-OH

## 2024-07-21 ENCOUNTER — NURSE TRIAGE (OUTPATIENT)
Dept: NURSING | Facility: CLINIC | Age: 64
End: 2024-07-21
Payer: COMMERCIAL

## 2024-07-21 NOTE — TELEPHONE ENCOUNTER
"Dizzy spells happens more with turning her head and it spins \"like she is on a ride\"  She reports numbness and tingling in her right hand  She reports she increased her fluid intake due to possible dehydration  Symptoms started on Friday 07/19/24  She says she was seen in the ED and did inform them of her dizziness  She reports they ruled out a MI, but her dizziness was not addressed  Triage guidelines recommend to Go to ED Now (or pcp triage)  Caller states she will go to [facility] ED for evaluation now  Deferred paging on call    Reason for Disposition   [1] MODERATE dizziness (e.g., vertigo; feels very unsteady, interferes with normal activities) AND [2] has NOT been evaluated by doctor (or NP/PA) for this   [1] Numbness (i.e., loss of sensation) of the face, arm / hand, or leg / foot on one side of the body AND [2] sudden onset AND [3] brief (now gone)    Additional Information   Negative: [1] Weakness (i.e., paralysis, loss of muscle strength) of the face, arm or leg on one side of the body AND [2] sudden onset AND [3] present now   Negative: [1] Numbness (i.e., loss of sensation) of the face, arm or leg on one side of the body AND [2] sudden onset AND [3] present now   Negative: [1] Loss of speech or garbled speech AND [2] sudden onset AND [3] present now   Negative: Difficult to awaken or acting confused (e.g., disoriented, slurred speech)   Negative: Sounds like a life-threatening emergency to the triager   Negative: Followed a head injury   Negative: Followed an ear injury   Negative: Localized weakness or numbness is main symptom   Negative: Dizziness relates to riding in a car, going to an amusement park, etc.   Negative: [1] Dizziness is main symptom AND [2] NO spinning sensation (i.e., vertigo)   Negative: SEVERE dizziness (vertigo) (e.g., unable to walk without assistance)   Negative: Severe headache (e.g., excruciating)   (Exception: Similar to previous migraines.)   Negative: [1] Dizziness (vertigo) " present now AND [2] one or more STROKE RISK FACTORS (i.e., hypertension, diabetes, prior stroke/TIA, heart attack)  (Exception: Prior doctor or NP/PA evaluation for this AND no different/worse than usual.)   Negative: [1] Dizziness (vertigo) present now AND [2] age > 59   (Exception: Prior doctor or NP/PA evaluation for this AND no different/worse than usual.)   Negative: [1] Weakness (i.e., paralysis, loss of muscle strength) of the face, arm / hand, or leg / foot on one side of the body AND [2] sudden onset AND [3] brief (now gone)   Negative: [1] Loss of speech or garbled speech AND [2] sudden onset AND [3] brief (now gone)   Negative: Loss of vision or double vision  (Exception: Similar to previous migraines.)   Negative: Patient sounds very sick or weak to the triager   Negative: Taking a medicine that could cause dizziness (e.g., phenytoin [Dilantin], carbamazepine [Tegretol], primidone [Mysoline])   Negative: [1] Weakness of the face, arm or leg on one side of the body AND [2] sudden onset AND [3] present now   Negative: [1] Numbness of the face, arm or leg on one side of the body AND [2] sudden onset AND [3] present now   Negative: [1] Loss of speech or garbled speech AND [2] sudden onset AND [3] present now   Negative: Difficult to awaken or acting confused (e.g., disoriented, slurred speech)   Negative: Unable to walk, or can only walk with assistance (e.g., requires support)   Negative: Sounds like a life-threatening emergency to the triager   Negative: Vomiting with motion sickness persists > 4 hours after stopping the trigger   Negative: Dizziness with motion sickness persists > 4 hours after stopping the trigger   Negative: SEVERE headache   Negative: Patient sounds very sick or weak to the triager   Negative: Motion sickness interferes with work or school   Negative: Motion sickness   Negative: Preventing motion sickness, questions about    Protocols used: Dizziness - Vertigo-A-AH, Motion  Sickness-A-AH

## 2024-07-25 ENCOUNTER — OFFICE VISIT (OUTPATIENT)
Dept: URGENT CARE | Facility: URGENT CARE | Age: 64
End: 2024-07-25
Payer: COMMERCIAL

## 2024-07-25 VITALS
HEART RATE: 71 BPM | RESPIRATION RATE: 18 BRPM | TEMPERATURE: 98 F | WEIGHT: 293 LBS | SYSTOLIC BLOOD PRESSURE: 150 MMHG | OXYGEN SATURATION: 95 % | BODY MASS INDEX: 49.09 KG/M2 | DIASTOLIC BLOOD PRESSURE: 65 MMHG

## 2024-07-25 DIAGNOSIS — R35.0 URINARY FREQUENCY: ICD-10-CM

## 2024-07-25 DIAGNOSIS — J02.9 SORE THROAT: ICD-10-CM

## 2024-07-25 DIAGNOSIS — R42 DIZZINESS: Primary | ICD-10-CM

## 2024-07-25 DIAGNOSIS — Z20.822 EXPOSURE TO 2019 NOVEL CORONAVIRUS: ICD-10-CM

## 2024-07-25 LAB
ALBUMIN UR-MCNC: NEGATIVE MG/DL
APPEARANCE UR: ABNORMAL
BACTERIA #/AREA URNS HPF: ABNORMAL /HPF
BILIRUB UR QL STRIP: NEGATIVE
COLOR UR AUTO: YELLOW
DEPRECATED S PYO AG THROAT QL EIA: NEGATIVE
GLUCOSE UR STRIP-MCNC: NEGATIVE MG/DL
GROUP A STREP BY PCR: NOT DETECTED
HGB UR QL STRIP: NEGATIVE
KETONES UR STRIP-MCNC: NEGATIVE MG/DL
LEUKOCYTE ESTERASE UR QL STRIP: NEGATIVE
MUCOUS THREADS #/AREA URNS LPF: PRESENT /LPF
NITRATE UR QL: NEGATIVE
PH UR STRIP: 6 [PH] (ref 5–7)
RBC #/AREA URNS AUTO: ABNORMAL /HPF
SP GR UR STRIP: 1.02 (ref 1–1.03)
SQUAMOUS #/AREA URNS AUTO: ABNORMAL /LPF
UROBILINOGEN UR STRIP-ACNC: 1 E.U./DL
WBC #/AREA URNS AUTO: ABNORMAL /HPF

## 2024-07-25 PROCEDURE — 81001 URINALYSIS AUTO W/SCOPE: CPT | Performed by: PHYSICIAN ASSISTANT

## 2024-07-25 PROCEDURE — 99214 OFFICE O/P EST MOD 30 MIN: CPT | Performed by: PHYSICIAN ASSISTANT

## 2024-07-25 PROCEDURE — 87651 STREP A DNA AMP PROBE: CPT | Performed by: PHYSICIAN ASSISTANT

## 2024-07-25 PROCEDURE — 87635 SARS-COV-2 COVID-19 AMP PRB: CPT | Performed by: PHYSICIAN ASSISTANT

## 2024-07-25 ASSESSMENT — ENCOUNTER SYMPTOMS
RESPIRATORY NEGATIVE: 1
PALPITATIONS: 0
DIARRHEA: 0
WEAKNESS: 0
SORE THROAT: 1
CHILLS: 0
SHORTNESS OF BREATH: 0
JOINT SWELLING: 0
EYES NEGATIVE: 1
HEADACHES: 0
MYALGIAS: 0
CARDIOVASCULAR NEGATIVE: 1
ENDOCRINE NEGATIVE: 1
VOMITING: 0
DIZZINESS: 1
LIGHT-HEADEDNESS: 0
ARTHRALGIAS: 0
COUGH: 0
FEVER: 0
NECK STIFFNESS: 0
NECK PAIN: 0
RHINORRHEA: 0
ALLERGIC/IMMUNOLOGIC NEGATIVE: 1
WOUND: 0
NAUSEA: 0
MUSCULOSKELETAL NEGATIVE: 1
BACK PAIN: 0

## 2024-07-25 ASSESSMENT — PAIN SCALES - GENERAL: PAINLEVEL: NO PAIN (0)

## 2024-07-25 NOTE — LETTER
July 26, 2024      Geovanna Aguilar  [address]  [place] [address]        Dear MsAlcira,    We are writing to inform you of your test results.    Your test results fall within the expected range(s). The further Strep test is negative.    Resulted Orders   Streptococcus A Rapid Screen w/Reflex to PCR - Clinic Collect   Result Value Ref Range    Group A Strep antigen Negative Negative   Group A Streptococcus PCR Throat Swab   Result Value Ref Range    Group A strep by PCR Not Detected Not Detected    Narrative    The Xpert Xpress Strep A test, performed on the [employer] water systems Systems, is a rapid, qualitative in vitro diagnostic test for the detection of Streptococcus pyogenes (Group A ß-hemolytic Streptococcus, Strep A) in throat swab specimens from patients with signs and symptoms of pharyngitis. The Xpert Xpress Strep A test can be used as an aid in the diagnosis of Group A Streptococcal pharyngitis. The assay is not intended to monitor treatment for Group A Streptococcus infections. The Xpert Xpress Strep A test utilizes an automated real-time polymerase chain reaction (PCR) to detect Streptococcus pyogenes DNA.   UA Macroscopic with reflex to Microscopic and Culture - Lab Collect   Result Value Ref Range    Color Urine Yellow Colorless, Straw, Light Yellow, Yellow    Appearance Urine Cloudy (A) Clear    Glucose Urine Negative Negative mg/dL    Bilirubin Urine Negative Negative    Ketones Urine Negative Negative mg/dL    Specific Gravity Urine 1.025 1.003 - 1.035    Blood Urine Negative Negative    pH Urine 6.0 5.0 - 7.0    Protein Albumin Urine Negative Negative mg/dL    Urobilinogen Urine 1.0 0.2, 1.0 E.U./dL    Nitrite Urine Negative Negative    Leukocyte Esterase Urine Negative Negative   UA Microscopic with Reflex to Culture   Result Value Ref Range    Bacteria Urine Many (A) None Seen /HPF    RBC Urine 0-2 0-2 /HPF /HPF    WBC Urine 0-5 0-5 /HPF /HPF    Squamous Epithelials Urine Many  (A) None Seen /LPF    Mucus Urine Present (A) None Seen /LPF    Narrative    Urine Culture not indicated       If you have any questions or concerns, please call the clinic at the number listed above.       Sincerely,      Cipriano Cortes PA-C

## 2024-07-25 NOTE — PROGRESS NOTES
Chief Complaint:     Chief Complaint   Patient presents with    Dizziness     About a week - EKG recently     Pharyngitis     Scratchy throat - exposure to COVID        Results for orders placed or performed in visit on 07/25/24   UA Macroscopic with reflex to Microscopic and Culture - Lab Collect     Status: Abnormal    Specimen: Urine, Midstream   Result Value Ref Range    Color Urine Yellow Colorless, Straw, Light Yellow, Yellow    Appearance Urine Cloudy (A) Clear    Glucose Urine Negative Negative mg/dL    Bilirubin Urine Negative Negative    Ketones Urine Negative Negative mg/dL    Specific Gravity Urine 1.025 1.003 - 1.035    Blood Urine Negative Negative    pH Urine 6.0 5.0 - 7.0    Protein Albumin Urine Negative Negative mg/dL    Urobilinogen Urine 1.0 0.2, 1.0 E.U./dL    Nitrite Urine Negative Negative    Leukocyte Esterase Urine Negative Negative   UA Microscopic with Reflex to Culture     Status: Abnormal   Result Value Ref Range    Bacteria Urine Many (A) None Seen /HPF    RBC Urine 0-2 0-2 /HPF /HPF    WBC Urine 0-5 0-5 /HPF /HPF    Squamous Epithelials Urine Many (A) None Seen /LPF    Mucus Urine Present (A) None Seen /LPF    Narrative    Urine Culture not indicated   Streptococcus A Rapid Screen w/Reflex to PCR - Clinic Collect     Status: Normal    Specimen: Throat; Swab   Result Value Ref Range    Group A Strep antigen Negative Negative       Medical Decision Making:    Vital signs reviewed by Cipriano Cortes PA-C  BP (!) 150/65 (BP Location: Left arm, Patient Position: Sitting, Cuff Size: Adult Large)   Pulse 71   Temp 98  F (36.7  C) (Tympanic)   Resp 18   Wt 133.8 kg (295 lb)   LMP  (LMP Unknown)   SpO2 95%   BMI 49.09 kg/m      Differential Diagnosis:  URI Adult/Peds:  Strep pharyngitis, Viral pharyngitis, and Viral upper respiratory illness  Dizziness: BPPV, Vestibular neuronitis, anemia, orthostatic hypotension, UTI        ASSESSMENT    1. Dizziness    2. Sore throat    3. Exposure to  2019 novel coronavirus    4. Urinary frequency        PLAN  Patient is in no acute distress.  Neuro exam was benign.  Temp is 98 in clinic today, lung sounds were clear, and O2 sats at 95% on RA.    COVID swab collected in clinic today.  RST was negative.  Urine results discussed with patient.  Negative for infection  ED note and labs reviewed.   Previous labs reviewed.   Dizziness Dx is broad, due to lack of headache, lack of symptom progression, and ability to replicate with known triggers, low suspicion for intracranial process - comfortable with PCP follow up  Rest, Push fluids, vaporizer, elevation of head of bed.  Ibuprofen and or Tylenol for any fever or body aches.  Patient brought to  for follow up visit with PCP.  Worrisome symptoms discussed with instructions to go to the ED.  Patient verbalized understanding and agreed with this plan.    39 minutes was spent in the care of this patient including chart review, HPI, ROS, PE, review of plan, and placing of orders.      Labs:    Results for orders placed or performed in visit on 07/25/24   UA Macroscopic with reflex to Microscopic and Culture - Lab Collect     Status: Abnormal    Specimen: Urine, Midstream   Result Value Ref Range    Color Urine Yellow Colorless, Straw, Light Yellow, Yellow    Appearance Urine Cloudy (A) Clear    Glucose Urine Negative Negative mg/dL    Bilirubin Urine Negative Negative    Ketones Urine Negative Negative mg/dL    Specific Gravity Urine 1.025 1.003 - 1.035    Blood Urine Negative Negative    pH Urine 6.0 5.0 - 7.0    Protein Albumin Urine Negative Negative mg/dL    Urobilinogen Urine 1.0 0.2, 1.0 E.U./dL    Nitrite Urine Negative Negative    Leukocyte Esterase Urine Negative Negative   UA Microscopic with Reflex to Culture     Status: Abnormal   Result Value Ref Range    Bacteria Urine Many (A) None Seen /HPF    RBC Urine 0-2 0-2 /HPF /HPF    WBC Urine 0-5 0-5 /HPF /HPF    Squamous Epithelials Urine Many (A) None  Seen /LPF    Mucus Urine Present (A) None Seen /LPF    Narrative    Urine Culture not indicated   Streptococcus A Rapid Screen w/Reflex to PCR - Clinic Collect     Status: Normal    Specimen: Throat; Swab   Result Value Ref Range    Group A Strep antigen Negative Negative        Vital signs reviewed by Cipriano Cortes PA-C  BP (!) 150/65 (BP Location: Left arm, Patient Position: Sitting, Cuff Size: Adult Large)   Pulse 71   Temp 98  F (36.7  C) (Tympanic)   Resp 18   Wt 133.8 kg (295 lb)   LMP  (LMP Unknown)   SpO2 95%   BMI 49.09 kg/m      Current Meds      Current Outpatient Medications:     AMBIEN 10 MG OR TABS, 1 TABLET AT BEDTIME, Disp: , Rfl:     atorvastatin (LIPITOR) 80 MG tablet, Take 1 tablet (80 mg) by mouth daily, Disp: 90 tablet, Rfl: 0    BUPROPION HCL# 300 MG OR TB24, 1 TABLET DAILY, Disp: , Rfl:     divalproex (DEPAKOTE ER) 500 MG 24 hr tablet, Reported on 3/27/2017, Disp: , Rfl:     esomeprazole (NEXIUM) 40 MG DR capsule, Take 1 capsule (40 mg) by mouth 2 times daily Take 30-60 minutes before eating., Disp: 180 capsule, Rfl: 3    ibuprofen (ADVIL/MOTRIN) 200 MG capsule, Take 200 mg by mouth every 4 hours as needed for fever, Disp: , Rfl:     levothyroxine (SYNTHROID) 150 MCG tablet, Take 1 tablet (150 mcg) by mouth daily, Disp: 90 tablet, Rfl: 2    metoprolol tartrate (LOPRESSOR) 25 MG tablet, Take 1 tablet (25 mg) by mouth 2 times daily, Disp: 180 tablet, Rfl: 3    order for DME, Walker with 4 wheels, brakes, seat, Disp: 1 each, Rfl: 0    ZYPREXA 5 MG OR TABS, Reported on 3/27/2017, Disp: , Rfl:       Respiratory History    no history of pneumonia or bronchitis      SUBJECTIVE    HPI: Geovanna Aguilar is an 64 year old female who presents with cough dry and sore throat.  Symptoms began 2  days ago and has stable.  She reports that she is currently living with an individual who tested positive for COVID who also has pneumonia. She is concerned that she has contracted something now that her  throat has started to hurt. She requests COVID and strep swabs. Patient is eating and drinking well.  No fever, nausea, vomiting, or diarrhea.    She also notes some dizziness that has been intermittently present for 2 weeks. She notes that it started during her recent ED visit. It occurs when standing from a sitting position, but also when she changes positions. Especially when laying flat in bed. She has not seen her PCP regarding this issue.     Patient denies any recent travel or exposure to known COVID positive tested individual.      ROS:     Review of Systems   Constitutional:  Negative for chills and fever.   HENT:  Positive for sore throat. Negative for congestion, ear pain and rhinorrhea.    Eyes: Negative.    Respiratory: Negative.  Negative for cough and shortness of breath.    Cardiovascular: Negative.  Negative for chest pain and palpitations.   Gastrointestinal:  Negative for diarrhea, nausea and vomiting.   Endocrine: Negative.    Genitourinary: Negative.    Musculoskeletal: Negative.  Negative for arthralgias, back pain, joint swelling, myalgias, neck pain and neck stiffness.   Skin: Negative.  Negative for rash and wound.   Allergic/Immunologic: Negative.  Negative for immunocompromised state.   Neurological:  Positive for dizziness. Negative for weakness, light-headedness and headaches.         Family History   Family History   Problem Relation Age of Onset    C.A.D. Father     Alzheimer Disease Paternal Grandmother     Migraines Paternal Grandmother     Cancer Mother 69        lung cancer now     Depression Mother     Other Cancer Mother             Migraines Mother     Thyroid Disease Maternal Grandmother     Diabetes Son         Type 1    Substance Abuse Sister     Depression Sister     Migraines Sister     Alcoholism Sister     Substance Abuse Sister     Depression Sister     Alcoholism Sister     Diabetes Son     Diabetes Sister     Glaucoma No family hx of     Macular  Degeneration No family hx of         Problem history  Patient Active Problem List   Diagnosis    GERD (gastroesophageal reflux disease)    Hypothyroidism    Peripheral edema    KALPESH (obstructive sleep apnea)    Hypertension goal BP (blood pressure) < 140/90    Hyperlipidemia LDL goal <130    Mild depressed bipolar I disorder (H)    Degenerative joint disease (DJD) of hip    Bursitis, hip    Bladder spasm    Anemia    Hyperglycemia    Chest pain    Schizophrenia (H)    Morbid obesity due to excess calories (H)    Slow transit constipation    Pseudophakia, os    Posterior vitreous detachment of both eyes    Combined forms of age-related cataract, mild, of right eye    Ulcer of left lower extremity with fat layer exposed (H)        Allergies  Allergies   Allergen Reactions    Abilify [Aripiprazole]     Carafate [Sucralfate]     Latex      Pt unsure of reaction    Lisinopril Cough     cough    Adhesive Tape Rash        Social History  Social History     Socioeconomic History    Marital status:      Spouse name: Not on file    Number of children: Not on file    Years of education: Not on file    Highest education level: Not on file   Occupational History    Not on file   Tobacco Use    Smoking status: Never     Passive exposure: Yes    Smokeless tobacco: Never    Tobacco comments:     boyfriend smokes   Substance and Sexual Activity    Alcohol use: Yes     Comment: rarely    Drug use: No    Sexual activity: Yes     Partners: Male   Other Topics Concern    Parent/sibling w/ CABG, MI or angioplasty before 65F 55M? No   Social History Narrative    Not on file     Social Determinants of Health     Financial Resource Strain: Low Risk  (2/23/2024)    Financial Resource Strain     Within the past 12 months, have you or your family members you live with been unable to get utilities (heat, electricity) when it was really needed?: No   Food Insecurity: Low Risk  (2/23/2024)    Food Insecurity     Within the past 12 months,  did you worry that your food would run out before you got money to buy more?: No     Within the past 12 months, did the food you bought just not last and you didn t have money to get more?: No   Transportation Needs: High Risk (2/23/2024)    Transportation Needs     Within the past 12 months, has lack of transportation kept you from medical appointments, getting your medicines, non-medical meetings or appointments, work, or from getting things that you need?: Yes   Physical Activity: Inactive (2/23/2024)    Exercise Vital Sign     Days of Exercise per Week: 0 days     Minutes of Exercise per Session: 0 min   Stress: No Stress Concern Present (2/23/2024)    [employer] - Occupational Stress Questionnaire     Feeling of Stress : Not at all   Social Connections: Unknown (2/23/2024)    Social Connection and Isolation Panel [NHANES]     Frequency of Communication with Friends and Family: Not on file     Frequency of Social Gatherings with Friends and Family: Never     Attends Cheondo[employer] Services: Not on file     Active Member of Clubs or Organizations: Not on file     Attends Club or Organization Meetings: Not on file     Marital Status: Not on file   Interpersonal Safety: Low Risk  (2/23/2024)    Interpersonal Safety     Do you feel physically and emotionally safe where you currently live?: Yes     Within the past 12 months, have you been hit, slapped, kicked or otherwise physically hurt by someone?: No     Within the past 12 months, have you been humiliated or emotionally abused in other ways by your partner or ex-partner?: No   Housing Stability: Low Risk  (2/23/2024)    Housing Stability     Do you have housing? : Yes     Are you worried about losing your housing?: No        OBJECTIVE     Vital signs reviewed by Cipriano Cortes PA-C  BP (!) 150/65 (BP Location: Left arm, Patient Position: Sitting, Cuff Size: Adult Large)   Pulse 71   Temp 98  F (36.7  C) (Tympanic)   Resp 18   Wt  133.8 kg (295 lb)   LMP  (LMP Unknown)   SpO2 95%   BMI 49.09 kg/m       Physical Exam  Vitals and nursing note reviewed.   Constitutional:       General: She is not in acute distress.     Appearance: She is well-developed. She is not ill-appearing, toxic-appearing or diaphoretic.   HENT:      Head: Normocephalic and atraumatic.      Right Ear: Hearing, tympanic membrane, ear canal and external ear normal. Tympanic membrane is not perforated, erythematous, retracted or bulging.      Left Ear: Hearing, tympanic membrane, ear canal and external ear normal. Tympanic membrane is not perforated, erythematous, retracted or bulging.      Nose: Congestion present. No mucosal edema or rhinorrhea.      Right Sinus: No maxillary sinus tenderness or frontal sinus tenderness.      Left Sinus: No maxillary sinus tenderness or frontal sinus tenderness.      Mouth/Throat:      Pharynx: Posterior oropharyngeal erythema present. No pharyngeal swelling, oropharyngeal exudate or uvula swelling.      Tonsils: No tonsillar exudate or tonsillar abscesses. 0 on the right. 0 on the left.   Eyes:      General:         Right eye: No discharge.         Left eye: No discharge.      Pupils: Pupils are equal, round, and reactive to light.   Cardiovascular:      Rate and Rhythm: Normal rate and regular rhythm.      Heart sounds: Normal heart sounds. No murmur heard.     No friction rub. No gallop.   Pulmonary:      Effort: Pulmonary effort is normal. No respiratory distress.      Breath sounds: Normal breath sounds. No decreased breath sounds, wheezing, rhonchi or rales.   Chest:      Chest wall: No tenderness.   Abdominal:      General: Bowel sounds are normal. There is no distension.      Palpations: Abdomen is soft. There is no mass.      Tenderness: There is no abdominal tenderness. There is no guarding.   Musculoskeletal:      Cervical back: Normal range of motion and neck supple.   Lymphadenopathy:      Head:      Right side of head: No  submental, submandibular, tonsillar, preauricular or posterior auricular adenopathy.      Left side of head: No submental, submandibular, tonsillar, preauricular or posterior auricular adenopathy.      Cervical: No cervical adenopathy.      Right cervical: No superficial or posterior cervical adenopathy.     Left cervical: No superficial or posterior cervical adenopathy.   Skin:     General: Skin is warm and dry.      Findings: No rash.   Neurological:      Mental Status: She is alert and oriented to person, place, and time.      GCS: GCS eye subscore is 4. GCS verbal subscore is 5. GCS motor subscore is 6.      Cranial Nerves: No cranial nerve deficit.      Sensory: Sensation is intact.      Motor: Motor function is intact. No weakness, atrophy or abnormal muscle tone.      Coordination: Coordination is intact. Coordination normal.      Deep Tendon Reflexes: Reflexes are normal and symmetric.   Psychiatric:         Behavior: Behavior normal. Behavior is cooperative.         Thought Content: Thought content normal.         Judgment: Judgment normal.           Cipriano Cortes PA-C  7/25/2024, 4:44 PM

## 2024-07-26 ENCOUNTER — TELEPHONE (OUTPATIENT)
Dept: FAMILY MEDICINE | Facility: CLINIC | Age: 64
End: 2024-07-26
Payer: COMMERCIAL

## 2024-07-26 ENCOUNTER — TELEPHONE (OUTPATIENT)
Dept: NURSING | Facility: CLINIC | Age: 64
End: 2024-07-26
Payer: COMMERCIAL

## 2024-07-26 LAB — SARS-COV-2 RNA RESP QL NAA+PROBE: POSITIVE

## 2024-07-26 NOTE — TELEPHONE ENCOUNTER
Patient classified as COVID treatment eligible by Epic high risk algorithm:  No    Coronavirus (COVID-19) Notification    Reason for call  Notify of POSITIVE COVID-19 lab result, assess symptoms,  review [facility] recommendations    Lab Result   Lab test for 2019-nCoV rRt-PCR or SARS-COV-2 PCR  Oropharyngeal AND/OR nasopharyngeal swabs were POSITIVE for 2019-nCoV RNA [OR] SARS-COV-2 RNA (COVID-19) RNA     We have been unable to reach patient by phone at this time to notify of their Positive COVID-19 result.    Left voicemail message requesting a call back to [phone] [facility] for results.        A Positive COVID-19 letter will be sent via [employer] or the mail.    Nenita Isaac

## 2024-07-26 NOTE — TELEPHONE ENCOUNTER
RN COVID TREATMENT VISIT  07/26/24      The patient has been triaged and does not require a higher level of care.    Geovanna Connorssravanthi  64 year old  Current weight? 295 lbs    Has the patient been seen by a primary care provider at an [facility] or [facility] within the past two years? Yes.   Have you been in close proximity to/do you have a known exposure to a person with a confirmed case of influenza? No.     General treatment eligibility:  Date of positive COVID test (PCR or at home)?  7/25/24    Are you or have you been hospitalized for this COVID-19 infection? No.   Have you received monoclonal antibodies or antiviral treatment for COVID-19 since this positive test? No.   Do you have any of the following conditions that place you at risk of being very sick from COVID-19?   - Age 50 years or older  - Overweight or Obesity (BMI >85th percentile or BMI 25 or higher)  Yes, patient has at least one high risk condition as noted above.     Current COVID symptoms:   - sore throat  - congestion or runny nose  Yes. Patient has at least one symptom as selected.     How many days since symptoms started? 5 days or less. Established patient, 12 years or older weighing at least 88.2 lbs, who has symptoms that started in the past 5 days, has not been hospitalized nor received treatment already, and is at risk for being very sick from COVID-19.     Treatment eligibility by RN:  Are you currently pregnant or nursing? No  Do you have a clinically significant hypersensitivity to nirmatrelvir or ritonavir, or toxic epidermal necrolysis (TEN) or Gonzáles-Abhinav Syndrome? No  Do you have a history of hepatitis, any hepatic impairment on the Problem List (such as Child-Villarreal Class C, cirrhosis, fatty liver disease, alcoholic liver disease), or was the last liver lab (hepatic panel, ALT, AST, ALK Phos, bilirubin) elevated in the past 6 months? No  Do you have any history of severe renal impairment (eGFR <  30mL/min)? No    Is patient eligible to continue? Yes, patient meets all eligibility requirements for the RN COVID treatment (as denoted by all no responses above).     Current Outpatient Medications   Medication Sig Dispense Refill    AMBIEN 10 MG OR TABS 1 TABLET AT BEDTIME      atorvastatin (LIPITOR) 80 MG tablet Take 1 tablet (80 mg) by mouth daily 90 tablet 0    BUPROPION HCL# 300 MG OR TB24 1 TABLET DAILY      divalproex (DEPAKOTE ER) 500 MG 24 hr tablet Reported on 3/27/2017      esomeprazole (NEXIUM) 40 MG DR capsule Take 1 capsule (40 mg) by mouth 2 times daily Take 30-60 minutes before eating. 180 capsule 3    ibuprofen (ADVIL/MOTRIN) 200 MG capsule Take 200 mg by mouth every 4 hours as needed for fever      levothyroxine (SYNTHROID) 150 MCG tablet Take 1 tablet (150 mcg) by mouth daily 90 tablet 2    metoprolol tartrate (LOPRESSOR) 25 MG tablet Take 1 tablet (25 mg) by mouth 2 times daily 180 tablet 3    order for DME Walker with 4 wheels, brakes, seat 1 each 0    ZYPREXA 5 MG OR TABS Reported on 3/27/2017         Medications from List 1 of the standing order (on medications that exclude the use of Paxlovid) that patient is taking: NONE. Is patient taking Deneen's Wort? No  Is patient taking Ricardo's Wort or any meds from List 1? No.   Medications from List 2 of the standing order (on meds that provider needs to adjust) that patient is taking: NONE. Is patient on any of the meds from List 2? No.   Medications from List 3 of standing order (on meds that a RN needs to adjust) that patient is taking: atorvastatin (Lipitor): Instructed patient to stop atorvastatin while taking Paxlovid and restart atorvastatin 1 day after the completion of Paxlovid.   bupropion (Wellbutrin, Zyban): Instructed patient to continue taking buproprion as directed but know that it may have less effect while taking Paxlovid.   zolpidem (Ambien, Intermezzo, Edluar): Is patient taking as needed and less than daily? No, patient is  taking daily or does not feel comfortable stopping medication and instructed patient to see a provider for COVID treatment options. Patient will be transferred to a  at the end of this call. Is patient on any meds from List 3? Yes. Patient is on at least one med that needs a provider visit and will be scheduled or transferred to a  at the end of this call.   Sapna Salamanca RN

## 2024-07-27 ENCOUNTER — VIRTUAL VISIT (OUTPATIENT)
Dept: URGENT CARE | Facility: CLINIC | Age: 64
End: 2024-07-27
Payer: COMMERCIAL

## 2024-07-27 DIAGNOSIS — U07.1 INFECTION DUE TO 2019 NOVEL CORONAVIRUS: Primary | ICD-10-CM

## 2024-07-27 DIAGNOSIS — F31.31 MILD DEPRESSED BIPOLAR I DISORDER (H): ICD-10-CM

## 2024-07-27 DIAGNOSIS — E78.5 HYPERLIPIDEMIA LDL GOAL <130: ICD-10-CM

## 2024-07-27 PROCEDURE — 99443 PR PHYSICIAN TELEPHONE EVALUATION 21-30 MIN: CPT | Mod: 93

## 2024-07-27 NOTE — PATIENT INSTRUCTIONS
Do NOT take your atorvastatin while you are on Paxlovid.  Two days after you finish the Paxlovid, restart your atorvastatin.    Only take HALF of your ambien tablet at bedtime while you are on Paxlovid.  After you finish the Paxlovid, go back to your usual dose of ambien.

## 2024-07-27 NOTE — PROGRESS NOTES
"  Geovanna Aguilar is a 64 year old female who is being evaluated via a billable telephone visit.      The patient has been notified of following at the time patient scheduled visit:     \"This telephone visit will be conducted via a phone call between you and your physician/provider. We have found that certain health care needs can be provided without the need for a physical exam.  This service lets us provide the care you need with a phone conversation.  If a prescription is necessary we can send it directly to your pharmacy.  If lab work is needed we can place an order for that and you can then stop by our lab to have the test done at a later time.\"   Patient has given consent for telephone visit?  Yes    SUBJECTIVE:  Geovanna Aguilar is an 64 year old female who presents for covid.  Sxs started two days ago after an exposure to someone with covid.  Has had cough and nasal congestion.  Feels tired and a little achy.  Has been vaccinated for covid.  Was seen in clinic 2 days ago and yesterday found out covid test was positive.  No n/v/d.  No shortness of breath.  No hx of kidney problems    PMH:   has a past medical history of Arthritis, Bipolar 2 disorder (H), Bone and joint disord back, pelvis, leg complicat preg, childb, puerp, Chronic constipation, Combined forms of age-related cataract, mild, of right eye (12/17/2022), Depressive disorder, Esophageal reflux, GERD (gastroesophageal reflux disease), Heart rate problem, Hiatal hernia, Hyperlipidemia LDL goal < 100, Hypertension, Hypothyroidism, Migraines, Obesity, Obstructive sleep apnea, Osteoporosis, Other mental problems, Peripheral edema, Schizophrenia (H), and Urinary incontinence.  Patient Active Problem List   Diagnosis    GERD (gastroesophageal reflux disease)    Hypothyroidism    Peripheral edema    KALPESH (obstructive sleep apnea)    Hypertension goal BP (blood pressure) < 140/90    Hyperlipidemia LDL goal <130    Mild depressed bipolar I disorder (H)    " Degenerative joint disease (DJD) of hip    Bursitis, hip    Bladder spasm    Anemia    Hyperglycemia    Chest pain    Schizophrenia (H)    Morbid obesity due to excess calories (H)    Slow transit constipation    Pseudophakia, os    Posterior vitreous detachment of both eyes    Combined forms of age-related cataract, mild, of right eye    Ulcer of left lower extremity with fat layer exposed (H)     Social History     Socioeconomic History    Marital status:    Tobacco Use    Smoking status: Never     Passive exposure: Yes    Smokeless tobacco: Never    Tobacco comments:     boyfriend smokes   Substance and Sexual Activity    Alcohol use: Yes     Comment: rarely    Drug use: No    Sexual activity: Yes     Partners: Male   Other Topics Concern    Parent/sibling w/ CABG, MI or angioplasty before 65F 55M? No     Social Determinants of Health     Financial Resource Strain: Low Risk  (2/23/2024)    Financial Resource Strain     Within the past 12 months, have you or your family members you live with been unable to get utilities (heat, electricity) when it was really needed?: No   Food Insecurity: Low Risk  (2/23/2024)    Food Insecurity     Within the past 12 months, did you worry that your food would run out before you got money to buy more?: No     Within the past 12 months, did the food you bought just not last and you didn t have money to get more?: No   Transportation Needs: High Risk (2/23/2024)    Transportation Needs     Within the past 12 months, has lack of transportation kept you from medical appointments, getting your medicines, non-medical meetings or appointments, work, or from getting things that you need?: Yes   Physical Activity: Inactive (2/23/2024)    Exercise Vital Sign     Days of Exercise per Week: 0 days     Minutes of Exercise per Session: 0 min   Stress: No Stress Concern Present (2/23/2024)    Citizen of [place] of Occupational Health - Occupational Stress Questionnaire     Feeling of  Stress : Not at all   Social Connections: Unknown (2024)    Social Connection and Isolation Panel [NHANES]     Frequency of Social Gatherings with Friends and Family: Never   Interpersonal Safety: Low Risk  (2024)    Interpersonal Safety     Do you feel physically and emotionally safe where you currently live?: Yes     Within the past 12 months, have you been hit, slapped, kicked or otherwise physically hurt by someone?: No     Within the past 12 months, have you been humiliated or emotionally abused in other ways by your partner or ex-partner?: No   Housing Stability: Low Risk  (2024)    Housing Stability     Do you have housing? : Yes     Are you worried about losing your housing?: No     Family History   Problem Relation Age of Onset    C.A.D. Father     Alzheimer Disease Paternal Grandmother     Migraines Paternal Grandmother     Cancer Mother 69        lung cancer now     Depression Mother     Other Cancer Mother             Migraines Mother     Thyroid Disease Maternal Grandmother     Diabetes Son         Type 1    Substance Abuse Sister     Depression Sister     Migraines Sister     Alcoholism Sister     Substance Abuse Sister     Depression Sister     Alcoholism Sister     Diabetes Son     Diabetes Sister     Glaucoma No family hx of     Macular Degeneration No family hx of        ALLERGIES:  Abilify [aripiprazole], Carafate [sucralfate], Latex, Lisinopril, and Adhesive tape    Current Outpatient Medications   Medication Sig Dispense Refill    AMBIEN 10 MG OR TABS 1 TABLET AT BEDTIME      atorvastatin (LIPITOR) 80 MG tablet Take 1 tablet (80 mg) by mouth daily 90 tablet 0    BUPROPION HCL# 300 MG OR TB24 1 TABLET DAILY      divalproex (DEPAKOTE ER) 500 MG 24 hr tablet Reported on 3/27/2017      esomeprazole (NEXIUM) 40 MG DR capsule Take 1 capsule (40 mg) by mouth 2 times daily Take 30-60 minutes before eating. 180 capsule 3    ibuprofen (ADVIL/MOTRIN) 200 MG capsule Take 200  mg by mouth every 4 hours as needed for fever      levothyroxine (SYNTHROID) 150 MCG tablet Take 1 tablet (150 mcg) by mouth daily 90 tablet 2    metoprolol tartrate (LOPRESSOR) 25 MG tablet Take 1 tablet (25 mg) by mouth 2 times daily 180 tablet 3    order for DME Walker with 4 wheels, brakes, seat 1 each 0    ZYPREXA 5 MG OR TABS Reported on 3/27/2017       No current facility-administered medications for this visit.         ROS:  ROS is done and is negative for general/constitutional, eye, ENT, Respiratory, cardiovascular, GI, , Skin, musculoskeletal except as noted elsewhere.  All other review of systems negative except as noted elsewhere.      OBJECTIVE:    No vital signs taken as is virtual visit.  GENERAL : Alert and oriented.  No distress detected.    RESP: No respiratory distress detected during phone conversation           ASSESSMENT/PLAN:    ASSESSMENT / PLAN:  (U07.1) Infection due to 2019 novel coronavirus  (primary encounter diagnosis)  Comment: has risk factors for covid complications.  Discussed treatment options and will treat with paxlovid.    Plan: nirmatrelvir and ritonavir (PAXLOVID) 300         mg/100 mg therapy pack        Reviewed medication instructions and side effects. Follow up if experiences side effects.. I reviewed supportive care, otc meds to use if needed, expected course, and signs of concern.  Follow up as needed or if does not improve within 5 day(s) or if worsens in any way.  Reviewed red flag symptoms and is to go to the ER if experiences any of these.    (E78.5) Hyperlipidemia LDL goal <130  Comment: has hx of.  Plan: stop atorvastatin while on paxlovid.  Restart two days after completion of paxlovid.      (F31.31) Mild depressed bipolar I disorder (H)  Comment:   Plan: is to decrease ambien to half of usual dose while on paxlovid.  Continue other medications at usual doses, but advised that if feels bipolar sxs are worsening, is to contact pcp right away.        See [facility]  for orders, medications, letters, patient instructions    Gisel Monson MD  7/27/2024, 8:27 AM      Phone call duration:  27 minutes

## 2024-07-29 ENCOUNTER — TELEPHONE (OUTPATIENT)
Dept: FAMILY MEDICINE | Facility: CLINIC | Age: 64
End: 2024-07-29

## 2024-07-29 ENCOUNTER — VIRTUAL VISIT (OUTPATIENT)
Dept: FAMILY MEDICINE | Facility: CLINIC | Age: 64
End: 2024-07-29
Payer: COMMERCIAL

## 2024-07-29 DIAGNOSIS — U07.1 INFECTION DUE TO 2019 NOVEL CORONAVIRUS: Primary | ICD-10-CM

## 2024-07-29 PROCEDURE — 99442 PR PHYSICIAN TELEPHONE EVALUATION 11-20 MIN: CPT | Mod: 93 | Performed by: FAMILY MEDICINE

## 2024-07-29 NOTE — TELEPHONE ENCOUNTER
"  Situation: Patient requesting to cancel today's appointment if appropriate (telephone visit at 1 pm).     Background: Patient was seen in ED on 7/12/24 for dizziness/left arm pain:     Patient seen on 7/25 at  for dizziness, COVID +        Patient had virtual UC on 7/27/24 and started on Paxlovid treatment this day.       Assessment: Patient reports provider on 7/27/24 informed patient that dizziness maybe related to positive COVID. Patient would like to complete Paxlovid treatment first and see if dizziness will resolve on it's own but would like to check with PCP to see if this is appropriate or does PCP recommended with ED follow up today? Patient worried follow up is too soon and will get another bill for \"2 second visit.\"    Recommendation: Please further advise if patient appropriate to cancel today's visit?     Patient is requesting a call back ASAP before appointment so appointment can be cancelled if needed.     JAJA Linda  [facility]                 "

## 2024-07-29 NOTE — PROGRESS NOTES
"  If patient has telephone visit, have they been educated on video visit as preferred visit method and offered to change to video visit? NOT APPLICABLE        Instructions Relayed to Patient by Virtual Roomer:     If patient is not active on [employer]:  Relayed the following to patient: \"Would you like us to text or email you a link to join your appointment now or when your provider is ready to initiate the virtual visit?\"      Patient Confirmed they will join visit via: Provider to call patient for telephone visit   Reminded patient to ensure they were logged on to virtual visit by arrival time listed.   Asked if patient has flexibility to initiate visit sooner than arrival time: patient stated yes, documented in appointment notes availability to initiate visit earlier than arrival time     If pediatric virtual visit, ensured pediatric patient along with parent/guardian will be present for video visit.     Patient offered the website [email or web address] and/or phone number to [employer] Help line: [phone]          Geovanna is a 64 year old who is being evaluated via a billable telephone visit.    What phone number would you like to be contacted at? [phone]  How would you like to obtain your AVS? Mail a copy  Originating Location (pt. Location): Home    Distant Location (provider location):  On-site    Assessment & Plan     Infection due to 2019 novel coronavirus  Improved - continue paxlovid.    Patient scheduled to see me 8/12 for dizziness evaluation.        MED REC REQUIRED  Post Medication Reconciliation Status:  Discharge medications reconciled, continue medications without change        Subjective   Geovanna is a 64 year old, presenting for the following health issues:  Post ER Check ([facility])        7/29/2024    12:56 PM   Additional Questions   Roomed by Edgar   Accompanied by Self     HPI     Dizziness  Onset/Duration: a couple weeks ago  Description:   Do you feel faint: No  Does it " feel like the surroundings (bed, room) are moving: YES- laying flat on her back  Unsteady/off balance: No  Have you passed out or fallen: No  Intensity: mild  Progression of Symptoms: improving and intermittent  Accompanying Signs & Symptoms:  Heart palpitations or chest pain: No  Nausea, vomiting: No  Weakness or lack of coordination in arms or legs: No  Vision or speech changes: No  Numbness or tingling: No  Ringing in ears (Tinnitus): No  Hearing Loss: No  History:   Head trauma/concussion history: No  Previous similar symptoms: No  Recent bleeding history: No  Any new medications (BP?): No  Precipitating factors:   Worse with activity: No  Worse with head movement: No  Alleviating factors:   Does staying in a fixed position give relief: YES  Therapies tried and outcome: None      Seen in  7/25 due to dizziness.  Tested for COVID and positive on 7/26.  ON 7/27 did visit for Paxlovid.  Denies any arm pain or dizziness now, she is on the fourth day of Paxlovid.   recommended she follow up after 7/12/24 ED visit.          Objective           Vitals:  No vitals were obtained today due to virtual visit.    Physical Exam   General: Alert and no distress //Respiratory: No audible wheeze, cough, or shortness of breath // Psychiatric:  Appropriate affect, tone, and pace of words      Office Visit on 07/25/2024   Component Date Value Ref Range Status    SARS CoV2 PCR 07/25/2024 Positive (A)  Negative Final    POSITIVE: SARS-CoV-2 (COVID-19) RNA detected, presumed positive.    Group A Strep antigen 07/25/2024 Negative  Negative Final    Group A strep by PCR 07/25/2024 Not Detected  Not Detected Final    Color Urine 07/25/2024 Yellow  Colorless, Straw, Light Yellow, Yellow Final    Appearance Urine 07/25/2024 Cloudy (A)  Clear Final    Glucose Urine 07/25/2024 Negative  Negative mg/dL Final    Bilirubin Urine 07/25/2024 Negative  Negative Final    Ketones Urine 07/25/2024 Negative  Negative mg/dL Final    Specific  Gravity Urine 07/25/2024 1.025  1.003 - 1.035 Final    Blood Urine 07/25/2024 Negative  Negative Final    pH Urine 07/25/2024 6.0  5.0 - 7.0 Final    Protein Albumin Urine 07/25/2024 Negative  Negative mg/dL Final    Urobilinogen Urine 07/25/2024 1.0  0.2, 1.0 E.U./dL Final    Nitrite Urine 07/25/2024 Negative  Negative Final    Leukocyte Esterase Urine 07/25/2024 Negative  Negative Final    Bacteria Urine 07/25/2024 Many (A)  None Seen /HPF Final    RBC Urine 07/25/2024 0-2  0-2 /HPF /HPF Final    WBC Urine 07/25/2024 0-5  0-5 /HPF /HPF Final    Squamous Epithelials Urine 07/25/2024 Many (A)  None Seen /LPF Final    Mucus Urine 07/25/2024 Present (A)  None Seen /LPF Final         Phone call duration: 12 minutes  Signed Electronically by: Leah Rhoades MD

## 2024-07-29 NOTE — PATIENT INSTRUCTIONS
At [facility], we strive to deliver an exceptional experience to you, every time we see you. If you receive a survey, please let us know what we are doing well and/or what we could improve upon, as we do value your feedback.  If you have MyChart, you can expect to receive results automatically within 24 hours of their completion.  Your provider will send a note interpreting your results as well.   If you do not have MyChart, you should receive your results in about a week by mail.    Your care team:                            Family Medicine Internal Medicine   MD Jesus Mendez, MD Leah Mccloud, MD Octavio Celestin, MD Amy Wyatt, PADustinC    Mauricio Brown, MD Pediatrics   Connie Martinez, MD Brigid Aguayo, MD Sapna Rehman, APRN CNP Shanda Marte APRN CNP   MD Chantal Melgoza, MD Heike Nichole, CNP     Mikey Headley, CNP Same-Day Provider (No follow-up visits)   KATI Mcneal, DNP Marcela Lin, KATI Gayle, FNP, BC FIORDALIZA StevenC     Clinic hours: Monday - Thursday 7 am-6 pm; Fridays 7 am-5 pm.   Urgent care: Monday - Friday 10 am- 8 pm; Saturday and Sunday 9 am-5 pm.    Clinic: [phone]       [facility]: Monday - Thursday 8 am - 7 pm; Friday 8 am - 6 pm  [facility]: [phone]

## 2024-08-12 ENCOUNTER — OFFICE VISIT (OUTPATIENT)
Dept: FAMILY MEDICINE | Facility: CLINIC | Age: 64
End: 2024-08-12
Payer: COMMERCIAL

## 2024-08-12 VITALS
DIASTOLIC BLOOD PRESSURE: 61 MMHG | RESPIRATION RATE: 18 BRPM | SYSTOLIC BLOOD PRESSURE: 133 MMHG | HEART RATE: 65 BPM | OXYGEN SATURATION: 95 % | BODY MASS INDEX: 48.82 KG/M2 | WEIGHT: 293 LBS | HEIGHT: 65 IN | TEMPERATURE: 97.6 F

## 2024-08-12 DIAGNOSIS — E78.5 HYPERLIPIDEMIA LDL GOAL <130: ICD-10-CM

## 2024-08-12 DIAGNOSIS — H81.10 BENIGN PAROXYSMAL POSITIONAL VERTIGO, UNSPECIFIED LATERALITY: Primary | ICD-10-CM

## 2024-08-12 LAB
ERYTHROCYTE [DISTWIDTH] IN BLOOD BY AUTOMATED COUNT: 14.9 % (ref 10–15)
HCT VFR BLD AUTO: 39 % (ref 35–47)
HGB BLD-MCNC: 12.6 G/DL (ref 11.7–15.7)
MCH RBC QN AUTO: 29.6 PG (ref 26.5–33)
MCHC RBC AUTO-ENTMCNC: 32.3 G/DL (ref 31.5–36.5)
MCV RBC AUTO: 92 FL (ref 78–100)
PLATELET # BLD AUTO: 297 10E3/UL (ref 150–450)
RBC # BLD AUTO: 4.26 10E6/UL (ref 3.8–5.2)
WBC # BLD AUTO: 7.4 10E3/UL (ref 4–11)

## 2024-08-12 PROCEDURE — G2211 COMPLEX E/M VISIT ADD ON: HCPCS | Performed by: FAMILY MEDICINE

## 2024-08-12 PROCEDURE — 85027 COMPLETE CBC AUTOMATED: CPT | Performed by: FAMILY MEDICINE

## 2024-08-12 PROCEDURE — 80053 COMPREHEN METABOLIC PANEL: CPT | Performed by: FAMILY MEDICINE

## 2024-08-12 PROCEDURE — 36415 COLL VENOUS BLD VENIPUNCTURE: CPT | Performed by: FAMILY MEDICINE

## 2024-08-12 PROCEDURE — 80061 LIPID PANEL: CPT | Performed by: FAMILY MEDICINE

## 2024-08-12 PROCEDURE — 99214 OFFICE O/P EST MOD 30 MIN: CPT | Performed by: FAMILY MEDICINE

## 2024-08-12 ASSESSMENT — PAIN SCALES - GENERAL: PAINLEVEL: NO PAIN (0)

## 2024-08-12 NOTE — PATIENT INSTRUCTIONS
At [facility], we strive to deliver an exceptional experience to you, every time we see you. If you receive a survey, please let us know what we are doing well and/or what we could improve upon, as we do value your feedback.  If you have MyChart, you can expect to receive results automatically within 24 hours of their completion.  Your provider will send a note interpreting your results as well.   If you do not have MyChart, you should receive your results in about a week by mail.    Your care team:                            Family Medicine Internal Medicine   MD Jesus Mendez, MD Leah Mccloud, MD Octavio Celestin, MD Amy Wyatt, PADustinC    Mauricio Brown, MD Pediatrics   Connie Martinez, MD Brigid Aguayo, MD Sapna Rehman, APRN CNP Shanda Marte APRN CNP   MD Chantal Melgoza, MD Heike Nichole, CNP     Mikey Headley, CNP Same-Day Provider (No follow-up visits)   KATI Mcneal, DNP Marcela Lin, KATI Gayle, FNP, BC FIORDALIZA StevenC     Clinic hours: Monday - Thursday 7 am-6 pm; Fridays 7 am-5 pm.   Urgent care: Monday - Friday 10 am- 8 pm; Saturday and Sunday 9 am-5 pm.    Clinic: [phone]       [facility]: Monday - Thursday 8 am - 7 pm; Friday 8 am - 6 pm  [facility]: [phone]

## 2024-08-12 NOTE — PROGRESS NOTES
"  Assessment & Plan     Benign paroxysmal positional vertigo, unspecified laterality  Discussed diagnosis - check labs to rule out other causes and PT  - CBC with platelets; Future  - Comprehensive metabolic panel (BMP + Alb, Alk Phos, ALT, AST, Total. Bili, TP); Future  - Physical Therapy  Referral; Future    Hyperlipidemia LDL goal <130  Recheck labs after medication change.  - CBC with platelets; Future  - Comprehensive metabolic panel (BMP + Alb, Alk Phos, ALT, AST, Total. Bili, TP); Future  - Lipid panel reflex to direct LDL Fasting; Future          BMI  Estimated body mass index is 49.09 kg/m  as calculated from the following:    Height as of this encounter: 1.651 m (5' 5\").    Weight as of this encounter: 133.8 kg (295 lb).   Weight management plan: Discussed healthy diet and exercise guidelines      The uses and side effects, including black box warnings as appropriate, were discussed in detail.  All patient questions were answered.  The patient was instructed to call immediately if any side effects developed.     Eliz Austin is a 64 year old, presenting for the following health issues:  Dizziness        8/12/2024    12:42 PM   Additional Questions   Roomed by radha green   Accompanied by none         8/12/2024    12:42 PM   Patient Reported Additional Medications   Patient reports taking the following new medications none     History of Present Illness       Reason for visit:  DIZZNESS    She eats 0-1 servings of fruits and vegetables daily.She consumes 1 sweetened beverage(s) daily.She exercises with enough effort to increase her heart rate 9 or less minutes per day.  She exercises with enough effort to increase her heart rate 3 or less days per week.   She is taking medications regularly.         Dizziness  Onset/Duration: 1 month  Description:   Do you feel faint: No  Does it feel like the surroundings (bed, room) are moving: YES  Unsteady/off balance: No  Have you passed out or fallen: " "No  Intensity: mild  Progression of Symptoms: same  Accompanying Signs & Symptoms:  Heart palpitations or chest pain: No  Nausea, vomiting: No  Weakness or lack of coordination in arms or legs: No  Vision or speech changes: No  Numbness or tingling: No  Ringing in ears (Tinnitus): No  Hearing Loss: No  History:   Head trauma/concussion history: No  Previous similar symptoms: No  Recent bleeding history: No  Any new medications (BP?): YES  Precipitating factors:   Worse with activity: No  Worse with head movement: No  Alleviating factors:   Does staying in a fixed position give relief: YES  Therapies tried and outcome: None    1 month of dizziness when lying head back.  Feels like room is spinning.  Last for a few seconds and then is done.            Objective    /61   Pulse 65   Temp 97.6  F (36.4  C) (Temporal)   Resp 18   Ht 1.651 m (5' 5\")   Wt 133.8 kg (295 lb)   LMP  (LMP Unknown)   SpO2 95%   BMI 49.09 kg/m    Body mass index is 49.09 kg/m .  Physical Exam   GENERAL: alert, no distress, and obese  EYES: Eyes grossly normal to inspection and nystagmus to left  NECK: no adenopathy, no asymmetry, masses, or scars  RESP: lungs clear to auscultation - no rales, rhonchi or wheezes  CV: regular rate and rhythm, normal S1 S2, no S3 or S4, no murmur, click or rub, no peripheral edema  ABDOMEN: soft, nontender, no hepatosplenomegaly, no masses and bowel sounds normal  MS: ambulating with walker  SKIN: dry  PSYCH: mentation appears normal, affect normal/bright            Signed Electronically by: Leah Rhoades MD    "

## 2024-08-12 NOTE — LETTER
August 13, 2024      Geovanna Aguilar  [address]  [facility] [place] [address]        Dear Ms.Oriadriana,    We are writing to inform you of your test results.    -Normal red blood cell (hgb) levels, normal white blood cell count and normal platelet levels.   -HDL(good) cholesterol level is low which can increase your heart disease risk.  A diet high in fat and simple carbohydrates, genetics and being overweight can contribute to this. Your LDL(bad) cholesterol and trigylceride levels are slightly improved.  ADVISE: exercising 150 minutes of aerobic exercise per week (30 minutes 5 days per week or 50 minutes 3 days per week are options), and omega-3 fatty acids (fish oil) 1622-4893 mg daily are helpful to improve this.  In 12 months, you should recheck your fasting cholesterol panel by scheduling a lab-only appointment.   -Liver and gallbladder tests are normal (ALT,AST, Alk phos, bilirubin), kidney function is stable (Cr, GFR), sodium is normal, potassium is normal, calcium is normal, glucose is normal.   For additional lab test information, [email or web address].[email or web address] is an excellent reference.    Resulted Orders   CBC with platelets   Result Value Ref Range    WBC Count 7.4 4.0 - 11.0 10e3/uL    RBC Count 4.26 3.80 - 5.20 10e6/uL    Hemoglobin 12.6 11.7 - 15.7 g/dL    Hematocrit 39.0 35.0 - 47.0 %    MCV 92 78 - 100 fL    MCH 29.6 26.5 - 33.0 pg    MCHC 32.3 31.5 - 36.5 g/dL    RDW 14.9 10.0 - 15.0 %    Platelet Count 297 150 - 450 10e3/uL   Comprehensive metabolic panel (BMP + Alb, Alk Phos, ALT, AST, Total. Bili, TP)   Result Value Ref Range    Sodium 137 135 - 145 mmol/L    Potassium 4.5 3.4 - 5.3 mmol/L    Carbon Dioxide (CO2) 21 (L) 22 - 29 mmol/L    Anion Gap 14 7 - 15 mmol/L    Urea Nitrogen 17.8 8.0 - 23.0 mg/dL    Creatinine 1.00 (H) 0.51 - 0.95 mg/dL    GFR Estimate 63 >60 mL/min/1.73m2      Comment:      eGFR calculated using 2021 CKD-EPI equation.    Calcium 8.9 8.8 - 10.4 mg/dL      Comment:      Reference  intervals for this test were updated on 7/16/2024 to reflect our healthy population more accurately. There may be differences in the flagging of prior results with similar values performed with this method. Those prior results can be interpreted in the context of the updated reference intervals.    Chloride 102 98 - 107 mmol/L    Glucose 132 (H) 70 - 99 mg/dL    Alkaline Phosphatase 88 40 - 150 U/L    AST 15 0 - 45 U/L    ALT 20 0 - 50 U/L    Protein Total 7.0 6.4 - 8.3 g/dL    Albumin 4.2 3.5 - 5.2 g/dL    Bilirubin Total 0.5 <=1.2 mg/dL    Patient Fasting > 8hrs? Yes    Lipid panel reflex to direct LDL Fasting   Result Value Ref Range    Cholesterol 184 <200 mg/dL    Triglycerides 126 <150 mg/dL    Direct Measure HDL 36 (L) >=50 mg/dL    LDL Cholesterol Calculated 123 (H) <=100 mg/dL    Non HDL Cholesterol 148 (H) <130 mg/dL    Patient Fasting > 8hrs? Yes     Narrative    Cholesterol  Desirable:  <200 mg/dL    Triglycerides  Normal:  Less than 150 mg/dL  Borderline High:  150-199 mg/dL  High:  200-499 mg/dL  Very High:  Greater than or equal to 500 mg/dL    Direct Measure HDL  Female:  Greater than or equal to 50 mg/dL   Male:  Greater than or equal to 40 mg/dL    LDL Cholesterol  Desirable:  <100mg/dL  Above Desirable:  100-129 mg/dL   Borderline High:  130-159 mg/dL   High:  160-189 mg/dL   Very High:  >= 190 mg/dL    Non HDL Cholesterol  Desirable:  130 mg/dL  Above Desirable:  130-159 mg/dL  Borderline High:  160-189 mg/dL  High:  190-219 mg/dL  Very High:  Greater than or equal to 220 mg/dL       If you have any questions or concerns, please call the clinic at the number listed above.       Sincerely,      Leah Rhoades MD

## 2024-08-13 LAB
ALBUMIN SERPL BCG-MCNC: 4.2 G/DL (ref 3.5–5.2)
ALP SERPL-CCNC: 88 U/L (ref 40–150)
ALT SERPL W P-5'-P-CCNC: 20 U/L (ref 0–50)
ANION GAP SERPL CALCULATED.3IONS-SCNC: 14 MMOL/L (ref 7–15)
AST SERPL W P-5'-P-CCNC: 15 U/L (ref 0–45)
BILIRUB SERPL-MCNC: 0.5 MG/DL
BUN SERPL-MCNC: 17.8 MG/DL (ref 8–23)
CALCIUM SERPL-MCNC: 8.9 MG/DL (ref 8.8–10.4)
CHLORIDE SERPL-SCNC: 102 MMOL/L (ref 98–107)
CHOLEST SERPL-MCNC: 184 MG/DL
CREAT SERPL-MCNC: 1 MG/DL (ref 0.51–0.95)
EGFRCR SERPLBLD CKD-EPI 2021: 63 ML/MIN/1.73M2
FASTING STATUS PATIENT QL REPORTED: YES
FASTING STATUS PATIENT QL REPORTED: YES
GLUCOSE SERPL-MCNC: 132 MG/DL (ref 70–99)
HCO3 SERPL-SCNC: 21 MMOL/L (ref 22–29)
HDLC SERPL-MCNC: 36 MG/DL
LDLC SERPL CALC-MCNC: 123 MG/DL
NONHDLC SERPL-MCNC: 148 MG/DL
POTASSIUM SERPL-SCNC: 4.5 MMOL/L (ref 3.4–5.3)
PROT SERPL-MCNC: 7 G/DL (ref 6.4–8.3)
SODIUM SERPL-SCNC: 137 MMOL/L (ref 135–145)
TRIGL SERPL-MCNC: 126 MG/DL

## 2024-08-13 NOTE — RESULT ENCOUNTER NOTE
Ms. Aguilar,    Here is a summary of your recent test results:    -Normal red blood cell (hgb) levels, normal white blood cell count and normal platelet levels.  -HDL(good) cholesterol level is low which can increase your heart disease risk.  A diet high in fat and simple carbohydrates, genetics and being overweight can contribute to this. Your LDL(bad) cholesterol and trigylceride levels are slightly improved.  ADVISE: exercising 150 minutes of aerobic exercise per week (30 minutes 5 days per week or 50 minutes 3 days per week are options), and omega-3 fatty acids (fish oil) 3844-6108 mg daily are helpful to improve this.  In 12 months, you should recheck your fasting cholesterol panel by scheduling a lab-only appointment.  -Liver and gallbladder tests are normal (ALT,AST, Alk phos, bilirubin), kidney function is stable (Cr, GFR), sodium is normal, potassium is normal, calcium is normal, glucose is normal.  For additional lab test information, [email or web address].org is an excellent reference.    Please contact the clinic if you have additional questions.  Thank you.    Sincerely,    Leah Rhoades MD

## 2024-09-25 ENCOUNTER — TELEPHONE (OUTPATIENT)
Dept: FAMILY MEDICINE | Facility: CLINIC | Age: 64
End: 2024-09-25
Payer: COMMERCIAL

## 2024-09-30 NOTE — TELEPHONE ENCOUNTER
Retail Pharmacy Prior Authorization Team   Phone: [phone]    PA Initiation    Medication: ESOMEPRAZOLE MAGNESIUM 40 MG PO CPDR  Insurance Company: [employer] - Phone [phone] Fax [phone]  Pharmacy Filling the Rx: [facility] [place] [facility] - [facility] AT PORTAL TO REGISTERED [facility] SITES  Filling Pharmacy Phone: [phone]  Filling Pharmacy Fax:    Start Date: 9/30/2024    Started PA on CMM and a response of [facility] has indicated that it is too soon to refill this medication at the pharmacy for your patient. If you need to renew an existing PA for your patient's medication, please reach out to [facility] directly at [phone].

## 2024-11-20 DIAGNOSIS — E78.5 HYPERLIPIDEMIA LDL GOAL <130: ICD-10-CM

## 2024-11-20 RX ORDER — ATORVASTATIN CALCIUM 80 MG/1
80 TABLET, FILM COATED ORAL DAILY
Qty: 90 TABLET | Refills: 0 | Status: SHIPPED | OUTPATIENT
Start: 2024-11-20

## 2024-11-30 ENCOUNTER — NURSE TRIAGE (OUTPATIENT)
Dept: NURSING | Facility: CLINIC | Age: 64
End: 2024-11-30
Payer: COMMERCIAL

## 2024-11-30 NOTE — TELEPHONE ENCOUNTER
Nurse Triage SBAR    Is this a 2nd Level Triage? NO    Situation: URI symptoms    Background: Runny nose and scratchy throat since Thursday night. Has been using nyquil and claritin which seem to help a little bit, especially over night.     Assessment: Coughing up green mucous. Nasal drainage is clear, has noticed a little blood in her nasal drainage but only a couple times. Denies difficulty breathing, chest pain/pressure, wheezing, stridor, difficulty swallowing, or fever.     Protocol Recommended Disposition:   Home Care    Recommendation: Home care advice given for URI and cough. Patient advised to take at home covid test and call us back if it is positive. Patient states that she does not have a way to get a covid test. Red flag symptoms were reviewed with patient. Advised on when to call back or be seen if symptoms are not improving or worsen.       Reason for Disposition   Common cold with no complications   Cough    Additional Information   Negative: SEVERE difficulty breathing (e.g., struggling for each breath, speaks in single words)   Negative: Sounds like a life-threatening emergency to the triager   Negative: Fever > 104 F (40 C)   Negative: Patient sounds very sick or weak to the triager   Negative: [1] Fever > 101 F (38.3 C) AND [2] age > 60 years   Negative: [1] Fever > 100.0 F (37.8 C) AND [2] bedridden (e.g., CVA, chronic illness, recovering from surgery)   Negative: [1] Fever > 100.0 F (37.8 C) AND [2] diabetes mellitus or weak immune system (e.g., HIV positive, cancer chemo, splenectomy, organ transplant, chronic steroids)   Negative: Fever present > 3 days (72 hours)   Negative: [1] Fever returns after gone for over 24 hours AND [2] symptoms worse or not improved   Negative: [1] Sinus pain (not just congestion) AND [2] fever   Negative: Earache   Negative: [1] SEVERE sore throat AND [2] present > 24 hours   Negative: [1] Sinus congestion (pressure, fullness) AND [2] present > 10 days    Negative: [1] Nasal discharge AND [2] present > 10 days   Negative: [1] Using nasal washes and pain medicine > 24 hours AND [2] sinus pain (lower forehead, cheekbone, or eye) persists   Negative: Sores with yellow scabs around the nasal opening   Negative: SEVERE difficulty breathing (e.g., struggling for each breath, speaks in single words)   Negative: Bluish (or gray) lips or face now   Negative: Difficult to awaken or acting confused (e.g., disoriented, slurred speech)   Negative: Shock suspected (e.g., cold/pale/clammy skin, too weak to stand, low BP, rapid pulse)   Negative: Sounds like a life-threatening emergency to the triager   Negative: SEVERE or constant chest pain or pressure  (Exception: Mild central chest pain, present only when coughing.)   Negative: MODERATE difficulty breathing (e.g., speaks in phrases, SOB even at rest, pulse 100-120)   Negative: [1] Headache AND [2] stiff neck (can't touch chin to chest)   Negative: Oxygen level (e.g., pulse oximetry) 90% or lower     ZACH-  no pulse ox   Negative: Chest pain or pressure  (Exception: MILD central chest pain, present only when coughing.)   Negative: Patient sounds very sick or weak to the triager   Negative: [1] Drinking very little AND [2] dehydration suspected (e.g., no urine > 12 hours, very dry mouth, very lightheaded)   Negative: MILD difficulty breathing (e.g., minimal/no SOB at rest, SOB with walking, pulse <100)   Negative: Fever > 103 F (39.4 C)   Negative: [1] Fever > 101 F (38.3 C) AND [2] age > 60 years   Negative: [1] Fever > 100 F (37.8 C) AND [2] bedridden (e.g., CVA, chronic illness, recovering from surgery)   Negative: Oxygen level (e.g., pulse oximetry) 91 to 94%     ZACH- no pulse ox   Negative: SEVERE difficulty breathing (e.g., struggling for each breath, speaks in single words)   Negative: Bluish (or gray) lips or face now   Negative: [1] Difficulty breathing AND [2] exposure to flames, smoke, or fumes   Negative: [1] Stridor  AND [2] difficulty breathing   Negative: Sounds like a life-threatening emergency to the triager   Negative: [1] MODERATE difficulty breathing (e.g., speaks in phrases, SOB even at rest, pulse 100-120) AND [2] still present when not coughing   Negative: Chest pain  (Exception: MILD central chest pain, present only when coughing.)   Negative: Patient sounds very sick or weak to the triager   Negative: [1] MILD difficulty breathing (e.g., minimal/no SOB at rest, SOB with walking, pulse <100) AND [2] still present when not coughing   Negative: [1] Coughed up blood AND [2] > 1 tablespoon (15 ml)   (Exception: Blood-tinged sputum.)   Negative: Fever > 103 F (39.4 C)   Negative: [1] Fever > 100.0 F (37.8 C) AND [2] bedridden (e.g., CVA, chronic illness, recovering from surgery)   Negative: [1] Fever > 101 F (38.3 C) AND [2] age > 60 years   Negative: [1] Fever > 100.0 F (37.8 C) AND [2] diabetes mellitus or weak immune system (e.g., HIV positive, cancer chemo, splenectomy, organ transplant, chronic steroids)   Negative: Wheezing is present   Negative: SEVERE coughing spells (e.g., whooping sound after coughing, vomiting after coughing)   Negative: [1] Continuous (nonstop) coughing interferes with work or school AND [2] no improvement using cough treatment per Care Advice   Negative: Coughing up catherine-colored (reddish-brown) sputum   Negative: Fever present > 3 days (72 hours)   Negative: [1] Fever returns after gone for over 24 hours AND [2] symptoms worse or not improved   Negative: [1] Using nasal washes and pain medicine > 24 hours AND [2] sinus pain (around cheekbone or eye) persists   Negative: Earache   Negative: [1] Nasal discharge AND [2] present > 10 days   Negative: Cough has been present for > 3 weeks   Negative: [1] Coughed up blood-tinged sputum AND [2] more than once   Negative: Exposure to TB (Tuberculosis)   Negative: [1] Known COPD or other severe lung disease (i.e., bronchiectasis, cystic fibrosis, lung  surgery) AND [2] worsening symptoms (i.e., increased sputum purulence or amount, increased breathing difficulty    Protocols used: Common Cold-A-AH, COVID-19 - Diagnosed or [id number], Cough - Acute [id number]-TI

## 2024-12-02 ENCOUNTER — NURSE TRIAGE (OUTPATIENT)
Dept: FAMILY MEDICINE | Facility: CLINIC | Age: 64
End: 2024-12-02
Payer: COMMERCIAL

## 2024-12-02 NOTE — TELEPHONE ENCOUNTER
Pt calling to follow up. She called on 11/30 due to cold symptoms.  States that it is not getting any better. Still has a productive cough with green mucus. States that she has no trouble breathing or wheezing. No fever.   Throat Is scratchy and pt sounds hoarse.   Nasal congestion. Has not been able to test for COVID.    Pt wants to be seen by a provider. Assisted in scheduling a visit.    BARRON Santana, RN  [facility]      Reason for Disposition   Patient wants to be seen    Additional Information   Negative: Symptoms of COVID-19 (e.g., cough, fever, SOB, or others) and COVID-19 is widespread in the community   Negative: Symptoms of COVID-19 (e.g., cough, fever, SOB, or others) and within 14 days of COVID-19 EXPOSURE   Negative: Symptoms of FLU (e.g., cough, runny nose, SOB, sore throat; with or without fever) and within 14 days of EXPOSURE (close contact) with someone diagnosed with influenza (e.g., flu test positive)   Negative: Difficulty breathing and not from stuffy nose (e.g., not relieved by cleaning out the nose)   Negative: Runny nose is caused by pollen or other allergies   Negative: Cough is main symptom   Negative: Sore throat is main symptom   Negative: Fever > 103 F (39.4 C)   Negative: Fever > 101 F (38.3 C) and over 60 years of age   Negative: Fever > 100 F (37.8 C) and has diabetes mellitus or a weak immune system (e.g., HIV positive, cancer chemotherapy, organ transplant, splenectomy, chronic steroids)   Negative: Fever > 100 F (37.8 C) and bedridden (e.g., CVA, chronic illness, recovering from surgery)   Negative: Fever present > 3 days (72 hours)   Negative: Fever returns after gone for over 24 hours and symptoms worse or not improved   Negative: Sinus pain (not just congestion) and fever   Negative: Earache   Negative: Sinus congestion (pressure, fullness) present > 10 days   Negative: Nasal discharge present > 10 days   Negative: Using nasal washes and pain  "medicine > 24 hours and sinus pain (lower forehead, cheekbone, or eye) persists    Answer Assessment - Initial Assessment Questions  1. ONSET: \"When did the nasal discharge start?\"       11/28  2. AMOUNT: \"How much discharge is there?\"       mild  3. COUGH: \"Do you have a cough?\" If Yes, ask: \"Describe the color of your mucus.\" (e.g., clear, white, yellow, green)      Cough, green mucus   4. RESPIRATORY DISTRESS: \"Describe your breathing.\"       no  5. FEVER: \"Do you have a fever?\" If Yes, ask: \"What is your temperature, how was it measured, and when did it start?\"      no  6. SEVERITY: \"Overall, how bad are you feeling right now?\" (e.g., doesn't interfere with normal activities, staying home from school/work, staying in bed)       Not well. sick  7. OTHER SYMPTOMS: \"Do you have any other symptoms?\" (e.g., earache, mouth sores, sore throat, wheezing)      no  8. PREGNANCY: \"Is there any chance you are pregnant?\" \"When was your last menstrual period?\"      no    Protocols used: Common Cold-A-OH    "

## 2024-12-03 ENCOUNTER — ANCILLARY PROCEDURE (OUTPATIENT)
Dept: GENERAL RADIOLOGY | Facility: CLINIC | Age: 64
End: 2024-12-03
Attending: PHYSICIAN ASSISTANT
Payer: COMMERCIAL

## 2024-12-03 ENCOUNTER — OFFICE VISIT (OUTPATIENT)
Dept: FAMILY MEDICINE | Facility: CLINIC | Age: 64
End: 2024-12-03
Payer: COMMERCIAL

## 2024-12-03 VITALS
RESPIRATION RATE: 14 BRPM | OXYGEN SATURATION: 94 % | WEIGHT: 290.8 LBS | SYSTOLIC BLOOD PRESSURE: 134 MMHG | DIASTOLIC BLOOD PRESSURE: 61 MMHG | TEMPERATURE: 97.4 F | HEIGHT: 65 IN | BODY MASS INDEX: 48.45 KG/M2 | HEART RATE: 89 BPM

## 2024-12-03 DIAGNOSIS — F31.31 MILD DEPRESSED BIPOLAR I DISORDER (H): ICD-10-CM

## 2024-12-03 DIAGNOSIS — I10 HYPERTENSION GOAL BP (BLOOD PRESSURE) < 140/90: ICD-10-CM

## 2024-12-03 DIAGNOSIS — R06.2 WHEEZING: Primary | ICD-10-CM

## 2024-12-03 DIAGNOSIS — R06.2 WHEEZING: ICD-10-CM

## 2024-12-03 PROCEDURE — 99214 OFFICE O/P EST MOD 30 MIN: CPT | Performed by: PHYSICIAN ASSISTANT

## 2024-12-03 PROCEDURE — 71046 X-RAY EXAM CHEST 2 VIEWS: CPT | Mod: TC | Performed by: RADIOLOGY

## 2024-12-03 RX ORDER — AZITHROMYCIN 250 MG/1
TABLET, FILM COATED ORAL
Qty: 6 TABLET | Refills: 0 | Status: SHIPPED | OUTPATIENT
Start: 2024-12-03 | End: 2024-12-08

## 2024-12-03 RX ORDER — ALBUTEROL SULFATE 90 UG/1
2 INHALANT RESPIRATORY (INHALATION) EVERY 6 HOURS PRN
Qty: 18 G | Refills: 0 | Status: SHIPPED | OUTPATIENT
Start: 2024-12-03

## 2024-12-03 ASSESSMENT — PAIN SCALES - GENERAL: PAINLEVEL_OUTOF10: NO PAIN (0)

## 2024-12-03 ASSESSMENT — ENCOUNTER SYMPTOMS: COUGH: 1

## 2024-12-03 NOTE — PROGRESS NOTES
Assessment & Plan     Wheezing  X-ray done today and questionable patchiness to right base.  Suspect normal however do not have comparison.  Radiology read pending.  Patient is higher risk and with wheezing we will treat with azithromycin to cover for atypicals as well as anti-inflammatory effect.  Discussed if radiology read shows true pneumonia would add on additional antibiotic for full coverage.  Will treat with albuterol which was reviewed.  - XR Chest 2 Views; Future  - albuterol (PROAIR HFA/PROVENTIL HFA/VENTOLIN HFA) 108 (90 Base) MCG/ACT inhaler; Inhale 2 puffs into the lungs every 6 hours as needed for shortness of breath, wheezing or cough.  - azithromycin (ZITHROMAX) 250 MG tablet; Take 2 tablets (500 mg) by mouth daily for 1 day, THEN 1 tablet (250 mg) daily for 4 days.    Hypertension goal BP (blood pressure) < 140/90  At goal today    Mild depressed bipolar I disorder (H)  Has some wheezing today but good oxygenation, will hold on prednisone due to risk of adverse effects.                Eliz Austin is a 64 year old, presenting for the following health issues:  Cough and Throat Problem (itching)        12/3/2024    10:27 AM   Additional Questions   Roomed by Chely     History of Present Illness       Reason for visit:  Cough abd itching throat  Symptom onset:  1-3 days ago  Symptoms include:  Running nose,gray and green cough mucus  Symptom intensity:  Moderate  Symptom progression:  Worsening  Had these symptoms before:  No  Prior treatment description:  Blood and clear fluid coming from nose  What makes it worse:  Coughing  What makes it better:  Medication   She is taking medications regularly.     Symptoms started early morning 11/29/2024.  Having congestion, drainage, sore throat, cough.  No fever or feeling feverish.  Feeling wheezy today.  Cough is productive of gray and green sputum.  Nasal drainage is mostly clear but has some blood in it at times.  No known sick contacts.   "Using DayQuil and NyQuil.                Objective    /61 (BP Location: Left arm, Patient Position: Sitting, Cuff Size: Adult Large)   Pulse 89   Temp 97.4  F (36.3  C) (Temporal)   Resp 14   Ht 1.651 m (5' 5\")   Wt 131.9 kg (290 lb 12.8 oz)   LMP  (LMP Unknown)   SpO2 94%   BMI 48.39 kg/m    Body mass index is 48.39 kg/m .  Physical Exam   GENERAL: alert and no distress  EYES: Eyes grossly normal to inspection, PERRL and conjunctivae and sclerae normal  HENT: normal cephalic/atraumatic, ear canals and TM's normal, nasal mucosa edematous , oral mucous membranes moist, and mild posterior oropharyngeal erythema  NECK: no adenopathy, no asymmetry, masses, or scars  RESP: Normal effort.  Inspiratory and expiratory wheeze to upper lungs, mostly right upper lung, clears with coughing  CV: regular rate and rhythm, normal S1 S2, no S3 or S4, no murmur, click or rub            Signed Electronically by: Tonia Fatima PA-C    "

## 2024-12-03 NOTE — PATIENT INSTRUCTIONS
Take azithromycin daily for 5 days. Use the albuterol inhaler every 4-6 hours as needed for wheezing. Continue over the counter medications as needed.    If the radiologist sees any additional new finding, we will call you.

## 2024-12-06 ENCOUNTER — TELEPHONE (OUTPATIENT)
Dept: FAMILY MEDICINE | Facility: CLINIC | Age: 64
End: 2024-12-06
Payer: COMMERCIAL

## 2024-12-06 DIAGNOSIS — R05.1 ACUTE COUGH: Primary | ICD-10-CM

## 2024-12-06 RX ORDER — BENZONATATE 200 MG/1
200 CAPSULE ORAL 3 TIMES DAILY PRN
Qty: 40 CAPSULE | Refills: 2 | Status: SHIPPED | OUTPATIENT
Start: 2024-12-06

## 2024-12-06 NOTE — TELEPHONE ENCOUNTER
Please let patient know that another medication, tessalon, was sent to [facility] to help with cough. CXR reviewed and was negative for lung infection. No need for further antibiotics. Patient should continue with the azithromycin as given for the full 5 days. This medication continues to treat for 5 more days after last dose.     Mauricio Brown MD

## 2024-12-06 NOTE — TELEPHONE ENCOUNTER
Pt called nurse line d/t worsening cough & hoarseness. Pt was last seen 12/3 for stated symptoms plus wheezing. Per office visit note, provider would consider ordering another round of antibiotics or prednisone.     Pt states she is not wheezing as using albuterol, however, wonders if needs more antibiotics or any other recommendation since not improving.    Routing to covering providers since PCP not here. Please route back to  nurse pool.    Ester Ramirez RN on 12/6/2024 at 2:45 PM

## 2024-12-07 ENCOUNTER — NURSE TRIAGE (OUTPATIENT)
Dept: NURSING | Facility: CLINIC | Age: 64
End: 2024-12-07
Payer: COMMERCIAL

## 2024-12-07 NOTE — TELEPHONE ENCOUNTER
Patient calling to get message from provider. Her sister is also on the line. Pt stated she does not have her voicemail set up yet. Provider message was read to pt.  Pt denies having any questions. No triage.  Pt will  med at pharmacy.   Reason for Disposition    General information question, no triage required and triager able to answer question    Protocols used: Information Only Call - No Triage-A-

## 2024-12-09 NOTE — TELEPHONE ENCOUNTER
Per chart review, provider message relayed by FNA nurse on 12/7.      No additional outreach needed.

## 2024-12-11 DIAGNOSIS — K21.9 GASTROESOPHAGEAL REFLUX DISEASE WITHOUT ESOPHAGITIS: Primary | ICD-10-CM

## 2024-12-11 RX ORDER — ESOMEPRAZOLE MAGNESIUM 40 MG/1
CAPSULE, DELAYED RELEASE ORAL
Qty: 180 CAPSULE | Refills: 3 | Status: SHIPPED | OUTPATIENT
Start: 2024-12-11

## 2024-12-28 ENCOUNTER — NURSE TRIAGE (OUTPATIENT)
Dept: NURSING | Facility: CLINIC | Age: 64
End: 2024-12-28
Payer: COMMERCIAL

## 2024-12-28 NOTE — TELEPHONE ENCOUNTER
Nurse Triage SBAR    Is this a 2nd Level Triage? NO    Situation: Patient calling.     Background: Cold symptoms and cough    Assessment: Cough and emesis. Symptoms began three days ago. Had emeses that day and also yesterday. No vomiting today. No fever. Coughing up white plegm. Has nasal congestion, with scant red streaks. Stated she coughed up red streak which she believes came from her nose.     Protocol Recommended Disposition:   See PCP Within 24 Hours    Recommendation: See provider within 24 hours.  Pt will go to [facility] in AM.          Does the patient meet one of the following criteria for ADS visit consideration? 16+ years old, with an [facility] PCP     TIP  Providers, please consider if this condition is appropriate for management at one of our Acute and Diagnostic Services sites.     If patient is a good candidate, please use dotphrase <dot>triageresponse and select Refer to ADS to document.    Reason for Disposition   SEVERE coughing spells (e.g., whooping sound after coughing, vomiting after coughing)    Additional Information   Negative: SEVERE difficulty breathing (e.g., struggling for each breath, speaks in single words)   Negative: Sounds like a life-threatening emergency to the triager   Negative: Fever > 104 F (40 C)   Negative: Patient sounds very sick or weak to the triager   Negative: [1] Fever > 101 F (38.3 C) AND [2] age > 60 years   Negative: [1] Fever > 100.0 F (37.8 C) AND [2] bedridden (e.g., CVA, chronic illness, recovering from surgery)   Negative: [1] Fever > 100.0 F (37.8 C) AND [2] diabetes mellitus or weak immune system (e.g., HIV positive, cancer chemo, splenectomy, organ transplant, chronic steroids)   Negative: Fever present > 3 days (72 hours)   Negative: [1] Fever returns after gone for over 24 hours AND [2] symptoms worse or not improved   Negative: [1] Sinus pain (not just congestion) AND [2] fever   Negative: Earache   Negative: [1] SEVERE sore throat AND [2] present  > 24 hours   Negative: SEVERE difficulty breathing (e.g., struggling for each breath, speaks in single words)   Negative: Bluish (or gray) lips or face now   Negative: [1] Difficulty breathing AND [2] exposure to flames, smoke, or fumes   Negative: [1] Stridor AND [2] difficulty breathing   Negative: Sounds like a life-threatening emergency to the triager   Negative: [1] Previous asthma attacks AND [2] this feels like asthma attack   Negative: [1] MODERATE difficulty breathing (e.g., speaks in phrases, SOB even at rest, pulse 100-120) AND [2] still present when not coughing   Negative: Chest pain  (Exception: MILD central chest pain, present only when coughing.)   Negative: Patient sounds very sick or weak to the triager   Negative: [1] MILD difficulty breathing (e.g., minimal/no SOB at rest, SOB with walking, pulse <100) AND [2] still present when not coughing   Negative: [1] Coughed up blood AND [2] > 1 tablespoon (15 ml)   (Exception: Blood-tinged sputum.)   Negative: Fever > 103 F (39.4 C)   Negative: [1] Fever > 101 F (38.3 C) AND [2] age > 60 years   Negative: [1] Fever > 100.0 F (37.8 C) AND [2] bedridden (e.g., CVA, chronic illness, recovering from surgery)   Negative: [1] Fever > 100.0 F (37.8 C) AND [2] diabetes mellitus or weak immune system (e.g., HIV positive, cancer chemo, splenectomy, organ transplant, chronic steroids)   Negative: Wheezing is present   Negative: [1] Continuous (nonstop) coughing interferes with work or school AND [2] no improvement using cough treatment per Care Advice    Protocols used: Common Cold-A-AH, Cough - Acute Bkofsldsyw-Z-LP

## 2024-12-29 ENCOUNTER — OFFICE VISIT (OUTPATIENT)
Dept: URGENT CARE | Facility: URGENT CARE | Age: 64
End: 2024-12-29
Payer: COMMERCIAL

## 2024-12-29 VITALS
SYSTOLIC BLOOD PRESSURE: 132 MMHG | TEMPERATURE: 97.9 F | BODY MASS INDEX: 47.01 KG/M2 | OXYGEN SATURATION: 95 % | HEART RATE: 67 BPM | DIASTOLIC BLOOD PRESSURE: 62 MMHG | WEIGHT: 282.5 LBS | RESPIRATION RATE: 16 BRPM

## 2024-12-29 DIAGNOSIS — J10.1 INFLUENZA A: Primary | ICD-10-CM

## 2024-12-29 DIAGNOSIS — R05.1 ACUTE COUGH: ICD-10-CM

## 2024-12-29 DIAGNOSIS — J02.9 ACUTE SORE THROAT: ICD-10-CM

## 2024-12-29 LAB
DEPRECATED S PYO AG THROAT QL EIA: NEGATIVE
FLUAV AG SPEC QL IA: POSITIVE
FLUBV AG SPEC QL IA: NEGATIVE
S PYO DNA THROAT QL NAA+PROBE: NOT DETECTED

## 2024-12-29 PROCEDURE — 87635 SARS-COV-2 COVID-19 AMP PRB: CPT | Performed by: PHYSICIAN ASSISTANT

## 2024-12-29 PROCEDURE — 87651 STREP A DNA AMP PROBE: CPT | Performed by: PHYSICIAN ASSISTANT

## 2024-12-29 PROCEDURE — 87804 INFLUENZA ASSAY W/OPTIC: CPT | Performed by: PHYSICIAN ASSISTANT

## 2024-12-29 PROCEDURE — 99214 OFFICE O/P EST MOD 30 MIN: CPT | Performed by: PHYSICIAN ASSISTANT

## 2024-12-29 RX ORDER — BENZONATATE 200 MG/1
200 CAPSULE ORAL 3 TIMES DAILY PRN
Qty: 40 CAPSULE | Refills: 0 | Status: SHIPPED | OUTPATIENT
Start: 2024-12-29

## 2024-12-29 ASSESSMENT — ENCOUNTER SYMPTOMS
NAUSEA: 0
DIZZINESS: 0
CARDIOVASCULAR NEGATIVE: 1
WOUND: 0
ALLERGIC/IMMUNOLOGIC NEGATIVE: 1
EYES NEGATIVE: 1
SHORTNESS OF BREATH: 0
NECK STIFFNESS: 0
BACK PAIN: 0
CHILLS: 0
ARTHRALGIAS: 0
JOINT SWELLING: 0
LIGHT-HEADEDNESS: 0
NECK PAIN: 0
VOMITING: 0
HEADACHES: 0
RHINORRHEA: 0
DIARRHEA: 0
PALPITATIONS: 0
SORE THROAT: 0
COUGH: 1
FEVER: 0
MUSCULOSKELETAL NEGATIVE: 1
ENDOCRINE NEGATIVE: 1
WEAKNESS: 0
MYALGIAS: 0

## 2024-12-29 NOTE — PROGRESS NOTES
Chief Complaint:     Chief Complaint   Patient presents with    Urgent Care     Urgent care visit for cold-like symptoms.    URI     Cold-like symptoms since [place] day. Nasal congestion/runny nose, throat tickles, productive cough w/ streaks of blood a couple of times, streaks of blood in nose when blowing nose, sneezing. Back was hurting last night when she was breathing. No fever/chills.     Pharyngitis     Sore throat since last night, but it really does not hurt today.      Cough     Cough since [place].  Feels like bronchitis. The patient states she had a spot on her xray last time but wasn't sure what it was since she was feeing so sick.    Vomiting     Vomited three times. Twice on [place] and once the next day, but that resolved.       Results for orders placed or performed in visit on 12/29/24   Influenza A & B Antigen - Clinic Collect     Status: Abnormal    Specimen: Nose; Swab   Result Value Ref Range    Influenza A antigen Positive (A) Negative    Influenza B antigen Negative Negative    Narrative    Test results must be correlated with clinical data. If necessary, results should be confirmed by a molecular assay or viral culture.   Streptococcus A Rapid Screen w/Reflex to PCR - Clinic Collect     Status: Normal    Specimen: Throat; Swab   Result Value Ref Range    Group A Strep antigen Negative Negative       Medical Decision Making:    Vital signs reviewed by Cipraino Cortes PA-C  /62 (BP Location: Left arm, Patient Position: Sitting, Cuff Size: Adult Large)   Pulse 67   Temp 97.9  F (36.6  C) (Oral)   Resp 16   Wt 128.1 kg (282 lb 8 oz)   LMP  (LMP Unknown)   SpO2 95%   Breastfeeding No   BMI 47.01 kg/m      Differential Diagnosis:  URI Adult/Peds:  Bronchitis-viral, Influenza, Pneumonia, Sinusitis, Strep pharyngitis, Tonsilitis, Viral pharyngitis, Viral syndrome, and Viral upper respiratory illness        ASSESSMENT    1. Influenza A    2. Acute cough    3. Acute sore  throat        PLAN    Patient is in no acute distress.    Temp is 97.9 in clinic today, lung sounds were clear, and O2 sats at 95% on RA.    RST was negative.  We will call with PCR results only if positive.  Influenza was positive.  Tamiflu not indicated due to length of symptoms.    COVID swab collected in clinic today.  Rest, Push fluids, vaporizer, elevation of head of bed.  Ibuprofen and or Tylenol for any fever or body aches.  Over the counter cough suppressant- PRN- as discussed.   If symptoms worsen, recheck immediately otherwise follow up with your PCP in 1 week if symptoms are not improving.  Worrisome symptoms discussed with instructions to go to the ED.  Patient verbalized understanding and agreed with this plan.    Labs:    Results for orders placed or performed in visit on 12/29/24   Influenza A & B Antigen - Clinic Collect     Status: Abnormal    Specimen: Nose; Swab   Result Value Ref Range    Influenza A antigen Positive (A) Negative    Influenza B antigen Negative Negative    Narrative    Test results must be correlated with clinical data. If necessary, results should be confirmed by a molecular assay or viral culture.   Streptococcus A Rapid Screen w/Reflex to PCR - Clinic Collect     Status: Normal    Specimen: Throat; Swab   Result Value Ref Range    Group A Strep antigen Negative Negative        Vital signs reviewed by Cipriano Cortes PA-C  /62 (BP Location: Left arm, Patient Position: Sitting, Cuff Size: Adult Large)   Pulse 67   Temp 97.9  F (36.6  C) (Oral)   Resp 16   Wt 128.1 kg (282 lb 8 oz)   LMP  (LMP Unknown)   SpO2 95%   Breastfeeding No   BMI 47.01 kg/m      Current Meds      Current Outpatient Medications:     AMBIEN 10 MG OR TABS, 1 TABLET AT BEDTIME, Disp: , Rfl:     atorvastatin (LIPITOR) 80 MG tablet, Take 1 tablet by mouth once daily, Disp: 90 tablet, Rfl: 0    benzonatate (TESSALON) 200 MG capsule, Take 1 capsule (200 mg) by mouth 3 times daily as needed for  cough., Disp: 40 capsule, Rfl: 0    benzonatate (TESSALON) 200 MG capsule, Take 1 capsule (200 mg) by mouth 3 times daily as needed for cough., Disp: 40 capsule, Rfl: 2    BUPROPION HCL# 300 MG OR TB24, 1 TABLET DAILY, Disp: , Rfl:     divalproex (DEPAKOTE ER) 500 MG 24 hr tablet, Reported on 3/27/2017, Disp: , Rfl:     esomeprazole (NEXIUM) 40 MG DR capsule, TAKE 1 CAPSULE TWICE DAILY 30-60 MINUTES BEFORE EATING, Disp: 180 capsule, Rfl: 3    ibuprofen (ADVIL/MOTRIN) 200 MG capsule, Take 200 mg by mouth every 4 hours as needed for fever, Disp: , Rfl:     levothyroxine (SYNTHROID) 150 MCG tablet, Take 1 tablet (150 mcg) by mouth daily, Disp: 90 tablet, Rfl: 2    Loratadine (CLARITIN PO), Take by mouth., Disp: , Rfl:     metoprolol tartrate (LOPRESSOR) 25 MG tablet, Take 1 tablet (25 mg) by mouth 2 times daily, Disp: 180 tablet, Rfl: 3    order for DME, Walker with 4 wheels, brakes, seat, Disp: 1 each, Rfl: 0    Pseudoeph-Doxylamine-DM-APAP (NYQUIL PO), Take by mouth., Disp: , Rfl:     ZYPREXA 5 MG OR TABS, Reported on 3/27/2017, Disp: , Rfl:     albuterol (PROAIR HFA/PROVENTIL HFA/VENTOLIN HFA) 108 (90 Base) MCG/ACT inhaler, Inhale 2 puffs into the lungs every 6 hours as needed for shortness of breath, wheezing or cough. (Patient not taking: Reported on 12/29/2024), Disp: 18 g, Rfl: 0      Respiratory History    occasional episodes of bronchitis and pneumonia      SUBJECTIVE    HPI: Geovanna Aguilar is an 64 year old female who presents with chest congestion, cough nonproductive, occasional, nasal congestion, and sore throat.  Symptoms began 4  days ago and has unchanged.  There is no shortness of breath, wheezing, and chest pain.  Patient is eating and drinking well.  No fever, nausea, vomiting, or diarrhea.    Patient denies any recent travel or exposure to known COVID positive tested individual.      ROS:     Review of Systems   Constitutional:  Negative for chills and fever.   HENT:  Positive for congestion.  Negative for ear pain, rhinorrhea and sore throat.    Eyes: Negative.    Respiratory:  Positive for cough. Negative for shortness of breath.    Cardiovascular: Negative.  Negative for chest pain and palpitations.   Gastrointestinal:  Negative for diarrhea, nausea and vomiting.   Endocrine: Negative.    Genitourinary: Negative.    Musculoskeletal: Negative.  Negative for arthralgias, back pain, joint swelling, myalgias, neck pain and neck stiffness.   Skin: Negative.  Negative for rash and wound.   Allergic/Immunologic: Negative.  Negative for immunocompromised state.   Neurological:  Negative for dizziness, weakness, light-headedness and headaches.         Family History   Family History   Problem Relation Age of Onset    C.A.D. Father     Alzheimer Disease Paternal Grandmother     Migraines Paternal Grandmother     Cancer Mother 69        lung cancer now     Depression Mother     Other Cancer Mother             Migraines Mother     Thyroid Disease Maternal Grandmother     Diabetes Son         Type 1    Substance Abuse Sister     Depression Sister     Migraines Sister     Alcoholism Sister     Substance Abuse Sister     Depression Sister     Alcoholism Sister     Diabetes Son     Diabetes Sister     Glaucoma No family hx of     Macular Degeneration No family hx of         Problem history  Patient Active Problem List   Diagnosis    GERD (gastroesophageal reflux disease)    Hypothyroidism    Peripheral edema    KALPESH (obstructive sleep apnea)    Hypertension goal BP (blood pressure) < 140/90    Hyperlipidemia LDL goal <130    Mild depressed bipolar I disorder (H)    Degenerative joint disease (DJD) of hip    Bursitis, hip    Bladder spasm    Anemia    Hyperglycemia    Chest pain    Schizophrenia (H)    Morbid obesity due to excess calories (H)    Slow transit constipation    Pseudophakia, os    Posterior vitreous detachment of both eyes    Combined forms of age-related cataract, mild, of right eye     Ulcer of left lower extremity with fat layer exposed (H)        Allergies  Allergies   Allergen Reactions    Abilify [Aripiprazole]     Carafate [Sucralfate]     Latex      Pt unsure of reaction    Lisinopril Cough     cough    Adhesive Tape Rash        Social History  Social History     Socioeconomic History    Marital status:      Spouse name: Not on file    Number of children: Not on file    Years of education: Not on file    Highest education level: Not on file   Occupational History    Not on file   Tobacco Use    Smoking status: Never     Passive exposure: Yes    Smokeless tobacco: Never    Tobacco comments:     boyfriend smokes   Substance and Sexual Activity    Alcohol use: Yes     Comment: rarely    Drug use: No    Sexual activity: Yes     Partners: Male   Other Topics Concern    Parent/sibling w/ CABG, MI or angioplasty before 65F 55M? No   Social History Narrative    Not on file     Social Drivers of Health     Financial Resource Strain: Low Risk  (2/23/2024)    Financial Resource Strain     Within the past 12 months, have you or your family members you live with been unable to get utilities (heat, electricity) when it was really needed?: No   Food Insecurity: Low Risk  (2/23/2024)    Food Insecurity     Within the past 12 months, did you worry that your food would run out before you got money to buy more?: No     Within the past 12 months, did the food you bought just not last and you didn t have money to get more?: No   Transportation Needs: High Risk (2/23/2024)    Transportation Needs     Within the past 12 months, has lack of transportation kept you from medical appointments, getting your medicines, non-medical meetings or appointments, work, or from getting things that you need?: Yes   Physical Activity: Inactive (2/23/2024)    Exercise Vital Sign     Days of Exercise per Week: 0 days     Minutes of Exercise per Session: 0 min   Stress: No Stress Concern Present (2/23/2024)    [place]  [place] [employer] - Occupational Stress Questionnaire     Feeling of Stress : Not at all   Social Connections: Unknown (2/23/2024)    Social Connection and Isolation Panel [NHANES]     Frequency of Communication with Friends and Family: Not on file     Frequency of Social Gatherings with Friends and Family: Never     Attends [employer] Services: Not on file     Active Member of Clubs or Organizations: Not on file     Attends Club or Organization Meetings: Not on file     Marital Status: Not on file   Interpersonal Safety: Low Risk  (12/3/2024)    Interpersonal Safety     Do you feel physically and emotionally safe where you currently live?: Yes     Within the past 12 months, have you been hit, slapped, kicked or otherwise physically hurt by someone?: No     Within the past 12 months, have you been humiliated or emotionally abused in other ways by your partner or ex-partner?: No   Housing Stability: Low Risk  (2/23/2024)    Housing Stability     Do you have housing? : Yes     Are you worried about losing your housing?: No        OBJECTIVE     Vital signs reviewed by Cipriano Cortes PA-C  /62 (BP Location: Left arm, Patient Position: Sitting, Cuff Size: Adult Large)   Pulse 67   Temp 97.9  F (36.6  C) (Oral)   Resp 16   Wt 128.1 kg (282 lb 8 oz)   LMP  (LMP Unknown)   SpO2 95%   Breastfeeding No   BMI 47.01 kg/m       Physical Exam  Vitals and nursing note reviewed.   Constitutional:       General: She is not in acute distress.     Appearance: She is well-developed. She is not ill-appearing, toxic-appearing or diaphoretic.   HENT:      Head: Normocephalic and atraumatic.      Right Ear: Hearing, tympanic membrane, ear canal and external ear normal. Tympanic membrane is not perforated, erythematous, retracted or bulging.      Left Ear: Hearing, tympanic membrane, ear canal and external ear normal. Tympanic membrane is not perforated, erythematous, retracted or bulging.      Nose: Congestion  present. No mucosal edema or rhinorrhea.      Right Sinus: No maxillary sinus tenderness or frontal sinus tenderness.      Left Sinus: No maxillary sinus tenderness or frontal sinus tenderness.      Mouth/Throat:      Pharynx: No pharyngeal swelling, oropharyngeal exudate, posterior oropharyngeal erythema or uvula swelling.      Tonsils: No tonsillar exudate or tonsillar abscesses. 0 on the right. 0 on the left.   Eyes:      General:         Right eye: No discharge.         Left eye: No discharge.      Pupils: Pupils are equal, round, and reactive to light.   Cardiovascular:      Rate and Rhythm: Normal rate and regular rhythm.      Heart sounds: Normal heart sounds. No murmur heard.     No friction rub. No gallop.   Pulmonary:      Effort: Pulmonary effort is normal. No respiratory distress.      Breath sounds: Normal breath sounds. No decreased breath sounds, wheezing, rhonchi or rales.   Chest:      Chest wall: No tenderness.   Abdominal:      General: Bowel sounds are normal. There is no distension.      Palpations: Abdomen is soft. There is no mass.      Tenderness: There is no abdominal tenderness. There is no guarding.   Musculoskeletal:      Cervical back: Normal range of motion and neck supple.   Lymphadenopathy:      Head:      Right side of head: No submental, submandibular, tonsillar, preauricular or posterior auricular adenopathy.      Left side of head: No submental, submandibular, tonsillar, preauricular or posterior auricular adenopathy.      Cervical: No cervical adenopathy.      Right cervical: No superficial or posterior cervical adenopathy.     Left cervical: No superficial or posterior cervical adenopathy.   Skin:     General: Skin is warm and dry.      Findings: No rash.   Neurological:      Mental Status: She is alert and oriented to person, place, and time.      Cranial Nerves: No cranial nerve deficit.      Deep Tendon Reflexes: Reflexes are normal and symmetric.   Psychiatric:          Behavior: Behavior normal. Behavior is cooperative.         Thought Content: Thought content normal.         Judgment: Judgment normal.           Cipriano Cortes PA-C  12/29/2024, 2:06 PM

## 2024-12-30 LAB — SARS-COV-2 RNA RESP QL NAA+PROBE: NEGATIVE

## 2025-01-07 ENCOUNTER — TELEPHONE (OUTPATIENT)
Dept: FAMILY MEDICINE | Facility: CLINIC | Age: 65
End: 2025-01-07
Payer: COMMERCIAL

## 2025-01-07 NOTE — TELEPHONE ENCOUNTER
Patient Quality Outreach    Patient is due for the following:   Breast Cancer Screening - Mammogram      Topic Date Due    Pneumococcal Vaccine (1 of 1 - PCV) Never done    Flu Vaccine (1) 09/01/2024    COVID-19 Vaccine (7 - 2024-25 season) 09/01/2024       Action(s) Taken:   Schedule mammogram    Type of outreach:    Chart review performed, no outreach needed. Appointment scheduled for 1/8/25 with PCP.    Questions for provider review:    None           Edgar Reddy MA

## 2025-01-08 ENCOUNTER — OFFICE VISIT (OUTPATIENT)
Dept: FAMILY MEDICINE | Facility: CLINIC | Age: 65
End: 2025-01-08
Payer: COMMERCIAL

## 2025-01-08 VITALS
HEART RATE: 63 BPM | OXYGEN SATURATION: 96 % | HEIGHT: 65 IN | DIASTOLIC BLOOD PRESSURE: 77 MMHG | WEIGHT: 281 LBS | TEMPERATURE: 97.6 F | SYSTOLIC BLOOD PRESSURE: 117 MMHG | RESPIRATION RATE: 18 BRPM | BODY MASS INDEX: 46.82 KG/M2

## 2025-01-08 DIAGNOSIS — Z12.31 VISIT FOR SCREENING MAMMOGRAM: ICD-10-CM

## 2025-01-08 DIAGNOSIS — E03.9 ACQUIRED HYPOTHYROIDISM: ICD-10-CM

## 2025-01-08 DIAGNOSIS — J10.1 INFLUENZA A: Primary | ICD-10-CM

## 2025-01-08 DIAGNOSIS — F31.73 MANIC BIPOLAR I DISORDER IN PARTIAL REMISSION (H): ICD-10-CM

## 2025-01-08 PROBLEM — L97.922 ULCER OF LEFT LOWER EXTREMITY WITH FAT LAYER EXPOSED (H): Status: RESOLVED | Noted: 2022-12-19 | Resolved: 2025-01-08

## 2025-01-08 PROCEDURE — 91320 SARSCV2 VAC 30MCG TRS-SUC IM: CPT | Performed by: FAMILY MEDICINE

## 2025-01-08 PROCEDURE — G2211 COMPLEX E/M VISIT ADD ON: HCPCS | Performed by: FAMILY MEDICINE

## 2025-01-08 PROCEDURE — 90480 ADMN SARSCOV2 VAC 1/ONLY CMP: CPT | Performed by: FAMILY MEDICINE

## 2025-01-08 PROCEDURE — 99214 OFFICE O/P EST MOD 30 MIN: CPT | Performed by: FAMILY MEDICINE

## 2025-01-08 ASSESSMENT — PATIENT HEALTH QUESTIONNAIRE - PHQ9
10. IF YOU CHECKED OFF ANY PROBLEMS, HOW DIFFICULT HAVE THESE PROBLEMS MADE IT FOR YOU TO DO YOUR WORK, TAKE CARE OF THINGS AT HOME, OR GET ALONG WITH OTHER PEOPLE: NOT DIFFICULT AT ALL
SUM OF ALL RESPONSES TO PHQ QUESTIONS 1-9: 13
SUM OF ALL RESPONSES TO PHQ QUESTIONS 1-9: 13

## 2025-01-08 NOTE — PROGRESS NOTES
Assessment & Plan     Influenza A  Resolved but still fatigue - discussed gentle increase in exercise    Acquired hypothyroidism  Continue current treatment    Manic bipolar I disorder in partial remission (H)  Stable - continue current treatment    Body mass index (BMI) 45.0-49.9, adult (H)  Increase exercise as able    Visit for screening mammogram  screening  - MA Screening Bilateral w/ Adriel; Future          Depression Screening Follow Up        1/8/2025    12:42 PM   PHQ   PHQ-9 Total Score 13    Q9: Thoughts of better off dead/self-harm past 2 weeks Not at all        Proxy-reported           Follow Up Actions Taken  Crisis resource information provided in After Visit Summary  Referred patient back to current mental health provider.       Follow up in Feb for KITTY Austin is a 64 year old, presenting for the following health issues:  urgent care follow up       1/8/2025    12:38 PM   Additional Questions   Roomed by stacie   Accompanied by self         1/8/2025    12:38 PM   Patient Reported Additional Medications   Patient reports taking the following new medications no     History of Present Illness       Reason for visit:  Urgent care follow up          UC Followup:    Facility:  [facility]  Date of visit: 12/29/2024 and 12/3/24  Reason for visit: URI  Current Status: fatigue, was not treated for flu or covid due to over the time when symptoms started.       Got sick just after Thanksgiving and seen 12/3/24 and wheezing and seen again 12/29/24 and influenza A was positive.    Ulcer on lower extremity has healed.  Manic bipolar - stable with current treatment.  Obesity - not able to exercise given hips and only eats what son brings her as she is unable to get around for the same reason.      Review of Systems  Constitutional, HEENT, cardiovascular, pulmonary, gi and gu systems are negative, except as otherwise noted.      Objective    /77 (BP Location: Left arm, Patient  "Position: Sitting, Cuff Size: Adult Large)   Pulse 63   Temp 97.6  F (36.4  C) (Oral)   Resp 18   Ht 1.651 m (5' 5\")   Wt 127.5 kg (281 lb)   LMP  (LMP Unknown)   BMI 46.76 kg/m    Body mass index is 46.76 kg/m .  Physical Exam   GENERAL: alert, no distress, and obese  NECK: no adenopathy, no asymmetry, masses, or scars  RESP: lungs clear to auscultation - no rales, rhonchi or wheezes  CV: regular rate and rhythm, normal S1 S2, no S3 or S4, no murmur, click or rub, no peripheral edema  ABDOMEN: soft, nontender, no hepatosplenomegaly, no masses and bowel sounds normal  MS: no gross musculoskeletal defects noted, no edema  PSYCH: mentation appears normal, affect normal/bright            Signed Electronically by: Leah Rhoades MD    "

## 2025-02-24 ENCOUNTER — OFFICE VISIT (OUTPATIENT)
Dept: FAMILY MEDICINE | Facility: CLINIC | Age: 65
End: 2025-02-24
Attending: FAMILY MEDICINE
Payer: COMMERCIAL

## 2025-02-24 VITALS
HEIGHT: 65 IN | RESPIRATION RATE: 14 BRPM | TEMPERATURE: 97.8 F | OXYGEN SATURATION: 98 % | WEIGHT: 289.6 LBS | SYSTOLIC BLOOD PRESSURE: 120 MMHG | BODY MASS INDEX: 48.25 KG/M2 | DIASTOLIC BLOOD PRESSURE: 72 MMHG | HEART RATE: 85 BPM

## 2025-02-24 DIAGNOSIS — F31.73 MANIC BIPOLAR I DISORDER IN PARTIAL REMISSION (H): Primary | ICD-10-CM

## 2025-02-24 DIAGNOSIS — R73.03 PREDIABETES: ICD-10-CM

## 2025-02-24 DIAGNOSIS — E03.9 ACQUIRED HYPOTHYROIDISM: ICD-10-CM

## 2025-02-24 DIAGNOSIS — F31.73 MANIC BIPOLAR I DISORDER IN PARTIAL REMISSION (H): ICD-10-CM

## 2025-02-24 DIAGNOSIS — Z00.00 ROUTINE GENERAL MEDICAL EXAMINATION AT A HEALTH CARE FACILITY: Primary | ICD-10-CM

## 2025-02-24 DIAGNOSIS — I10 HYPERTENSION GOAL BP (BLOOD PRESSURE) < 140/90: ICD-10-CM

## 2025-02-24 DIAGNOSIS — E78.5 HYPERLIPIDEMIA LDL GOAL <130: ICD-10-CM

## 2025-02-24 LAB
ERYTHROCYTE [DISTWIDTH] IN BLOOD BY AUTOMATED COUNT: 14.3 % (ref 10–15)
EST. AVERAGE GLUCOSE BLD GHB EST-MCNC: 108 MG/DL
HBA1C MFR BLD: 5.4 % (ref 0–5.6)
HCT VFR BLD AUTO: 38.2 % (ref 35–47)
HGB BLD-MCNC: 12.3 G/DL (ref 11.7–15.7)
HOLD SPECIMEN: NORMAL
MCH RBC QN AUTO: 28.9 PG (ref 26.5–33)
MCHC RBC AUTO-ENTMCNC: 32.2 G/DL (ref 31.5–36.5)
MCV RBC AUTO: 90 FL (ref 78–100)
PLATELET # BLD AUTO: 297 10E3/UL (ref 150–450)
RBC # BLD AUTO: 4.25 10E6/UL (ref 3.8–5.2)
WBC # BLD AUTO: 10.4 10E3/UL (ref 4–11)

## 2025-02-24 PROCEDURE — 83036 HEMOGLOBIN GLYCOSYLATED A1C: CPT | Performed by: FAMILY MEDICINE

## 2025-02-24 PROCEDURE — 36415 COLL VENOUS BLD VENIPUNCTURE: CPT | Performed by: FAMILY MEDICINE

## 2025-02-24 PROCEDURE — G2211 COMPLEX E/M VISIT ADD ON: HCPCS | Performed by: FAMILY MEDICINE

## 2025-02-24 PROCEDURE — 90471 IMMUNIZATION ADMIN: CPT | Performed by: FAMILY MEDICINE

## 2025-02-24 PROCEDURE — 99396 PREV VISIT EST AGE 40-64: CPT | Mod: 25 | Performed by: FAMILY MEDICINE

## 2025-02-24 PROCEDURE — 90673 RIV3 VACCINE NO PRESERV IM: CPT | Performed by: FAMILY MEDICINE

## 2025-02-24 PROCEDURE — 80164 ASSAY DIPROPYLACETIC ACD TOT: CPT | Performed by: FAMILY MEDICINE

## 2025-02-24 PROCEDURE — 84443 ASSAY THYROID STIM HORMONE: CPT | Performed by: FAMILY MEDICINE

## 2025-02-24 PROCEDURE — 85027 COMPLETE CBC AUTOMATED: CPT | Performed by: FAMILY MEDICINE

## 2025-02-24 PROCEDURE — 80061 LIPID PANEL: CPT | Performed by: FAMILY MEDICINE

## 2025-02-24 PROCEDURE — 82043 UR ALBUMIN QUANTITATIVE: CPT | Performed by: FAMILY MEDICINE

## 2025-02-24 PROCEDURE — 90472 IMMUNIZATION ADMIN EACH ADD: CPT | Performed by: FAMILY MEDICINE

## 2025-02-24 PROCEDURE — 90677 PCV20 VACCINE IM: CPT | Performed by: FAMILY MEDICINE

## 2025-02-24 PROCEDURE — 99214 OFFICE O/P EST MOD 30 MIN: CPT | Mod: 25 | Performed by: FAMILY MEDICINE

## 2025-02-24 PROCEDURE — 82570 ASSAY OF URINE CREATININE: CPT | Performed by: FAMILY MEDICINE

## 2025-02-24 PROCEDURE — 80076 HEPATIC FUNCTION PANEL: CPT | Performed by: FAMILY MEDICINE

## 2025-02-24 PROCEDURE — 84439 ASSAY OF FREE THYROXINE: CPT | Performed by: FAMILY MEDICINE

## 2025-02-24 RX ORDER — ATORVASTATIN CALCIUM 80 MG/1
80 TABLET, FILM COATED ORAL DAILY
Qty: 90 TABLET | Refills: 4 | Status: SHIPPED | OUTPATIENT
Start: 2025-02-24

## 2025-02-24 RX ORDER — LEVOTHYROXINE SODIUM 150 UG/1
150 TABLET ORAL DAILY
Qty: 90 TABLET | Refills: 3 | Status: SHIPPED | OUTPATIENT
Start: 2025-02-24

## 2025-02-24 SDOH — HEALTH STABILITY: PHYSICAL HEALTH: ON AVERAGE, HOW MANY DAYS PER WEEK DO YOU ENGAGE IN MODERATE TO STRENUOUS EXERCISE (LIKE A BRISK WALK)?: 0 DAYS

## 2025-02-24 SDOH — HEALTH STABILITY: PHYSICAL HEALTH: ON AVERAGE, HOW MANY MINUTES DO YOU ENGAGE IN EXERCISE AT THIS LEVEL?: 0 MIN

## 2025-02-24 ASSESSMENT — PAIN SCALES - GENERAL: PAINLEVEL_OUTOF10: NO PAIN (0)

## 2025-02-24 ASSESSMENT — SOCIAL DETERMINANTS OF HEALTH (SDOH): HOW OFTEN DO YOU GET TOGETHER WITH FRIENDS OR RELATIVES?: NEVER

## 2025-02-24 NOTE — PATIENT INSTRUCTIONS
At [facility], we strive to deliver an exceptional experience to you, every time we see you. If you receive a survey, please let us know what we are doing well and/or what we could improve upon, as we do value your feedback.  If you have MyChart, you can expect to receive results automatically within 24 hours of their completion.  Your provider will send a note interpreting your results as well.   If you do not have MyChart, you should receive your results in about a week by mail.    Your care team:                            Family Medicine Internal Medicine   MD Jesus Mendez, MD Leah Mccloud, MD Octavio Celestin, MD Amy Wyatt, PADustinC    Mauricio Brown, MD Pediatrics   Connie Martinez, MD Brigid Aguayo, MD Sapna Rehman, APRN CNP Shanda Marte APRN CNP   Mateusz Hanson, MD Chantal Alfonso, MD Heike Nichole, CNP     Mikey Headley, CNP Same-Day Provider (No follow-up visits)   KATI Mcneal, DNP Marcela Lin, PA-C   KATI Rubio, FNP, BC FIORDALIZA StevenC     Clinic hours: Monday - Thursday 7 am-6 pm; Fridays 7 am-5 pm.   Urgent care: Monday - Friday 10 am- 8 pm; Saturday and Sunday 9 am-5 pm.    Clinic: [phone]       [facility]: Monday - Thursday 8 am - 7 pm; Friday 8 am - 6 pm  [facility]: [phone]    Patient Education   Preventive Care Advice   This is general advice given by our system to help you stay healthy. However, your care team may have specific advice just for you. Please talk to your care team about your preventive care needs.  Nutrition  Eat 5 or more servings of fruits and vegetables each day.  Try wheat bread, brown rice and whole grain pasta (instead of white bread, rice, and pasta).  Get enough calcium and vitamin D. Check the label on foods and aim for 100% of the RDA (recommended daily allowance).  Lifestyle  Exercise at least 150  minutes each week  (30 minutes a day, 5 days a week).  Do muscle strengthening activities 2 days a week. These help control your weight and prevent disease.  No smoking.  Wear sunscreen to prevent skin cancer.  Have a dental exam and cleaning every 6 months.  Yearly exams  See your health care team every year to talk about:  Any changes in your health.  Any medicines your care team has prescribed.  Preventive care, family planning, and ways to prevent chronic diseases.  Shots (vaccines)   HPV shots (up to age 26), if you've never had them before.  Hepatitis B shots (up to age 59), if you've never had them before.  COVID-19 shot: Get this shot when it's due.  Flu shot: Get a flu shot every year.  Tetanus shot: Get a tetanus shot every 10 years.  Pneumococcal, hepatitis A, and RSV shots: Ask your care team if you need these based on your risk.  Shingles shot (for age 50 and up)  General health tests  Diabetes screening:  Starting at age 35, Get screened for diabetes at least every 3 years.  If you are younger than age 35, ask your care team if you should be screened for diabetes.  Cholesterol test: At age 39, start having a cholesterol test every 5 years, or more often if advised.  Bone density scan (DEXA): At age 50, ask your care team if you should have this scan for osteoporosis (brittle bones).  Hepatitis C: Get tested at least once in your life.  STIs (sexually transmitted infections)  Before age 24: Ask your care team if you should be screened for STIs.  After age 24: Get screened for STIs if you're at risk. You are at risk for STIs (including HIV) if:  You are sexually active with more than one person.  You don't use condoms every time.  You or a partner was diagnosed with a sexually transmitted infection.  If you are at risk for HIV, ask about PrEP medicine to prevent HIV.  Get tested for HIV at least once in your life, whether you are at risk for HIV or not.  Cancer screening tests  Cervical cancer screening:  If you have a cervix, begin getting regular cervical cancer screening tests starting at age 21.  Breast cancer scan (mammogram): If you've ever had breasts, begin having regular mammograms starting at age 40. This is a scan to check for breast cancer.  Colon cancer screening: It is important to start screening for colon cancer at age 45.  Have a colonoscopy test every 10 years (or more often if you're at risk) Or, ask your provider about stool tests like a FIT test every year or Cologuard test every 3 years.  To learn more about your testing options, visit:   .  For help making a decision, visit:   https://bit.ly/mm18331.  Prostate cancer screening test: If you have a prostate, ask your care team if a prostate cancer screening test (PSA) at age 55 is right for you.  Lung cancer screening: If you are a current or former smoker ages 50 to 80, ask your care team if ongoing lung cancer screenings are right for you.  For informational purposes only. Not to replace the advice of your health care provider. Copyright   2023 [facility] [employer]. All rights reserved. Clinically reviewed by the [employer]. [employer] 214535 - REV 01/24.  Learning About Stress  What is stress?     Stress is your body's response to a hard situation. Your body can have a physical, emotional, or mental response. Stress is a fact of life for most people, and it affects everyone differently. What causes stress for you may not be stressful for someone else.  A lot of things can cause stress. You may feel stress when you go on a job interview, take a test, or run a race. This kind of short-term stress is normal and even useful. It can help you if you need to work hard or react quickly. For example, stress can help you finish an important job on time.  Long-term stress is caused by ongoing stressful situations or events. Examples of long-term stress include long-term health problems, ongoing problems at work, or  conflicts in your family. Long-term stress can harm your health.  How does stress affect your health?  When you are stressed, your body responds as though you are in danger. It makes hormones that speed up your heart, make you breathe faster, and give you a burst of energy. This is called the fight-or-flight stress response. If the stress is over quickly, your body goes back to normal and no harm is done.  But if stress happens too often or lasts too long, it can have bad effects. Long-term stress can make you more likely to get sick, and it can make symptoms of some diseases worse. If you tense up when you are stressed, you may develop neck, shoulder, or low back pain. Stress is linked to high blood pressure and heart disease.  Stress also harms your emotional health. It can make you horan, tense, or depressed. Your relationships may suffer, and you may not do well at work or school.  What can you do to manage stress?  You can try these things to help manage stress:   Do something active. Exercise or activity can help reduce stress. Walking is a great way to get started. Even everyday activities such as housecleaning or yard work can help.  Try yoga or roxana chi. These techniques combine exercise and meditation. You may need some training at first to learn them.  Do something you enjoy. For example, listen to music or go to a movie. Practice your hobby or do volunteer work.  Meditate. This can help you relax, because you are not worrying about what happened before or what may happen in the future.  Do guided imagery. Imagine yourself in any setting that helps you feel calm. You can use online videos, books, or a teacher to guide you.  Do breathing exercises. For example:  From a standing position, bend forward from the waist with your knees slightly bent. Let your arms dangle close to the floor.  Breathe in slowly and deeply as you return to a standing position. Roll up slowly and lift your head last.  Hold your  "breath for just a few seconds in the standing position.  Breathe out slowly and bend forward from the waist.  Let your feelings out. Talk, laugh, cry, and express anger when you need to. Talking with supportive friends or family, a counselor, or a alexandre leader about your feelings is a healthy way to relieve stress. Avoid discussing your feelings with people who make you feel worse.  Write. It may help to write about things that are bothering you. This helps you find out how much stress you feel and what is causing it. When you know this, you can find better ways to cope.  What can you do to prevent stress?  You might try some of these things to help prevent stress:  Manage your time. This helps you find time to do the things you want and need to do.  Get enough sleep. Your body recovers from the stresses of the day while you are sleeping.  Get support. Your family, friends, and community can make a difference in how you experience stress.  Limit your news feed. Avoid or limit time on social media or news that may make you feel stressed.  Do something active. Exercise or activity can help reduce stress. Walking is a great way to get started.  Where can you learn more?  Go to https://www.[employer].net/patiented  Enter N032 in the search box to learn more about \"Learning About Stress.\"  Current as of: October 24, 2023  Content Version: 14.3    2024 [employer].   Care instructions adapted under license by your healthcare professional. If you have questions about a medical condition or this instruction, always ask your healthcare professional. [employer] disclaims any warranty or liability for your use of this information.       "

## 2025-02-24 NOTE — LETTER
February 27, 2025      Geovanna Aguilar  [address]  [place] [address]        Dear MsSilvestreavnipetar,    We are writing to inform you of your test results.    -HDL(good) cholesterol level is low which can increase your heart disease risk.  A diet high in fat and simple carbohydrates, genetics and being overweight can contribute to this. Your LDL(bad) cholesterol and trigylceride levels are normal.  ADVISE: exercising 150 minutes of aerobic exercise per week (30 minutes 5 days per week or 50 minutes 3 days per week are options), and omega-3 fatty acids (fish oil) 1197-6397 mg daily are helpful to improve this.  In 12 months, you should recheck your fasting cholesterol panel by scheduling a lab-only appointment.   -A1C (diabetic test) is normal and indicates that your blood sugar has been in a normal range the last 3 months.   -TSH (thyroid stimulating hormone) level indicates hypothyroidism (an underactive thyroid).  This can cause a number of symptoms including weight gain, fatigue, high cholesterol, constipation, hair loss, cold intolerance).  ADVISE:rechecking your TSH in 3 month.   -Microalbumin (urine protein) test is normal.  ADVISE: rechecking this annually.   For additional lab test information, labtestsonline.org is an excellent reference.    Resulted Orders   TSH WITH FREE T4 REFLEX   Result Value Ref Range    TSH 6.15 (H) 0.30 - 4.20 uIU/mL   Hemoglobin A1c   Result Value Ref Range    Estimated Average Glucose 108 <117 mg/dL    Hemoglobin A1C 5.4 0.0 - 5.6 %      Comment:      Normal <5.7%   Prediabetes 5.7-6.4%    Diabetes 6.5% or higher     Note: Adopted from ADA consensus guidelines.   Albumin Random Urine Quantitative with Creat Ratio   Result Value Ref Range    Creatinine Urine mg/dL 122.2 mg/dL      Comment:      The reference ranges have not been established in urine creatinine. The results should be integrated into the clinical context for interpretation.    Albumin Urine mg/L <12.0 mg/L       Comment:      The reference ranges have not been established in urine albumin. The results should be integrated into the clinical context for interpretation.    Albumin Urine mg/g Cr        Comment:      Unable to calculate, urine albumin and/or urine creatinine is outside detectable limits.  Microalbuminuria is defined as an albumin:creatinine ratio of 17 to 299 for males and 25 to 299 for females. A ratio of albumin:creatinine of 300 or higher is indicative of overt proteinuria.  Due to biologic variability, positive results should be confirmed by a second, first-morning random or 24-hour timed urine specimen. If there is discrepancy, a third specimen is recommended. When 2 out of 3 results are in the microalbuminuria range, this is evidence for incipient nephropathy and warrants increased efforts at glucose control, blood pressure control, and institution of therapy with an angiotensin-converting-enzyme (ACE) inhibitor (if the patient can tolerate it).     Lipid panel reflex to direct LDL Fasting   Result Value Ref Range    Cholesterol 189 <200 mg/dL    Triglycerides 130 <150 mg/dL    Direct Measure HDL 43 (L) >=50 mg/dL    LDL Cholesterol Calculated 120 (H) <100 mg/dL    Non HDL Cholesterol 146 (H) <130 mg/dL    Patient Fasting > 8hrs? Yes     Narrative    Cholesterol  Desirable: < 200 mg/dL  Borderline High: 200 - 239 mg/dL  High: >= 240 mg/dL    Triglycerides  Normal: < 150 mg/dL  Borderline High: 150 - 199 mg/dL  High: 200-499 mg/dL  Very High: >= 500 mg/dL    Direct Measure HDL  Female: >= 50 mg/dL   Male: >= 40 mg/dL    LDL Cholesterol  Desirable: < 100 mg/dL  Above Desirable: 100 - 129 mg/dL   Borderline High: 130 - 159 mg/dL   High:  160 - 189 mg/dL   Very High: >= 190 mg/dL    Non HDL Cholesterol  Desirable: < 130 mg/dL  Above Desirable: 130 - 159 mg/dL  Borderline High: 160 - 189 mg/dL  High: 190 - 219 mg/dL  Very High: >= 220 mg/dL   T4 free   Result Value Ref Range    Free T4 1.49 0.90 - 1.70 ng/dL        If you have any questions or concerns, please call the clinic at the number listed above.       Sincerely,      Leah Rhoades MD    Electronically signed

## 2025-02-24 NOTE — PROGRESS NOTES
"Preventive Care Visit  [facility] ERIKA Rhoades MD, Family Medicine  Feb 24, 2025      Assessment & Plan     Routine general medical examination at a health care facility  Routine preventive reviewed, vaccines today.  Handicap form will be completed when due.    Acquired hypothyroidism  Check labs and medication adjustments as needed.  - TSH WITH FREE T4 REFLEX; Future  - levothyroxine (SYNTHROID) 150 MCG tablet; Take 1 tablet (150 mcg) by mouth daily.    Hyperlipidemia LDL goal <130  Stable - continue statin.  - atorvastatin (LIPITOR) 80 MG tablet; Take 1 tablet (80 mg) by mouth daily.    Hypertension goal BP (blood pressure) < 140/90  Controlled - check lab and continue current treatmen  - Albumin Random Urine Quantitative with Creat Ratio; Future    Prediabetes  Recheck  - Hemoglobin A1c; Future      BMI  Estimated body mass index is 48.19 kg/m  as calculated from the following:    Height as of this encounter: 1.651 m (5' 5\").    Weight as of this encounter: 131.4 kg (289 lb 9.6 oz).   Weight management plan: Discussed healthy diet and exercise guidelines, too expensive for weight loss program.    Counseling  Appropriate preventive services were addressed with this patient via screening, questionnaire, or discussion as appropriate for fall prevention, nutrition, physical activity, Tobacco-use cessation, social engagement, weight loss and cognition.  Checklist reviewing preventive services available has been given to the patient.  Reviewed patient's diet, addressing concerns and/or questions.   Patient is at risk for social isolation and has been provided with information about the benefit of social connection.   The patient was instructed to see the dentist every 6 months.   She is at risk for psychosocial distress and has been provided with information to reduce risk.       The uses and side effects, including black box warnings as appropriate, were discussed in detail.  " All patient questions were answered.  The patient was instructed to call immediately if any side effects developed.     Follow up yearly.    Eliz Austin is a 64 year old, presenting for the following:  Physical        2/24/2025     1:47 PM   Additional Questions   Roomed by jem        Via the Health Maintenance questionnaire, the patient has reported the following services have been completed -Mammogram: [place] 2025-03-04, this information has not been sent to the abstraction team.    HPI  Son is primary care giver and they live in the same home.  Some stress related to different health and political views but they are doing okay.    Psychiatrist is managing medications and mood is stable.  Labs due for them.    Chronic medical conditions stable without new symptoms or medication side effects.      Health Care Directive  Patient does not have a Health Care Directive: Discussed advance care planning with patient; however, patient declined at this time.      2/24/2025   General Health   How would you rate your overall physical health? (!) POOR   Feel stress (tense, anxious, or unable to sleep) To some extent   (!) STRESS CONCERN      2/24/2025   Nutrition   Three or more servings of calcium each day? (!) NO   Diet: Regular (no restrictions)   How many servings of fruit and vegetables per day? (!) 0-1   How many sweetened beverages each day? (!) 2         2/24/2025   Exercise   Days per week of moderate/strenous exercise 0 days   Average minutes spent exercising at this level 0 min   (!) EXERCISE CONCERN      2/24/2025   Social Factors   Frequency of gathering with friends or relatives Never   Worry food won't last until get money to buy more Patient declined   Food not last or not have enough money for food? Patient declined   Do you have housing? (Housing is defined as stable permanent housing and does not include staying ouside in a car, in a tent, in an abandoned building, in an overnight shelter, or  couch-surfing.) Patient declined   Are you worried about losing your housing? Patient declined   Lack of transportation? Patient declined   Unable to get utilities (heat,electricity)? Patient declined   (!) SOCIAL CONNECTIONS CONCERN      2/24/2025   Fall Risk   Fallen 2 or more times in the past year? No    Trouble with walking or balance? No        Proxy-reported          2/24/2025   Dental   Dentist two times every year? (!) NO         2/23/2024   TB Screening   Were you born outside of the [place]? No           Today's PHQ-2 Score:       1/8/2025    12:42 PM   PHQ-2 ( 1999 [employer])   Q1: Little interest or pleasure in doing things 2   Q2: Feeling down, depressed or hopeless 1   PHQ-2 Score 3    Q1: Little interest or pleasure in doing things More than half the days   Q2: Feeling down, depressed or hopeless Several days   PHQ-2 Score 3       Patient-reported         2/24/2025   Substance Use   Alcohol more than 3/day or more than 7/wk No   Do you use any other substances recreationally? No     Social History     Tobacco Use    Smoking status: Never     Passive exposure: Yes    Smokeless tobacco: Never    Tobacco comments:     boyfriend smokes   Substance Use Topics    Alcohol use: Yes     Comment: rarely    Drug use: No           12/13/2022   LAST FHS-7 RESULTS   1st degree relative breast or ovarian cancer No   Any relative bilateral breast cancer No   Any male have breast cancer No   Any ONE woman have BOTH breast AND ovarian cancer No   Any woman with breast cancer before 50yrs No   2 or more relatives with breast AND/OR ovarian cancer No   2 or more relatives with breast AND/OR bowel cancer No                2/24/2025   STI Screening   New sexual partner(s) since last STI/HIV test? No     History of abnormal Pap smear: No - age 30- 64 PAP with HPV every 5 years recommended        6/18/2013    12:00 AM   PAP / HPV   PAP (Historical) NIL      ASCVD Risk   The 10-year ASCVD risk score (Rosie ROPER, et al.,  "2019) is: 6.3%    Values used to calculate the score:      Age: 64 years      Sex: Female      Is Non- : No      Diabetic: No      Tobacco smoker: No      Systolic Blood Pressure: 120 mmHg      Is BP treated: Yes      HDL Cholesterol: 36 mg/dL      Total Cholesterol: 184 mg/dL           Reviewed and updated as needed this visit by Provider   Tobacco  Allergies  Meds  Problems  Med Hx  Surg Hx  Fam Hx                  Review of Systems  Constitutional, neuro, ENT, endocrine, pulmonary, cardiac, gastrointestinal, genitourinary, musculoskeletal, integument and psychiatric systems are negative, except as otherwise noted.     Objective    Exam  /72 (BP Location: Right arm)   Pulse 85   Temp 97.8  F (36.6  C) (Temporal)   Resp 14   Ht 1.651 m (5' 5\")   Wt 131.4 kg (289 lb 9.6 oz)   LMP  (LMP Unknown)   SpO2 98%   BMI 48.19 kg/m     Estimated body mass index is 48.19 kg/m  as calculated from the following:    Height as of this encounter: 1.651 m (5' 5\").    Weight as of this encounter: 131.4 kg (289 lb 9.6 oz).    Physical Exam  GENERAL: alert, no distress, and obese  EYES: Eyes grossly normal to inspection, PERRL and conjunctivae and sclerae normal  HENT: ear canals and TM's normal, nose and mouth without ulcers or lesions  NECK: no adenopathy, no asymmetry, masses, or scars  RESP: lungs clear to auscultation - no rales, rhonchi or wheezes  CV: regular rate and rhythm, normal S1 S2, no S3 or S4, no murmur, click or rub, no peripheral edema  ABDOMEN: soft, nontender, no hepatosplenomegaly, no masses and bowel sounds normal  MS: hip pain with ROM  SKIN: no suspicious lesions or rashes  NEURO: Normal strength and tone, mentation intact and speech normal  PSYCH: mentation appears normal, affect normal/bright        Signed Electronically by: Leah Rhoades MD    "

## 2025-02-25 LAB
ALBUMIN SERPL BCG-MCNC: 3.9 G/DL (ref 3.5–5.2)
ALP SERPL-CCNC: 90 U/L (ref 40–150)
ALT SERPL W P-5'-P-CCNC: 12 U/L (ref 0–50)
AST SERPL W P-5'-P-CCNC: 14 U/L (ref 0–45)
BILIRUB DIRECT SERPL-MCNC: 0.13 MG/DL (ref 0–0.3)
BILIRUB SERPL-MCNC: 0.5 MG/DL
CHOLEST SERPL-MCNC: 189 MG/DL
CREAT UR-MCNC: 122.2 MG/DL
FASTING STATUS PATIENT QL REPORTED: YES
HDLC SERPL-MCNC: 43 MG/DL
LDLC SERPL CALC-MCNC: 120 MG/DL
MICROALBUMIN UR-MCNC: <12 MG/L
MICROALBUMIN/CREAT UR: NORMAL MG/G{CREAT}
NONHDLC SERPL-MCNC: 146 MG/DL
PROT SERPL-MCNC: 7.1 G/DL (ref 6.4–8.3)
T4 FREE SERPL-MCNC: 1.49 NG/DL (ref 0.9–1.7)
TRIGL SERPL-MCNC: 130 MG/DL
TSH SERPL DL<=0.005 MIU/L-ACNC: 6.15 UIU/ML (ref 0.3–4.2)
VALPROATE SERPL-MCNC: 93.3 UG/ML

## 2025-02-27 NOTE — RESULT ENCOUNTER NOTE
Ms. Aguilar,    Here is a summary of your recent test results:    -HDL(good) cholesterol level is low which can increase your heart disease risk.  A diet high in fat and simple carbohydrates, genetics and being overweight can contribute to this. Your LDL(bad) cholesterol and trigylceride levels are normal.  ADVISE: exercising 150 minutes of aerobic exercise per week (30 minutes 5 days per week or 50 minutes 3 days per week are options), and omega-3 fatty acids (fish oil) 0743-1669 mg daily are helpful to improve this.  In 12 months, you should recheck your fasting cholesterol panel by scheduling a lab-only appointment.  -A1C (diabetic test) is normal and indicates that your blood sugar has been in a normal range the last 3 months.  -TSH (thyroid stimulating hormone) level indicates hypothyroidism (an underactive thyroid).  This can cause a number of symptoms including weight gain, fatigue, high cholesterol, constipation, hair loss, cold intolerance).  ADVISE:rechecking your TSH in 3 month.   -Microalbumin (urine protein) test is normal.  ADVISE: rechecking this annually.  For additional lab test information, labtestsonline.org is an excellent reference.    Please contact the clinic if you have additional questions.  Thank you.    Sincerely,    Leah Rhoades MD

## 2025-03-04 ENCOUNTER — ANCILLARY PROCEDURE (OUTPATIENT)
Dept: MAMMOGRAPHY | Facility: CLINIC | Age: 65
End: 2025-03-04
Attending: FAMILY MEDICINE
Payer: COMMERCIAL

## 2025-03-04 DIAGNOSIS — Z12.31 VISIT FOR SCREENING MAMMOGRAM: ICD-10-CM

## 2025-03-04 PROCEDURE — 77063 BREAST TOMOSYNTHESIS BI: CPT | Mod: TC | Performed by: RADIOLOGY

## 2025-03-04 PROCEDURE — 77067 SCR MAMMO BI INCL CAD: CPT | Mod: TC | Performed by: RADIOLOGY

## 2025-03-14 NOTE — TELEPHONE ENCOUNTER
Reason for Call:  Medication or medication refill:    Do you use a [facility]?  Name of the pharmacy and phone number for the current request: [facility]    Name of the medication requested: simvastatin (ZOCOR) 40 MG tablet    Other request: Patent is out of this medication and would like for a nurse to give her a call back so she can explain in detail why it should ne refilled     Can we leave a detailed message on this number? YES    Phone number patient can be reached at: Home number on file [phone] (home)    Best Time: Any    Call taken on 3/8/2019 at 12:27 PM by Que Khalil     PAST MEDICAL HISTORY:  No pertinent past medical history

## 2025-07-15 DIAGNOSIS — I10 HYPERTENSION GOAL BP (BLOOD PRESSURE) < 140/90: ICD-10-CM

## 2025-07-16 RX ORDER — METOPROLOL TARTRATE 25 MG/1
25 TABLET, FILM COATED ORAL 2 TIMES DAILY
Qty: 180 TABLET | Refills: 1 | Status: SHIPPED | OUTPATIENT
Start: 2025-07-16

## 2025-07-31 ENCOUNTER — OFFICE VISIT (OUTPATIENT)
Dept: URGENT CARE | Facility: URGENT CARE | Age: 65
End: 2025-07-31
Payer: COMMERCIAL

## 2025-07-31 VITALS
HEART RATE: 129 BPM | DIASTOLIC BLOOD PRESSURE: 74 MMHG | OXYGEN SATURATION: 95 % | RESPIRATION RATE: 22 BRPM | WEIGHT: 272.2 LBS | BODY MASS INDEX: 45.3 KG/M2 | SYSTOLIC BLOOD PRESSURE: 130 MMHG | TEMPERATURE: 97.6 F

## 2025-07-31 DIAGNOSIS — J20.9 ACUTE BRONCHITIS WITH COEXISTING CONDITION REQUIRING PROPHYLACTIC TREATMENT: Primary | ICD-10-CM

## 2025-07-31 RX ORDER — LIDOCAINE HYDROCHLORIDE 20 MG/ML
15 SOLUTION OROPHARYNGEAL
Qty: 100 ML | Refills: 0 | Status: SHIPPED | OUTPATIENT
Start: 2025-07-31

## 2025-07-31 RX ORDER — BENZONATATE 200 MG/1
200 CAPSULE ORAL 3 TIMES DAILY PRN
Qty: 30 CAPSULE | Refills: 0 | Status: SHIPPED | OUTPATIENT
Start: 2025-07-31 | End: 2025-08-10

## 2025-07-31 RX ORDER — AZITHROMYCIN 250 MG/1
TABLET, FILM COATED ORAL
Qty: 6 TABLET | Refills: 0 | Status: SHIPPED | OUTPATIENT
Start: 2025-07-31

## 2025-07-31 ASSESSMENT — ENCOUNTER SYMPTOMS
SINUS PAIN: 0
RHINORRHEA: 1
VOMITING: 1
SINUS PRESSURE: 0
NAUSEA: 0
PALPITATIONS: 0
COUGH: 1
DIARRHEA: 0
CHILLS: 0
CARDIOVASCULAR NEGATIVE: 1
FATIGUE: 0
FEVER: 0
SORE THROAT: 1
WHEEZING: 0
SHORTNESS OF BREATH: 0

## 2025-07-31 ASSESSMENT — PAIN SCALES - GENERAL: PAINLEVEL_OUTOF10: SEVERE PAIN (8)

## 2025-07-31 NOTE — PROGRESS NOTES
Urgent Care Clinic Visit    Chief Complaint   Patient presents with    URI     Sore throat, runny nose with green mucus/blood, and wet cough started yesterday                7/31/2025     6:01 PM   Additional Questions   Roomed by Meme   Accompanied by Self

## 2025-07-31 NOTE — PROGRESS NOTES
Eliz Austin is a 65 year old, presenting for the following health issues:  URI (Sore throat, runny nose with green mucus/blood, and wet cough started yesterday )  HPI    Acute Illness  Acute illness concerns:   Onset/Duration: 2days  Symptoms:  Fever: No  Chills/Sweats: No  Headache (location?): No  Sinus Pressure: No  Conjunctivitis:  No  Ear Pain: no  Rhinorrhea: YES  Congestion: YES  Sore Throat: YES  Cough: YES-productive of yellow sputum  Wheeze: No  Decreased Appetite: No  Nausea: No  Vomiting: YES  Diarrhea: No  Dysuria/Freq.: No  Dysuria or Hematuria: No  Fatigue/Achiness: YES  Sick/Strep Exposure: No  Therapies tried and outcome: rest,fluids,cough drops,dayquil,nyquil with minimal relief    Patient Active Problem List   Diagnosis    GERD (gastroesophageal reflux disease)    Hypothyroidism    Peripheral edema    KALPESH (obstructive sleep apnea)    Hypertension goal BP (blood pressure) < 140/90    Hyperlipidemia LDL goal <130    Mild depressed bipolar I disorder (H)    Degenerative joint disease (DJD) of hip    Bursitis, hip    Bladder spasm    Anemia    Hyperglycemia    Chest pain    Schizophrenia (H)    Morbid obesity due to excess calories (H)    Slow transit constipation    Pseudophakia, os    Posterior vitreous detachment of both eyes    Combined forms of age-related cataract, mild, of right eye     Current Outpatient Medications   Medication Sig Dispense Refill    AMBIEN 10 MG OR TABS 1 TABLET AT BEDTIME      atorvastatin (LIPITOR) 80 MG tablet Take 1 tablet (80 mg) by mouth daily. 90 tablet 4    benzonatate (TESSALON) 200 MG capsule Take 1 capsule (200 mg) by mouth 3 times daily as needed for cough. (Patient not taking: Reported on 7/31/2025) 40 capsule 2    BUPROPION HCL# 300 MG OR TB24 1 TABLET DAILY      divalproex (DEPAKOTE ER) 500 MG 24 hr tablet Reported on 3/27/2017      esomeprazole (NEXIUM) 40 MG DR capsule TAKE 1 CAPSULE TWICE DAILY 30-60 MINUTES BEFORE EATING 180 capsule 3     ibuprofen (ADVIL/MOTRIN) 200 MG capsule Take 200 mg by mouth every 4 hours as needed for fever      levothyroxine (SYNTHROID) 150 MCG tablet Take 1 tablet (150 mcg) by mouth daily. 90 tablet 3    Loratadine (CLARITIN PO) Take by mouth.      metoprolol tartrate (LOPRESSOR) 25 MG tablet Take 1 tablet by mouth twice daily 180 tablet 1    order for DME Walker with 4 wheels, brakes, seat 1 each 0    Pseudoeph-Doxylamine-DM-APAP (NYQUIL PO) Take by mouth.      ZYPREXA 5 MG OR TABS Reported on 3/27/2017       No current facility-administered medications for this visit.          Allergies   Allergen Reactions    Abilify [Aripiprazole]     Carafate [Sucralfate]     Latex      Pt unsure of reaction    Lisinopril Cough     cough    Adhesive Tape Rash     Review of Systems   Constitutional:  Negative for chills, fatigue and fever.   HENT:  Positive for congestion, rhinorrhea and sore throat. Negative for ear pain, sinus pressure and sinus pain.    Respiratory:  Positive for cough. Negative for shortness of breath and wheezing.    Cardiovascular: Negative.  Negative for chest pain, palpitations and leg swelling.   Gastrointestinal:  Positive for vomiting. Negative for diarrhea and nausea.   All other systems reviewed and are negative.          Objective    /74 (BP Location: Left arm, Patient Position: Sitting, Cuff Size: Adult Large)   Pulse (!) 129   Temp 97.6  F (36.4  C) (Tympanic)   Resp 22   Wt 123.5 kg (272 lb 3.2 oz)   LMP  (LMP Unknown)   SpO2 95%   BMI 45.30 kg/m    Body mass index is 45.3 kg/m .  Physical Exam  Vitals and nursing note reviewed.   Constitutional:       General: She is not in acute distress.     Appearance: Normal appearance. She is well-developed. She is obese. She is not ill-appearing.   HENT:      Head: Normocephalic and atraumatic.      Comments: TMs are intact without any erythema or bulging bilaterally.  Airway is patent.     Nose: Nose normal.      Mouth/Throat:      Lips: Pink.       Mouth: Mucous membranes are moist.      Pharynx: Oropharynx is clear. Uvula midline. No pharyngeal swelling, oropharyngeal exudate or posterior oropharyngeal erythema.      Tonsils: No tonsillar exudate or tonsillar abscesses.   Eyes:      General: No scleral icterus.     Extraocular Movements: Extraocular movements intact.      Conjunctiva/sclera: Conjunctivae normal.      Pupils: Pupils are equal, round, and reactive to light.   Neck:      Thyroid: No thyromegaly.   Cardiovascular:      Rate and Rhythm: Normal rate and regular rhythm.      Pulses: Normal pulses.      Heart sounds: Normal heart sounds, S1 normal and S2 normal. No murmur heard.     No friction rub. No gallop.   Pulmonary:      Effort: Pulmonary effort is normal. No accessory muscle usage, respiratory distress or retractions.      Breath sounds: Normal breath sounds and air entry. No stridor. No decreased breath sounds, wheezing, rhonchi or rales.   Musculoskeletal:      Cervical back: Normal range of motion and neck supple.   Lymphadenopathy:      Cervical: No cervical adenopathy.   Skin:     General: Skin is warm and dry.      Nails: There is no clubbing.   Neurological:      Mental Status: She is alert and oriented to person, place, and time.      Gait: Gait abnormal.   Psychiatric:         Mood and Affect: Mood normal.         Behavior: Behavior normal.         Thought Content: Thought content normal.         Judgment: Judgment normal.              Assessment/Plan:  Acute bronchitis with coexisting condition requiring prophylactic treatment:   Will also check covid as well.  Will treat with zithromax X5days, tessalon perles, and lidocaine oral viscous as needed for symptoms.  Recommend treatment with rest, fluids and chicken soup. Tylenol/ibuprofen prn fever/pain.  Recheck in clinic if symptoms worsen or if symptoms do not improve.  To the ER if she develops hemoptysis, chest pain, fevers>102, worsening shortness of breath/wheezing.    -      COVID-19 Virus (Coronavirus) by PCR Nose  -     azithromycin (ZITHROMAX) 250 MG tablet; 2 tablets the first day, then 1 tablet daily for the next 4 days  -     benzonatate (TESSALON) 200 MG capsule; Take 1 capsule (200 mg) by mouth 3 times daily as needed for cough.  -     lidocaine, viscous, (XYLOCAINE) 2 % solution; Swish and spit 15 mLs in mouth every 3 hours as needed for moderate pain. ; Max 8 doses/24 hour period.        Vesna Kaur PA-C

## 2025-09-01 ENCOUNTER — TELEPHONE (OUTPATIENT)
Dept: FAMILY MEDICINE | Facility: CLINIC | Age: 65
End: 2025-09-01
Payer: COMMERCIAL

## 2025-09-01 DIAGNOSIS — F31.73 MANIC BIPOLAR I DISORDER IN PARTIAL REMISSION (H): Primary | ICD-10-CM

## 2025-09-03 RX ORDER — BUPROPION HYDROCHLORIDE 150 MG/1
150 TABLET ORAL EVERY MORNING
Qty: 30 TABLET | Refills: 0 | Status: SHIPPED | OUTPATIENT
Start: 2025-09-03

## (undated) RX ORDER — PROPOFOL 10 MG/ML
INJECTION, EMULSION INTRAVENOUS
Status: DISPENSED
Start: 2021-08-12